# Patient Record
Sex: FEMALE | Race: WHITE | NOT HISPANIC OR LATINO | URBAN - METROPOLITAN AREA
[De-identification: names, ages, dates, MRNs, and addresses within clinical notes are randomized per-mention and may not be internally consistent; named-entity substitution may affect disease eponyms.]

---

## 2017-06-17 ENCOUNTER — INPATIENT (INPATIENT)
Facility: HOSPITAL | Age: 47
LOS: 0 days | Discharge: HOME | End: 2017-06-18
Attending: INTERNAL MEDICINE | Admitting: FAMILY MEDICINE

## 2017-06-17 DIAGNOSIS — E11.9 TYPE 2 DIABETES MELLITUS WITHOUT COMPLICATIONS: ICD-10-CM

## 2017-06-17 DIAGNOSIS — K85.90 ACUTE PANCREATITIS WITHOUT NECROSIS OR INFECTION, UNSPECIFIED: ICD-10-CM

## 2017-06-17 DIAGNOSIS — I25.10 ATHEROSCLEROTIC HEART DISEASE OF NATIVE CORONARY ARTERY WITHOUT ANGINA PECTORIS: ICD-10-CM

## 2017-06-28 DIAGNOSIS — I25.10 ATHEROSCLEROTIC HEART DISEASE OF NATIVE CORONARY ARTERY WITHOUT ANGINA PECTORIS: ICD-10-CM

## 2017-06-28 DIAGNOSIS — Z87.891 PERSONAL HISTORY OF NICOTINE DEPENDENCE: ICD-10-CM

## 2017-06-28 DIAGNOSIS — E66.01 MORBID (SEVERE) OBESITY DUE TO EXCESS CALORIES: ICD-10-CM

## 2017-06-28 DIAGNOSIS — Z95.5 PRESENCE OF CORONARY ANGIOPLASTY IMPLANT AND GRAFT: ICD-10-CM

## 2017-06-28 DIAGNOSIS — F32.9 MAJOR DEPRESSIVE DISORDER, SINGLE EPISODE, UNSPECIFIED: ICD-10-CM

## 2017-06-28 DIAGNOSIS — I10 ESSENTIAL (PRIMARY) HYPERTENSION: ICD-10-CM

## 2017-06-28 DIAGNOSIS — E78.5 HYPERLIPIDEMIA, UNSPECIFIED: ICD-10-CM

## 2017-06-28 DIAGNOSIS — Z79.4 LONG TERM (CURRENT) USE OF INSULIN: ICD-10-CM

## 2017-06-28 DIAGNOSIS — R07.9 CHEST PAIN, UNSPECIFIED: ICD-10-CM

## 2017-06-28 DIAGNOSIS — F41.9 ANXIETY DISORDER, UNSPECIFIED: ICD-10-CM

## 2017-06-28 DIAGNOSIS — E11.9 TYPE 2 DIABETES MELLITUS WITHOUT COMPLICATIONS: ICD-10-CM

## 2017-06-28 DIAGNOSIS — I25.2 OLD MYOCARDIAL INFARCTION: ICD-10-CM

## 2017-07-09 ENCOUNTER — INPATIENT (INPATIENT)
Facility: HOSPITAL | Age: 47
LOS: 1 days | Discharge: HOME | End: 2017-07-11
Attending: HOSPITALIST | Admitting: FAMILY MEDICINE

## 2017-07-09 DIAGNOSIS — I25.10 ATHEROSCLEROTIC HEART DISEASE OF NATIVE CORONARY ARTERY WITHOUT ANGINA PECTORIS: ICD-10-CM

## 2017-07-09 DIAGNOSIS — E11.9 TYPE 2 DIABETES MELLITUS WITHOUT COMPLICATIONS: ICD-10-CM

## 2017-07-09 DIAGNOSIS — K85.90 ACUTE PANCREATITIS WITHOUT NECROSIS OR INFECTION, UNSPECIFIED: ICD-10-CM

## 2017-07-14 DIAGNOSIS — Z79.84 LONG TERM (CURRENT) USE OF ORAL HYPOGLYCEMIC DRUGS: ICD-10-CM

## 2017-07-14 DIAGNOSIS — Z87.891 PERSONAL HISTORY OF NICOTINE DEPENDENCE: ICD-10-CM

## 2017-07-14 DIAGNOSIS — I25.2 OLD MYOCARDIAL INFARCTION: ICD-10-CM

## 2017-07-14 DIAGNOSIS — Z95.5 PRESENCE OF CORONARY ANGIOPLASTY IMPLANT AND GRAFT: ICD-10-CM

## 2017-07-14 DIAGNOSIS — I25.10 ATHEROSCLEROTIC HEART DISEASE OF NATIVE CORONARY ARTERY WITHOUT ANGINA PECTORIS: ICD-10-CM

## 2017-07-14 DIAGNOSIS — I10 ESSENTIAL (PRIMARY) HYPERTENSION: ICD-10-CM

## 2017-07-14 DIAGNOSIS — R19.7 DIARRHEA, UNSPECIFIED: ICD-10-CM

## 2017-07-14 DIAGNOSIS — K85.90 ACUTE PANCREATITIS WITHOUT NECROSIS OR INFECTION, UNSPECIFIED: ICD-10-CM

## 2017-07-14 DIAGNOSIS — E78.5 HYPERLIPIDEMIA, UNSPECIFIED: ICD-10-CM

## 2017-07-14 DIAGNOSIS — E83.42 HYPOMAGNESEMIA: ICD-10-CM

## 2017-07-14 DIAGNOSIS — E11.9 TYPE 2 DIABETES MELLITUS WITHOUT COMPLICATIONS: ICD-10-CM

## 2017-07-21 ENCOUNTER — TRANSCRIPTION ENCOUNTER (OUTPATIENT)
Age: 47
End: 2017-07-21

## 2017-10-12 ENCOUNTER — EMERGENCY (EMERGENCY)
Facility: HOSPITAL | Age: 47
LOS: 0 days | Discharge: HOME | End: 2017-10-12
Admitting: FAMILY MEDICINE

## 2017-10-12 DIAGNOSIS — Z79.899 OTHER LONG TERM (CURRENT) DRUG THERAPY: ICD-10-CM

## 2017-10-12 DIAGNOSIS — E11.9 TYPE 2 DIABETES MELLITUS WITHOUT COMPLICATIONS: ICD-10-CM

## 2017-10-12 DIAGNOSIS — I25.10 ATHEROSCLEROTIC HEART DISEASE OF NATIVE CORONARY ARTERY WITHOUT ANGINA PECTORIS: ICD-10-CM

## 2017-10-12 DIAGNOSIS — Z95.5 PRESENCE OF CORONARY ANGIOPLASTY IMPLANT AND GRAFT: ICD-10-CM

## 2017-10-12 DIAGNOSIS — I10 ESSENTIAL (PRIMARY) HYPERTENSION: ICD-10-CM

## 2017-10-12 DIAGNOSIS — Z79.84 LONG TERM (CURRENT) USE OF ORAL HYPOGLYCEMIC DRUGS: ICD-10-CM

## 2017-10-12 DIAGNOSIS — R07.89 OTHER CHEST PAIN: ICD-10-CM

## 2017-10-12 DIAGNOSIS — K85.90 ACUTE PANCREATITIS WITHOUT NECROSIS OR INFECTION, UNSPECIFIED: ICD-10-CM

## 2017-10-12 DIAGNOSIS — Z79.82 LONG TERM (CURRENT) USE OF ASPIRIN: ICD-10-CM

## 2018-04-06 ENCOUNTER — TRANSCRIPTION ENCOUNTER (OUTPATIENT)
Age: 48
End: 2018-04-06

## 2018-07-05 ENCOUNTER — EMERGENCY (EMERGENCY)
Facility: HOSPITAL | Age: 48
LOS: 0 days | Discharge: HOME | End: 2018-07-05
Attending: EMERGENCY MEDICINE | Admitting: EMERGENCY MEDICINE

## 2018-07-05 VITALS
DIASTOLIC BLOOD PRESSURE: 72 MMHG | SYSTOLIC BLOOD PRESSURE: 137 MMHG | TEMPERATURE: 98 F | OXYGEN SATURATION: 99 % | RESPIRATION RATE: 18 BRPM | HEART RATE: 72 BPM

## 2018-07-05 VITALS
OXYGEN SATURATION: 98 % | HEART RATE: 69 BPM | DIASTOLIC BLOOD PRESSURE: 90 MMHG | RESPIRATION RATE: 19 BRPM | TEMPERATURE: 98 F | SYSTOLIC BLOOD PRESSURE: 174 MMHG

## 2018-07-05 DIAGNOSIS — I48.91 UNSPECIFIED ATRIAL FIBRILLATION: ICD-10-CM

## 2018-07-05 DIAGNOSIS — I25.10 ATHEROSCLEROTIC HEART DISEASE OF NATIVE CORONARY ARTERY WITHOUT ANGINA PECTORIS: ICD-10-CM

## 2018-07-05 DIAGNOSIS — R42 DIZZINESS AND GIDDINESS: ICD-10-CM

## 2018-07-05 DIAGNOSIS — Z95.5 PRESENCE OF CORONARY ANGIOPLASTY IMPLANT AND GRAFT: ICD-10-CM

## 2018-07-05 DIAGNOSIS — E11.9 TYPE 2 DIABETES MELLITUS WITHOUT COMPLICATIONS: ICD-10-CM

## 2018-07-05 DIAGNOSIS — Z79.84 LONG TERM (CURRENT) USE OF ORAL HYPOGLYCEMIC DRUGS: ICD-10-CM

## 2018-07-05 DIAGNOSIS — Z79.899 OTHER LONG TERM (CURRENT) DRUG THERAPY: ICD-10-CM

## 2018-07-05 DIAGNOSIS — R11.0 NAUSEA: ICD-10-CM

## 2018-07-05 DIAGNOSIS — I25.2 OLD MYOCARDIAL INFARCTION: ICD-10-CM

## 2018-07-05 LAB
ANION GAP SERPL CALC-SCNC: 14 MMOL/L — SIGNIFICANT CHANGE UP (ref 7–14)
BASOPHILS # BLD AUTO: 0.03 K/UL — SIGNIFICANT CHANGE UP (ref 0–0.2)
BASOPHILS NFR BLD AUTO: 0.4 % — SIGNIFICANT CHANGE UP (ref 0–1)
BUN SERPL-MCNC: 10 MG/DL — SIGNIFICANT CHANGE UP (ref 10–20)
CALCIUM SERPL-MCNC: 9.7 MG/DL — SIGNIFICANT CHANGE UP (ref 8.5–10.1)
CHLORIDE SERPL-SCNC: 97 MMOL/L — LOW (ref 98–110)
CK MB CFR SERPL CALC: 1.6 NG/ML — SIGNIFICANT CHANGE UP (ref 0.6–6.3)
CK SERPL-CCNC: 91 U/L — SIGNIFICANT CHANGE UP (ref 0–225)
CO2 SERPL-SCNC: 28 MMOL/L — SIGNIFICANT CHANGE UP (ref 17–32)
CREAT SERPL-MCNC: 0.6 MG/DL — LOW (ref 0.7–1.5)
EOSINOPHIL # BLD AUTO: 0.12 K/UL — SIGNIFICANT CHANGE UP (ref 0–0.7)
EOSINOPHIL NFR BLD AUTO: 1.7 % — SIGNIFICANT CHANGE UP (ref 0–8)
GLUCOSE SERPL-MCNC: 195 MG/DL — HIGH (ref 70–99)
HCT VFR BLD CALC: 38.8 % — SIGNIFICANT CHANGE UP (ref 37–47)
HGB BLD-MCNC: 13.2 G/DL — SIGNIFICANT CHANGE UP (ref 12–16)
IMM GRANULOCYTES NFR BLD AUTO: 0.3 % — SIGNIFICANT CHANGE UP (ref 0.1–0.3)
LYMPHOCYTES # BLD AUTO: 1.62 K/UL — SIGNIFICANT CHANGE UP (ref 1.2–3.4)
LYMPHOCYTES # BLD AUTO: 22.8 % — SIGNIFICANT CHANGE UP (ref 20.5–51.1)
MAGNESIUM SERPL-MCNC: 1.6 MG/DL — LOW (ref 1.8–2.4)
MCHC RBC-ENTMCNC: 30.9 PG — SIGNIFICANT CHANGE UP (ref 27–31)
MCHC RBC-ENTMCNC: 34 G/DL — SIGNIFICANT CHANGE UP (ref 32–37)
MCV RBC AUTO: 90.9 FL — SIGNIFICANT CHANGE UP (ref 81–99)
MONOCYTES # BLD AUTO: 0.33 K/UL — SIGNIFICANT CHANGE UP (ref 0.1–0.6)
MONOCYTES NFR BLD AUTO: 4.6 % — SIGNIFICANT CHANGE UP (ref 1.7–9.3)
NEUTROPHILS # BLD AUTO: 4.99 K/UL — SIGNIFICANT CHANGE UP (ref 1.4–6.5)
NEUTROPHILS NFR BLD AUTO: 70.2 % — SIGNIFICANT CHANGE UP (ref 42.2–75.2)
PLATELET # BLD AUTO: 259 K/UL — SIGNIFICANT CHANGE UP (ref 130–400)
POTASSIUM SERPL-MCNC: 4.4 MMOL/L — SIGNIFICANT CHANGE UP (ref 3.5–5)
POTASSIUM SERPL-SCNC: 4.4 MMOL/L — SIGNIFICANT CHANGE UP (ref 3.5–5)
RBC # BLD: 4.27 M/UL — SIGNIFICANT CHANGE UP (ref 4.2–5.4)
RBC # FLD: 13.4 % — SIGNIFICANT CHANGE UP (ref 11.5–14.5)
SODIUM SERPL-SCNC: 139 MMOL/L — SIGNIFICANT CHANGE UP (ref 135–146)
TROPONIN T SERPL-MCNC: <0.01 NG/ML — SIGNIFICANT CHANGE UP
WBC # BLD: 7.11 K/UL — SIGNIFICANT CHANGE UP (ref 4.8–10.8)
WBC # FLD AUTO: 7.11 K/UL — SIGNIFICANT CHANGE UP (ref 4.8–10.8)

## 2018-07-05 RX ORDER — SODIUM CHLORIDE 9 MG/ML
1000 INJECTION INTRAMUSCULAR; INTRAVENOUS; SUBCUTANEOUS ONCE
Qty: 0 | Refills: 0 | Status: COMPLETED | OUTPATIENT
Start: 2018-07-05 | End: 2018-07-05

## 2018-07-05 RX ADMIN — SODIUM CHLORIDE 1000 MILLILITER(S): 9 INJECTION INTRAMUSCULAR; INTRAVENOUS; SUBCUTANEOUS at 18:33

## 2018-07-05 NOTE — ED PROVIDER NOTE - ATTENDING CONTRIBUTION TO CARE
46 yo female h/o CAD/ 4 stents (cardiologist Dr Callejas), DM c/o an episode of dizziness after having a BM earlier today.  Denies black or bloody stools, no vomiting or abdominal pain, no CP, SOB, fever, chills, change in exercise tolerance,  no paresthesias or focal weakness.  Reports compliance with medications.  No recent illness or travel, no change in meds.  States she is feeling much better. Well-appearing, female, resting on a stretcher, NAD, SHIN, mmm, nml exam including a detained neuro exam, nml gait.  Will check l;abd, ECG, reassess.

## 2018-07-05 NOTE — ED PROVIDER NOTE - OBJECTIVE STATEMENT
46yo F with PMHx CAD/PCI, DM, p/w dizziness after having a bowel movement earlier today. BM was diarrhea (chronic for patient), no melena or bloody stools. Assoc with nausea. Denies fever, headache, CP, SOB, cough, palpitations, vomiting, abd pain, dysuria, hematuria, leg swelling.   Card: Duvvuri

## 2018-07-05 NOTE — ED PROVIDER NOTE - NS ED ROS FT
Constitutional: No fever  ENMT:  No neck pain  Cardiac:  No chest pain  Respiratory:  No cough, SOB  GI:  No nausea, vomiting, abdominal pain. +diarrhea  :  No dysuria  MS:  No back pain  Neuro:  No headache. +lightheadedness  Skin:  No skin rash  Endocrine: +DM

## 2018-07-05 NOTE — ED PROVIDER NOTE - PHYSICAL EXAMINATION
Vital signs reviewed  GENERAL: Patient well appearing, NAD  HEAD: NCAT  EYES: PERRL, EOMI. No nystagmus  ENT: MMM  NECK: Supple, non tender,   RESPIRATORY: Normal respiratory effort. CTA B/L. No wheezing, rales, rhonchi  CARDIOVASCULAR: Regular rate and rhythm. Normal S1/S2. No murmurs, rubs or gallops.  ABDOMEN: Soft. Nondistended. Nontender. No guarding or rebound. No CVA tenderness.  MUSCULOSKELETAL/EXTREMITIES: Brisk cap refill. 2+ radial pulses. No leg edema.  SKIN:  Warm and dry  NEURO: AAOx3. No gross FND  PSYCHIATRIC: Cooperative. Affect appropriate

## 2018-07-05 NOTE — ED PROVIDER NOTE - PROGRESS NOTE DETAILS
Pt states will f/u with Dr. Sotelo outpatient. Strict return precautions given. I had a long discussion with the patient and her  prior to disposition,  I discussed results of all tests and recommended admission ( patient with h/o CAD/stent); she does not want to stay despite no clear cause for her symptoms.  She is A&Ox3, has capacity, her  participated in the decision making process and is comfortable with her going home.  Strict return precautions given, patient verbalized understanding and is amenable with the plan.

## 2018-10-18 ENCOUNTER — TRANSCRIPTION ENCOUNTER (OUTPATIENT)
Age: 48
End: 2018-10-18

## 2019-08-08 ENCOUNTER — OUTPATIENT (OUTPATIENT)
Dept: OUTPATIENT SERVICES | Facility: HOSPITAL | Age: 49
LOS: 1 days | Discharge: HOME | End: 2019-08-08

## 2019-08-08 PROBLEM — E11.9 TYPE 2 DIABETES MELLITUS WITHOUT COMPLICATIONS: Chronic | Status: ACTIVE | Noted: 2018-07-05

## 2019-08-08 PROBLEM — I48.91 UNSPECIFIED ATRIAL FIBRILLATION: Chronic | Status: ACTIVE | Noted: 2018-07-05

## 2019-08-22 PROBLEM — Z00.00 ENCOUNTER FOR PREVENTIVE HEALTH EXAMINATION: Status: ACTIVE | Noted: 2019-08-22

## 2019-09-10 ENCOUNTER — OUTPATIENT (OUTPATIENT)
Dept: OUTPATIENT SERVICES | Facility: HOSPITAL | Age: 49
LOS: 1 days | Discharge: HOME | End: 2019-09-10

## 2019-09-10 ENCOUNTER — LABORATORY RESULT (OUTPATIENT)
Age: 49
End: 2019-09-10

## 2019-09-10 ENCOUNTER — APPOINTMENT (OUTPATIENT)
Dept: OBGYN | Facility: CLINIC | Age: 49
End: 2019-09-10
Payer: COMMERCIAL

## 2019-09-10 VITALS
SYSTOLIC BLOOD PRESSURE: 144 MMHG | DIASTOLIC BLOOD PRESSURE: 80 MMHG | BODY MASS INDEX: 44.68 KG/M2 | WEIGHT: 278 LBS | HEART RATE: 89 BPM | HEIGHT: 66 IN | RESPIRATION RATE: 20 BRPM

## 2019-09-10 DIAGNOSIS — L73.8 OTHER SPECIFIED FOLLICULAR DISORDERS: ICD-10-CM

## 2019-09-10 DIAGNOSIS — B37.3 CANDIDIASIS OF VULVA AND VAGINA: ICD-10-CM

## 2019-09-10 PROCEDURE — 99386 PREV VISIT NEW AGE 40-64: CPT

## 2019-09-10 NOTE — CHIEF COMPLAINT
[Annual Visit] : annual visit [FreeTextEntry1] : MARCIAL MCCORMICK is a 49 year female for annual\par

## 2019-09-11 DIAGNOSIS — Z01.419 ENCOUNTER FOR GYNECOLOGICAL EXAMINATION (GENERAL) (ROUTINE) WITHOUT ABNORMAL FINDINGS: ICD-10-CM

## 2019-09-17 LAB
A VAGINAE DNA VAG QL NAA+PROBE: NORMAL
BVAB2 DNA VAG QL NAA+PROBE: NORMAL
C KRUSEI DNA VAG QL NAA+PROBE: NEGATIVE
C TRACH RRNA SPEC QL NAA+PROBE: NEGATIVE
MEGA1 DNA VAG QL NAA+PROBE: NORMAL
N GONORRHOEA RRNA SPEC QL NAA+PROBE: NEGATIVE
T VAGINALIS RRNA SPEC QL NAA+PROBE: NEGATIVE

## 2019-10-06 ENCOUNTER — INPATIENT (INPATIENT)
Facility: HOSPITAL | Age: 49
LOS: 0 days | Discharge: HOME | End: 2019-10-07
Attending: INTERNAL MEDICINE | Admitting: INTERNAL MEDICINE
Payer: COMMERCIAL

## 2019-10-06 VITALS
TEMPERATURE: 97 F | SYSTOLIC BLOOD PRESSURE: 206 MMHG | DIASTOLIC BLOOD PRESSURE: 102 MMHG | HEART RATE: 147 BPM | RESPIRATION RATE: 20 BRPM

## 2019-10-06 DIAGNOSIS — Z98.891 HISTORY OF UTERINE SCAR FROM PREVIOUS SURGERY: Chronic | ICD-10-CM

## 2019-10-06 LAB
ALBUMIN SERPL ELPH-MCNC: 4.6 G/DL — SIGNIFICANT CHANGE UP (ref 3.5–5.2)
ALP SERPL-CCNC: 91 U/L — SIGNIFICANT CHANGE UP (ref 30–115)
ALT FLD-CCNC: 43 U/L — HIGH (ref 0–41)
ANION GAP SERPL CALC-SCNC: 16 MMOL/L — HIGH (ref 7–14)
AST SERPL-CCNC: 24 U/L — SIGNIFICANT CHANGE UP (ref 0–41)
BILIRUB SERPL-MCNC: 0.4 MG/DL — SIGNIFICANT CHANGE UP (ref 0.2–1.2)
BUN SERPL-MCNC: 16 MG/DL — SIGNIFICANT CHANGE UP (ref 10–20)
CALCIUM SERPL-MCNC: 10.1 MG/DL — SIGNIFICANT CHANGE UP (ref 8.5–10.1)
CHLORIDE SERPL-SCNC: 101 MMOL/L — SIGNIFICANT CHANGE UP (ref 98–110)
CO2 SERPL-SCNC: 24 MMOL/L — SIGNIFICANT CHANGE UP (ref 17–32)
CREAT SERPL-MCNC: 0.8 MG/DL — SIGNIFICANT CHANGE UP (ref 0.7–1.5)
GLUCOSE BLDC GLUCOMTR-MCNC: 203 MG/DL — HIGH (ref 70–99)
GLUCOSE SERPL-MCNC: 192 MG/DL — HIGH (ref 70–99)
HCT VFR BLD CALC: 46.2 % — SIGNIFICANT CHANGE UP (ref 37–47)
HGB BLD-MCNC: 15.6 G/DL — SIGNIFICANT CHANGE UP (ref 12–16)
MCHC RBC-ENTMCNC: 31.6 PG — HIGH (ref 27–31)
MCHC RBC-ENTMCNC: 33.8 G/DL — SIGNIFICANT CHANGE UP (ref 32–37)
MCV RBC AUTO: 93.5 FL — SIGNIFICANT CHANGE UP (ref 81–99)
NRBC # BLD: 0 /100 WBCS — SIGNIFICANT CHANGE UP (ref 0–0)
PLATELET # BLD AUTO: 248 K/UL — SIGNIFICANT CHANGE UP (ref 130–400)
POTASSIUM SERPL-MCNC: 4.2 MMOL/L — SIGNIFICANT CHANGE UP (ref 3.5–5)
POTASSIUM SERPL-SCNC: 4.2 MMOL/L — SIGNIFICANT CHANGE UP (ref 3.5–5)
PROT SERPL-MCNC: 7.6 G/DL — SIGNIFICANT CHANGE UP (ref 6–8)
RBC # BLD: 4.94 M/UL — SIGNIFICANT CHANGE UP (ref 4.2–5.4)
RBC # FLD: 13.5 % — SIGNIFICANT CHANGE UP (ref 11.5–14.5)
SODIUM SERPL-SCNC: 141 MMOL/L — SIGNIFICANT CHANGE UP (ref 135–146)
TROPONIN T SERPL-MCNC: <0.01 NG/ML — SIGNIFICANT CHANGE UP
WBC # BLD: 6.44 K/UL — SIGNIFICANT CHANGE UP (ref 4.8–10.8)
WBC # FLD AUTO: 6.44 K/UL — SIGNIFICANT CHANGE UP (ref 4.8–10.8)

## 2019-10-06 PROCEDURE — 93010 ELECTROCARDIOGRAM REPORT: CPT

## 2019-10-06 PROCEDURE — 99291 CRITICAL CARE FIRST HOUR: CPT

## 2019-10-06 PROCEDURE — 71045 X-RAY EXAM CHEST 1 VIEW: CPT | Mod: 26

## 2019-10-06 PROCEDURE — 71275 CT ANGIOGRAPHY CHEST: CPT | Mod: 26

## 2019-10-06 RX ORDER — ATORVASTATIN CALCIUM 80 MG/1
80 TABLET, FILM COATED ORAL AT BEDTIME
Refills: 0 | Status: DISCONTINUED | OUTPATIENT
Start: 2019-10-06 | End: 2019-10-07

## 2019-10-06 RX ORDER — ENOXAPARIN SODIUM 100 MG/ML
125 INJECTION SUBCUTANEOUS ONCE
Refills: 0 | Status: DISCONTINUED | OUTPATIENT
Start: 2019-10-06 | End: 2019-10-06

## 2019-10-06 RX ORDER — CHLORHEXIDINE GLUCONATE 213 G/1000ML
1 SOLUTION TOPICAL
Refills: 0 | Status: DISCONTINUED | OUTPATIENT
Start: 2019-10-06 | End: 2019-10-07

## 2019-10-06 RX ORDER — INSULIN LISPRO 100/ML
16 VIAL (ML) SUBCUTANEOUS
Refills: 0 | Status: DISCONTINUED | OUTPATIENT
Start: 2019-10-06 | End: 2019-10-07

## 2019-10-06 RX ORDER — DEXTROSE 50 % IN WATER 50 %
25 SYRINGE (ML) INTRAVENOUS ONCE
Refills: 0 | Status: DISCONTINUED | OUTPATIENT
Start: 2019-10-06 | End: 2019-10-07

## 2019-10-06 RX ORDER — CLOPIDOGREL BISULFATE 75 MG/1
75 TABLET, FILM COATED ORAL DAILY
Refills: 0 | Status: DISCONTINUED | OUTPATIENT
Start: 2019-10-06 | End: 2019-10-07

## 2019-10-06 RX ORDER — GLUCAGON INJECTION, SOLUTION 0.5 MG/.1ML
1 INJECTION, SOLUTION SUBCUTANEOUS ONCE
Refills: 0 | Status: DISCONTINUED | OUTPATIENT
Start: 2019-10-06 | End: 2019-10-07

## 2019-10-06 RX ORDER — METOPROLOL TARTRATE 50 MG
100 TABLET ORAL DAILY
Refills: 0 | Status: DISCONTINUED | OUTPATIENT
Start: 2019-10-06 | End: 2019-10-07

## 2019-10-06 RX ORDER — SODIUM CHLORIDE 9 MG/ML
1000 INJECTION, SOLUTION INTRAVENOUS
Refills: 0 | Status: DISCONTINUED | OUTPATIENT
Start: 2019-10-06 | End: 2019-10-07

## 2019-10-06 RX ORDER — ENOXAPARIN SODIUM 100 MG/ML
120 INJECTION SUBCUTANEOUS
Refills: 0 | Status: DISCONTINUED | OUTPATIENT
Start: 2019-10-06 | End: 2019-10-07

## 2019-10-06 RX ORDER — DEXTROSE 50 % IN WATER 50 %
12.5 SYRINGE (ML) INTRAVENOUS ONCE
Refills: 0 | Status: DISCONTINUED | OUTPATIENT
Start: 2019-10-06 | End: 2019-10-07

## 2019-10-06 RX ORDER — ENOXAPARIN SODIUM 100 MG/ML
120 INJECTION SUBCUTANEOUS ONCE
Refills: 0 | Status: COMPLETED | OUTPATIENT
Start: 2019-10-06 | End: 2019-10-06

## 2019-10-06 RX ORDER — INSULIN LISPRO 100/ML
16 VIAL (ML) SUBCUTANEOUS
Refills: 0 | Status: DISCONTINUED | OUTPATIENT
Start: 2019-10-06 | End: 2019-10-06

## 2019-10-06 RX ORDER — INSULIN LISPRO 100/ML
VIAL (ML) SUBCUTANEOUS
Refills: 0 | Status: DISCONTINUED | OUTPATIENT
Start: 2019-10-06 | End: 2019-10-07

## 2019-10-06 RX ORDER — ASPIRIN/CALCIUM CARB/MAGNESIUM 324 MG
81 TABLET ORAL DAILY
Refills: 0 | Status: DISCONTINUED | OUTPATIENT
Start: 2019-10-06 | End: 2019-10-07

## 2019-10-06 RX ORDER — INSULIN GLARGINE 100 [IU]/ML
70 INJECTION, SOLUTION SUBCUTANEOUS AT BEDTIME
Refills: 0 | Status: DISCONTINUED | OUTPATIENT
Start: 2019-10-06 | End: 2019-10-07

## 2019-10-06 RX ORDER — INSULIN GLARGINE 100 [IU]/ML
70 INJECTION, SOLUTION SUBCUTANEOUS AT BEDTIME
Refills: 0 | Status: DISCONTINUED | OUTPATIENT
Start: 2019-10-06 | End: 2019-10-06

## 2019-10-06 RX ORDER — DILTIAZEM HCL 120 MG
20 CAPSULE, EXT RELEASE 24 HR ORAL ONCE
Refills: 0 | Status: COMPLETED | OUTPATIENT
Start: 2019-10-06 | End: 2019-10-06

## 2019-10-06 RX ORDER — DEXTROSE 50 % IN WATER 50 %
15 SYRINGE (ML) INTRAVENOUS ONCE
Refills: 0 | Status: DISCONTINUED | OUTPATIENT
Start: 2019-10-06 | End: 2019-10-07

## 2019-10-06 RX ADMIN — ENOXAPARIN SODIUM 120 MILLIGRAM(S): 100 INJECTION SUBCUTANEOUS at 20:31

## 2019-10-06 RX ADMIN — Medication 20 MILLIGRAM(S): at 16:48

## 2019-10-06 NOTE — ED PROVIDER NOTE - NS ED ROS FT
Constitutional:  No fevers or chills.  Eyes:  No visual changes, eye pain, or discharge.  ENT:  No hearing changes. No sore throat.  Neck:  No neck pain or stiffness.  Cardiac:  +CP.  Resp:  No cough, +SOB. No hemoptysis.   GI:  No nausea, vomiting, diarrhea, or abdominal pain.  :  No dysuria, frequency, or hematuria.  MSK:  No myalgias or joint pain/swelling.  Neuro:  No headache, +dizziness/weakness.  Skin:  No skin rash.

## 2019-10-06 NOTE — ED PROVIDER NOTE - CLINICAL SUMMARY MEDICAL DECISION MAKING FREE TEXT BOX
pt s/o to me by Dr. Ansari - 49y f h/o cad/mi/stents, dm, htn, marilin non-compliance w/cpap p/w new-onset rapid AF to 120s - rate controlled with cardizem, AC lovenox - ED w/u incl CTPA w/acute findings, no PE - Cardio/EP consulted, admitted to tele for further mgmt

## 2019-10-06 NOTE — ED PROVIDER NOTE - PHYSICAL EXAMINATION
PHYSICAL EXAM: I have reviewed current vital signs.  GENERAL: NAD.  HEAD:  Normocephalic, atraumatic.  EYES:  Conjunctiva and sclera clear.  ENT: MMM, no erythema/exudates.  NECK: Supple, full ROM.  CHEST/LUNG: Clear to auscultation bilaterally; no wheezes, rales, or rhonchi.  HEART: Irregularly irregular, normal S1 and S2; no murmurs, rubs, or gallops.  ABDOMEN: Soft, nontender, nondistended.  EXTREMITIES:  2+ peripheral pulses; FROM.  PSYCH: Cooperative, appropriate, normal mood and affect.  NEUROLOGY: A&O x 3. Motor 5/5. No focal neurological deficits.   SKIN: Warm and dry.

## 2019-10-06 NOTE — H&P ADULT - HISTORY OF PRESENT ILLNESS
49 year old female with history of HTN, DM, DLD, CAD, s/p cardiac stent x 4 on DAPT presents to ED with palpitations.     Has associated substernal chest pressure/SOB and dizziness/weakness. Mild chest pressure, non-radiating, no exacerbating/relieving factors. Denies any f/c, back pain, abd pain, n/v/d, injuries/traumas/falls, or syncope. No know hx of afib, cards is Dr. Sotelo. Took 2 baby ASA PTA. Patient's brother  suddenly from PE after going into afib.    In ED   206/102  RR 20 /min Temp (F)97.4 found to have new afib 49 year old female with history of HTN, DM, DLD, CAD, s/p cardiac stent x 4 on DAPT presents to ED with palpitations.  patient was doing well until this morning when she started to fel palpitations and chest tightness, reports similar 2 episodes in the past. has been experiencing sob with exertion, no chest pain, orthopnea, PND. no nausea, vomiting, abdominal pain, Diarrhea or urinary symptoms.     In ED   206/102  RR 20 /min Temp (F)97.4 found to have new afib, rate controlled with Cardizem IV 20 mg.

## 2019-10-06 NOTE — ED ADULT NURSE NOTE - CHPI ED NUR SYMPTOMS NEG
no congestion/no back pain/no shortness of breath/no diaphoresis/no syncope/no vomiting/no dizziness/no fever/no nausea/no chest pain/no chills

## 2019-10-06 NOTE — H&P ADULT - NSHPLABSRESULTS_GEN_ALL_CORE
15.6   6.44  )-----------( 248      ( 06 Oct 2019 16:13 )             46.2       10-06    141  |  101  |  16  ----------------------------<  192<H>  4.2   |  24  |  0.8    Ca    10.1      06 Oct 2019 16:13    TPro  7.6  /  Alb  4.6  /  TBili  0.4  /  DBili  x   /  AST  24  /  ALT  43<H>  /  AlkPhos  91  10-06                      Lactate Trend      CARDIAC MARKERS ( 06 Oct 2019 16:13 )  x     / <0.01 ng/mL / x     / x     / x            CAPILLARY BLOOD GLUCOSE      POCT Blood Glucose.: 172 mg/dL (06 Oct 2019 15:58)

## 2019-10-06 NOTE — ED ADULT NURSE NOTE - NSIMPLEMENTINTERV_GEN_ALL_ED
Implemented All Universal Safety Interventions:  [place] to call system. Call bell, personal items and telephone within reach. Instruct patient to call for assistance. Room bathroom lighting operational. Non-slip footwear when patient is off stretcher. Physically safe environment: no spills, clutter or unnecessary equipment. Stretcher in lowest position, wheels locked, appropriate side rails in place.

## 2019-10-06 NOTE — ED ADULT NURSE NOTE - OBJECTIVE STATEMENT
Patient c/o palpitations this afternoon while cooking. Denies n/v/f/chills/SOB at this time. Patient took 2 ASA tablets and nitro at home prior to arrival On assessment patients HR- 155, shows no signs of distress at this time.

## 2019-10-06 NOTE — CONSULT NOTE ADULT - SUBJECTIVE AND OBJECTIVE BOX
Patient was seen and examined earlier this evening by me in Main ER.   at bedside.      Patient is a 49y old  Female who presents with a chief complaint of palpitation (06 Oct 2019 21:35)      REASON FOR CONSULT: Atrial Fibrillation    HPI:  Ms. Emelina Gutierrez is an 49-year-old  woman with a past medical history of CAD, S/P MI, S/P PTCA/Stent (Infarct-related Artery, RCA, JUAN), S/P PTCA/Stent of LCx, Hypertension, Hyperlipidemia, Type II DM, Obesity and Obstructive Sleep Apnea.     She presented at [facility] ER because of palpitations associated with chest tightness.  She was in her usual state of health when she suddenly started feeling palpitations while she in her kitchen at home.   She admits to having had episodes of exertional shortness of breath.   She was found to be in rapid AFIB in the ER.    She currently appears comfortable.  Her rhythm remains in Atrial Fibrillation.  She denies any chest pain.  _______________________________    House Staff Admission Notes  49 year old female with history of HTN, DM, DLD, CAD, s/p cardiac stent x 4 on DAPT presents to ED with palpitations.  patient was doing well until this morning when she started to fel palpitations and chest tightness, reports similar 2 episodes in the past. has been experiencing sob with exertion, no chest pain, orthopnea, PND. no nausea, vomiting, abdominal pain, Diarrhea or urinary symptoms.     In ED   206/102  RR 20 /min Temp (F)97.4 found to have new afib, rate controlled with Cardizem IV 20 mg. (06 Oct 2019 21:35)      PAST MEDICAL & SURGICAL HISTORY:  DM (diabetes mellitus)  Afib  MI (myocardial infarction)  H/O  section          SOCIAL HISTORY: Quit smoking in 2016 when she had MI    FAMILY HISTORY:  Family history of early CAD  FH: stroke  FH: pulmonary embolism    No Known Allergies      MEDICATIONS  (STANDING):  aspirin  chewable 81 milliGRAM(s) Oral daily  atorvastatin 80 milliGRAM(s) Oral at bedtime  chlorhexidine 4% Liquid 1 Application(s) Topical <User Schedule>  clopidogrel Tablet 75 milliGRAM(s) Oral daily  dextrose 5%. 1000 milliLiter(s) (50 mL/Hr) IV Continuous <Continuous>  dextrose 50% Injectable 12.5 Gram(s) IV Push once  dextrose 50% Injectable 25 Gram(s) IV Push once  dextrose 50% Injectable 25 Gram(s) IV Push once  enoxaparin Injectable 120 milliGRAM(s) SubCutaneous two times a day  insulin glargine Injectable (LANTUS) 70 Unit(s) SubCutaneous at bedtime  insulin lispro (HumaLOG) corrective regimen sliding scale   SubCutaneous three times a day before meals  insulin lispro Injectable (HumaLOG) 16 Unit(s) SubCutaneous three times a day before meals  metoprolol succinate  milliGRAM(s) Oral daily  PARoxetine 30 milliGRAM(s) Oral daily    MEDICATIONS  (PRN):  dextrose 40% Gel 15 Gram(s) Oral once PRN Blood Glucose LESS THAN 70 milliGRAM(s)/deciliter  glucagon  Injectable 1 milliGRAM(s) IntraMuscular once PRN Glucose LESS THAN 70 milligrams/deciliter      Vital Signs Last 24 Hrs  T(C): 36.1 (06 Oct 2019 22:16), Max: 37.7 (06 Oct 2019 16:22)  T(F): 97 (06 Oct 2019 22:16), Max: 99.9 (06 Oct 2019 16:22)  HR: 80 (06 Oct 2019 22:16) (80 - 147)  BP: 131/61 (06 Oct 2019 22:16) (119/57 - 206/102)  BP(mean): --  RR: 18 (06 Oct 2019 22:16) (17 - 20)  SpO2: 98% (06 Oct 2019 22:16) (96% - 99%) I&O's Detail    PHYSICAL EXAM:  Not in distress  Normocephalic; atraumatic; obese  EOM's intact  No JVD; irregular rhythm; nl S1S2  Bilateral Breath sounds  Abdomen soft, no guarding  No edema  No focal neurologic deficits    REVIEW OF SYSTEMS  CONSTITUTIONAL:  No night sweats.  No fatigue, malaise, lethargy.  No fever or chills.  HEENT:  Eyes:  No visual changes.  No eye pain.  No eye discharge.  ENT:  No runny nose.  No epistaxis.  No sinus pain.  No sore throat.  No ear pain.  No congestion.  Stopped using CPAP in .  RESPIRATORY:  No cough.  No wheeze.  No hemoptysis.    CARDIOVASCULAR:  Palpitations with chest tightness and shortness of breath as described in HPI.  GASTROINTESTINAL:  No abdominal pain.  No nausea or vomiting.  No diarrhea or constipation.  No hematemesis.  No hematochezia.  No melena.  GENITOURINARY:  No urgency.  No frequency.  No dysuria.  No hematuria.  No obstructive symptoms.    MUSCULOSKELETAL:  No musculoskeletal pain.  No joint swelling.  No arthritis.  NEUROLOGICAL:    No headache or neck pain.  No syncope or seizure.   SKIN:  No rashes.  No lesions.  No wounds.  ENDOCRINE:  No unexplained weight loss.  No polydipsia.  No polyuria.  No polyphagia.  HEMATOLOGIC:  No anemia.  No purpura.  No petechiae.    ALLERGIC AND IMMUNOLOGIC:  No pruritus.      ECG: Atrial Fibrillation    ECHOCARDIOGRAM: pending    RADIOLOGY & ADDITIONAL STUDIES:  CXR  < from: Xray Chest 1 View AP/PA (10.06.19 @ 16:38) >  FINDINGS:    Support Devices: None.    Cardiac/Mediastinum/Hilum: Unremarkable.    Lung Parenchyma/Pleura: No focal parenchymal opacities, pleural effusion   or pneumothorax are present.    Skeleton/Soft Tissues: Unremarkable.    IMPRESSION:  No evidence of acute cardiopulmonary disease.      < end of copied text >    CT  < from: CT Angio Chest w/ IV Cont (10.06.19 @ 20:35) >  FINDINGS:    PULMONARY EMBOLUS: No pulmonary embolus.    LUNGS: Bibasilar subsegmental atelectasis. No focal consolidations,   pneumothorax, or pleural effusions. Patent central airways.    HEART/GREAT VESSELS: Cardiomegaly. No pericardial effusion. The thoracic   aorta and main pulmonary artery are within normal limits. Coronary artery   calcifications.    MEDIASTINUM/THORACIC NODES: Unremarkable.    VISUALIZED UPPER ABDOMEN: Postcholecystectomy.    BONES/SOFT TISSUES: Degenerative changes of the thoracolumbar spine.    TUBES/LINES: None.    IMPRESSION:    No pulmonary emboli or acute intrathoracic pathology.    < end of copied text >        LABS:                        15.6   6.44  )-----------( 248      ( 06 Oct 2019 16:13 )             46.2     10-    141  |  101  |  16  ----------------------------<  192<H>  4.2   |  24  |  0.8    Ca    10.1      06 Oct 2019 16:13    TPro  7.6  /  Alb  4.6  /  TBili  0.4  /  DBili  x   /  AST  24  /  ALT  43<H>  /  AlkPhos  91  10-06    CARDIAC MARKERS ( 06 Oct 2019 16:13 )  x     / <0.01 ng/mL / x     / x     / x

## 2019-10-06 NOTE — ED PROVIDER NOTE - PROGRESS NOTE DETAILS
I personally evaluated the patient. I reviewed the Resident’s or Physician Assistant’s note (as assigned above), and agree with the findings and plan except as documented in my note.   50 Y/O F HTN, DM, DLD, CAD, S/P CARDIAC STENT X 4 ON DAPT, C/O PALPITATIONS SINCE 3 PM THIS AFTERNOON WHILE PREPARING DINNER. + ASSOCIATED CHEST PRESSURE AND SOB. + DIZZINESS. PT DENIES ANY H/O AFIB. PT REPORTS BROTHER DIES OF PE. NO RECENT CHANGE IN MEDICATIONS. VITALS NOTED. ALERT OX3 NAD. OP NORMAL. NECK SUPPLE. THYROID NORMAL. LUNGS CLEAR B/L. TACHYCARDIA IRREG IRREG RHYTHM. ABD- SOFT NONTENDER. NO LEG EDEMA. CN 2-12 INTACT. NEURO EXAM NONFOCAL. CASE D/W DR. TAPIA, DR. HILLMAN IN ED AND WILL EVALUATE PT DR. HILLMAN AT PTS BEDSIDE. DR. HILLMAN AT PTS BEDSIDE. EP CONSULTED. PT SIGNED OUT TO DR. RAMACHANDRAN, FOLLOW UP CT SCAN, REASSESS AND DISPO.

## 2019-10-06 NOTE — H&P ADULT - ATTENDING COMMENTS
48 YO F with a PMH of HTN, DM, DLD, CAD, s/p cardiac stent x 4, and JOCELYNE (non-compliant with mask) who presents to the hospital with a c/o palpitations that started suddenly today at 1500. Associated with chest discomfort and head throbbing. Pt denies any excessive caffeine, cocaine/crack use, or cigarette smoking. In the ED, the pt was noted to be in AF with RVR. Given Cardizem 20 mg IV with good relief of symptoms. Cardio consulted. Physical exam with obesity. Otherwise WNLs. Labs and radiology as above. New onset AF with RVR, resolved. FU Cardio consult. Elevated CHADSVASC (4), will need to be started on AC. C/w beta-blocker therapy. Obtain echo. Send TSH. Admit to telem. JOCELYNE, poor compliance with CPAP. Will need to be reevaluated as out-pt. Hx of CAD. C/w home meds. Hx of HTN and DM. C/w home meds. GI and DVT PPX. Rest as per above note. 50 YO F with a PMH of HTN, DM, DLD, CAD, s/p cardiac stent x 4, and JOCELYNE (non-compliant with mask) who presents to the hospital with a c/o palpitations that started suddenly today at 1500. Associated with chest discomfort and head throbbing. Pt denies any excessive caffeine, cocaine/crack use, or cigarette smoking. In the ED, the pt was noted to be in AF with RVR. Given Cardizem 20 mg IV with good relief of symptoms. Cardio consulted. Physical exam with obesity. Otherwise WNLs. Labs and radiology as above. New onset AF with RVR, resolved. FU Cardio consult. Elevated CHADSVASC (4), will need to be started on AC. C/w beta-blocker therapy. Obtain echo. Send TSH. Admit to telem. [facility]. Short trial of IVFs. Repeat BMP in the AM. JOCELYNE, poor compliance with CPAP. Will need to be reevaluated as out-pt. Hx of CAD. C/w home meds. Hx of HTN and DM. C/w home meds. GI and DVT PPX. Rest as per above note.

## 2019-10-06 NOTE — CONSULT NOTE ADULT - ASSESSMENT
1] Atrial Fibrillation, new onset? vs PAF      [id number] score 4  - Indications for OAC discussed with the patient.  Risks, benefits and alternatives of OAC with Warfarin vs NOAC (Xarelto or Eliquis) discussed with the patient.  - Will recommend OAC with NOAC if patient agrees.  - Continue anticoagulation with Heparin for now  - Treatment options for Atrial Fib discussed with the patient.  Will hold off on IBIS guided DCCV since patient has not been on CPAP therapy for her JOCELYNE for past 3 years.  Patient states she has had episodes of palpitations.    2] CAD S/P PTCA/Stent (RCA/LCx)  - Will discontinue Aspirin when OAC is started  - For now continue DAPT    3] Obstructive Sleep Apnea  - Patient used her CPAP therapy briefly in 2016 after her MI.  She has not been on CPAP for >3 years now.  - Will need to be re-evaluated as outpatient.    4] Hypertension  - Continue beta-blocker therapy.  Adjust dose in am if rate of AFIB still >100 beats per minute    5] Type II DM  - Glycemic control    6] Hyperlipidemia  - Continue statin therapy    Omero Giraldo MD (covering for Dr. Sean Callejas)  [phone] Office

## 2019-10-06 NOTE — ED PROVIDER NOTE - OBJECTIVE STATEMENT
50yo F with PMH of HTN, DM, DLD, CAD, s/p cardiac stent x 4 on DAPT presenting to ED with palpitations that started Y/O F HTN, DM, DLD, CAD, S/P CARDIAC STENT X 4 ON DAPT, C/O PALPITATIONS SINCE 3 PM THIS AFTERNOON WHILE PREPARING DINNER. + ASSOCIATED CHEST PRESSURE AND SOB. + DIZZINESS. PT DENIES ANY H/O AFIB. PT REPORTS BROTHER DIES OF PE. NO RECENT CHANGE IN MEDICATIONS. VITALS NOTED. ALERT OX3 NAD. OP NORMAL. NECK SUPPLE. THYROID NORMAL. LUNGS CLEAR B/L. TACHYCARDIA IRREG IRREG RHYTHM. ABD- SOFT NONTENDER. NO LEG EDEMA. CN 2-12 INTACT. NEURO EXAM NONFOCA 50yo F with PMH of HTN, DM, DLD, CAD, s/p cardiac stent x 4 on DAPT presenting to ED with palpitations that started this afternoon around 3PM when preparing dinner. Has associated substernal chest pressure/SOB and dizziness/weakness. Mild chest pressure, non-radiating, no exacerbating/relieving factors. Denies any f/c, back pain, abd pain, n/v/d, injuries/traumas/falls, or syncope. No know hx of afib, cards is Dr. Sotelo. Took 2 baby ASA PTA. Patient's brother  suddenly from PE after going into afib.

## 2019-10-06 NOTE — H&P ADULT - ASSESSMENT
# NEW ATRIAL FIBRILLATION      # HTN   # CAD s/p PCI      # DM 49 year old female with history of HTN, DM, DLD, CAD, s/p cardiac stent x 4 on DAPT presents to ED with palpitations.      # NEW ATRIAL FIBRILLATION  - rate controlled s/p 2 mg Cardizem IV, start 30 mg Q6 PO  - Lovenox for now, switch to NOAC before d/c   - trop negative *1   - 2decho   - admit to tele   - TFT  - cardiology eval    # HTN   # CAD s/p PCI  - c/w aspirin, and Plavix   - c/w metoprolol   - c/w atorvaststin    # DM   - c/w insulin 70/16/16/16    # DVT PPX on AC   # DASH diet   # full code   # dispo d/c in 24 hours 49 year old female with history of HTN, DM, DLD, CAD, s/p cardiac stent x 4 on DAPT presents to ED with palpitations.      # NEW ATRIAL FIBRILLATION  - rate controlled s/p 2 mg Cardizem IV, start 30 mg Q6 PO  - Lovenox for now, switch to NOAC before d/c   - trop negative *1   - 2decho   - admit to tele   - TFT  - cardiology eval    # HTN   # CAD s/p PCI  - c/w aspirin, and Plavix  - c/w metoprolol   - c/w atorvaststin    # DM   - c/w insulin 70/16/16/16    # DVT PPX on AC   # DASH diet   # full code   # dispo d/c in 24 hours

## 2019-10-06 NOTE — H&P ADULT - NSHPPHYSICALEXAM_GEN_ALL_CORE
PHYSICAL EXAM:  GENERAL: NAD, lying in bed comfortably  HEAD:  Atraumatic, Normocephalic  EYES: EOMI, PERRLA, conjunctiva and sclera clear  ENT: Moist mucous membranes  NECK: Supple, No JVD  CHEST/LUNG: Clear to auscultation bilaterally; No rales, rhonchi, wheezing, or rubs. Unlabored respirations  HEART: Regular rate and rhythm; No murmurs, rubs, or gallops  ABDOMEN: Bowel sounds present; Soft, Nontender, Nondistended. No hepatomegally  EXTREMITIES:  2+ Peripheral Pulses, brisk capillary refill. No clubbing, cyanosis, or edema  NERVOUS SYSTEM:  Alert & Oriented X3, speech clear. No deficits   MSK: FROM all 4 extremities, full and equal strength  SKIN: No rashes or lesions GENERAL: NAD, lying in bed comfortably  HEAD:  Atraumatic, Normocephalic  EYES: EOMI, PERRLA, conjunctiva and sclera clear  ENT: Moist mucous membranes  CHEST/LUNG: Clear to auscultation bilaterally  HEART: Regular rate and rhythm; No murmurs, rubs, or gallops  ABDOMEN: Soft, Nontender, Nondistended. No hepatomegally  EXTREMITIES:  2+ Peripheral Pulses, brisk capillary refill. No clubbing, cyanosis, or edema  NERVOUS SYSTEM:  Alert & Oriented X3, speech clear. No deficits   MSK: FROM all 4 extremities, full and equal strength  SKIN: No rashes or lesions

## 2019-10-06 NOTE — H&P ADULT - NSICDXFAMILYHX_GEN_ALL_CORE_FT
FAMILY HISTORY:  FH: pulmonary embolism FAMILY HISTORY:  Family history of early CAD  FH: pulmonary embolism  FH: stroke

## 2019-10-07 ENCOUNTER — TRANSCRIPTION ENCOUNTER (OUTPATIENT)
Age: 49
End: 2019-10-07

## 2019-10-07 VITALS
SYSTOLIC BLOOD PRESSURE: 141 MMHG | DIASTOLIC BLOOD PRESSURE: 76 MMHG | HEART RATE: 100 BPM | RESPIRATION RATE: 18 BRPM | TEMPERATURE: 97 F | OXYGEN SATURATION: 98 %

## 2019-10-07 LAB
ALBUMIN SERPL ELPH-MCNC: 4.2 G/DL — SIGNIFICANT CHANGE UP (ref 3.5–5.2)
ALP SERPL-CCNC: 71 U/L — SIGNIFICANT CHANGE UP (ref 30–115)
ALT FLD-CCNC: 37 U/L — SIGNIFICANT CHANGE UP (ref 0–41)
ANION GAP SERPL CALC-SCNC: 18 MMOL/L — HIGH (ref 7–14)
AST SERPL-CCNC: 19 U/L — SIGNIFICANT CHANGE UP (ref 0–41)
BASOPHILS # BLD AUTO: 0.03 K/UL — SIGNIFICANT CHANGE UP (ref 0–0.2)
BASOPHILS NFR BLD AUTO: 0.6 % — SIGNIFICANT CHANGE UP (ref 0–1)
BILIRUB SERPL-MCNC: 0.4 MG/DL — SIGNIFICANT CHANGE UP (ref 0.2–1.2)
BUN SERPL-MCNC: 15 MG/DL — SIGNIFICANT CHANGE UP (ref 10–20)
CALCIUM SERPL-MCNC: 9.1 MG/DL — SIGNIFICANT CHANGE UP (ref 8.5–10.1)
CHLORIDE SERPL-SCNC: 100 MMOL/L — SIGNIFICANT CHANGE UP (ref 98–110)
CO2 SERPL-SCNC: 22 MMOL/L — SIGNIFICANT CHANGE UP (ref 17–32)
CREAT SERPL-MCNC: 0.6 MG/DL — LOW (ref 0.7–1.5)
EOSINOPHIL # BLD AUTO: 0.12 K/UL — SIGNIFICANT CHANGE UP (ref 0–0.7)
EOSINOPHIL NFR BLD AUTO: 2.6 % — SIGNIFICANT CHANGE UP (ref 0–8)
ESTIMATED AVERAGE GLUCOSE: 237 MG/DL — HIGH (ref 68–114)
GLUCOSE BLDC GLUCOMTR-MCNC: 156 MG/DL — HIGH (ref 70–99)
GLUCOSE BLDC GLUCOMTR-MCNC: 158 MG/DL — HIGH (ref 70–99)
GLUCOSE BLDC GLUCOMTR-MCNC: 163 MG/DL — HIGH (ref 70–99)
GLUCOSE SERPL-MCNC: 166 MG/DL — HIGH (ref 70–99)
HBA1C BLD-MCNC: 9.9 % — HIGH (ref 4–5.6)
HCT VFR BLD CALC: 40.6 % — SIGNIFICANT CHANGE UP (ref 37–47)
HGB BLD-MCNC: 13.3 G/DL — SIGNIFICANT CHANGE UP (ref 12–16)
IMM GRANULOCYTES NFR BLD AUTO: 0.6 % — HIGH (ref 0.1–0.3)
LYMPHOCYTES # BLD AUTO: 2.04 K/UL — SIGNIFICANT CHANGE UP (ref 1.2–3.4)
LYMPHOCYTES # BLD AUTO: 43.7 % — SIGNIFICANT CHANGE UP (ref 20.5–51.1)
MAGNESIUM SERPL-MCNC: 1.8 MG/DL — SIGNIFICANT CHANGE UP (ref 1.8–2.4)
MCHC RBC-ENTMCNC: 31.1 PG — HIGH (ref 27–31)
MCHC RBC-ENTMCNC: 32.8 G/DL — SIGNIFICANT CHANGE UP (ref 32–37)
MCV RBC AUTO: 95.1 FL — SIGNIFICANT CHANGE UP (ref 81–99)
MONOCYTES # BLD AUTO: 0.34 K/UL — SIGNIFICANT CHANGE UP (ref 0.1–0.6)
MONOCYTES NFR BLD AUTO: 7.3 % — SIGNIFICANT CHANGE UP (ref 1.7–9.3)
NEUTROPHILS # BLD AUTO: 2.11 K/UL — SIGNIFICANT CHANGE UP (ref 1.4–6.5)
NEUTROPHILS NFR BLD AUTO: 45.2 % — SIGNIFICANT CHANGE UP (ref 42.2–75.2)
NRBC # BLD: 0 /100 WBCS — SIGNIFICANT CHANGE UP (ref 0–0)
PLATELET # BLD AUTO: 212 K/UL — SIGNIFICANT CHANGE UP (ref 130–400)
POTASSIUM SERPL-MCNC: 4.1 MMOL/L — SIGNIFICANT CHANGE UP (ref 3.5–5)
POTASSIUM SERPL-SCNC: 4.1 MMOL/L — SIGNIFICANT CHANGE UP (ref 3.5–5)
PROT SERPL-MCNC: 6.8 G/DL — SIGNIFICANT CHANGE UP (ref 6–8)
RBC # BLD: 4.27 M/UL — SIGNIFICANT CHANGE UP (ref 4.2–5.4)
RBC # FLD: 13.6 % — SIGNIFICANT CHANGE UP (ref 11.5–14.5)
SODIUM SERPL-SCNC: 140 MMOL/L — SIGNIFICANT CHANGE UP (ref 135–146)
TSH SERPL-MCNC: 1.48 UIU/ML — SIGNIFICANT CHANGE UP (ref 0.27–4.2)
WBC # BLD: 4.67 K/UL — LOW (ref 4.8–10.8)
WBC # FLD AUTO: 4.67 K/UL — LOW (ref 4.8–10.8)

## 2019-10-07 PROCEDURE — 99223 1ST HOSP IP/OBS HIGH 75: CPT | Mod: AI

## 2019-10-07 PROCEDURE — 93010 ELECTROCARDIOGRAM REPORT: CPT

## 2019-10-07 PROCEDURE — 93306 TTE W/DOPPLER COMPLETE: CPT | Mod: 26

## 2019-10-07 RX ORDER — INFLUENZA VIRUS VACCINE 15; 15; 15; 15 UG/.5ML; UG/.5ML; UG/.5ML; UG/.5ML
0.5 SUSPENSION INTRAMUSCULAR ONCE
Refills: 0 | Status: DISCONTINUED | OUTPATIENT
Start: 2019-10-07 | End: 2019-10-07

## 2019-10-07 RX ORDER — RIVAROXABAN 15 MG-20MG
1 KIT ORAL
Qty: 30 | Refills: 0
Start: 2019-10-07 | End: 2019-11-05

## 2019-10-07 RX ORDER — METOPROLOL TARTRATE 50 MG
1 TABLET ORAL
Qty: 0 | Refills: 0 | DISCHARGE

## 2019-10-07 RX ORDER — CLOPIDOGREL BISULFATE 75 MG/1
1 TABLET, FILM COATED ORAL
Qty: 30 | Refills: 0
Start: 2019-10-07 | End: 2019-11-05

## 2019-10-07 RX ORDER — METOPROLOL TARTRATE 50 MG
1 TABLET ORAL
Qty: 30 | Refills: 0
Start: 2019-10-07 | End: 2019-11-05

## 2019-10-07 RX ORDER — RIVAROXABAN 15 MG-20MG
20 KIT ORAL ONCE
Refills: 0 | Status: COMPLETED | OUTPATIENT
Start: 2019-10-07 | End: 2019-10-07

## 2019-10-07 RX ORDER — METOPROLOL TARTRATE 50 MG
100 TABLET ORAL
Refills: 0 | Status: DISCONTINUED | OUTPATIENT
Start: 2019-10-07 | End: 2019-10-07

## 2019-10-07 RX ORDER — IBUPROFEN 200 MG
400 TABLET ORAL EVERY 6 HOURS
Refills: 0 | Status: DISCONTINUED | OUTPATIENT
Start: 2019-10-07 | End: 2019-10-07

## 2019-10-07 RX ORDER — RIVAROXABAN 15 MG-20MG
20 KIT ORAL DAILY
Refills: 0 | Status: DISCONTINUED | OUTPATIENT
Start: 2019-10-07 | End: 2019-10-07

## 2019-10-07 RX ORDER — METOPROLOL TARTRATE 50 MG
50 TABLET ORAL
Refills: 0 | Status: DISCONTINUED | OUTPATIENT
Start: 2019-10-07 | End: 2019-10-07

## 2019-10-07 RX ADMIN — Medication 100 MILLIGRAM(S): at 05:30

## 2019-10-07 RX ADMIN — Medication 16 UNIT(S): at 12:56

## 2019-10-07 RX ADMIN — Medication 1: at 17:00

## 2019-10-07 RX ADMIN — CLOPIDOGREL BISULFATE 75 MILLIGRAM(S): 75 TABLET, FILM COATED ORAL at 12:55

## 2019-10-07 RX ADMIN — Medication 1: at 08:43

## 2019-10-07 RX ADMIN — Medication 1: at 12:56

## 2019-10-07 RX ADMIN — Medication 16 UNIT(S): at 17:00

## 2019-10-07 RX ADMIN — Medication 81 MILLIGRAM(S): at 12:55

## 2019-10-07 RX ADMIN — RIVAROXABAN 20 MILLIGRAM(S): KIT at 18:29

## 2019-10-07 RX ADMIN — Medication 16 UNIT(S): at 08:43

## 2019-10-07 RX ADMIN — Medication 30 MILLIGRAM(S): at 12:55

## 2019-10-07 RX ADMIN — RIVAROXABAN 20 MILLIGRAM(S): KIT at 20:43

## 2019-10-07 RX ADMIN — ENOXAPARIN SODIUM 120 MILLIGRAM(S): 100 INJECTION SUBCUTANEOUS at 05:30

## 2019-10-07 NOTE — DISCHARGE NOTE PROVIDER - NSDCFUSCHEDAPPT_GEN_ALL_CORE_FT
MARCIAL MCCORMICK ; 10/09/2019 ; [facility]  MARCIAL MCCORMICK ; 12/06/2019 ; [facility]PP Urogynecology [address]  MARCIAL MCCORMICK ; 12/12/2019 ; NPP Urogyn [address] [facility] MARCIAL MCCORMICK ; 10/09/2019 ; [facility] PreAdmits  MARCIAL MCCORMICK ; 12/06/2019 ; NPP Urogynecology [address]  MARCIAL MCCORMICK ; 12/12/2019 ; NPP Urogyn [address] [facility]

## 2019-10-07 NOTE — DISCHARGE NOTE NURSING/CASE MANAGEMENT/SOCIAL WORK - PATIENT PORTAL LINK FT
You can access the FollowMyHealth Patient Portal offered by [facility] by registering at the following website: [email or web address]. By joining [facility]’s FollowMyHealth portal, you will also be able to view your health information using other applications (apps) compatible with our system.

## 2019-10-07 NOTE — PROGRESS NOTE ADULT - ASSESSMENT
Atrial Fibrillation, new onset      [id number] score 4  -Begin Xarelto 20 mg daily.  -- Treatment options for Atrial Fib discussed with the patient.    Will hold off on IBIS guided DCCV since patient has not been on CPAP therapy for her JOCELYNE for past 3 years.  Patient states she has had episodes of palpitations.     CAD S/P PTCA/Stent (RCA/LCx) stable.  - Will discontinue Aspirin and continue Clopidogrel.      Obstructive Sleep Apnea  - Patient used her CPAP therapy briefly in 2016 after her MI.  She has not been on CPAP for >3 years now.  - Will need to be re-evaluated as outpatient.     Hypertension  - Continue beta-blocker therapy.       Type II DM  - Glycemic control     Hyperlipidemia  - Continue statin therapy Atrial Fibrillation - paroxysmal.      [id number] score 4  -Begin Xarelto 20 mg daily.  -- Treatment options for Atrial Fib discussed with the patient.    patient has not been on CPAP therapy for her JOCELYNE for past 3 years and importance of JOCELYNE control with CPAP to minimize recurrence of AF was discussed.      CAD S/P PTCA/Stent (RCA/LCx) stable.  - Will discontinue Aspirin and continue Clopidogrel.      Obstructive Sleep Apnea  - Patient used her CPAP therapy briefly in 2016 after her MI.   She has not been on CPAP for >3 years now.  - Will need to be re-evaluated as outpatient.     Hypertension  - Continue beta-blocker therapy.       Type II DM  - Glycemic control     Hyperlipidemia  - Continue statin therapy    Discussed with Intern.  Repeat EKG today and pt may be discharged on meds as noted above.  Follow up with me in 2 weeks.

## 2019-10-07 NOTE — CHART NOTE - NSCHARTNOTEFT_GEN_A_CORE
<<<RESIDENT DISCHARGE NOTE>>>     MACRIAL MCCORMICK  MRN-[id number]    VITAL SIGNS:  T(F): 96.7 (10-07-19 @ 21:14), Max: 98.6 (10-07-19 @ 16:11)  HR: 100 (10-07-19 @ 21:14)  BP: 141/76 (10-07-19 @ 21:14)  SpO2: 98% (10-07-19 @ 21:14)      TEST RESULTS:                        13.3   4.67  )-----------( 212      ( 07 Oct 2019 05:15 )             40.6       10-07    140  |  100  |  15  ----------------------------<  166<H>  4.1   |  22  |  0.6<L>    Ca    9.1      07 Oct 2019 05:15  Mg     1.8     10-07    TPro  6.8  /  Alb  4.2  /  TBili  0.4  /  DBili  x   /  AST  19  /  ALT  37  /  AlkPhos  71  10-07      FINAL DISCHARGE INTERVIEW:  Resident(s) Present: (Name:______Duran_______), RN Present: (Name:  ___________)    DISCHARGE MEDICATION RECONCILIATION  reviewed with Attending (Name:_____Dr. Callejas______)    DISPOSITION:   [ X ] Home,    [  ] Home with Visiting Nursing Services,   [    ]  SNF/ NH,    [   ] Acute Rehab (4A),   [   ] Other (Specify:_________)                   Discussed patient with Dr. Callejas who wishes to have patient discharged home. Called in Xarelto to pharmacy and copay is 30 dollars - patient is OK with that. During afternoon rounds earlier that day with hospitalist team - if anticoagulation plan is established then patient can go home. Discussed with patient her uncontrolled diabetes - 9.9 HbA1c - she will be establishing care with new endocrinologist.

## 2019-10-07 NOTE — PROGRESS NOTE ADULT - SUBJECTIVE AND OBJECTIVE BOX
Patient's chart reviewed and patient examined by Sean Callejas MD (contact:[phone])    SUBJ: p[t with new onset AF, hx of CAD, DM, Htn    Events in last 24 hours:    MEDICATIONS  (STANDING):  aspirin  chewable 81 milliGRAM(s) Oral daily  atorvastatin 80 milliGRAM(s) Oral at bedtime  chlorhexidine 4% Liquid 1 Application(s) Topical <User Schedule>  clopidogrel Tablet 75 milliGRAM(s) Oral daily  dextrose 5%. 1000 milliLiter(s) (50 mL/Hr) IV Continuous <Continuous>  dextrose 50% Injectable 12.5 Gram(s) IV Push once  dextrose 50% Injectable 25 Gram(s) IV Push once  dextrose 50% Injectable 25 Gram(s) IV Push once  enoxaparin Injectable 120 milliGRAM(s) SubCutaneous two times a day  insulin glargine Injectable (LANTUS) 70 Unit(s) SubCutaneous at bedtime  insulin lispro (HumaLOG) corrective regimen sliding scale   SubCutaneous three times a day before meals  insulin lispro Injectable (HumaLOG) 16 Unit(s) SubCutaneous three times a day before meals  metoprolol succinate  milliGRAM(s) Oral daily  PARoxetine 30 milliGRAM(s) Oral daily    MEDICATIONS  (PRN):  dextrose 40% Gel 15 Gram(s) Oral once PRN Blood Glucose LESS THAN 70 milliGRAM(s)/deciliter  glucagon  Injectable 1 milliGRAM(s) IntraMuscular once PRN Glucose LESS THAN 70 milligrams/deciliter  ibuprofen  Tablet. 400 milliGRAM(s) Oral every 6 hours PRN Moderate Pain (4 - 6)          Vital Signs Last 24 Hrs  T(C): 37 (07 Oct 2019 16:11), Max: 37 (07 Oct 2019 16:11)  T(F): 98.6 (07 Oct 2019 16:11), Max: 98.6 (07 Oct 2019 16:11)  HR: 84 (07 Oct 2019 16:11) (77 - 97)  BP: 169/75 (07 Oct 2019 16:11) (119/57 - 169/75)  BP(mean): --  RR: 18 (07 Oct 2019 16:11) (18 - 18)  SpO2: 95% (07 Oct 2019 16:11) (95% - 99%)     REVIEW OF SYSTEMS:  CONSTITUTIONAL: No fever, chills.  CARDIOLOGY: Denies chest pain, shortness of breath or palpitations.   RESPIRATORY: denies cough, shortness of breath, wheezing.   NEUROLOGICAL: Generalized weakness, no focal deficits to report.  GI: no melena/hematochezia, denies nausea, Vomiting or diarrhea.    PSYCHIATRY: normal mood and affect    PHYSICAL EXAM:  · CONSTITUTIONAL:	Well-developed, well nourished M/F in no distress    ·RESPIRATORY:   breath sounds equal with good entry;  clear to auscultation bilaterally; no rales, rhonchi or wheeze  · CARDIOVASCULAR	S1 and S2 normal intensity, no rub, gallop or murmur,    ABDOMEN: soft, non-tender, NABS  · EXTREMITIES: No cyanosis, clubbing or edema  NEURO: alert and oriented x 3, no focal deficits.  · VASCULAR: 	Equal and normal pulses   	  TELEMETRY:      ECG:    LABS:                        13.3   4.67  )-----------( 212      ( 07 Oct 2019 05:15 )             40.6     10-07    140  |  100  |  15  ----------------------------<  166<H>  4.1   |  22  |  0.6<L>    Ca    9.1      07 Oct 2019 05:15  Mg     1.8     10-07    TPro  6.8  /  Alb  4.2  /  TBili  0.4  /  DBili  x   /  AST  19  /  ALT  37  /  AlkPhos  71  10-07    CARDIAC MARKERS ( 06 Oct 2019 16:13 )  x     / <0.01 ng/mL / x     / x     / x              I&O's Summary    BNP  RADIOLOGY & ADDITIONAL STUDIES:    IMPRESSION AND PLAN: Patient's chart reviewed and patient examined by Sean Callejas MD (contact:[phone])    SUBJ: p[t with new onset AF, hx of CAD, DM, Htn    Events in last 24 hours: converted to sinus.    MEDICATIONS  (STANDING):  aspirin  chewable 81 milliGRAM(s) Oral daily  atorvastatin 80 milliGRAM(s) Oral at bedtime  chlorhexidine 4% Liquid 1 Application(s) Topical <User Schedule>  clopidogrel Tablet 75 milliGRAM(s) Oral daily  dextrose 5%. 1000 milliLiter(s) (50 mL/Hr) IV Continuous <Continuous>  dextrose 50% Injectable 12.5 Gram(s) IV Push once  dextrose 50% Injectable 25 Gram(s) IV Push once  dextrose 50% Injectable 25 Gram(s) IV Push once  enoxaparin Injectable 120 milliGRAM(s) SubCutaneous two times a day  insulin glargine Injectable (LANTUS) 70 Unit(s) SubCutaneous at bedtime  insulin lispro (HumaLOG) corrective regimen sliding scale   SubCutaneous three times a day before meals  insulin lispro Injectable (HumaLOG) 16 Unit(s) SubCutaneous three times a day before meals  metoprolol succinate  milliGRAM(s) Oral daily  PARoxetine 30 milliGRAM(s) Oral daily    MEDICATIONS  (PRN):  dextrose 40% Gel 15 Gram(s) Oral once PRN Blood Glucose LESS THAN 70 milliGRAM(s)/deciliter  glucagon  Injectable 1 milliGRAM(s) IntraMuscular once PRN Glucose LESS THAN 70 milligrams/deciliter  ibuprofen  Tablet. 400 milliGRAM(s) Oral every 6 hours PRN Moderate Pain (4 - 6)      Vital Signs Last 24 Hrs  T(C): 37 (07 Oct 2019 16:11), Max: 37 (07 Oct 2019 16:11)  T(F): 98.6 (07 Oct 2019 16:11), Max: 98.6 (07 Oct 2019 16:11)  HR: 84 (07 Oct 2019 16:11) (77 - 97)  BP: 169/75 (07 Oct 2019 16:11) (119/57 - 169/75)  BP(mean): --  RR: 18 (07 Oct 2019 16:11) (18 - 18)  SpO2: 95% (07 Oct 2019 16:11) (95% - 99%)     REVIEW OF SYSTEMS:  CONSTITUTIONAL: No fever, chills.  CARDIOLOGY: Denies chest pain, shortness of breath or palpitations.   RESPIRATORY: denies cough, shortness of breath, wheezing.   NEUROLOGICAL: Generalized weakness, no focal deficits to report.  GI: no melena/hematochezia, denies nausea, Vomiting or diarrhea.    PSYCHIATRY: normal mood and affect    PHYSICAL EXAM:  · CONSTITUTIONAL:	Well-developed obese F in no distress    ·RESPIRATORY:   breath sounds equal with good entry;  clear to auscultation bilaterally; no rales, rhonchi or wheeze  · CARDIOVASCULAR	S1 and S2 normal intensity, no rub, gallop or murmur,    ABDOMEN: soft, non-tender, NABS  · EXTREMITIES: No cyanosis, clubbing or edema  NEURO: alert and oriented x 3, no focal deficits.  · VASCULAR: 	Equal and normal pulses   	  TELEMETRY:  NSR 90/min    LABS:                        13.3   4.67  )-----------( 212      ( 07 Oct 2019 05:15 )             40.6     10-07    140  |  100  |  15  ----------------------------<  166<H>  4.1   |  22  |  0.6<L>    Ca    9.1      07 Oct 2019 05:15  Mg     1.8     10-07    TPro  6.8  /  Alb  4.2  /  TBili  0.4  /  DBili  x   /  AST  19  /  ALT  37  /  AlkPhos  71  10-07    CARDIAC MARKERS ( 06 Oct 2019 16:13 )  x     / <0.01 ng/mL / x     / x     / x        I&O's Summary    BNP  RADIOLOGY & ADDITIONAL STUDIES:    IMPRESSION AND PLAN:

## 2019-10-07 NOTE — DISCHARGE NOTE PROVIDER - NSDCCPCAREPLAN_GEN_ALL_CORE_FT
PRINCIPAL DISCHARGE DIAGNOSIS  Diagnosis: Palpitations  Assessment and Plan of Treatment: You have atrial fibrillation. Please continue taking your Plavix. Stop your Aspirin. Metoprolol will now be 50mg in the morning and 100mg at night. Please continue taking Xarelto 20mg daily. Follow up with your Cardiologist within 1 week of discharge from the hospital.      SECONDARY DISCHARGE DIAGNOSES  Diagnosis: Diabetes mellitus  Assessment and Plan of Treatment: Your A1c was 9.9. Please follow up with your endocrinologist within 1 week of discharge from the hospital.

## 2019-10-07 NOTE — DISCHARGE NOTE PROVIDER - HOSPITAL COURSE
49 year old female with history of HTN, DM, DLD, CAD, s/p cardiac stent x 4 on DAPT presents to ED with palpitations.            Atrial Fibrillation - paroxysmal.        [id number] score 4    -Begin Xarelto 20 mg daily.    -- Treatment options for Atrial Fib discussed with the patient.      patient has not been on CPAP therapy for her JOCELYNE for past 3 years and importance of JOCELYNE control with CPAP to minimize recurrence of AF was discussed.          CAD S/P PTCA/Stent (RCA/LCx) stable.    - Will discontinue Aspirin and continue Clopidogrel.         Diabetes:    Cont home meds        Patient cleared by cardiology and adamant that she wants to go home.

## 2019-10-11 DIAGNOSIS — G47.33 OBSTRUCTIVE SLEEP APNEA (ADULT) (PEDIATRIC): ICD-10-CM

## 2019-10-11 DIAGNOSIS — I48.0 PAROXYSMAL ATRIAL FIBRILLATION: ICD-10-CM

## 2019-10-11 DIAGNOSIS — E11.9 TYPE 2 DIABETES MELLITUS WITHOUT COMPLICATIONS: ICD-10-CM

## 2019-10-11 DIAGNOSIS — I25.2 OLD MYOCARDIAL INFARCTION: ICD-10-CM

## 2019-10-11 DIAGNOSIS — E78.5 HYPERLIPIDEMIA, UNSPECIFIED: ICD-10-CM

## 2019-10-11 DIAGNOSIS — I10 ESSENTIAL (PRIMARY) HYPERTENSION: ICD-10-CM

## 2019-10-11 DIAGNOSIS — Z87.891 PERSONAL HISTORY OF NICOTINE DEPENDENCE: ICD-10-CM

## 2019-10-11 DIAGNOSIS — I25.10 ATHEROSCLEROTIC HEART DISEASE OF NATIVE CORONARY ARTERY WITHOUT ANGINA PECTORIS: ICD-10-CM

## 2019-10-30 ENCOUNTER — OUTPATIENT (OUTPATIENT)
Dept: OUTPATIENT SERVICES | Facility: HOSPITAL | Age: 49
LOS: 1 days | Discharge: HOME | End: 2019-10-30

## 2019-10-30 DIAGNOSIS — K02.63 DENTAL CARIES ON SMOOTH SURFACE PENETRATING INTO PULP: ICD-10-CM

## 2019-10-30 DIAGNOSIS — Z98.891 HISTORY OF UTERINE SCAR FROM PREVIOUS SURGERY: Chronic | ICD-10-CM

## 2019-11-21 ENCOUNTER — OTHER (OUTPATIENT)
Age: 49
End: 2019-11-21

## 2019-12-06 ENCOUNTER — APPOINTMENT (OUTPATIENT)
Dept: UROGYNECOLOGY | Facility: CLINIC | Age: 49
End: 2019-12-06

## 2019-12-12 ENCOUNTER — APPOINTMENT (OUTPATIENT)
Dept: UROGYNECOLOGY | Facility: CLINIC | Age: 49
End: 2019-12-12

## 2020-02-08 ENCOUNTER — EMERGENCY (EMERGENCY)
Facility: HOSPITAL | Age: 50
LOS: 0 days | Discharge: HOME | End: 2020-02-08
Admitting: EMERGENCY MEDICINE
Payer: COMMERCIAL

## 2020-02-08 VITALS
DIASTOLIC BLOOD PRESSURE: 77 MMHG | WEIGHT: 270.07 LBS | SYSTOLIC BLOOD PRESSURE: 162 MMHG | TEMPERATURE: 98 F | HEART RATE: 87 BPM | RESPIRATION RATE: 20 BRPM | OXYGEN SATURATION: 97 %

## 2020-02-08 VITALS
DIASTOLIC BLOOD PRESSURE: 70 MMHG | RESPIRATION RATE: 18 BRPM | SYSTOLIC BLOOD PRESSURE: 135 MMHG | TEMPERATURE: 98 F | HEART RATE: 80 BPM | OXYGEN SATURATION: 99 %

## 2020-02-08 DIAGNOSIS — R06.02 SHORTNESS OF BREATH: ICD-10-CM

## 2020-02-08 DIAGNOSIS — Z98.891 HISTORY OF UTERINE SCAR FROM PREVIOUS SURGERY: Chronic | ICD-10-CM

## 2020-02-08 DIAGNOSIS — R50.9 FEVER, UNSPECIFIED: ICD-10-CM

## 2020-02-08 DIAGNOSIS — E11.9 TYPE 2 DIABETES MELLITUS WITHOUT COMPLICATIONS: ICD-10-CM

## 2020-02-08 DIAGNOSIS — R05 COUGH: ICD-10-CM

## 2020-02-08 DIAGNOSIS — Z79.4 LONG TERM (CURRENT) USE OF INSULIN: ICD-10-CM

## 2020-02-08 DIAGNOSIS — Z79.899 OTHER LONG TERM (CURRENT) DRUG THERAPY: ICD-10-CM

## 2020-02-08 DIAGNOSIS — Z87.891 PERSONAL HISTORY OF NICOTINE DEPENDENCE: ICD-10-CM

## 2020-02-08 PROCEDURE — 71046 X-RAY EXAM CHEST 2 VIEWS: CPT | Mod: 26

## 2020-02-08 PROCEDURE — 99285 EMERGENCY DEPT VISIT HI MDM: CPT

## 2020-02-08 RX ORDER — IPRATROPIUM/ALBUTEROL SULFATE 18-103MCG
3 AEROSOL WITH ADAPTER (GRAM) INHALATION ONCE
Refills: 0 | Status: COMPLETED | OUTPATIENT
Start: 2020-02-08 | End: 2020-02-08

## 2020-02-08 RX ORDER — AZITHROMYCIN 500 MG/1
1 TABLET, FILM COATED ORAL
Qty: 3 | Refills: 0
Start: 2020-02-08 | End: 2020-02-10

## 2020-02-08 RX ORDER — DEXAMETHASONE 0.5 MG/5ML
10 ELIXIR ORAL ONCE
Refills: 0 | Status: COMPLETED | OUTPATIENT
Start: 2020-02-08 | End: 2020-02-08

## 2020-02-08 RX ADMIN — Medication 3 MILLILITER(S): at 21:26

## 2020-02-08 RX ADMIN — Medication 3 MILLILITER(S): at 21:10

## 2020-02-08 RX ADMIN — Medication 10 MILLIGRAM(S): at 21:25

## 2020-02-08 RX ADMIN — Medication 3 MILLILITER(S): at 22:10

## 2020-02-08 NOTE — ED PROVIDER NOTE - PATIENT PORTAL LINK FT
You can access the FollowMyHealth Patient Portal offered by [facility] by registering at the following website: [email or web address]. By joining [email or web address]’s FollowMyHealth portal, you will also be able to view your health information using other applications (apps) compatible with our system.

## 2020-02-08 NOTE — ED PROVIDER NOTE - NS ED ROS FT
Constitutional: (+) fever  Eyes/ENT: (-) visual changes   Cardiovascular: (-) chest pain, (-) syncope  Respiratory: (+) cough, (-) shortness of breath  Gastrointestinal: (-) vomiting, (-) diarrhea  Genitourinary: (-) dysuria, (-) hesitancy, (-) frequency   Musculoskeletal: (-) neck pain, (-) back pain, (-) joint pain  Integumentary: (-) rash, (-) edema  Neurological: (-) headache, (-) altered mental status  Allergic/Immunologic: (-) pruritus

## 2020-02-08 NOTE — ED PROVIDER NOTE - PHYSICAL EXAMINATION
Gen: NAD, AOx3  Head: NCAT  HEENT: PERRL, oral mucosa moist, normal conjunctiva, oropharynx clear without exudate or erythema  Lung: CTAB, no respiratory distress, expiratory wheezing  CV: normal s1/s2, rrr, Normal perfusion, pulses 2+ throughout  Abd: soft, NTND, no CVA tenderness  Genitourinary: no pelvic tenderness  MSK: No edema, no visible deformities, full range of motion in all 4 extremities  Neuro: CN II-XII grossly intact, No focal neurologic deficits  Skin: No rash   Psych: normal affect

## 2020-02-08 NOTE — ED PROVIDER NOTE - OBJECTIVE STATEMENT
50 yo female with a pmh of DM, MI, and afib presents with fever/body aches/nonproductive cough for 6 day. pt states today was her first day afebrile but cough is persistent now causing some difficulty breathing. denies any other symptoms including headache, recent illness/travel, n/v/d, abdominal pain, or chest pain.

## 2020-02-08 NOTE — ED ADULT TRIAGE NOTE - CHIEF COMPLAINT QUOTE
came to ED for shortness of breath after coughing   states she had the flu last week  no relief from inhalers  speaking in full sentences in triage

## 2020-02-08 NOTE — ED PROVIDER NOTE - NSFOLLOWUPINSTRUCTIONS_ED_ALL_ED_FT
Please follow up with your primary care physician within 24-72 hours and return immediately if symptoms worsen.    Viral Illness, Adult  Viruses are tiny germs that can get into a person's body and cause illness. There are many different types of viruses, and they cause many types of illness. Viral illnesses can range from mild to severe. They can affect various parts of the body.    Common illnesses that are caused by a virus include colds and the flu. Viral illnesses also include serious conditions such as HIV/AIDS (human immunodeficiency virus/acquired immunodeficiency syndrome). A few viruses have been linked to certain cancers.    What are the causes?  Many types of viruses can cause illness. Viruses invade cells in your body, multiply, and cause the infected cells to malfunction or die. When the cell dies, it releases more of the virus. When this happens, you develop symptoms of the illness, and the virus continues to spread to other cells. If the virus takes over the function of the cell, it can cause the cell to divide and grow out of control, as is the case when a virus causes cancer.    Different viruses get into the body in different ways. You can get a virus by:  Swallowing food or water that is contaminated with the virus.  Breathing in droplets that have been coughed or sneezed into the air by an infected person.  Touching a surface that has been contaminated with the virus and then touching your eyes, nose, or mouth.  Being bitten by an insect or animal that carries the virus.  Having sexual contact with a person who is infected with the virus.  Being exposed to blood or fluids that contain the virus, either through an open cut or during a transfusion.  If a virus enters your body, your body's defense system (immune system) will try to fight the virus. You may be at higher risk for a viral illness if your immune system is weak.    What are the signs or symptoms?  Symptoms vary depending on the type of virus and the location of the cells that it invades. Common symptoms of the main types of viral illnesses include:    Cold and flu viruses     Fever.  Headache.  Sore throat.  Muscle aches.  Nasal congestion.  Cough.  Digestive system (gastrointestinal) viruses     Fever.  Abdominal pain.  Nausea.  Diarrhea.  Liver viruses (hepatitis)     Loss of appetite.  Tiredness.  Yellowing of the skin (jaundice).  Brain and spinal cord viruses     Fever.  Headache.  Stiff neck.  Nausea and vomiting.  Confusion or sleepiness.  Skin viruses     Warts.  Itching.  Rash.  Sexually transmitted viruses     Discharge.  Swelling.  Redness.  Rash.  How is this treated?  Viruses can be difficult to treat because they live within cells. Antibiotic medicines do not treat viruses because these drugs do not get inside cells. Treatment for a viral illness may include:  Resting and drinking plenty of fluids.  Medicines to relieve symptoms. These can include over-the-counter medicine for pain and fever, medicines for cough or congestion, and medicines to relieve diarrhea.  Antiviral medicines. These drugs are available only for certain types of viruses. They may help reduce flu symptoms if taken early. There are also many antiviral medicines for hepatitis and HIV/AIDS.  Some viral illnesses can be prevented with vaccinations. A common example is the flu shot.    Follow these instructions at home:  Medicines     Image   Take over-the-counter and prescription medicines only as told by your health care provider.  If you were prescribed an antiviral medicine, take it as told by your health care provider. Do not stop taking the medicine even if you start to feel better.  Be aware of when antibiotics are needed and when they are not needed. Antibiotics do not treat viruses. If your health care provider thinks that you may have a bacterial infection as well as a viral infection, you may get an antibiotic.  Do not ask for an antibiotic prescription if you have been diagnosed with a viral illness. That will not make your illness go away faster.  Frequently taking antibiotics when they are not needed can lead to antibiotic resistance. When this develops, the medicine no longer works against the bacteria that it normally fights.  General instructions     Drink enough fluids to keep your urine clear or pale yellow.  Rest as much as possible.  Return to your normal activities as told by your health care provider. Ask your health care provider what activities are safe for you.  Keep all follow-up visits as told by your health care provider. This is important.  How is this prevented?  Take these actions to reduce your risk of viral infection:Image  Eat a healthy diet and get enough rest.  Wash your hands often with soap and water. This is especially important when you are in public places. If soap and water are not available, use hand .  Avoid close contact with friends and family who have a viral illness.  If you travel to areas where viral gastrointestinal infection is common, avoid drinking water or eating raw food.  Keep your immunizations up to date. Get a flu shot every year as told by your health care provider.  Do not share toothbrushes, nail clippers, razors, or needles with other people.  Always practice safe sex.  Contact a health care provider if:  You have symptoms of a viral illness that do not go away.  Your symptoms come back after going away.  Your symptoms get worse.  Get help right away if:  You have trouble breathing.  You have a severe headache or a stiff neck.  You have severe vomiting or abdominal pain.  This information is not intended to replace advice given to you by your health care provider. Make sure you discuss any questions you have with your health care provider.

## 2020-02-08 NOTE — ED PROVIDER NOTE - CLINICAL SUMMARY MEDICAL DECISION MAKING FREE TEXT BOX
48 yo female presents with 6 days of flu-like symptoms. will start on short course on steroids, z-noe, and have f/u with pcp. given return precautions.

## 2020-05-13 ENCOUNTER — OUTPATIENT (OUTPATIENT)
Dept: OUTPATIENT SERVICES | Facility: HOSPITAL | Age: 50
LOS: 1 days | Discharge: HOME | End: 2020-05-13

## 2020-05-13 DIAGNOSIS — Z98.891 HISTORY OF UTERINE SCAR FROM PREVIOUS SURGERY: Chronic | ICD-10-CM

## 2020-05-13 DIAGNOSIS — K02.9 DENTAL CARIES, UNSPECIFIED: ICD-10-CM

## 2020-08-17 ENCOUNTER — TRANSCRIPTION ENCOUNTER (OUTPATIENT)
Age: 50
End: 2020-08-17

## 2020-08-18 ENCOUNTER — INPATIENT (INPATIENT)
Facility: HOSPITAL | Age: 50
LOS: 7 days | Discharge: HOME | End: 2020-08-26
Attending: SURGERY | Admitting: SURGERY
Payer: COMMERCIAL

## 2020-08-18 VITALS
HEART RATE: 72 BPM | OXYGEN SATURATION: 98 % | DIASTOLIC BLOOD PRESSURE: 69 MMHG | SYSTOLIC BLOOD PRESSURE: 147 MMHG | TEMPERATURE: 98 F | RESPIRATION RATE: 18 BRPM

## 2020-08-18 DIAGNOSIS — Z98.891 HISTORY OF UTERINE SCAR FROM PREVIOUS SURGERY: Chronic | ICD-10-CM

## 2020-08-18 LAB
ALBUMIN SERPL ELPH-MCNC: 4.2 G/DL — SIGNIFICANT CHANGE UP (ref 3.5–5.2)
ALP SERPL-CCNC: 107 U/L — SIGNIFICANT CHANGE UP (ref 30–115)
ALT FLD-CCNC: 23 U/L — SIGNIFICANT CHANGE UP (ref 0–41)
ANION GAP SERPL CALC-SCNC: 11 MMOL/L — SIGNIFICANT CHANGE UP (ref 7–14)
AST SERPL-CCNC: 16 U/L — SIGNIFICANT CHANGE UP (ref 0–41)
BASOPHILS # BLD AUTO: 0.03 K/UL — SIGNIFICANT CHANGE UP (ref 0–0.2)
BASOPHILS NFR BLD AUTO: 0.4 % — SIGNIFICANT CHANGE UP (ref 0–1)
BILIRUB SERPL-MCNC: 0.8 MG/DL — SIGNIFICANT CHANGE UP (ref 0.2–1.2)
BUN SERPL-MCNC: 15 MG/DL — SIGNIFICANT CHANGE UP (ref 10–20)
CALCIUM SERPL-MCNC: 9.4 MG/DL — SIGNIFICANT CHANGE UP (ref 8.5–10.1)
CHLORIDE SERPL-SCNC: 100 MMOL/L — SIGNIFICANT CHANGE UP (ref 98–110)
CO2 SERPL-SCNC: 26 MMOL/L — SIGNIFICANT CHANGE UP (ref 17–32)
CREAT SERPL-MCNC: 0.7 MG/DL — SIGNIFICANT CHANGE UP (ref 0.7–1.5)
EOSINOPHIL # BLD AUTO: 0.13 K/UL — SIGNIFICANT CHANGE UP (ref 0–0.7)
EOSINOPHIL NFR BLD AUTO: 1.9 % — SIGNIFICANT CHANGE UP (ref 0–8)
ERYTHROCYTE [SEDIMENTATION RATE] IN BLOOD: 22 MM/HR — HIGH (ref 0–20)
GLUCOSE BLDC GLUCOMTR-MCNC: 147 MG/DL — HIGH (ref 70–99)
GLUCOSE SERPL-MCNC: 123 MG/DL — HIGH (ref 70–99)
HCG UR QL: NEGATIVE — SIGNIFICANT CHANGE UP
HCT VFR BLD CALC: 42.8 % — SIGNIFICANT CHANGE UP (ref 37–47)
HGB BLD-MCNC: 13.4 G/DL — SIGNIFICANT CHANGE UP (ref 12–16)
IMM GRANULOCYTES NFR BLD AUTO: 0.4 % — HIGH (ref 0.1–0.3)
LYMPHOCYTES # BLD AUTO: 1.41 K/UL — SIGNIFICANT CHANGE UP (ref 1.2–3.4)
LYMPHOCYTES # BLD AUTO: 21.1 % — SIGNIFICANT CHANGE UP (ref 20.5–51.1)
MCHC RBC-ENTMCNC: 30.1 PG — SIGNIFICANT CHANGE UP (ref 27–31)
MCHC RBC-ENTMCNC: 31.3 G/DL — LOW (ref 32–37)
MCV RBC AUTO: 96.2 FL — SIGNIFICANT CHANGE UP (ref 81–99)
MONOCYTES # BLD AUTO: 0.42 K/UL — SIGNIFICANT CHANGE UP (ref 0.1–0.6)
MONOCYTES NFR BLD AUTO: 6.3 % — SIGNIFICANT CHANGE UP (ref 1.7–9.3)
NEUTROPHILS # BLD AUTO: 4.67 K/UL — SIGNIFICANT CHANGE UP (ref 1.4–6.5)
NEUTROPHILS NFR BLD AUTO: 69.9 % — SIGNIFICANT CHANGE UP (ref 42.2–75.2)
NRBC # BLD: 0 /100 WBCS — SIGNIFICANT CHANGE UP (ref 0–0)
PLATELET # BLD AUTO: 249 K/UL — SIGNIFICANT CHANGE UP (ref 130–400)
POTASSIUM SERPL-MCNC: 4.6 MMOL/L — SIGNIFICANT CHANGE UP (ref 3.5–5)
POTASSIUM SERPL-SCNC: 4.6 MMOL/L — SIGNIFICANT CHANGE UP (ref 3.5–5)
PROT SERPL-MCNC: 6.9 G/DL — SIGNIFICANT CHANGE UP (ref 6–8)
RBC # BLD: 4.45 M/UL — SIGNIFICANT CHANGE UP (ref 4.2–5.4)
RBC # FLD: 14.2 % — SIGNIFICANT CHANGE UP (ref 11.5–14.5)
SODIUM SERPL-SCNC: 137 MMOL/L — SIGNIFICANT CHANGE UP (ref 135–146)
WBC # BLD: 6.69 K/UL — SIGNIFICANT CHANGE UP (ref 4.8–10.8)
WBC # FLD AUTO: 6.69 K/UL — SIGNIFICANT CHANGE UP (ref 4.8–10.8)

## 2020-08-18 PROCEDURE — 99223 1ST HOSP IP/OBS HIGH 75: CPT

## 2020-08-18 PROCEDURE — 70450 CT HEAD/BRAIN W/O DYE: CPT | Mod: 26

## 2020-08-18 PROCEDURE — 99285 EMERGENCY DEPT VISIT HI MDM: CPT

## 2020-08-18 PROCEDURE — 93010 ELECTROCARDIOGRAM REPORT: CPT

## 2020-08-18 RX ORDER — INSULIN DEGLUDEC 100 U/ML
70 INJECTION, SOLUTION SUBCUTANEOUS
Qty: 0 | Refills: 0 | DISCHARGE

## 2020-08-18 RX ORDER — TRAMADOL HYDROCHLORIDE 50 MG/1
50 TABLET ORAL ONCE
Refills: 0 | Status: DISCONTINUED | OUTPATIENT
Start: 2020-08-18 | End: 2020-08-18

## 2020-08-18 RX ORDER — RIVAROXABAN 15 MG-20MG
20 KIT ORAL
Refills: 0 | Status: DISCONTINUED | OUTPATIENT
Start: 2020-08-18 | End: 2020-08-22

## 2020-08-18 RX ORDER — CLOPIDOGREL BISULFATE 75 MG/1
75 TABLET, FILM COATED ORAL DAILY
Refills: 0 | Status: DISCONTINUED | OUTPATIENT
Start: 2020-08-19 | End: 2020-08-24

## 2020-08-18 RX ORDER — CHLORHEXIDINE GLUCONATE 213 G/1000ML
1 SOLUTION TOPICAL
Refills: 0 | Status: DISCONTINUED | OUTPATIENT
Start: 2020-08-18 | End: 2020-08-25

## 2020-08-18 RX ORDER — METOPROLOL TARTRATE 50 MG
50 TABLET ORAL DAILY
Refills: 0 | Status: DISCONTINUED | OUTPATIENT
Start: 2020-08-18 | End: 2020-08-25

## 2020-08-18 RX ORDER — ATORVASTATIN CALCIUM 80 MG/1
80 TABLET, FILM COATED ORAL AT BEDTIME
Refills: 0 | Status: DISCONTINUED | OUTPATIENT
Start: 2020-08-18 | End: 2020-08-25

## 2020-08-18 RX ORDER — METOPROLOL TARTRATE 50 MG
100 TABLET ORAL AT BEDTIME
Refills: 0 | Status: DISCONTINUED | OUTPATIENT
Start: 2020-08-18 | End: 2020-08-25

## 2020-08-18 RX ORDER — INSULIN LISPRO 100/ML
18 VIAL (ML) SUBCUTANEOUS
Refills: 0 | Status: DISCONTINUED | OUTPATIENT
Start: 2020-08-18 | End: 2020-08-21

## 2020-08-18 RX ORDER — INSULIN LISPRO 100/ML
16 VIAL (ML) SUBCUTANEOUS
Qty: 0 | Refills: 0 | DISCHARGE

## 2020-08-18 RX ADMIN — RIVAROXABAN 20 MILLIGRAM(S): KIT at 21:16

## 2020-08-18 RX ADMIN — ATORVASTATIN CALCIUM 80 MILLIGRAM(S): 80 TABLET, FILM COATED ORAL at 21:15

## 2020-08-18 RX ADMIN — Medication 100 MILLIGRAM(S): at 21:15

## 2020-08-18 RX ADMIN — TRAMADOL HYDROCHLORIDE 50 MILLIGRAM(S): 50 TABLET ORAL at 19:49

## 2020-08-18 NOTE — H&P ADULT - NSHPPHYSICALEXAM_GEN_ALL_CORE
Vital Signs Last 24 Hrs  T(C): 36.8 (18 Aug 2020 13:44), Max: 36.8 (18 Aug 2020 13:44)  T(F): 98.3 (18 Aug 2020 13:44), Max: 98.3 (18 Aug 2020 13:44)  HR: 72 (18 Aug 2020 13:44) (72 - 72)  BP: 147/69 (18 Aug 2020 13:44) (147/69 - 147/69)  BP(mean): --  RR: 18 (18 Aug 2020 13:44) (18 - 18)  SpO2: 98% (18 Aug 2020 13:44) (98% - 98%)    Physical examination:  General: NAD. AAOx4, obese body habitus  HEENT: PERRLA, visual fields intact in all quadrants b/l; no conjunctival injection or discharge, atraumatic, normocephalic  CV: NS1, S2, now normal rate and rhythm, no murmurs, rubs, gallops  Pulmonary: clear to auscultation b/l; non-tachypneic; non-cyanotic; on room air  Gastrointestinal: soft, nontender, nondistended  Extremities: non-erythematous, non-edematous  Neurological: non-dysarthric; no facial asymmetry; motor and sensory function intact in all extremities  Skin: no rashes or lesions

## 2020-08-18 NOTE — H&P ADULT - ATTENDING COMMENTS
Patient's significant other present by bedside.      PHYSICAL EXAM:    CONSTITUTIONAL: NAD, sitting on side of stretcher, comfortable   ENMT: EOMI, PERRLA, No tonsillar erythema, exudates, or enlargement, neck supple, No JVD  PSYCH: Alert & Oriented X3  RESPIRATORY: Clear to percussion bilaterally; No rales, rhonchi, wheezing, or rubs  CARDIOVASCULAR: Regular rate and rhythm; No murmurs, rubs, or gallops, negative edema  GASTROINTESTINAL: Soft, Nontender, Nondistended; Bowel sounds present  EXTREMITIES:  2+ Peripheral Pulses, No clubbing, cyanosis  SKIN: No rashes or lesions    51 yo F with PMHx of CAD s/p PCI, AFIB on Xarelto, DM II, HTN was advised by Ophthalmologist to report to ED for further evualuation of left eye vision changes and concern for optic neuritis (as per ED staff's discussion with Ophthalmologist). Patient states symptoms occurred on Sunday evening when she noticed while looking downward her bottom left visual field was black, symptoms persisted until Monday morning, patient currently endorses "fuzzy" left sided vision with scattered "spots" throughout left field. Patient also describes history of frontal headaches, recently since Sunday, headache have been left sided, pressure like and felt behind the eye, with bilateral watery discharge, patient endorses photo and phonophobia, takes occasionally Tylenol without noted relief. Remaining ROS unrevealing.      #Left Eye Vision Changes, Left Sided Headaches: differential includes optic neuritis secondary to DM II vs. Giant Cell Arteritis vs. MS vs. Complicated Migraine: Awaiting Neurology and Opthalmology recommendations, f/u     Will order MRI Brain of bilateral orbits w/ and w/o IV contrast, cervical, thoracic, lumbar spine w/ and w/o  Will hold off on IV steroids for now pending imaging and as patient's vision improving  Tramadol 50 mg PO x 1; if headache improves can add to standing regimen  Vitals per routine  Admit to Medicine    # CAD s/p 4 stents  C/w metoprolol succinate 50 mg qAM, 100 mg qHS  C/w Lipitor 80 mg once daily, Plavix 75 mg once daily    # HTN  C/w metoprolol succinate 50 mg qAM, 100 mg qHS  Vitals per routine    # DM  On Jardiance, metformin, Tresiba at home; hold for now  C/w Lispro 18 U qAC  FS qAC, qHS; IF > 180, add sliding scale  Carb-consistent diet    # CHG  # DVT ppx- Xarelto 20 mg qHS  # GI ppx- not indicated  # Diet- Carb-consistent  # Activity- ambulate w/ assistance  # Code status- full  # Dispo- acute pending MRI b/l orbits and of spine; Neuro eval Patient's significant other present by bedside.    PHYSICAL EXAM:    CONSTITUTIONAL: NAD, sitting on side of stretcher, comfortable   ENMT: EOMI, PERRLA, No tonsillar erythema, exudates, or enlargement, neck supple, No JVD  PSYCH: Alert & Oriented X3  RESPIRATORY: Clear to percussion bilaterally; No rales, rhonchi, wheezing, or rubs  CARDIOVASCULAR: Regular rate and rhythm; No murmurs, rubs, or gallops, negative edema  GASTROINTESTINAL: Soft, Nontender, Nondistended; Bowel sounds present  EXTREMITIES:  2+ Peripheral Pulses, No clubbing, cyanosis  SKIN: No rashes or lesions    51 yo F with PMHx of CAD s/p PCI, AFIB on Xarelto, DM II, HTN was advised by Ophthalmologist to report to ED for further evualuation of left eye vision changes and concern for optic neuritis (as per ED staff's discussion with Ophthalmologist). Patient states symptoms occurred on Sunday evening when she noticed while looking downward her bottom left visual field was black, symptoms persisted until Monday morning, patient currently endorses "fuzzy" left sided vision with scattered "spots" throughout left field. Patient also describes history of frontal headaches, recently since Sunday, headache have been left sided, pressure like and felt behind the eye, with bilateral watery discharge, patient endorses photo and phonophobia, takes occasionally Tylenol without noted relief. Remaining ROS unrevealing.      #Left Eye Vision Changes, Left Sided Headaches: differential includes optic neuritis secondary to DM II vs. Giant Cell Arteritis vs. MS vs. Complicated Migraine: Awaiting Neurology and Opthalmology recommendations, f/u MRI Orbits (discussed with admitting resident physician who states opthalmology resident recommended MRI cervical, thoracic and lumbar imaging as well), fall precautions, neuro checks per unit, f/u ESR, CRP    #DM II (monitor fingersticks, basal bolus insulin, patient states last HgbA1C% was 7)/CAD/HTN: Continue home medications  CAD s/p 4 stents    Disposition: Acute

## 2020-08-18 NOTE — H&P ADULT - HISTORY OF PRESENT ILLNESS
The patient is a 50 year-old female with a PMH of CAD w/ 4 stents (on Plavix), atrial fibrillation (on Xarelto), DM, and HTN, presenting for left eye vision loss. The patient reports that Luis evening while looking at her phone, she began to experience a darkening of the inferior thomas-visual field; it gradually progressed to blackness. She reports about 90 minutes later, while ambulating, she began feeling like the room was spinning and was nauseous while ambulating, though she did not vomit. She went to sleep; by the following morning, the darkness had resolved, replaced by slight blurriness, and she started seeing floaters in her left visual field. The patient denied any associated subjective fever or chills, cough, shortness of breath, recent trauma, sick contacts, focal weakness of any limb, slurred speech or facial asymmetry. Today, she saw her ophthalmologist who did a dilated eye exam and noted optic neuritis of the left eye; she referred the patient to the ED for an MRI and neurological evaluation. Notably, she reports over the last 2-3 months she has been occasionally forgetting names; prior to this she has had no memory deficits.     In the ED, T 98.3 F, HR 72, /69, RR 18; O2 sat 98%. CT Head negative for acute mass effect, midline shift or hemorrhage. Ophtho was consulted; recommended an MRI of b/l orbits and of the spine w/ and w/o contrast, and will see the patient in the morning. The patient is a 50 year-old female with a PMH of CAD w/ 4 stents (on Plavix), atrial fibrillation (on Xarelto), DM, and HTN, presenting for left eye vision loss. The patient reports that Luis evening while looking at her phone, she began to experience a darkening of the inferior thomas-visual field; it gradually progressed to blackness. She reports about 90 minutes later, while ambulating, she began feeling like the room was spinning and was nauseous while ambulating, though she did not vomit. She went to sleep; by the following morning, the darkness had resolved, replaced by slight blurriness, and she started seeing floaters in her left visual field. The patient denied any associated subjective fever or chills, cough, shortness of breath, recent trauma, sick contacts, focal weakness of any limb, slurred speech or facial asymmetry. Today, she saw her ophthalmologist who did a dilated eye exam and noted optic neuritis of the left eye; she referred the patient to the ED for an MRI and neurological evaluation. She also reports a chronic sinus headache, 6/10 in severity, for which she typically takes a nasal steroid spray, but which does not alleviate the discomfort. Notably, she reports over the last 2-3 months she has been occasionally forgetting names; prior to this she has had no memory deficits.     In the ED, T 98.3 F, HR 72, /69, RR 18; O2 sat 98%. CT Head negative for acute mass effect, midline shift or hemorrhage. Ophtho was consulted; recommended an MRI of b/l orbits and of the spine w/ and w/o contrast, and will see the patient in the morning.

## 2020-08-18 NOTE — ED PROVIDER NOTE - PHYSICAL EXAMINATION
VITALS:  I have reviewed the initial vital signs.  GENERAL: Well-developed, obese female, in no acute distress. Nontoxic.  HEENT: Visual acuity 20/20 OD, 20/40 OS, 20/20 OU. Visual fields intact. Sclera clear. No conjunctival injection. EOMI, PERRLA. Mucous membranes moist.  NECK: supple w FROM.   CARDIO: RRR, nl S1 and S2. No murmurs, rubs, or gallops.   PULM: Normal effort. CTA b/l without wheezes, rales, or rhonchi.  MSK: Normal, steady gait.   GI: Abdomen soft and non-distended. Nontender.  SKIN: Warm, dry. Capillary refill <2 seconds. No pallor. No rash.   NEURO: A&Ox3. Speech clear. CN II-XII intact. 5/5 strength to upper and lower extremities b/l. Sensation intact and equal throughout. No pronator drift. Finger to nose intact. Normal heel to shin. Negative romberg.  PSYCH: Calm and cooperative.

## 2020-08-18 NOTE — H&P ADULT - NSHPOUTPATIENTPROVIDERS_GEN_ALL_CORE
Dr. Cristina Green (Ophthalmology)  Dr. Hunter (Endocrinology)  Dr. Callejas (Cardiology)  Dr. Tatiana Wallace (PMD)

## 2020-08-18 NOTE — ED PROVIDER NOTE - NS ED ROS FT
CONSTITUTIONAL: (-) fevers, (-) chills  EYES: see HPI, (-) blurry vision, (-) double vision, (-) photophobia, (-) eye pain, (-) eye redness, (-) eye discharge, (-) tearing  ENT: (-) tinnitus, (-) congestion, (-) rhinorrhea, (-) sore throat  NECK: (-) neck pain, (-) neck stiffness  CARDIO: (-) chest pain, (-) palpitations  PULM: (-) cough, (-) sputum, (-) chest tightness, (-) shortness of breath  GI: (-) nausea, (-) vomiting, (-) diarrhea, (-) abdominal pain  : (-) dysuria, (-) hematuria, (-) frequency  MSK: (-) back pain, (-) myalgias, (-) gait difficulty  SKIN: (-) rashes, (-) pallor  NEURO: see HPI, (-) headache, (-) head injury, (-) LOC, (-) lightheadedness, (-) weakness, (-) paresthesias, (-) numbness, (-) syncope, (-) speech changes, (-) facial droop, (-) confusion    *all other systems negative except as documented above and in the HPI*

## 2020-08-18 NOTE — H&P ADULT - ASSESSMENT
The patient is a 50 year-old female with a PMH of CAD w/ 4 stents (on Plavix), atrial fibrillation (on Xarelto), DM, and HTN, presenting for left eye vision loss, admitted for further workup. The patient is a 50 year-old female with a PMH of CAD w/ 4 stents (on Plavix), atrial fibrillation (on Xarelto), DM, and HTN, presenting for left eye vision loss, admitted for further workup.    # Left lower hemifield vision loss x 2 days, improving, possibly secondary to optic neuritis vs vasculitis vs amaurosis fugax, less likely, w/ chronic sinus headache  Visual field testing normal on my exam; CT Head also negative for acute bleed or mass effect  Spoke w/ Ophthalmology resident on-call; said he will see patient in the AM after imaging   Will order MRI Brain of bilateral orbits w/ and w/o IV contrast, cervical, thoracic, lumbar spine w/ and w/o  Will hold off on IV steroids for now pending imaging and as patient's vision improving  Tramadol 50 mg PO x 1; if headache improves can add to standing regimen  Vitals per routine  Admit to Medicine    # CAD s/p 4 stents  C/w metoprolol succinate 50 mg qAM, 100 mg qHS  C/w Lipitor 80 mg once daily, Plavix 75 mg once daily    # HTN  C/w metoprolol succinate 50 mg qAM, 100 mg qHS  Vitals per routine    # DM  On Jardiance, metformin, Tresiba at home; hold for now  C/w Lispro 18 U qAC  FS qAC, qHS; IF > 180, add sliding scale  Carb-consistent diet    # CHG  # DVT ppx- Xarelto 20 mg qHS  # GI ppx- not indicated  # Diet- Carb-consistent  # Activity- ambulate w/ assistance  # Code status- full  # Dispo- acute pending MRI b/l orbits and of spine; Neuro eval

## 2020-08-18 NOTE — ED ADULT NURSE NOTE - OBJECTIVE STATEMENT
Pt is c/o left eye blurriness starting Sunday when her visual field went blackm she became nauseous and dizzy. PMH a fib on xarelto, stents on plavix, htn, dm.

## 2020-08-18 NOTE — ED ADULT TRIAGE NOTE - CHIEF COMPLAINT QUOTE
Pt sent in by ophthalmologist for r/o anterior ischemic optic neuropathy, pt endorses partial vision loss in left eye since Sunday

## 2020-08-18 NOTE — ED PROVIDER NOTE - PROGRESS NOTE DETAILS
Attending Note: 51 y/o F with PMH of DM, MI and Afib, sent in to ED by ophthalmologist for evaluation. Pt reports partial vision loss in the left eye and feeling dizzy for the past few days. Pt was seen by an ophthalmologist today and had a dilated eye exam showing inflammation of the optic nerve so was referred to the ED. When asked why she was referred to ED as opposed to outpatient was told that ophthalmologist spoke to pt’s PMD who preferred she goes to ED for evaluation. Pt reports she is sent in for an MRI and neuro consult.  Plan: CT of head, labs. RICARDO: spoke with Dr. Green, patient's ophthalmologist, states patient's exam in office was concerning for optic neuritis, requesting neuro eval and brain MRI. RICARDO: Patient currently eating meal, no complaints at this time. Discussed labs and ct results. Recommended admission for MRI and neuro evaluation. Patient agreeable. MAR aware.

## 2020-08-18 NOTE — H&P ADULT - NSHPLABSRESULTS_GEN_ALL_CORE
13.4   6.69  )-----------( 249      ( 18 Aug 2020 14:19 )             42.8     08-18    137  |  100  |  15  ----------------------------<  123<H>  4.6   |  26  |  0.7    Ca    9.4      18 Aug 2020 14:19    TPro  6.9  /  Alb  4.2  /  TBili  0.8  /  DBili  x   /  AST  16  /  ALT  23  /  AlkPhos  107  08-18    < from: CT Head No Cont (08.18.20 @ 15:29) >    INTERPRETATION:  Clinical History / Reason for exam: Dizziness, left eye lateral vision loss for 3 days. A. fib on Xarelto.    TECHNIQUE: Contiguous axial CT images were obtained from the base of the skull to the vertex without administration of intravenous contrast. Coronal and sagittal reformatted images were constructed.    COMPARISON:None    FINDINGS:    The ventricles and cortical sulci are unremarkable.    There is no evidence for intracranial hemorrhage, large territorial infarct, midline shift, or mass effect.    The calvarium is intact. Visualized paranasal sinuses and mastoids are well-aerated. The orbits are unremarkable though incompletely visualized.    IMPRESSION:    No acute intracranial pathology. No evidence of midline shift, mass effect or intracranial hemorrhage.    < end of copied text >

## 2020-08-18 NOTE — H&P ADULT - NSICDXPASTMEDICALHX_GEN_ALL_CORE_FT
PAST MEDICAL HISTORY:  Afib     DM (diabetes mellitus)     Hypertension     MI (myocardial infarction) s/p 4 stents (most recent 2018)

## 2020-08-18 NOTE — H&P ADULT - NSHPSOCIALHISTORY_GEN_ALL_CORE
Patient smoked until 4 years ago when she had the MI; denies alcohol or other drug use. Patient smoked until 4 years ago when she had the MI; denies alcohol or other illicit drug use.

## 2020-08-18 NOTE — ED PROVIDER NOTE - CLINICAL SUMMARY MEDICAL DECISION MAKING FREE TEXT BOX
pt sent for admission and workup of left eye seen by optho.  ct head wnl. pt admitted for further eval.

## 2020-08-18 NOTE — ED ADULT NURSE NOTE - NSIMPLEMENTINTERV_GEN_ALL_ED
Implemented All Universal Safety Interventions:  [place] to call system. Call bell, personal items and telephone within reach. Instruct patient to call for assistance. Room bathroom lighting operational. Non-slip footwear when patient is off stretcher. Physically safe environment: no spills, clutter or unnecessary equipment. Stretcher in lowest position, wheels locked, appropriate side rails in place.

## 2020-08-18 NOTE — CONSULT NOTE ADULT - ASSESSMENT
49 yO RHF with a PMH of CAD w/ 4 stents (on Plavix), atrial fibrillation (on Xarelto), DM, and HTN, p.w 3 days of partial left eye vision loss. The patient reports that this past Sunday evening after she woke up she experienced sudden loss of vision in the left inferior temporal visual field which was a/w nausea, room spinning dizziness  and left sided headache. CTH negative for acute findings.       DDX  -Optic neuritis   -Stroke   -Giant cell arteritis        Plan  -MRI brain and orbits ww/o contrast  -CTA H/N  -Check ESR crp  -Opthalmology follow up 51 yO RHF with a PMH of CAD w/ 4 stents (on Plavix), atrial fibrillation (on Xarelto), DM, and HTN, p.w 3 days of partial left eye vision loss. The patient reports that this past Sunday evening after she woke up she experienced sudden loss of vision in the left inferior temporal visual field which was a/w nausea, room spinning dizziness  and left sided headache. CTH negative for acute findings.       DDX  -Optic neuritis   -Stroke   -Giant cell arteritis        Plan  -MRI brain and orbits ww/o contrast  -CTA H/N  -Check ESR crp, lipid profile, hbaic  -Opthalmology follow up 51 yO RHF with a PMH of CAD w/ 4 stents (on Plavix), atrial fibrillation (on Xarelto), DM, and HTN, p.w 3 days of partial left eye vision loss. The patient reports that this past Sunday evening after she woke up she experienced sudden loss of vision in the left inferior temporal visual field which was a/w nausea, room spinning dizziness  and left sided headache. CTH negative for acute findings.       DDX  -Optic neuritis   -Stroke   -Giant cell arteritis        Plan  -CTA H/N  -Check ESR crp, lipid profile, hbaic  -Opthalmology follow up

## 2020-08-18 NOTE — ED ADULT NURSE NOTE - PMH
Afib    DM (diabetes mellitus)    Hypertension    MI (myocardial infarction)  s/p 4 stents (most recent 2018)

## 2020-08-18 NOTE — CONSULT NOTE ADULT - SUBJECTIVE AND OBJECTIVE BOX
CC:HPI: HPI:  The patient is a 50 year-old female with a PMH of CAD w/ 4 stents (on Plavix), atrial fibrillation (on Xarelto), DM, and HTN, presenting for left eye vision loss. The patient reports that Luis evening while looking at her phone, she began to experience a darkening of the inferior thomas-visual field; it gradually progressed to blackness. She reports about 90 minutes later, while ambulating, she began feeling like the room was spinning and was nauseous while ambulating, though she did not vomit. She went to sleep; by the following morning, the darkness had resolved, replaced by slight blurriness, and she started seeing floaters in her left visual field. The patient denied any associated subjective fever or chills, cough, shortness of breath, recent trauma, sick contacts, focal weakness of any limb, slurred speech or facial asymmetry. Today, she saw her ophthalmologist who did a dilated eye exam and noted optic neuritis of the left eye; she referred the patient to the ED for an MRI and neurological evaluation. She also reports a chronic sinus headache, 6/10 in severity, for which she typically takes a nasal steroid spray, but which does not alleviate the discomfort. Notably, she reports over the last 2-3 months she has been occasionally forgetting names; prior to this she has had no memory deficits.     In the ED, T 98.3 F, HR 72, /69, RR 18; O2 sat 98%. CT Head negative for acute mass effect, midline shift or hemorrhage. Ophtho was consulted; recommended an MRI of b/l orbits and of the spine w/ and w/o contrast, and will see the patient in the morning. (18 Aug 2020 17:58)      ROS:  Constitutional, Neurological, Psychiatric, Eyes, ENT, Cardiovascular, Respiratory, Gastrointestinal, Genitourinary, Musculoskeletal, Integumentary, Endocrine and Heme/Lymph are otherwise negative.     Physical Exam:  Constitutional: alert and in no acute distress.  Eyes: the sclera and conjunctiva were normal, pupils were equal in size, round, reactive to light, with normal accommodation and extraocular movements were intact.   Back: no costovertebral angle tenderness and no spinal tenderness.      Neuro Exam:  Orientation: oriented to person, place time and situation  Attention: normal concentrating ability and visual attention was not decreased.   Language: no difficulty naming common objects, no difficulty repeating a phrase, no difficulty writing a sentence, fluency intact, comprehension intact and reading intact.   Fund of knowledge: displays adequate knowledge of personal past history.   Cranial Nerves: visual acuity intact bilaterally, visual fields full to confrontation, pupils equal round and reactive to light, extraocular motion intact, facial sensation intact symmetrically, face symmetrical, hearing was intact bilaterally, tongue and palate midline, head turning and shoulder shrug symmetric and there was no tongue deviation with protrusion.   Motor: muscle tone was normal in all four extremities, muscle strength was normal in all four extremities and normal bulk in all four extremities.   Sensory exam: light touch was intact.   Coordination:. normal gait. balance was intact. there was no past-pointing. no tremor present.   Deep tendon reflexes:   Biceps right 2+. Biceps left 2+.    Triceps right 2+. Triceps left 2+.  LOC  Brachioradialis right 2+. Brachioradialis left 2+.    Patella right 2+. Patella left 2+.    Ankle jerk right 2+. Ankle jerk left 2+.   Plantar responses normal on the right, normal on the left.      NIHSS:     Allergies    No Known Allergies    Intolerances      MEDICATIONS  (STANDING):  atorvastatin 80 milliGRAM(s) Oral at bedtime  chlorhexidine 4% Liquid 1 Application(s) Topical <User Schedule>  insulin lispro Injectable (HumaLOG) 18 Unit(s) SubCutaneous three times a day before meals  metoprolol succinate  milliGRAM(s) Oral at bedtime  metoprolol succinate ER 50 milliGRAM(s) Oral daily  PARoxetine 30 milliGRAM(s) Oral daily  rivaroxaban 20 milliGRAM(s) Oral with dinner        MEDICATIONS  (PRN):      LABS:                        13.4   6.69  )-----------( 249      ( 18 Aug 2020 14:19 )             42.8     08-18    137  |  100  |  15  ----------------------------<  123<H>  4.6   |  26  |  0.7    Ca    9.4      18 Aug 2020 14:19    TPro  6.9  /  Alb  4.2  /  TBili  0.8  /  DBili  x   /  AST  16  /  ALT  23  /  AlkPhos  107  08-18            Neuro Imaging:  < from: CT Head No Cont (08.18.20 @ 15:29) >  FINDINGS:    The ventricles and cortical sulci are unremarkable.    There is no evidence for intracranial hemorrhage, large territorial infarct, midline shift, or mass effect.    The calvarium is intact. Visualized paranasal sinuses and mastoids are well-aerated. The orbits are unremarkable though incompletely visualized.      IMPRESSION:    No acute intracranial pathology. No evidence of midline shift, mass effect or intracranial hemorrhage.            KODY RANGEL M.D., RESIDENT RADIOLOGIST  This document has been electronically signed.  NESTOR SUTHERLAND M.D., ATTENDING RADIOLOGIST  This document has been electronically signed. Aug 18 2020  4:02PM    < end of copied text >    EEG:     Echo:   Carotid Doppler: N/A  Telemetry: CC: Left eye peripheral vision loss      HPI:  49 yO RHF with a PMH of CAD w/ 4 stents (on Plavix), atrial fibrillation (on Xarelto), DM, and HTN, p.w 3 days of  left eye vision loss. The patient reports that this past Sunday evening after she woke up she experienced sudden loss of vision in the left inferior temporal visual field.  She reports about 90 minutes later, while ambulating, she began feeling like the room was spinning and was nauseous while ambulating, though she did not vomit. The following morning, the vision had improved and only had some bluriness. Today, she saw her ophthalmologist who did a dilated eye exam and suspected optic neuritis of the left eye; she referred the patient to the ED for an MRI and further neurourological evaluation. She also reports worsening of left ear pain , left sided  headache, 6/10  with above symptoms and discomfort with left eye movements. Notably, she reports over the last 2-3 months she has been occasionally forgetting names; prior to this she has had no memory deficits.     In the ED, T 98.3 F, HR 72, /69, RR 18; O2 sat 98%.   CT Head negative for acute mass effect, midline shift or hemorrhage.   Ophtho was consulted; recommended an MRI of b/l orbits and of the spine w/ and w/o contrast        Home Medications:  Atorvastatin 80 mg oral tablet: 1 tab(s) orally once a day  HumaLOG KwikPen 100 units/mL injectable solution: 18 unit(s) injectable 3 times a day  Jardiance 25 mg oral tablet: 1 tab(s) orally once a day (in the morning)  MetFORMIN 1000 mg oral tablet: 1 tab(s) orally 2 times a day  PARoxetine 30 mg oral tablet: 1 tab(s) orally once a day   pioglitazone 30 mg oral tablet: 1 tab(s) orally once a day  Tresiba FlexTouch 100 units/mL subcutaneous solution: 80 unit(s) subcutaneous once a day    Social History  EX heavy smoker 2ppd x 32 years  Denies alcohol and drug use      Family history  Father with DM, CAD, CVA at age 54  Mother with HTN        Neuro Exam:  Orientation: oriented to person, place time and situation. Able to give history, speech is fluent with good repeats  Cranial Nerves: intact except for subjective blurriness in the left peripheral visual field. VFF on exam   Motor: 5/5 throughout/ no drift  Sensory exam:  intact.   Coordination:. No dysmetria or limb ataxia  Gait : deferred    NIHSS:     Allergies    No Known Allergies    Intolerances      MEDICATIONS  (STANDING):  atorvastatin 80 milliGRAM(s) Oral at bedtime  chlorhexidine 4% Liquid 1 Application(s) Topical <User Schedule>  insulin lispro Injectable (HumaLOG) 18 Unit(s) SubCutaneous three times a day before meals  metoprolol succinate  milliGRAM(s) Oral at bedtime  metoprolol succinate ER 50 milliGRAM(s) Oral daily  PARoxetine 30 milliGRAM(s) Oral daily  rivaroxaban 20 milliGRAM(s) Oral with dinner        MEDICATIONS  (PRN):      LABS:                        13.4   6.69  )-----------( 249      ( 18 Aug 2020 14:19 )             42.8     08-18    137  |  100  |  15  ----------------------------<  123<H>  4.6   |  26  |  0.7    Ca    9.4      18 Aug 2020 14:19    TPro  6.9  /  Alb  4.2  /  TBili  0.8  /  DBili  x   /  AST  16  /  ALT  23  /  AlkPhos  107  08-18            Neuro Imaging:  < from: CT Head No Cont (08.18.20 @ 15:29) >  FINDINGS:    The ventricles and cortical sulci are unremarkable.    There is no evidence for intracranial hemorrhage, large territorial infarct, midline shift, or mass effect.    The calvarium is intact. Visualized paranasal sinuses and mastoids are well-aerated. The orbits are unremarkable though incompletely visualized.      IMPRESSION:    No acute intracranial pathology. No evidence of midline shift, mass effect or intracranial hemorrhage.            KODY RANGEL M.D., RESIDENT RADIOLOGIST  This document has been electronically signed.  NESTOR SUTHERLAND M.D., ATTENDING RADIOLOGIST  This document has been electronically signed. Aug 18 2020  4:02PM    < end of copied text >    EEG:     Echo:   Carotid Doppler: N/A  Telemetry:

## 2020-08-19 LAB
ANION GAP SERPL CALC-SCNC: 9 MMOL/L — SIGNIFICANT CHANGE UP (ref 7–14)
BASOPHILS # BLD AUTO: 0.04 K/UL — SIGNIFICANT CHANGE UP (ref 0–0.2)
BASOPHILS NFR BLD AUTO: 0.6 % — SIGNIFICANT CHANGE UP (ref 0–1)
BUN SERPL-MCNC: 17 MG/DL — SIGNIFICANT CHANGE UP (ref 10–20)
CALCIUM SERPL-MCNC: 9.6 MG/DL — SIGNIFICANT CHANGE UP (ref 8.5–10.1)
CHLORIDE SERPL-SCNC: 97 MMOL/L — LOW (ref 98–110)
CO2 SERPL-SCNC: 29 MMOL/L — SIGNIFICANT CHANGE UP (ref 17–32)
CREAT SERPL-MCNC: 0.7 MG/DL — SIGNIFICANT CHANGE UP (ref 0.7–1.5)
CRP SERPL-MCNC: 0.4 MG/DL — SIGNIFICANT CHANGE UP (ref 0–0.4)
EOSINOPHIL # BLD AUTO: 0.17 K/UL — SIGNIFICANT CHANGE UP (ref 0–0.7)
EOSINOPHIL NFR BLD AUTO: 2.4 % — SIGNIFICANT CHANGE UP (ref 0–8)
ERYTHROCYTE [SEDIMENTATION RATE] IN BLOOD: 20 MM/HR — SIGNIFICANT CHANGE UP (ref 0–20)
GLUCOSE BLDC GLUCOMTR-MCNC: 109 MG/DL — HIGH (ref 70–99)
GLUCOSE BLDC GLUCOMTR-MCNC: 190 MG/DL — HIGH (ref 70–99)
GLUCOSE BLDC GLUCOMTR-MCNC: 261 MG/DL — HIGH (ref 70–99)
GLUCOSE SERPL-MCNC: 130 MG/DL — HIGH (ref 70–99)
HCT VFR BLD CALC: 41.5 % — SIGNIFICANT CHANGE UP (ref 37–47)
HGB BLD-MCNC: 13.1 G/DL — SIGNIFICANT CHANGE UP (ref 12–16)
IMM GRANULOCYTES NFR BLD AUTO: 0.4 % — HIGH (ref 0.1–0.3)
LYMPHOCYTES # BLD AUTO: 2.46 K/UL — SIGNIFICANT CHANGE UP (ref 1.2–3.4)
LYMPHOCYTES # BLD AUTO: 35.1 % — SIGNIFICANT CHANGE UP (ref 20.5–51.1)
MAGNESIUM SERPL-MCNC: 1.9 MG/DL — SIGNIFICANT CHANGE UP (ref 1.8–2.4)
MCHC RBC-ENTMCNC: 30.4 PG — SIGNIFICANT CHANGE UP (ref 27–31)
MCHC RBC-ENTMCNC: 31.6 G/DL — LOW (ref 32–37)
MCV RBC AUTO: 96.3 FL — SIGNIFICANT CHANGE UP (ref 81–99)
MONOCYTES # BLD AUTO: 0.49 K/UL — SIGNIFICANT CHANGE UP (ref 0.1–0.6)
MONOCYTES NFR BLD AUTO: 7 % — SIGNIFICANT CHANGE UP (ref 1.7–9.3)
NEUTROPHILS # BLD AUTO: 3.82 K/UL — SIGNIFICANT CHANGE UP (ref 1.4–6.5)
NEUTROPHILS NFR BLD AUTO: 54.5 % — SIGNIFICANT CHANGE UP (ref 42.2–75.2)
NRBC # BLD: 0 /100 WBCS — SIGNIFICANT CHANGE UP (ref 0–0)
PLATELET # BLD AUTO: 235 K/UL — SIGNIFICANT CHANGE UP (ref 130–400)
POTASSIUM SERPL-MCNC: 4.3 MMOL/L — SIGNIFICANT CHANGE UP (ref 3.5–5)
POTASSIUM SERPL-SCNC: 4.3 MMOL/L — SIGNIFICANT CHANGE UP (ref 3.5–5)
RBC # BLD: 4.31 M/UL — SIGNIFICANT CHANGE UP (ref 4.2–5.4)
RBC # FLD: 14.1 % — SIGNIFICANT CHANGE UP (ref 11.5–14.5)
SARS-COV-2 IGG SERPL QL IA: NEGATIVE — SIGNIFICANT CHANGE UP
SARS-COV-2 IGM SERPL IA-ACNC: 0.01 INDEX — SIGNIFICANT CHANGE UP
SARS-COV-2 RNA SPEC QL NAA+PROBE: SIGNIFICANT CHANGE UP
SODIUM SERPL-SCNC: 135 MMOL/L — SIGNIFICANT CHANGE UP (ref 135–146)
WBC # BLD: 7.01 K/UL — SIGNIFICANT CHANGE UP (ref 4.8–10.8)
WBC # FLD AUTO: 7.01 K/UL — SIGNIFICANT CHANGE UP (ref 4.8–10.8)

## 2020-08-19 PROCEDURE — 99233 SBSQ HOSP IP/OBS HIGH 50: CPT

## 2020-08-19 RX ORDER — METOCLOPRAMIDE HCL 10 MG
5 TABLET ORAL DAILY
Refills: 0 | Status: DISCONTINUED | OUTPATIENT
Start: 2020-08-19 | End: 2020-08-19

## 2020-08-19 RX ORDER — METOCLOPRAMIDE HCL 10 MG
10 TABLET ORAL ONCE
Refills: 0 | Status: COMPLETED | OUTPATIENT
Start: 2020-08-19 | End: 2020-08-19

## 2020-08-19 RX ORDER — ACETAMINOPHEN 500 MG
650 TABLET ORAL EVERY 6 HOURS
Refills: 0 | Status: DISCONTINUED | OUTPATIENT
Start: 2020-08-19 | End: 2020-08-25

## 2020-08-19 RX ADMIN — Medication 1 MILLIGRAM(S): at 17:02

## 2020-08-19 RX ADMIN — Medication 650 MILLIGRAM(S): at 00:57

## 2020-08-19 RX ADMIN — Medication 100 MILLIGRAM(S): at 22:12

## 2020-08-19 RX ADMIN — CLOPIDOGREL BISULFATE 75 MILLIGRAM(S): 75 TABLET, FILM COATED ORAL at 17:03

## 2020-08-19 RX ADMIN — Medication 30 MILLIGRAM(S): at 17:03

## 2020-08-19 RX ADMIN — Medication 50 MILLIGRAM(S): at 05:45

## 2020-08-19 RX ADMIN — Medication 10 MILLIGRAM(S): at 01:49

## 2020-08-19 RX ADMIN — Medication 650 MILLIGRAM(S): at 02:00

## 2020-08-19 RX ADMIN — ATORVASTATIN CALCIUM 80 MILLIGRAM(S): 80 TABLET, FILM COATED ORAL at 22:12

## 2020-08-19 RX ADMIN — RIVAROXABAN 20 MILLIGRAM(S): KIT at 18:34

## 2020-08-19 RX ADMIN — Medication 18 UNIT(S): at 08:44

## 2020-08-19 NOTE — CONSULT NOTE ADULT - ASSESSMENT
IMP/PLAN: 1. Ischemic Optic Neuropathy OS.   2. Cotton Wool Spots OS.   3. Branch Retinal Artery Occlusion OS.   4. Diabetes, Type II with Ocular Complications, Insulin Dependent.   5. Hypertensive Retinopathy OU. 6. Mild Nonproliferative Diabetic Retinopathy OU.   7. Nuclear Sclerosis OU.   8. Hypertension, Systemic.   9. Coronary Artery Disease (CAD) (Plavix).   10. Atrial Fibrillation (Xarelto).     8/19/20, There is no evidence of optic nerve edema or inflammation OU, there is a cotton wool spot, from an occlusion of  a precapillary arteriole off the nerve at 2-3:00 OS which accounts for her symptoms of a defect in the inferotemporal VF, this is not a thomas-field defect. There is ischemia in the sector of the optic nerve corresponding to the CWS and it can be associated with an afferent defect which Ms Gutierrez does have. This is more likely a retinal or cilioretinal arteriole occlusion, small, OS. With minimal digital compression, I can see pulsation of what is likely a cilioretinal artery off the disc OS.  I agree with the evaluation of GCA but she has no symptoms of GCA but has had stents for coronary artery disease. I will follow for resolution of the CWS and improvement of ocular symptoms. Stroke evaluation should also be performed.    FOLLOW UP: Dr.Vincent Sneed 2-3 Weeks

## 2020-08-19 NOTE — PROGRESS NOTE ADULT - ASSESSMENT
The patient is a 50 year-old female with a PMH of CAD w/ 4 stents (on Plavix), atrial fibrillation (on Xarelto), DM, and HTN, presenting for left eye vision loss, admitted for further workup.    # Left lower hemifield vision loss x 2 days, improving, possibly secondary to optic neuritis vs vasculitis vs amaurosis fugax, less likely, w/ chronic sinus headache  Visual field testing normal on my exam;   CT Head also negative for acute bleed or mass effect   MRI Brain of bilateral orbits w/ and w/o IV contrast, cervical, thoracic, lumbar spine w/ and w/o: was declined by patient due to anxiety  hold off on IV steroids (patient's vision improving)  Tramadol 50 mg PO x 1; if headache improves can add to standing regimen  Opthalmology exam: 8/19/20, no evidence of optic nerve edema or inflammation OU, there is a cotton wool spot, from an occlusion of  a precapillary arteriole off the nerve at 2-3:00 OS which accounts for her symptoms of a defect in the inferotemporal VF, this is not a thomas-field defect. ischemia in the sector of the optic nerve. retinal or cilioretinal arteriole occlusion, small, OS.    # CAD s/p 4 stents  C/w metoprolol succinate 50 mg qAM, 100 mg qHS  C/w Lipitor 80 mg once daily, Plavix 75 mg once daily    # HTN  C/w metoprolol succinate 50 mg qAM, 100 mg qHS  Vitals per routine    # DM  On Jardiance, metformin, Tresiba at home; hold for now  C/w Lispro 18 U qAC  FS qAC, qHS; IF > 180, add sliding scale  Carb-consistent diet    # CHG  # DVT ppx- Xarelto 20 mg qHS  # GI ppx- not indicated  # Diet- Carb-consistent  # Activity- ambulate w/ assistance  # Code status- full  # Dispo- Neuro evaluation

## 2020-08-19 NOTE — PROGRESS NOTE ADULT - SUBJECTIVE AND OBJECTIVE BOX
SUBJECTIVE:    Patient is a 50y old Female who presents with a chief complaint of left eye vision loss (19 Aug 2020 18:12)    Currently admitted to medicine with the primary diagnosis of Vision loss of left eye     Today is hospital day 1d. This morning she is resting comfortably in bed and reports blurry vision, floating, left sided head sensitivity to touch and headaches. Denies n/v, dizziness.    PAST MEDICAL & SURGICAL HISTORY  Hypertension  DM (diabetes mellitus)  Afib  MI (myocardial infarction): s/p 4 stents (most recent )  H/O  section    SOCIAL HISTORY:  Negative for smoking/alcohol/drug use.     ALLERGIES:  No Known Allergies    MEDICATIONS:  STANDING MEDICATIONS  atorvastatin 80 milliGRAM(s) Oral at bedtime  chlorhexidine 4% Liquid 1 Application(s) Topical <User Schedule>  clopidogrel Tablet 75 milliGRAM(s) Oral daily  insulin lispro Injectable (HumaLOG) 18 Unit(s) SubCutaneous three times a day before meals  metoprolol succinate  milliGRAM(s) Oral at bedtime  metoprolol succinate ER 50 milliGRAM(s) Oral daily  PARoxetine 30 milliGRAM(s) Oral daily  rivaroxaban 20 milliGRAM(s) Oral with dinner    PRN MEDICATIONS  acetaminophen   Tablet .. 650 milliGRAM(s) Oral every 6 hours PRN    VITALS:   T(F): 98.7  HR: 80  BP: 137/63  RR: 18  SpO2: 97%    LABS:                        13.1   7.01  )-----------( 235      ( 19 Aug 2020 04:30 )             41.5     -    135  |  97<L>  |  17  ----------------------------<  130<H>  4.3   |  29  |  0.7    Ca    9.6      19 Aug 2020 04:30  Mg     1.9         TPro  6.9  /  Alb  4.2  /  TBili  0.8  /  DBili  x   /  AST  16  /  ALT  23  /  AlkPhos  107            Sedimentation Rate, Erythrocyte: 20 mm/Hr (20 @ 04:30)  Sedimentation Rate, Erythrocyte: 22 mm/Hr <H> (20 @ 23:00)          RADIOLOGY:  MRI cervical/ thoracic and lumbar spine: declined by patient    PHYSICAL EXAM:  GEN: No acute distress, appears worried  Eyes: no conjunctival injection, no sclarea clear. Fundoscopic exam: not performed, limited due to lack of supplies,  LUNGS: Clear to auscultation bilaterally, no wheezes, rales, rhonchi   HEART: Regular rate + s1 s2, no murmurs  ABD: Soft, non-tender, non-distended.  EXT: No edema, peripheral pulses 2+ b/l   NEURO: AAOX3

## 2020-08-19 NOTE — CONSULT NOTE ADULT - SUBJECTIVE AND OBJECTIVE BOX
MARCIAL MCCORMICK  MRN-[id number]  50y Female        Patient is a 50y old  Female who presents with a chief complaint of left eye vision loss (19 Aug 2020 15:14)    HEALTH ISSUES - R/O PROBLEM Dx:    HPI:  The patient is a 50 year-old female with a PMH of CAD w/ 4 stents (on Plavix), atrial fibrillation (on Xarelto), DM, and HTN, presenting for left eye vision loss. The patient reports that Luis evening while looking at her phone, she began to experience a darkening of the inferior thomas-visual field; it gradually progressed to blackness. She reports about 90 minutes later, while ambulating, she began feeling like the room was spinning and was nauseous while ambulating, though she did not vomit. She went to sleep; by the following morning, the darkness had resolved, replaced by slight blurriness, and she started seeing floaters in her left visual field. The patient denied any associated subjective fever or chills, cough, shortness of breath, recent trauma, sick contacts, focal weakness of any limb, slurred speech or facial asymmetry. Today, she saw her ophthalmologist who did a dilated eye exam and noted optic neuritis of the left eye; she referred the patient to the ED for an MRI and neurological evaluation. She also reports a chronic sinus headache, 6/10 in severity, for which she typically takes a nasal steroid spray, but which does not alleviate the discomfort. Notably, she reports over the last 2-3 months she has been occasionally forgetting names; prior to this she has had no memory deficits.     In the ED, T 98.3 F, HR 72, /69, RR 18; O2 sat 98%. CT Head negative for acute mass effect, midline shift or hemorrhage. Ophtho was consulted; recommended an MRI of b/l orbits and of the spine w/ and w/o contrast, and will see the patient in the morning. (18 Aug 2020 17:58)    PAST MEDICAL & SURGICAL HISTORY:  Hypertension  DM (diabetes mellitus)  Afib  MI (myocardial infarction): s/p 4 stents (most recent )  H/O  section    MEDICATIONS  (STANDING):  atorvastatin 80 milliGRAM(s) Oral at bedtime  chlorhexidine 4% Liquid 1 Application(s) Topical <User Schedule>  clopidogrel Tablet 75 milliGRAM(s) Oral daily  insulin lispro Injectable (HumaLOG) 18 Unit(s) SubCutaneous three times a day before meals  metoprolol succinate  milliGRAM(s) Oral at bedtime  metoprolol succinate ER 50 milliGRAM(s) Oral daily  PARoxetine 30 milliGRAM(s) Oral daily  rivaroxaban 20 milliGRAM(s) Oral with dinner    MEDICATIONS  (PRN):  acetaminophen   Tablet .. 650 milliGRAM(s) Oral every 6 hours PRN Severe Pain (7 - 10)    Allergies    No Known Allergies    Intolerances      HEALTH ISSUES - PROBLEM Dx:          QIANA:  POHx:  FOHx: Non-contributory    Extended HPI: CC: Blurred Vision OS. Severity: moderate OS. Location: inferior temporal visual field. Onset: sudden. Duration of Problem: 1 day. Modifying Factors: one hour later felt dizzy (as if she was spinning) while sitting/standing but not lying down. Also c/o left sided HA. No temple pain. No AF, No dipoplia, rash, Now feels as if has to more head around more to see out of the left eye. No pain with eye movements    Ocular Medications: none        EXTERNAL:    VISUAL ACUITY:       RIGHT EYE: sc: 20/25                  LEFT EYE: sc: 20/25        NEURO TESTING  EXTRAOCULAR MOVEMENTS: full Ou  PUPILS: PERRL; trace APD OS  VFc: FTFC OU    [place]ler OD - wml OS - [place] left side    ANTERIOR SEGMENT EVALUATION: :    LIDS/ADENEXA: clear  CONJUNCTIVA/SCLERA: clear  CORNEA: clear  ANTERIOR CHAMBER: deep/quiet  IRIS/PUPIL: flat OU  LENS/VITREOUS: NS OU    INTRAOCULAR PRESSURE:   OD: 17mmHg  OS: 17mmHg  @ 2:00pm    POSTERIOR SEGMENT EVALUATION  DFE: 1% Tropicamide, 2.5 % Phenylephrine OU    OPTIC NERVE: 0.2/0.2 sector pallor, 2-3 oclock  MACULA: cotton wool spot ST macular OS  A/V: av nicking, tortuosity OD > OS  FUNDUS/PERIPHERY:    SUPPLEMENTAL TESTING:

## 2020-08-19 NOTE — PROGRESS NOTE ADULT - SUBJECTIVE AND OBJECTIVE BOX
PROGRESS NOTE  Chief Complaint:  Patient is a 50y old  Female who presents with a chief complaint of left eye vision loss (18 Aug 2020 20:35)      HPI:  The patient is a 50 year-old female with a PMH of CAD w/ 4 stents (on Plavix), atrial fibrillation (on Xarelto), DM, and HTN, presenting for left eye vision loss. The patient reports that Luis evening while looking at her phone, she began to experience a darkening of the inferior thomas-visual field; it gradually progressed to blackness. She reports about 90 minutes later, while ambulating, she began feeling like the room was spinning and was nauseous while ambulating, though she did not vomit. She went to sleep; by the following morning, the darkness had resolved, replaced by slight blurriness, and she started seeing floaters in her left visual field. The patient denied any associated subjective fever or chills, cough, shortness of breath, recent trauma, sick contacts, focal weakness of any limb, slurred speech or facial asymmetry. Today, she saw her ophthalmologist who did a dilated eye exam and noted optic neuritis of the left eye; she referred the patient to the ED for an MRI and neurological evaluation. She also reports a chronic sinus headache, 6/10 in severity, for which she typically takes a nasal steroid spray, but which does not alleviate the discomfort. Notably, she reports over the last 2-3 months she has been occasionally forgetting names; prior to this she has had no memory deficits.     In the ED, T 98.3 F, HR 72, /69, RR 18; O2 sat 98%. CT Head negative for acute mass effect, midline shift or hemorrhage. Ophtho was consulted; recommended an MRI of b/l orbits and of the spine w/ and w/o contrast, and will see the patient in the morning. (18 Aug 2020 17:58)      ALLERGIES:  No Known Allergies      HOSPITAL MEDICATIONS:  MEDICATIONS  (STANDING):  atorvastatin 80 milliGRAM(s) Oral at bedtime  chlorhexidine 4% Liquid 1 Application(s) Topical <User Schedule>  clopidogrel Tablet 75 milliGRAM(s) Oral daily  insulin lispro Injectable (HumaLOG) 18 Unit(s) SubCutaneous three times a day before meals  metoprolol succinate  milliGRAM(s) Oral at bedtime  metoprolol succinate ER 50 milliGRAM(s) Oral daily  PARoxetine 30 milliGRAM(s) Oral daily  rivaroxaban 20 milliGRAM(s) Oral with dinner    MEDICATIONS  (PRN):  acetaminophen   Tablet .. 650 milliGRAM(s) Oral every 6 hours PRN Severe Pain (7 - 10)  LORazepam   Injectable 1 milliGRAM(s) IV Push once PRN Agitation      PMHX/PSHX:  Hypertension  DM (diabetes mellitus)  Afib  MI (myocardial infarction)  H/O  section      FAMILY HISTORY:  Family history of early CAD  FH: stroke  FH: pulmonary embolism      SOCIAL HISTORY:    REVIEW OF SYSTEMS:     General:  No wt loss, fevers, chills, night sweats, fatigue,   Eyes:  Good vision, no reported pain  ENT:  No sore throat, pain, runny nose, dysphagia  CV:  No pain, palpitations, hypo/hypertension  Resp:  No dyspnea, cough, tachypnea, wheezing  GI:  No pain, No nausea, No vomiting, No diarrhea, No constipation, No weight loss, No fever, No pruritis, No rectal bleeding, No tarry stools, No dysphagia,  :  No pain, bleeding, incontinence, nocturia  Muscle:  No pain, weakness  Neuro:  No weakness, tingling, memory problems  Psych:  No fatigue, insomnia, mood problems, depression  Endocrine:  No polyuria, polydipsia, cold/heat intolerance  Heme:  No petechiae, ecchymosis, easy bruisability  Skin:  No rash, tattoos, scars, edema      PHYSICAL EXAM:   Vital Signs:  Vital Signs Last 24 Hrs  T(C): 36 (19 Aug 2020 05:22), Max: 37.4 (18 Aug 2020 23:58)  T(F): 96.8 (19 Aug 2020 05:22), Max: 99.3 (18 Aug 2020 23:58)  HR: 68 (19 Aug 2020 05:22) (61 - 77)  BP: 139/76 (19 Aug 2020 05:22) (123/75 - 139/76)  RR: 18 (19 Aug 2020 05:22) (18 - 20)  SpO2: 97% (19 Aug 2020 03:17) (94% - 97%)  Daily Height in cm: 167.64 (19 Aug 2020 05:22)        GENERAL:  Appears stated age, well-groomed, well-nourished, no distress  HEENT:  NC/AT,  conjunctivae clear and pink, no thyromegaly, nodules, adenopathy, no JVD, sclera -anicteric  CHEST:  Full & symmetric excursion, no increased effort, breath sounds clear  HEART:  Regular rhythm, S1, S2, no murmur/rub/S3/S4, no abdominal bruit, no edema  ABDOMEN:  Soft, non-tender, non-distended, normoactive bowel sounds,  no masses ,no hepatosplenomegaly no signs of chronic liver disease  EXTREMITIES  no cyanosis, clubbing or edema  SKIN:  No rash/erythema/ecchymoses/petechiae/wounds/abscess/warm/dry  NEURO:  Alert, oriented, no asterixis, no tremor, no encephalopathy    LABS:                        13.1   7.01  )-----------( 235      ( 19 Aug 2020 04:30 )             41.5     -    135  |  97<L>  |  17  ----------------------------<  130<H>  4.3   |  29  |  0.7    Ca    9.6      19 Aug 2020 04:30  Mg     1.9         TPro  6.9  /  Alb  4.2  /  TBili  0.8  /  DBili  x   /  AST  16  /  ALT  23  /  AlkPhos  107  18    LIVER FUNCTIONS - ( 18 Aug 2020 14:19 )  Alb: 4.2 g/dL / Pro: 6.9 g/dL / ALK PHOS: 107 U/L / ALT: 23 U/L / AST: 16 U/L / GGT: x                   ASSESSMENT & PLAN:

## 2020-08-19 NOTE — PROGRESS NOTE ADULT - ASSESSMENT
The patient is a 50 year-old female with a PMH of CAD w/ 4 stents (on Plavix), atrial fibrillation (on Xarelto), DM, and HTN, presenting for left eye vision loss, admitted for further workup.    # Left lower hemifield vision loss x 2 days, improving, possibly secondary to optic neuritis vs vasculitis vs amaurosis fugax, less likely, w/ chronic sinus headache  Visual field testing normal on my exam; CT Head also negative for acute bleed or mass effect  Will order MRI Brain of bilateral orbits w/ and w/o IV contrast, cervical, thoracic, lumbar spine w/ and w/o  Will hold off on IV steroids for now pending imaging and as patient's vision improving  Tramadol 50 mg PO x 1; if headache improves can add to standing regimen      # CAD s/p 4 stents  C/w metoprolol succinate 50 mg qAM, 100 mg qHS  C/w Lipitor 80 mg once daily, Plavix 75 mg once daily    # HTN  C/w metoprolol succinate 50 mg qAM, 100 mg qHS  Vitals per routine    # DM  On Jardiance, metformin, Tresiba at home; hold for now  C/w Lispro 18 U qAC  FS qAC, qHS; IF > 180, add sliding scale  Carb-consistent diet    # CHG  # DVT ppx- Xarelto 20 mg qHS  # GI ppx- not indicated  # Diet- Carb-consistent  # Activity- ambulate w/ assistance  # Code status- full  # Dispo- acute pending MRI b/l orbits and of spine; Neuro eval    Follow up MRI followed by Neurology and ophthalmology follow ups

## 2020-08-20 LAB
ALBUMIN SERPL ELPH-MCNC: 4.2 G/DL — SIGNIFICANT CHANGE UP (ref 3.5–5.2)
ALP SERPL-CCNC: 98 U/L — SIGNIFICANT CHANGE UP (ref 30–115)
ALT FLD-CCNC: 24 U/L — SIGNIFICANT CHANGE UP (ref 0–41)
ANION GAP SERPL CALC-SCNC: 10 MMOL/L — SIGNIFICANT CHANGE UP (ref 7–14)
AST SERPL-CCNC: 17 U/L — SIGNIFICANT CHANGE UP (ref 0–41)
BILIRUB SERPL-MCNC: 1 MG/DL — SIGNIFICANT CHANGE UP (ref 0.2–1.2)
BUN SERPL-MCNC: 13 MG/DL — SIGNIFICANT CHANGE UP (ref 10–20)
CALCIUM SERPL-MCNC: 9.2 MG/DL — SIGNIFICANT CHANGE UP (ref 8.5–10.1)
CHLORIDE SERPL-SCNC: 102 MMOL/L — SIGNIFICANT CHANGE UP (ref 98–110)
CO2 SERPL-SCNC: 29 MMOL/L — SIGNIFICANT CHANGE UP (ref 17–32)
CREAT SERPL-MCNC: 0.7 MG/DL — SIGNIFICANT CHANGE UP (ref 0.7–1.5)
GLUCOSE BLDC GLUCOMTR-MCNC: 127 MG/DL — HIGH (ref 70–99)
GLUCOSE BLDC GLUCOMTR-MCNC: 159 MG/DL — HIGH (ref 70–99)
GLUCOSE BLDC GLUCOMTR-MCNC: 173 MG/DL — HIGH (ref 70–99)
GLUCOSE BLDC GLUCOMTR-MCNC: 290 MG/DL — HIGH (ref 70–99)
GLUCOSE SERPL-MCNC: 122 MG/DL — HIGH (ref 70–99)
HCT VFR BLD CALC: 41.2 % — SIGNIFICANT CHANGE UP (ref 37–47)
HGB BLD-MCNC: 13 G/DL — SIGNIFICANT CHANGE UP (ref 12–16)
MCHC RBC-ENTMCNC: 30.6 PG — SIGNIFICANT CHANGE UP (ref 27–31)
MCHC RBC-ENTMCNC: 31.6 G/DL — LOW (ref 32–37)
MCV RBC AUTO: 96.9 FL — SIGNIFICANT CHANGE UP (ref 81–99)
NRBC # BLD: 0 /100 WBCS — SIGNIFICANT CHANGE UP (ref 0–0)
PLATELET # BLD AUTO: 248 K/UL — SIGNIFICANT CHANGE UP (ref 130–400)
POTASSIUM SERPL-MCNC: 4.2 MMOL/L — SIGNIFICANT CHANGE UP (ref 3.5–5)
POTASSIUM SERPL-SCNC: 4.2 MMOL/L — SIGNIFICANT CHANGE UP (ref 3.5–5)
PROT SERPL-MCNC: 6.6 G/DL — SIGNIFICANT CHANGE UP (ref 6–8)
RBC # BLD: 4.25 M/UL — SIGNIFICANT CHANGE UP (ref 4.2–5.4)
RBC # FLD: 14.4 % — SIGNIFICANT CHANGE UP (ref 11.5–14.5)
SODIUM SERPL-SCNC: 141 MMOL/L — SIGNIFICANT CHANGE UP (ref 135–146)
WBC # BLD: 6.38 K/UL — SIGNIFICANT CHANGE UP (ref 4.8–10.8)
WBC # FLD AUTO: 6.38 K/UL — SIGNIFICANT CHANGE UP (ref 4.8–10.8)

## 2020-08-20 PROCEDURE — 99222 1ST HOSP IP/OBS MODERATE 55: CPT

## 2020-08-20 PROCEDURE — 70496 CT ANGIOGRAPHY HEAD: CPT | Mod: 26

## 2020-08-20 PROCEDURE — 70498 CT ANGIOGRAPHY NECK: CPT | Mod: 26

## 2020-08-20 PROCEDURE — 99233 SBSQ HOSP IP/OBS HIGH 50: CPT

## 2020-08-20 RX ORDER — ALPRAZOLAM 0.25 MG
0.5 TABLET ORAL ONCE
Refills: 0 | Status: DISCONTINUED | OUTPATIENT
Start: 2020-08-20 | End: 2020-08-20

## 2020-08-20 RX ORDER — ALPRAZOLAM 0.25 MG
0.5 TABLET ORAL ONCE
Refills: 0 | Status: DISCONTINUED | OUTPATIENT
Start: 2020-08-20 | End: 2020-08-21

## 2020-08-20 RX ORDER — INSULIN LISPRO 100/ML
7 VIAL (ML) SUBCUTANEOUS ONCE
Refills: 0 | Status: COMPLETED | OUTPATIENT
Start: 2020-08-20 | End: 2020-08-21

## 2020-08-20 RX ADMIN — Medication 30 MILLIGRAM(S): at 12:10

## 2020-08-20 RX ADMIN — Medication 100 MILLIGRAM(S): at 22:53

## 2020-08-20 RX ADMIN — RIVAROXABAN 20 MILLIGRAM(S): KIT at 17:06

## 2020-08-20 RX ADMIN — CLOPIDOGREL BISULFATE 75 MILLIGRAM(S): 75 TABLET, FILM COATED ORAL at 12:09

## 2020-08-20 RX ADMIN — ATORVASTATIN CALCIUM 80 MILLIGRAM(S): 80 TABLET, FILM COATED ORAL at 22:53

## 2020-08-20 RX ADMIN — Medication 18 UNIT(S): at 12:08

## 2020-08-20 RX ADMIN — Medication 50 MILLIGRAM(S): at 05:42

## 2020-08-20 RX ADMIN — Medication 18 UNIT(S): at 08:06

## 2020-08-20 NOTE — PROGRESS NOTE ADULT - ASSESSMENT
The patient is a 50 year-old female with a PMH of CAD w/ 4 stents (on Plavix), atrial fibrillation (on Xarelto), DM, and HTN, presenting for left eye vision loss, admitted for further workup.    # Left lower hemifield vision loss x 2 days, improving, possibly secondary to occlusion of  a precapillary arteriole vs amaurosis fugax w/ chronic sinus headache  CT Head also negative for acute bleed or mass effect  optic neuritis and vasculitis were rulled out   As per Neuro, does not require MRI Brain of bilateral orbits w/ and w/o IV contrast, cervical, thoracic, lumbar spine w/ and w/o (pt declined due to anxiety)  hold off on IV steroids (patient's vision improving)  Tramadol 50 mg PO x 1; if headache improves can add to standing regimen  Opthalmology exam: 8/19/20: no evidence of optic nerve edema or inflammation OU, there is a cotton wool spot, from an occlusion of  a precapillary arteriole off the nerve at 2-3:00 OS which accounts for her symptoms of a defect in the inferotemporal VF, this is not a thomas-field defect. ischemia in the sector of the optic nerve. retinal or cilioretinal arteriole occlusion, small, OS.  As per Neuro, CT head and neck is ordered. F/u platelet function assay; Patient does not require MRI at this time.    # CAD s/p 4 stents  C/w metoprolol succinate 50 mg qAM, 100 mg qHS  C/w Lipitor 80 mg once daily, Plavix 75 mg once daily    # HTN  C/w metoprolol succinate 50 mg qAM, 100 mg qHS  Vitals per routine    # DM  On Jardiance, metformin, Tresiba at home; hold for now  C/w Lispro 18 U qAC  FS qAC, qHS; IF > 180, add sliding scale  Carb-consistent diet    # CHG  # DVT ppx- Xarelto 20 mg qHS  # GI ppx- not indicated  # Diet- Carb-consistent  # Activity- ambulate w/ assistance  # Code status- full  # Dispo- platelet function assay, CT head and neck

## 2020-08-20 NOTE — PROGRESS NOTE ADULT - SUBJECTIVE AND OBJECTIVE BOX
SUBJECTIVE:    Patient is a 50y old Female who presents with a chief complaint of left eye vision loss (19 Aug 2020 22:01)    Currently admitted to medicine with the primary diagnosis of Vision loss of left eye.      Today is hospital day 2d. This morning she is resting comfortably in bed and reports no new issues. Patient admits to left eye partial vision loss and headache in the left side, which is decreased in severity from yesterday. Otherwise, denied nausea, vomiting, weakness in arm and legs, numbness.  Patient is compliant with medications Plavix and Xarelto.     PAST MEDICAL & SURGICAL HISTORY  Hypertension  DM (diabetes mellitus)  Afib  MI (myocardial infarction): s/p 4 stents (most recent )  H/O  section    SOCIAL HISTORY:  Negative for smoking/alcohol/drug use.     ALLERGIES:  No Known Allergies    MEDICATIONS:  STANDING MEDICATIONS  atorvastatin 80 milliGRAM(s) Oral at bedtime  chlorhexidine 4% Liquid 1 Application(s) Topical <User Schedule>  clopidogrel Tablet 75 milliGRAM(s) Oral daily  insulin lispro Injectable (HumaLOG) 18 Unit(s) SubCutaneous three times a day before meals  metoprolol succinate  milliGRAM(s) Oral at bedtime  metoprolol succinate ER 50 milliGRAM(s) Oral daily  PARoxetine 30 milliGRAM(s) Oral daily  rivaroxaban 20 milliGRAM(s) Oral with dinner    PRN MEDICATIONS  acetaminophen   Tablet .. 650 milliGRAM(s) Oral every 6 hours PRN    VITALS:   T(F): 97.5  HR: 68  BP: 135/75  RR: 18  SpO2: --    LABS:                        13.0   6.38  )-----------( 248      ( 20 Aug 2020 05:40 )             41.2     -    141  |  102  |  13  ----------------------------<  122<H>  4.2   |  29  |  0.7    Ca    9.2      20 Aug 2020 05:40  Mg     1.9         TPro  6.6  /  Alb  4.2  /  TBili  1.0  /  DBili  x   /  AST  17  /  ALT  24  /  AlkPhos  98  -    RADIOLOGY:  < from: CT Head No Cont (20 @ 15:29) >  IMPRESSION:    No acute intracranial pathology. No evidence of midline shift, mass effect or intracranial hemorrhage.      < end of copied text >    PHYSICAL EXAM:  GEN: No acute distress,  obese,   HEENT: normocephalic, atraumatic. Mucous membrane moist. Fundoscopic exam not performed.  LUNGS: Clear to auscultation bilaterally, no wheezes, rales, rhonchi  HEART: Regular rate, + S1 S2,   ABD: Soft, non-tender, non-distended. + BS   EXT: No cyanosis, ne edema. Peripheral pulse LE 2+ b/l. Skin Intact.   NEURO: AAOX3

## 2020-08-21 LAB
ALBUMIN SERPL ELPH-MCNC: 4 G/DL — SIGNIFICANT CHANGE UP (ref 3.5–5.2)
ALP SERPL-CCNC: 93 U/L — SIGNIFICANT CHANGE UP (ref 30–115)
ALT FLD-CCNC: 23 U/L — SIGNIFICANT CHANGE UP (ref 0–41)
ANION GAP SERPL CALC-SCNC: 13 MMOL/L — SIGNIFICANT CHANGE UP (ref 7–14)
AST SERPL-CCNC: 17 U/L — SIGNIFICANT CHANGE UP (ref 0–41)
BILIRUB SERPL-MCNC: 0.8 MG/DL — SIGNIFICANT CHANGE UP (ref 0.2–1.2)
BUN SERPL-MCNC: 12 MG/DL — SIGNIFICANT CHANGE UP (ref 10–20)
CALCIUM SERPL-MCNC: 9.3 MG/DL — SIGNIFICANT CHANGE UP (ref 8.5–10.1)
CHLORIDE SERPL-SCNC: 101 MMOL/L — SIGNIFICANT CHANGE UP (ref 98–110)
CO2 SERPL-SCNC: 27 MMOL/L — SIGNIFICANT CHANGE UP (ref 17–32)
CREAT SERPL-MCNC: 0.6 MG/DL — LOW (ref 0.7–1.5)
GLUCOSE BLDC GLUCOMTR-MCNC: 164 MG/DL — HIGH (ref 70–99)
GLUCOSE BLDC GLUCOMTR-MCNC: 198 MG/DL — HIGH (ref 70–99)
GLUCOSE BLDC GLUCOMTR-MCNC: 213 MG/DL — HIGH (ref 70–99)
GLUCOSE BLDC GLUCOMTR-MCNC: 249 MG/DL — HIGH (ref 70–99)
GLUCOSE SERPL-MCNC: 172 MG/DL — HIGH (ref 70–99)
HCT VFR BLD CALC: 41.5 % — SIGNIFICANT CHANGE UP (ref 37–47)
HGB BLD-MCNC: 13 G/DL — SIGNIFICANT CHANGE UP (ref 12–16)
MCHC RBC-ENTMCNC: 30.2 PG — SIGNIFICANT CHANGE UP (ref 27–31)
MCHC RBC-ENTMCNC: 31.3 G/DL — LOW (ref 32–37)
MCV RBC AUTO: 96.5 FL — SIGNIFICANT CHANGE UP (ref 81–99)
NRBC # BLD: 0 /100 WBCS — SIGNIFICANT CHANGE UP (ref 0–0)
PLATELET # BLD AUTO: 223 K/UL — SIGNIFICANT CHANGE UP (ref 130–400)
POTASSIUM SERPL-MCNC: 4.4 MMOL/L — SIGNIFICANT CHANGE UP (ref 3.5–5)
POTASSIUM SERPL-SCNC: 4.4 MMOL/L — SIGNIFICANT CHANGE UP (ref 3.5–5)
PROT SERPL-MCNC: 6.6 G/DL — SIGNIFICANT CHANGE UP (ref 6–8)
RBC # BLD: 4.3 M/UL — SIGNIFICANT CHANGE UP (ref 4.2–5.4)
RBC # FLD: 14.3 % — SIGNIFICANT CHANGE UP (ref 11.5–14.5)
SODIUM SERPL-SCNC: 141 MMOL/L — SIGNIFICANT CHANGE UP (ref 135–146)
WBC # BLD: 5.29 K/UL — SIGNIFICANT CHANGE UP (ref 4.8–10.8)
WBC # FLD AUTO: 5.29 K/UL — SIGNIFICANT CHANGE UP (ref 4.8–10.8)

## 2020-08-21 PROCEDURE — 99233 SBSQ HOSP IP/OBS HIGH 50: CPT

## 2020-08-21 PROCEDURE — 99222 1ST HOSP IP/OBS MODERATE 55: CPT

## 2020-08-21 RX ORDER — DEXTROSE 50 % IN WATER 50 %
15 SYRINGE (ML) INTRAVENOUS ONCE
Refills: 0 | Status: DISCONTINUED | OUTPATIENT
Start: 2020-08-21 | End: 2020-08-25

## 2020-08-21 RX ORDER — INSULIN GLARGINE 100 [IU]/ML
27 INJECTION, SOLUTION SUBCUTANEOUS AT BEDTIME
Refills: 0 | Status: DISCONTINUED | OUTPATIENT
Start: 2020-08-21 | End: 2020-08-22

## 2020-08-21 RX ORDER — DEXTROSE 50 % IN WATER 50 %
25 SYRINGE (ML) INTRAVENOUS ONCE
Refills: 0 | Status: DISCONTINUED | OUTPATIENT
Start: 2020-08-21 | End: 2020-08-25

## 2020-08-21 RX ORDER — INSULIN LISPRO 100/ML
5 VIAL (ML) SUBCUTANEOUS ONCE
Refills: 0 | Status: COMPLETED | OUTPATIENT
Start: 2020-08-21 | End: 2020-08-21

## 2020-08-21 RX ORDER — DEXTROSE 50 % IN WATER 50 %
12.5 SYRINGE (ML) INTRAVENOUS ONCE
Refills: 0 | Status: DISCONTINUED | OUTPATIENT
Start: 2020-08-21 | End: 2020-08-25

## 2020-08-21 RX ORDER — INSULIN LISPRO 100/ML
VIAL (ML) SUBCUTANEOUS
Refills: 0 | Status: DISCONTINUED | OUTPATIENT
Start: 2020-08-21 | End: 2020-08-25

## 2020-08-21 RX ORDER — GLUCAGON INJECTION, SOLUTION 0.5 MG/.1ML
1 INJECTION, SOLUTION SUBCUTANEOUS ONCE
Refills: 0 | Status: DISCONTINUED | OUTPATIENT
Start: 2020-08-21 | End: 2020-08-25

## 2020-08-21 RX ORDER — SODIUM CHLORIDE 9 MG/ML
1000 INJECTION, SOLUTION INTRAVENOUS
Refills: 0 | Status: DISCONTINUED | OUTPATIENT
Start: 2020-08-21 | End: 2020-08-25

## 2020-08-21 RX ORDER — INSULIN LISPRO 100/ML
9 VIAL (ML) SUBCUTANEOUS
Refills: 0 | Status: DISCONTINUED | OUTPATIENT
Start: 2020-08-21 | End: 2020-08-22

## 2020-08-21 RX ADMIN — RIVAROXABAN 20 MILLIGRAM(S): KIT at 17:23

## 2020-08-21 RX ADMIN — Medication 18 UNIT(S): at 11:58

## 2020-08-21 RX ADMIN — Medication 9 UNIT(S): at 17:23

## 2020-08-21 RX ADMIN — Medication 30 MILLIGRAM(S): at 11:58

## 2020-08-21 RX ADMIN — INSULIN GLARGINE 27 UNIT(S): 100 INJECTION, SOLUTION SUBCUTANEOUS at 22:07

## 2020-08-21 RX ADMIN — ATORVASTATIN CALCIUM 80 MILLIGRAM(S): 80 TABLET, FILM COATED ORAL at 22:06

## 2020-08-21 RX ADMIN — CLOPIDOGREL BISULFATE 75 MILLIGRAM(S): 75 TABLET, FILM COATED ORAL at 11:58

## 2020-08-21 RX ADMIN — Medication 1: at 17:23

## 2020-08-21 RX ADMIN — Medication 5 UNIT(S): at 22:07

## 2020-08-21 RX ADMIN — Medication 18 UNIT(S): at 08:10

## 2020-08-21 RX ADMIN — Medication 7 UNIT(S): at 00:05

## 2020-08-21 RX ADMIN — Medication 50 MILLIGRAM(S): at 06:10

## 2020-08-21 RX ADMIN — Medication 100 MILLIGRAM(S): at 22:07

## 2020-08-21 RX ADMIN — Medication 0.5 MILLIGRAM(S): at 15:36

## 2020-08-21 NOTE — PROGRESS NOTE ADULT - ASSESSMENT
The patient is a 50 year-old female with a PMH of CAD w/ 4 stents (on Plavix), atrial fibrillation (on Xarelto), DM, and HTN, presenting for left eye vision loss, admitted for further workup.    # Left lower hemifield vision loss x 2 days, secondary to occlusion of  a precapillary arteriole  H/o chronic sinus headache  -CT Head also negative for acute bleed or mass effect  -optic neuritis and vasculitis were rulled out  - As per Neuro, does not require MRI Brain of bilateral orbits w/ and w/o IV contrast, cervical, thoracic, lumbar spine w/ and w/o (pt declined due to anxiety)  -hold off on IV steroids (patient's vision improving)  -Tramadol 50 mg PO x 1; if headache improves can add to standing regimen  Opthalmology exam: 8/19/20: no evidence of optic nerve edema or inflammation OU, there is a cotton wool spot, from an occlusion of  a precapillary arteriole off the nerve at 2-3:00 OS which accounts for her symptoms of a defect in the inferotemporal VF, this is not a thomas-field defect. ischemia in the sector of the optic nerve. retinal or cilioretinal arteriole occlusion, small, OS.  As per Neuro, CT head and neck is performed: Severe focal stenosis involving the origin of the right ICA, Moderate-severe focal stenosis involving the origin of the left ICA.  Platelet function assay:94, patient responding well to plavix  f/u Vascular surgery recs    # CAD s/p 4 stents  C/w metoprolol succinate 50 mg qAM, 100 mg qHS  C/w Lipitor 80 mg once daily, Plavix 75 mg once daily    # HTN  C/w metoprolol succinate 50 mg qAM, 100 mg qHS  Vitals per routine    # DM  On Jardiance, metformin, Tresiba at home; hold for now  C/w Lispro 9/9/9, and Lantus 27HS  FS qAC, qHS; -290  Adjust insulin doses, increase as needed  Carb-consistent diet    # CHG  # DVT ppx- Xarelto 20 mg qHS  # GI ppx- not indicated  # Diet- Carb-consistent  # Activity- ambulate w/ assistance  # Code status- full  # Dispo- vascular consult The patient is a 50 year-old female with a PMH of CAD w/ 4 stents (on Plavix), atrial fibrillation (on Xarelto), DM, and HTN, presenting for left eye vision loss, admitted for further workup.    # Left lower hemifield vision loss x 2 days, secondary to occlusion of  a precapillary arteriole  H/o chronic sinus headache  -CT Head also negative for acute bleed or mass effect  -optic neuritis and vasculitis were rulled out  - As per Neuro, does not require MRI Brain of bilateral orbits w/ and w/o IV contrast, cervical, thoracic, lumbar spine w/ and w/o (pt declined due to anxiety)  -hold off on IV steroids (patient's vision improving)  -Tramadol 50 mg PO x 1; if headache improves can add to standing regimen  Opthalmology exam: 8/19/20: no evidence of optic nerve edema or inflammation OU, there is a cotton wool spot, from an occlusion of  a precapillary arteriole off the nerve at 2-3:00 OS which accounts for her symptoms of a defect in the inferotemporal VF, this is not a thomas-field defect. ischemia in the sector of the optic nerve. retinal or cilioretinal arteriole occlusion, small, OS.   CTangio head and neck is performed: Severe focal stenosis involving the origin of the right ICA, Moderate-severe focal stenosis involving the origin of the left ICA.  Platelet function assay:94, patient responding well to plavix  f/u Vascular surgery recs    # CAD s/p 4 stents  C/w metoprolol succinate 50 mg qAM, 100 mg qHS  C/w Lipitor 80 mg once daily, Plavix 75 mg once daily    # HTN  C/w metoprolol succinate 50 mg qAM, 100 mg qHS  Vitals per routine    # DM  On Jardiance, metformin, Tresiba at home; hold for now  C/w Lispro 9/9/9, and Lantus 27HS  FS qAC, qHS; -290  Adjust insulin doses, increase as needed  Carb-consistent diet    # CHG  # DVT ppx- Xarelto 20 mg qHS  # GI ppx- not indicated  # Diet- Carb-consistent  # Activity- ambulate w/ assistance  # Code status- full  # Dispo- vascular consult

## 2020-08-21 NOTE — CONSULT NOTE ADULT - SUBJECTIVE AND OBJECTIVE BOX
MARCIAL MCCORMICK [id number]  50y Female  3d    HPI:  The patient is a 50 year-old female with a PMH of CAD w/ 4 stents (on Plavix), atrial fibrillation (on Xarelto), DM, and HTN, presenting for left eye vision loss. The patient reports that Luis evening while looking at her phone, she began to experience a darkening of the inferior thomas-visual field; it gradually progressed to blackness. She reports about 90 minutes later, while ambulating, she began feeling like the room was spinning and was nauseous while ambulating, though she did not vomit. She went to sleep; by the following morning, the darkness had resolved, replaced by slight blurriness, and she started seeing floaters in her left visual field. The patient denied any associated subjective fever or chills, cough, shortness of breath, recent trauma, sick contacts, focal weakness of any limb, slurred speech or facial asymmetry. Today, she saw her ophthalmologist who did a dilated eye exam and noted optic neuritis of the left eye; she referred the patient to the ED for an MRI and neurological evaluation. She also reports a chronic sinus headache, 6/10 in severity, for which she typically takes a nasal steroid spray, but which does not alleviate the discomfort. Notably, she reports over the last 2-3 months she has been occasionally forgetting names; prior to this she has had no memory deficits.     In the ED, T 98.3 F, HR 72, /69, RR 18; O2 sat 98%. CT Head negative for acute mass effect, midline shift or hemorrhage. Ophtho was consulted; recommended an MRI of b/l orbits and of the spine w/ and w/o contrast, and will see the patient in the morning. (18 Aug 2020 17:58)    CT scan of the Neck showed severe focal stenosis involving the origin of the Right ICA and moderate to severe Focal stenosis involving the origin of the left ICA      PAST MEDICAL & SURGICAL HISTORY:  Hypertension  DM (diabetes mellitus)  Afib  MI (myocardial infarction): s/p 4 stents (most recent )  H/O  section      MEDICATIONS  (STANDING):  atorvastatin 80 milliGRAM(s) Oral at bedtime  chlorhexidine 4% Liquid 1 Application(s) Topical <User Schedule>  clopidogrel Tablet 75 milliGRAM(s) Oral daily  dextrose 5%. 1000 milliLiter(s) (50 mL/Hr) IV Continuous <Continuous>  dextrose 50% Injectable 12.5 Gram(s) IV Push once  dextrose 50% Injectable 25 Gram(s) IV Push once  dextrose 50% Injectable 25 Gram(s) IV Push once  insulin glargine Injectable (LANTUS) 27 Unit(s) SubCutaneous at bedtime  insulin lispro (HumaLOG) corrective regimen sliding scale   SubCutaneous three times a day before meals  insulin lispro Injectable (HumaLOG) 9 Unit(s) SubCutaneous three times a day before meals  metoprolol succinate  milliGRAM(s) Oral at bedtime  metoprolol succinate ER 50 milliGRAM(s) Oral daily  PARoxetine 30 milliGRAM(s) Oral daily  rivaroxaban 20 milliGRAM(s) Oral with dinner    MEDICATIONS  (PRN):  acetaminophen   Tablet .. 650 milliGRAM(s) Oral every 6 hours PRN Severe Pain (7 - 10)  dextrose 40% Gel 15 Gram(s) Oral once PRN Blood Glucose LESS THAN 70 milliGRAM(s)/deciliter  glucagon  Injectable 1 milliGRAM(s) IntraMuscular once PRN Glucose LESS THAN 70 milligrams/deciliter      Allergies    No Known Allergies    Intolerances        REVIEW OF SYSTEMS    [X] A ten-point review of systems was otherwise negative except as noted.  [ ] Due to altered mental status/intubation, subjective information were not able to be obtained from the patient. History was obtained, to the extent possible, from review of the chart and collateral sources of information.      Vital Signs Last 24 Hrs  T(C): 36.3 (21 Aug 2020 14:10), Max: 36.8 (20 Aug 2020 20:01)  T(F): 97.3 (21 Aug 2020 14:10), Max: 98.2 (20 Aug 2020 20:01)  HR: 80 (21 Aug 2020 14:10) (75 - 80)  BP: 110/56 (21 Aug 2020 14:10) (110/56 - 143/63)  RR: 18 (21 Aug 2020 14:10) (18 - 18)  SpO2: 95% (21 Aug 2020 05:00) (95% - 95%)    PHYSICAL EXAM:  GENERAL: NAD, well-appearing  CHEST/LUNG: Clear to auscultation bilaterally  HEART: Regular, HR 80 x min  ABDOMEN: Soft, Nontender, Nondistended;   EXTREMITIES:  No clubbing, cyanosis, or edema  Neuro: Awake A&Ox3, No neurologic deficit at the moment of physical exam      LABS:  Labs:  CAPILLARY BLOOD GLUCOSE      POCT Blood Glucose.: 213 mg/dL (21 Aug 2020 11:20)  POCT Blood Glucose.: 164 mg/dL (21 Aug 2020 07:54)  POCT Blood Glucose.: 290 mg/dL (20 Aug 2020 21:29)  POCT Blood Glucose.: 159 mg/dL (20 Aug 2020 16:38)                          13.0   5.29  )-----------( 223      ( 21 Aug 2020 07:11 )             41.5             141  |  101  |  12  ----------------------------<  172<H>  4.4   |  27  |  0.6<L>      Calcium, Total Serum: 9.3 mg/dL (20 @ 07:11)      LFTs:             6.6  | 0.8  | 17       ------------------[93      ( 21 Aug 2020 07:11 )  4.0  | x    | 23              RADIOLOGY & ADDITIONAL STUDIES:  IMPRESSION:  Severe focal stenosis involving the origin of the right ICA.  Moderate-severe focal stenosis involving the origin of the left ICA.

## 2020-08-21 NOTE — PROGRESS NOTE ADULT - SUBJECTIVE AND OBJECTIVE BOX
SUBJECTIVE:    Patient is a 50y old Female who presents with a chief complaint of left eye vision loss (20 Aug 2020 11:36)  Currently admitted to medicine with the primary diagnosis of Vision loss of left eye. Today is hospital day 3d. This morning she is resting comfortably in bed and reports no new issues. Headache is resolving, better from prior day. loss of vision is the same, not progressing.  PAST MEDICAL & SURGICAL HISTORY  Hypertension  DM (diabetes mellitus)  Afib  MI (myocardial infarction): s/p 4 stents (most recent )  H/O  section    SOCIAL HISTORY:  Negative for smoking/alcohol/drug use.     ALLERGIES:  No Known Allergies    MEDICATIONS:  STANDING MEDICATIONS  ALPRAZolam 0.5 milliGRAM(s) Oral once  atorvastatin 80 milliGRAM(s) Oral at bedtime  chlorhexidine 4% Liquid 1 Application(s) Topical <User Schedule>  clopidogrel Tablet 75 milliGRAM(s) Oral daily  insulin lispro Injectable (HumaLOG) 18 Unit(s) SubCutaneous three times a day before meals  metoprolol succinate  milliGRAM(s) Oral at bedtime  metoprolol succinate ER 50 milliGRAM(s) Oral daily  PARoxetine 30 milliGRAM(s) Oral daily  rivaroxaban 20 milliGRAM(s) Oral with dinner    PRN MEDICATIONS  acetaminophen   Tablet .. 650 milliGRAM(s) Oral every 6 hours PRN    VITALS:   T(F): 97.3  HR: 80  BP: 110/56  RR: 18  SpO2: 95%    LABS:                        13.0   5.29  )-----------( 223      ( 21 Aug 2020 07:11 )             41.5     08-    141  |  101  |  12  ----------------------------<  172<H>  4.4   |  27  |  0.6<L>    Ca    9.3      21 Aug 2020 07:11    TPro  6.6  /  Alb  4.0  /  TBili  0.8  /  DBili  x   /  AST  17  /  ALT  23  /  AlkPhos  93  08-                  RADIOLOGY:  < from: CT Angio Head w/ IV Cont (20 @ 18:07) >  MPRESSION:    Severe focal stenosis involving the origin of the right ICA.    Moderate-severe focal stenosis involving the origin of the left ICA.    < end of copied text >  < from: CT Angio Neck w/ IV Cont (20 @ 18:05) >  IMPRESSION:    Severe focal stenosis involving the origin of the right ICA.    Moderate-severe focal stenosis involving the origin of the left ICA.    < end of copied text >      PHYSICAL EXAM:  GEN: No acute distress, obese  LUNGS: Clear to auscultation bilaterally, no wheezes, rale, rhonchi  HEART: Regular rate, +s1 S2, no murmurs, rubs or gallops  ABD: Soft, non-tender, non-distended. + BS in 4 Quadrants  EXT: no edema no cyanosis, +2 dorsalis pedis pulse b/l, Skin Intact.   NEURO: AAOX3

## 2020-08-21 NOTE — CONSULT NOTE ADULT - ASSESSMENT
This is a 50 year old female admitted to Medicine for left vision loss and dizziness on Sunday that improved after one day. Patient has a Neck CT scan that showed severe focal stenosis involving the origin of the Right ICA and moderate to severe Focal stenosis involving the origin of the left ICA.     Plan:  - Patient would benefit of Carotid endarterectomy  - We will consider performing procedure during this hospitalization  - Please continue to medically optimize   - Vascular surgery will continue to follow, we will notify you when to hold AC and NPO once definitive plan is made with attending  - Please call for question or concerns at spectra # [id number].

## 2020-08-22 LAB
A1C WITH ESTIMATED AVERAGE GLUCOSE RESULT: 7.1 % — HIGH (ref 4–5.6)
ALBUMIN SERPL ELPH-MCNC: 3.9 G/DL — SIGNIFICANT CHANGE UP (ref 3.5–5.2)
ALP SERPL-CCNC: 95 U/L — SIGNIFICANT CHANGE UP (ref 30–115)
ALT FLD-CCNC: 28 U/L — SIGNIFICANT CHANGE UP (ref 0–41)
ANION GAP SERPL CALC-SCNC: 10 MMOL/L — SIGNIFICANT CHANGE UP (ref 7–14)
AST SERPL-CCNC: 18 U/L — SIGNIFICANT CHANGE UP (ref 0–41)
BILIRUB SERPL-MCNC: 0.7 MG/DL — SIGNIFICANT CHANGE UP (ref 0.2–1.2)
BUN SERPL-MCNC: 10 MG/DL — SIGNIFICANT CHANGE UP (ref 10–20)
CALCIUM SERPL-MCNC: 9.2 MG/DL — SIGNIFICANT CHANGE UP (ref 8.5–10.1)
CHLORIDE SERPL-SCNC: 102 MMOL/L — SIGNIFICANT CHANGE UP (ref 98–110)
CO2 SERPL-SCNC: 29 MMOL/L — SIGNIFICANT CHANGE UP (ref 17–32)
CREAT SERPL-MCNC: 0.7 MG/DL — SIGNIFICANT CHANGE UP (ref 0.7–1.5)
ESTIMATED AVERAGE GLUCOSE: 157 MG/DL — HIGH (ref 68–114)
GLUCOSE BLDC GLUCOMTR-MCNC: 184 MG/DL — HIGH (ref 70–99)
GLUCOSE BLDC GLUCOMTR-MCNC: 202 MG/DL — HIGH (ref 70–99)
GLUCOSE BLDC GLUCOMTR-MCNC: 236 MG/DL — HIGH (ref 70–99)
GLUCOSE BLDC GLUCOMTR-MCNC: 253 MG/DL — HIGH (ref 70–99)
GLUCOSE SERPL-MCNC: 199 MG/DL — HIGH (ref 70–99)
HCT VFR BLD CALC: 41.1 % — SIGNIFICANT CHANGE UP (ref 37–47)
HGB BLD-MCNC: 12.8 G/DL — SIGNIFICANT CHANGE UP (ref 12–16)
MCHC RBC-ENTMCNC: 30.5 PG — SIGNIFICANT CHANGE UP (ref 27–31)
MCHC RBC-ENTMCNC: 31.1 G/DL — LOW (ref 32–37)
MCV RBC AUTO: 97.9 FL — SIGNIFICANT CHANGE UP (ref 81–99)
NRBC # BLD: 0 /100 WBCS — SIGNIFICANT CHANGE UP (ref 0–0)
PLATELET # BLD AUTO: 236 K/UL — SIGNIFICANT CHANGE UP (ref 130–400)
POTASSIUM SERPL-MCNC: 4.7 MMOL/L — SIGNIFICANT CHANGE UP (ref 3.5–5)
POTASSIUM SERPL-SCNC: 4.7 MMOL/L — SIGNIFICANT CHANGE UP (ref 3.5–5)
PROT SERPL-MCNC: 6.5 G/DL — SIGNIFICANT CHANGE UP (ref 6–8)
RBC # BLD: 4.2 M/UL — SIGNIFICANT CHANGE UP (ref 4.2–5.4)
RBC # FLD: 14.1 % — SIGNIFICANT CHANGE UP (ref 11.5–14.5)
SODIUM SERPL-SCNC: 141 MMOL/L — SIGNIFICANT CHANGE UP (ref 135–146)
WBC # BLD: 5.68 K/UL — SIGNIFICANT CHANGE UP (ref 4.8–10.8)
WBC # FLD AUTO: 5.68 K/UL — SIGNIFICANT CHANGE UP (ref 4.8–10.8)

## 2020-08-22 PROCEDURE — 99233 SBSQ HOSP IP/OBS HIGH 50: CPT

## 2020-08-22 RX ORDER — ASPIRIN/CALCIUM CARB/MAGNESIUM 324 MG
81 TABLET ORAL DAILY
Refills: 0 | Status: DISCONTINUED | OUTPATIENT
Start: 2020-08-23 | End: 2020-08-24

## 2020-08-22 RX ORDER — INSULIN LISPRO 100/ML
20 VIAL (ML) SUBCUTANEOUS
Refills: 0 | Status: DISCONTINUED | OUTPATIENT
Start: 2020-08-22 | End: 2020-08-25

## 2020-08-22 RX ORDER — INSULIN LISPRO 100/ML
15 VIAL (ML) SUBCUTANEOUS
Refills: 0 | Status: DISCONTINUED | OUTPATIENT
Start: 2020-08-22 | End: 2020-08-22

## 2020-08-22 RX ORDER — INSULIN GLARGINE 100 [IU]/ML
50 INJECTION, SOLUTION SUBCUTANEOUS AT BEDTIME
Refills: 0 | Status: DISCONTINUED | OUTPATIENT
Start: 2020-08-22 | End: 2020-08-22

## 2020-08-22 RX ORDER — INSULIN LISPRO 100/ML
5 VIAL (ML) SUBCUTANEOUS ONCE
Refills: 0 | Status: COMPLETED | OUTPATIENT
Start: 2020-08-22 | End: 2020-08-22

## 2020-08-22 RX ORDER — INSULIN GLARGINE 100 [IU]/ML
64 INJECTION, SOLUTION SUBCUTANEOUS AT BEDTIME
Refills: 0 | Status: DISCONTINUED | OUTPATIENT
Start: 2020-08-22 | End: 2020-08-25

## 2020-08-22 RX ORDER — RIVAROXABAN 15 MG-20MG
20 KIT ORAL
Refills: 0 | Status: COMPLETED | OUTPATIENT
Start: 2020-08-22 | End: 2020-08-22

## 2020-08-22 RX ADMIN — INSULIN GLARGINE 50 UNIT(S): 100 INJECTION, SOLUTION SUBCUTANEOUS at 22:38

## 2020-08-22 RX ADMIN — Medication 30 MILLIGRAM(S): at 12:04

## 2020-08-22 RX ADMIN — Medication 15 UNIT(S): at 17:19

## 2020-08-22 RX ADMIN — Medication 3: at 17:20

## 2020-08-22 RX ADMIN — RIVAROXABAN 20 MILLIGRAM(S): KIT at 17:20

## 2020-08-22 RX ADMIN — Medication 1: at 08:36

## 2020-08-22 RX ADMIN — Medication 15 UNIT(S): at 12:03

## 2020-08-22 RX ADMIN — ATORVASTATIN CALCIUM 80 MILLIGRAM(S): 80 TABLET, FILM COATED ORAL at 22:38

## 2020-08-22 RX ADMIN — Medication 5 UNIT(S): at 22:45

## 2020-08-22 RX ADMIN — Medication 100 MILLIGRAM(S): at 22:38

## 2020-08-22 RX ADMIN — CLOPIDOGREL BISULFATE 75 MILLIGRAM(S): 75 TABLET, FILM COATED ORAL at 12:04

## 2020-08-22 RX ADMIN — Medication 650 MILLIGRAM(S): at 13:56

## 2020-08-22 RX ADMIN — Medication 50 MILLIGRAM(S): at 05:46

## 2020-08-22 RX ADMIN — Medication 9 UNIT(S): at 08:36

## 2020-08-22 RX ADMIN — Medication 650 MILLIGRAM(S): at 16:19

## 2020-08-22 RX ADMIN — Medication 2: at 12:02

## 2020-08-22 NOTE — PROGRESS NOTE ADULT - ASSESSMENT
The patient is a 50 year-old female with a PMH of CAD w/ 4 stents (on Plavix), atrial fibrillation (on Xarelto), DM, and HTN, presenting for left eye vision loss, admitted for further workup. Patient was seen by vascular surgery, planned for carotid endarterectomy next week (date pending).    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 ischemia in the sector of the optic nerve. retinal or cilioretinal arteriole occlusion, small, OS.  -CTangio head and neck is performed: Severe focal stenosis involving the origin of the right ICA, Moderate-severe focal stenosis involving the origin of the left ICA.  Platelet function assay: 94, patient responding well to plavix  - Vascular surgery: planned for carotid endarterectomy, will need to hold AC once the date is confirmed    # CAD s/p 4 stents  C/w metoprolol succinate 50 mg qAM, 100 mg qHS  C/w Lipitor 80 mg once daily, Plavix 75 mg once daily    # HTN  C/w metoprolol succinate 50 mg qAM, 100 mg qHS  Vitals per routine    # DM  On Jardiance, metformin, Tresiba at home; hold for now  -adjusted Lispro 12/12/12( previously on9/9/9), and Lantus 50U HS (previously 27U HS)  FS qAC, qHS;  today  Adjust insulin doses, increase as needed  Carb-consistent diet  hgb A1C in 2019 is 9.9. Pending this admission    # CHG  # DVT ppx- Xarelto 20 mg qHS  # GI ppx- not indicated  # Diet- Carb-consistent  # Activity- ambulate w/ assistance  # Code status- full  # Dispo- carotid endarterectomy per vascular

## 2020-08-22 NOTE — PROGRESS NOTE ADULT - SUBJECTIVE AND OBJECTIVE BOX
SUBJECTIVE:    Patient is a 50y old Female who presents with a chief complaint of left eye vision loss (21 Aug 2020 15:42)    Currently admitted to medicine with the primary diagnosis of Vision loss of left eye     Today is hospital day 4d. This morning she is resting comfortably in bed and reports left sided headache. Patient denies dizziness, chest pain, SOB, abdominal pain, N/v/c, dysuria.     PAST MEDICAL & SURGICAL HISTORY  Hypertension  DM (diabetes mellitus)  Afib  MI (myocardial infarction): s/p 4 stents (most recent )  H/O  section    SOCIAL HISTORY:  Negative for smoking/alcohol/drug use.     ALLERGIES:  No Known Allergies    MEDICATIONS:  STANDING MEDICATIONS  atorvastatin 80 milliGRAM(s) Oral at bedtime  chlorhexidine 4% Liquid 1 Application(s) Topical <User Schedule>  clopidogrel Tablet 75 milliGRAM(s) Oral daily  dextrose 5%. 1000 milliLiter(s) IV Continuous <Continuous>  dextrose 50% Injectable 12.5 Gram(s) IV Push once  dextrose 50% Injectable 25 Gram(s) IV Push once  dextrose 50% Injectable 25 Gram(s) IV Push once  insulin glargine Injectable (LANTUS) 50 Unit(s) SubCutaneous at bedtime  insulin lispro (HumaLOG) corrective regimen sliding scale   SubCutaneous three times a day before meals  insulin lispro Injectable (HumaLOG) 15 Unit(s) SubCutaneous three times a day before meals  metoprolol succinate  milliGRAM(s) Oral at bedtime  metoprolol succinate ER 50 milliGRAM(s) Oral daily  PARoxetine 30 milliGRAM(s) Oral daily  rivaroxaban 20 milliGRAM(s) Oral with dinner    PRN MEDICATIONS  acetaminophen   Tablet .. 650 milliGRAM(s) Oral every 6 hours PRN  dextrose 40% Gel 15 Gram(s) Oral once PRN  glucagon  Injectable 1 milliGRAM(s) IntraMuscular once PRN    VITALS:   T(F): 96.5  HR: 71  BP: 118/59  RR: 18  SpO2: --    LABS:                        13.0   5.29  )-----------( 223      ( 21 Aug 2020 07:11 )             41.5     08-    141  |  101  |  12  ----------------------------<  172<H>  4.4   |  27  |  0.6<L>    Ca    9.3      21 Aug 2020 07:11    TPro  6.6  /  Alb  4.0  /  TBili  0.8  /  DBili  x   /  AST  17  /  ALT  23  /  AlkPhos  93  08-    RADIOLOGY:  < from: CT Angio Head w/ IV Cont (20 @ 18:07) >  MPRESSION:    Severe focal stenosis involving the origin of the right ICA.    Moderate-severe focal stenosis involving the origin of the left ICA.    < end of copied text >  < from: CT Angio Neck w/ IV Cont (20 @ 18:05) >  IMPRESSION:    Severe focal stenosis involving the origin of the right ICA.    Moderate-severe focal stenosis involving the origin of the left ICA.    < end of copied text >      PHYSICAL EXAM:  GEN: No acute distress, obese  Neck: carotid pulse palpable, no carotid bruit   LUNGS: Clear to auscultation bilaterally, no wheezes, rale, rhonchi  HEART: Regular rate, +s1 S2, no murmurs, rubs or gallops  ABD: Soft, non-tender, non-distended. + BS in 4 Quadrants  EXT: no edema no cyanosis on LE, +2 dorsalis pedis pulse b/l, Skin Intact.   NEURO: AAOX3

## 2020-08-23 LAB
ALBUMIN SERPL ELPH-MCNC: 3.8 G/DL — SIGNIFICANT CHANGE UP (ref 3.5–5.2)
ALP SERPL-CCNC: 102 U/L — SIGNIFICANT CHANGE UP (ref 30–115)
ALT FLD-CCNC: 28 U/L — SIGNIFICANT CHANGE UP (ref 0–41)
ANION GAP SERPL CALC-SCNC: 8 MMOL/L — SIGNIFICANT CHANGE UP (ref 7–14)
AST SERPL-CCNC: 16 U/L — SIGNIFICANT CHANGE UP (ref 0–41)
BILIRUB SERPL-MCNC: 0.6 MG/DL — SIGNIFICANT CHANGE UP (ref 0.2–1.2)
BUN SERPL-MCNC: 12 MG/DL — SIGNIFICANT CHANGE UP (ref 10–20)
CALCIUM SERPL-MCNC: 9.2 MG/DL — SIGNIFICANT CHANGE UP (ref 8.5–10.1)
CHLORIDE SERPL-SCNC: 101 MMOL/L — SIGNIFICANT CHANGE UP (ref 98–110)
CO2 SERPL-SCNC: 30 MMOL/L — SIGNIFICANT CHANGE UP (ref 17–32)
CREAT SERPL-MCNC: 0.7 MG/DL — SIGNIFICANT CHANGE UP (ref 0.7–1.5)
GLUCOSE BLDC GLUCOMTR-MCNC: 173 MG/DL — HIGH (ref 70–99)
GLUCOSE BLDC GLUCOMTR-MCNC: 180 MG/DL — HIGH (ref 70–99)
GLUCOSE BLDC GLUCOMTR-MCNC: 204 MG/DL — HIGH (ref 70–99)
GLUCOSE BLDC GLUCOMTR-MCNC: 234 MG/DL — HIGH (ref 70–99)
GLUCOSE SERPL-MCNC: 224 MG/DL — HIGH (ref 70–99)
HCT VFR BLD CALC: 41 % — SIGNIFICANT CHANGE UP (ref 37–47)
HGB BLD-MCNC: 12.8 G/DL — SIGNIFICANT CHANGE UP (ref 12–16)
MCHC RBC-ENTMCNC: 30.5 PG — SIGNIFICANT CHANGE UP (ref 27–31)
MCHC RBC-ENTMCNC: 31.2 G/DL — LOW (ref 32–37)
MCV RBC AUTO: 97.6 FL — SIGNIFICANT CHANGE UP (ref 81–99)
NRBC # BLD: 0 /100 WBCS — SIGNIFICANT CHANGE UP (ref 0–0)
PLATELET # BLD AUTO: 238 K/UL — SIGNIFICANT CHANGE UP (ref 130–400)
POTASSIUM SERPL-MCNC: 4.7 MMOL/L — SIGNIFICANT CHANGE UP (ref 3.5–5)
POTASSIUM SERPL-SCNC: 4.7 MMOL/L — SIGNIFICANT CHANGE UP (ref 3.5–5)
PROT SERPL-MCNC: 6.5 G/DL — SIGNIFICANT CHANGE UP (ref 6–8)
RBC # BLD: 4.2 M/UL — SIGNIFICANT CHANGE UP (ref 4.2–5.4)
RBC # FLD: 14.1 % — SIGNIFICANT CHANGE UP (ref 11.5–14.5)
SODIUM SERPL-SCNC: 139 MMOL/L — SIGNIFICANT CHANGE UP (ref 135–146)
WBC # BLD: 4.82 K/UL — SIGNIFICANT CHANGE UP (ref 4.8–10.8)
WBC # FLD AUTO: 4.82 K/UL — SIGNIFICANT CHANGE UP (ref 4.8–10.8)

## 2020-08-23 PROCEDURE — 99233 SBSQ HOSP IP/OBS HIGH 50: CPT

## 2020-08-23 RX ORDER — KETOROLAC TROMETHAMINE 30 MG/ML
15 SYRINGE (ML) INJECTION ONCE
Refills: 0 | Status: DISCONTINUED | OUTPATIENT
Start: 2020-08-23 | End: 2020-08-23

## 2020-08-23 RX ADMIN — Medication 2: at 08:10

## 2020-08-23 RX ADMIN — Medication 20 UNIT(S): at 12:22

## 2020-08-23 RX ADMIN — Medication 20 UNIT(S): at 17:28

## 2020-08-23 RX ADMIN — Medication 20 UNIT(S): at 08:10

## 2020-08-23 RX ADMIN — Medication 15 MILLIGRAM(S): at 18:55

## 2020-08-23 RX ADMIN — Medication 1: at 12:23

## 2020-08-23 RX ADMIN — Medication 30 MILLIGRAM(S): at 12:23

## 2020-08-23 RX ADMIN — Medication 15 MILLIGRAM(S): at 17:50

## 2020-08-23 RX ADMIN — ATORVASTATIN CALCIUM 80 MILLIGRAM(S): 80 TABLET, FILM COATED ORAL at 21:41

## 2020-08-23 RX ADMIN — INSULIN GLARGINE 64 UNIT(S): 100 INJECTION, SOLUTION SUBCUTANEOUS at 21:41

## 2020-08-23 RX ADMIN — CLOPIDOGREL BISULFATE 75 MILLIGRAM(S): 75 TABLET, FILM COATED ORAL at 12:23

## 2020-08-23 RX ADMIN — Medication 650 MILLIGRAM(S): at 15:02

## 2020-08-23 RX ADMIN — Medication 100 MILLIGRAM(S): at 21:41

## 2020-08-23 RX ADMIN — Medication 50 MILLIGRAM(S): at 06:14

## 2020-08-23 RX ADMIN — Medication 650 MILLIGRAM(S): at 16:12

## 2020-08-23 RX ADMIN — Medication 81 MILLIGRAM(S): at 12:23

## 2020-08-23 RX ADMIN — Medication 1: at 17:28

## 2020-08-23 NOTE — PROGRESS NOTE ADULT - SUBJECTIVE AND OBJECTIVE BOX
SUBJECTIVE:    Patient is a 50y old Female who presents with a chief complaint of left eye vision loss (23 Aug 2020 12:40)    Currently admitted to medicine with the primary diagnosis of Vision loss of left eye     Today is hospital day 5d.     PAST MEDICAL & SURGICAL HISTORY  Hypertension  DM (diabetes mellitus)  Afib  MI (myocardial infarction): s/p 4 stents (most recent )  H/O  section    ALLERGIES:  No Known Allergies    MEDICATIONS:  STANDING MEDICATIONS  aspirin enteric coated 81 milliGRAM(s) Oral daily  atorvastatin 80 milliGRAM(s) Oral at bedtime  chlorhexidine 4% Liquid 1 Application(s) Topical <User Schedule>  clopidogrel Tablet 75 milliGRAM(s) Oral daily  dextrose 5%. 1000 milliLiter(s) IV Continuous <Continuous>  dextrose 50% Injectable 12.5 Gram(s) IV Push once  dextrose 50% Injectable 25 Gram(s) IV Push once  dextrose 50% Injectable 25 Gram(s) IV Push once  insulin glargine Injectable (LANTUS) 64 Unit(s) SubCutaneous at bedtime  insulin lispro (HumaLOG) corrective regimen sliding scale   SubCutaneous three times a day before meals  insulin lispro Injectable (HumaLOG) 20 Unit(s) SubCutaneous three times a day before meals  metoprolol succinate  milliGRAM(s) Oral at bedtime  metoprolol succinate ER 50 milliGRAM(s) Oral daily  PARoxetine 30 milliGRAM(s) Oral daily    PRN MEDICATIONS  acetaminophen   Tablet .. 650 milliGRAM(s) Oral every 6 hours PRN  dextrose 40% Gel 15 Gram(s) Oral once PRN  glucagon  Injectable 1 milliGRAM(s) IntraMuscular once PRN    VITALS:   T(F): 96.4  HR: 71  BP: 128/60  RR: 18  SpO2: --    LABS:                        12.8   4.82  )-----------( 238      ( 23 Aug 2020 07:19 )             41.0     08-    139  |  101  |  12  ----------------------------<  224<H>  4.7   |  30  |  0.7    Ca    9.2      23 Aug 2020 07:19    TPro  6.5  /  Alb  3.8  /  TBili  0.6  /  DBili  x   /  AST  16  /  ALT  28  /  AlkPhos  102  08-                  RADIOLOGY:    PHYSICAL EXAM:  GEN: No acute distress  LUNGS: Clear to auscultation bilaterally   HEART: S1/S2 present. RRR.   ABD/ GI: Soft, non-tender, non-distended. Bowel sounds present  EXT: moving all ext  NEURO: AAOX3

## 2020-08-23 NOTE — PROGRESS NOTE ADULT - ASSESSMENT
s a 50 year-old female with a PMH of CAD w/ 4 stents (on Plavix), atrial fibrillation (on Xarelto), DM, and HTN, presenting for left eye vision loss, admitted for further workup. Patient was seen by vascular surgery, planned for carotid endarterectomy next week (date pending).    # Lt lower field vision loss-- seen by opthalmology and could be sec to precapillary or retinal arteriole. likely embolic event-- was on xarelto from home.  CT angio revealed--rt and lt carotid stenosis-- vascular surgery consulted--they will decide TCAR vs CEA  # A fib on xarelto-- now put on hold since 8/23  # CAD s/p stents on plavix-- seen by cardiology.  # DM uncontrolled increased her dose of lantus and lispro.

## 2020-08-23 NOTE — CONSULT NOTE ADULT - SUBJECTIVE AND OBJECTIVE BOX
Date of Admission:    CHIEF COMPLAINT:    HISTORY OF PRESENT ILLNESS:  The patient is a 50 year-old female with a PMH of CAD w/ 4 stents (on Plavix), atrial fibrillation (on Xarelto), DM, and HTN, presenting for left eye vision loss. The patient reports that Luis evening while looking at her phone, she began to experience a darkening of the inferior thomas-visual field; it gradually progressed to blackness. She reports about 90 minutes later, while ambulating, she began feeling like the room was spinning and was nauseous while ambulating, though she did not vomit. She went to sleep; by the following morning, the darkness had resolved, replaced by slight blurriness, and she started seeing floaters in her left visual field. The patient denied any associated subjective fever or chills, cough, shortness of breath, recent trauma, sick contacts, focal weakness of any limb, slurred speech or facial asymmetry. Today, she saw her ophthalmologist who did a dilated eye exam and noted optic neuritis of the left eye; she referred the patient to the ED for an MRI and neurological evaluation. She also reports a chronic sinus headache, 6/10 in severity, for which she typically takes a nasal steroid spray, but which does not alleviate the discomfort. Notably, she reports over the last 2-3 months she has been occasionally forgetting names; prior to this she has had no memory deficits.     In the ED, T 98.3 F, HR 72, /69, RR 18; O2 sat 98%. CT Head negative for acute mass effect, midline shift or hemorrhage. Ophtho was consulted; recommended an MRI of b/l orbits and of the spine w/ and w/o contrast, and will see the patient in the morning.      PAST MEDICAL & SURGICAL HISTORY:  Hypertension  DM (diabetes mellitus)  Afib  MI (myocardial infarction): s/p 4 stents (most recent )  H/O  section    HEALTH ISSUES - PROBLEM Dx:        FAMILY HISTORY:  Family history of early CAD  FH: stroke: father  FH: pulmonary embolism: brother    Allergies    No Known Allergies    Intolerances    	  Home Medications:  atorvastatin 80 mg oral tablet: 1 tab(s) orally once a day (2018 21:39)  HumaLOG KwikPen 100 units/mL injectable solution: 18 unit(s) injectable 3 times a day (18 Aug 2020 17:40)  Jardiance 25 mg oral tablet: 1 tab(s) orally once a day (in the morning) (07 Oct 2019 20:45)  metFORMIN 1000 mg oral tablet: 1 tab(s) orally 2 times a day (2018 21:39)  PARoxetine 30 mg oral tablet: 1 tab(s) orally once a day (18 Aug 2020 17:43)  pioglitazone 30 mg oral tablet: 1 tab(s) orally once a day (18 Aug 2020 17:44)  Tresiba FlexTouch 100 units/mL subcutaneous solution: 80 unit(s) subcutaneous once a day (18 Aug 2020 17:41)    MEDICATIONS  (STANDING):  aspirin enteric coated 81 milliGRAM(s) Oral daily  atorvastatin 80 milliGRAM(s) Oral at bedtime  chlorhexidine 4% Liquid 1 Application(s) Topical <User Schedule>  clopidogrel Tablet 75 milliGRAM(s) Oral daily  dextrose 5%. 1000 milliLiter(s) (50 mL/Hr) IV Continuous <Continuous>  dextrose 50% Injectable 12.5 Gram(s) IV Push once  dextrose 50% Injectable 25 Gram(s) IV Push once  dextrose 50% Injectable 25 Gram(s) IV Push once  insulin glargine Injectable (LANTUS) 64 Unit(s) SubCutaneous at bedtime  insulin lispro (HumaLOG) corrective regimen sliding scale   SubCutaneous three times a day before meals  insulin lispro Injectable (HumaLOG) 20 Unit(s) SubCutaneous three times a day before meals  metoprolol succinate  milliGRAM(s) Oral at bedtime  metoprolol succinate ER 50 milliGRAM(s) Oral daily  PARoxetine 30 milliGRAM(s) Oral daily    MEDICATIONS  (PRN):  acetaminophen   Tablet .. 650 milliGRAM(s) Oral every 6 hours PRN Severe Pain (7 - 10)  dextrose 40% Gel 15 Gram(s) Oral once PRN Blood Glucose LESS THAN 70 milliGRAM(s)/deciliter  glucagon  Injectable 1 milliGRAM(s) IntraMuscular once PRN Glucose LESS THAN 70 milligrams/deciliter        REVIEW OF SYSTEMS:  CONSTITUTIONAL: No fever, weight loss, or fatigue  CARDIOLOGY:  Patient denies chest pain, shortness of breath or syncopal episodes.   RESPIRATORY: denies shortness of breath, wheezing.   NEUROLOGICAL: NO weakness, no focal deficits to report.   GI: no BRBPR, no Vomiting, no diarrhea.    PSYCHIATRY: normal mood and affect  HEENT: no nose bleed,   SKIN: no ecchymosis, no breakdown  MUSCULOSKELETAL: normal range of motion ,no myalgia      PHYSICAL EXAM:  T(C): 35.8 (20 @ 05:44), Max: 36.5 (20 @ 13:52)  HR: 71 (20 @ 05:44) (71 - 76)  BP: 128/60 (20 @ 05:44) (108/71 - 128/60)  RR: 18 (20 @ 05:44) (18 - 18)  SpO2: --  Wt(kg): --  I&O's Summary    Daily     Daily     General Appearance: Normal	  Cardiovascular: Normal S1 S2, No JVD, No murmurs, No edema  Respiratory: Lungs clear to auscultation	  Psychiatry: A & O x 3, Mood & affect appropriate  Gastrointestinal:  Soft, Non-tender, obese  Skin: No rashes, No ecchymoses, No cyanosis	  Neurologic: Non-focal  Extremities: Normal range of motion, No clubbing, cyanosis or edema  Vascular: Peripheral pulses palpable 2+ bilaterally        LABS:	 	                          12.8   4.82  )-----------( 238      ( 23 Aug 2020 07:19 )             41.0         139  |  101  |  12  ----------------------------<  224<H>  4.7   |  30  |  0.7    Ca    9.2      23 Aug 2020 07:19    TPro  6.5  /  Alb  3.8  /  TBili  0.6  /  DBili  x   /  AST  16  /  ALT  28  /  AlkPhos  102  08-23        PREVIOUS DIAGNOSTIC TESTING:    [ ] Echocardiogram: Echo revealed normal LV systolic function in 2019  [ ] Stress Test:  	Exercise MPI stress test revealed normal perfusion in 2019.  	  ASSESSMENT/PLAN: 	    1)CAD, S/P PTCA/STENT stable     Continue ASA, metoprolol, and statin treatment.    2)Paroxysmal atrial fibrillation , now in sinus rhythm     She was on Xarelto at home, presently on hold,  restart post CEA due to high risk of embolic event ([id number]     score is 5 ).    3)Left eye visual field loss, could be embolic     F/U neurology     F/U vascular surgery regarding the need for CEA.     She will benefit from anticoagulation rather than dual antiplatelet treatment.     There is no cardiac indication for plavix , We can continue ASA along with anticoagulation.    4)HTN stable.    5)Type2 Diabetes     Continue present treatment.     6)Hyperlipidemia     Continue statin treatment.    7)Right Carotid stenosis 70%  If CEA is indicated at this time she is at low risk for malia-op events.

## 2020-08-23 NOTE — PROGRESS NOTE ADULT - SUBJECTIVE AND OBJECTIVE BOX
Progress Note: Surgery  Patient: MARCIAL MCCORMICK , 50y (1970)Female   MRN: [id number]  Location: [place] [id number]  Visit: 08-18-20 Inpatient  Date: 08-23-20 @ 08:19    Procedure/Diagnosis:  Events over 24h: No acute event overnight. No new complaint. Pt is hemodynamically stable.    Vitals: T(F): 96.4 (08-23-20 @ 05:44), Max: 97.7 (08-22-20 @ 13:52)  HR: 71 (08-23-20 @ 05:44)  BP: 128/60 (08-23-20 @ 05:44) (108/71 - 128/60)  RR: 18 (08-23-20 @ 05:44)  SpO2: --      Diet: Diet, Consistent Carbohydrate/No Snacks (08-18-20 @ 17:46)    IV Fluid: dextrose 5%. 1000 milliLiter(s) (50 mL/Hr) IV Continuous <Continuous>      In:   Out:   Net:     Physical Examination:  General Appearance: NAD, alert and cooperative  HEENT: NCAT, WNL  Heart: S1 and S2. No murmurs. Rhythm is regular.  Lungs: Clear to auscultation BL without rales, rhonchi, wheezing, crackles or diminished breath sounds.  Abdomen: Positive bowel sounds. Soft, nondistended, malia-incisional tenderness nontender. Obese. No rigidity, guarding, or rebound tenderness. No masses palpated. No McBurney point tenderness. No Montano's sign. No Rovsing's sign.   MSK/Extremities: ROM intact BL upper/lower extremities. No joint erythema or tenderness. Normal gait. No significant deformity or joint abnormality. No edema. Peripheral pulses intact. No varicosities. WNL  Neuro: CN II-XII intact. Strength and sensation symmetric and intact throughout. Reflexes 2+ throughout. Cerebellar testing normal. Sensation grossly intact.  Skin: Warm/dry, Normal color, No jaundice.   Incisions/Wounds: Dressings in place, clean, dry and intact, no signs of infection/active bleeding/drainage    Medications: [Standing]  aspirin enteric coated 81 milliGRAM(s) Oral daily  atorvastatin 80 milliGRAM(s) Oral at bedtime  chlorhexidine 4% Liquid 1 Application(s) Topical <User Schedule>  clopidogrel Tablet 75 milliGRAM(s) Oral daily  dextrose 5%. 1000 milliLiter(s) (50 mL/Hr) IV Continuous <Continuous>  dextrose 50% Injectable 12.5 Gram(s) IV Push once  dextrose 50% Injectable 25 Gram(s) IV Push once  dextrose 50% Injectable 25 Gram(s) IV Push once  insulin glargine Injectable (LANTUS) 64 Unit(s) SubCutaneous at bedtime  insulin lispro (HumaLOG) corrective regimen sliding scale   SubCutaneous three times a day before meals  insulin lispro Injectable (HumaLOG) 20 Unit(s) SubCutaneous three times a day before meals  metoprolol succinate  milliGRAM(s) Oral at bedtime  metoprolol succinate ER 50 milliGRAM(s) Oral daily  PARoxetine 30 milliGRAM(s) Oral daily    Medications:[PRN]  acetaminophen   Tablet .. 650 milliGRAM(s) Oral every 6 hours PRN  dextrose 40% Gel 15 Gram(s) Oral once PRN  glucagon  Injectable 1 milliGRAM(s) IntraMuscular once PRN    Labs:                        12.8   5.68  )-----------( 236      ( 22 Aug 2020 07:50 )             41.1   08-22    141  |  102  |  10  ----------------------------<  199<H>  4.7   |  29  |  0.7    Ca    9.2      22 Aug 2020 07:50    TPro  6.5  /  Alb  3.9  /  TBili  0.7  /  DBili  x   /  AST  18  /  ALT  28  /  AlkPhos  95  08-22  LIVER FUNCTIONS - ( 22 Aug 2020 07:50 )  Alb: 3.9 g/dL / Pro: 6.5 g/dL / ALK PHOS: 95 U/L / ALT: 28 U/L / AST: 18 U/L / GGT: x             Micro/Urine:    Imaging:  None/24h    Assessment:  This is a 50 year old female admitted to Medicine for left vision loss and dizziness on Sunday that improved after one day. Patient has a Neck CT scan that showed severe focal stenosis involving the origin of the Right ICA and moderate to severe Focal stenosis involving the origin of the left ICA.     Plan:  - Discuss risk vs benefits of TCAR vs CEA  - cardiology clearance (call was made already)  - medical clearance given  - stop xarelto, start ASA/Plavix  - c/w high dose statin  -Pain control as needed  -Hemodynamic monitoring as per routine  -Encourage ambulation and incentive spirometer use (10x/hr when awake)  -GI and DVT prophylaxis  -Check and replete CBC and BMP q daily  -Strict input and output monitoring  -Continue current management    Date/Time: 08-23-20 @ 08:19

## 2020-08-24 LAB
ALBUMIN SERPL ELPH-MCNC: 3.9 G/DL — SIGNIFICANT CHANGE UP (ref 3.5–5.2)
ALBUMIN SERPL ELPH-MCNC: 4 G/DL — SIGNIFICANT CHANGE UP (ref 3.5–5.2)
ALP SERPL-CCNC: 112 U/L — SIGNIFICANT CHANGE UP (ref 30–115)
ALP SERPL-CCNC: 98 U/L — SIGNIFICANT CHANGE UP (ref 30–115)
ALT FLD-CCNC: 27 U/L — SIGNIFICANT CHANGE UP (ref 0–41)
ALT FLD-CCNC: 32 U/L — SIGNIFICANT CHANGE UP (ref 0–41)
ANION GAP SERPL CALC-SCNC: 11 MMOL/L — SIGNIFICANT CHANGE UP (ref 7–14)
ANION GAP SERPL CALC-SCNC: 8 MMOL/L — SIGNIFICANT CHANGE UP (ref 7–14)
APTT BLD: 32.1 SEC — SIGNIFICANT CHANGE UP (ref 27–39.2)
AST SERPL-CCNC: 14 U/L — SIGNIFICANT CHANGE UP (ref 0–41)
AST SERPL-CCNC: 20 U/L — SIGNIFICANT CHANGE UP (ref 0–41)
BILIRUB DIRECT SERPL-MCNC: 0.2 MG/DL — SIGNIFICANT CHANGE UP (ref 0–0.2)
BILIRUB INDIRECT FLD-MCNC: 0.5 MG/DL — SIGNIFICANT CHANGE UP (ref 0.2–1.2)
BILIRUB SERPL-MCNC: 0.7 MG/DL — SIGNIFICANT CHANGE UP (ref 0.2–1.2)
BILIRUB SERPL-MCNC: 0.9 MG/DL — SIGNIFICANT CHANGE UP (ref 0.2–1.2)
BLD GP AB SCN SERPL QL: SIGNIFICANT CHANGE UP
BUN SERPL-MCNC: 15 MG/DL — SIGNIFICANT CHANGE UP (ref 10–20)
BUN SERPL-MCNC: 17 MG/DL — SIGNIFICANT CHANGE UP (ref 10–20)
CALCIUM SERPL-MCNC: 9 MG/DL — SIGNIFICANT CHANGE UP (ref 8.5–10.1)
CALCIUM SERPL-MCNC: 9.7 MG/DL — SIGNIFICANT CHANGE UP (ref 8.5–10.1)
CHLORIDE SERPL-SCNC: 101 MMOL/L — SIGNIFICANT CHANGE UP (ref 98–110)
CHLORIDE SERPL-SCNC: 99 MMOL/L — SIGNIFICANT CHANGE UP (ref 98–110)
CO2 SERPL-SCNC: 27 MMOL/L — SIGNIFICANT CHANGE UP (ref 17–32)
CO2 SERPL-SCNC: 30 MMOL/L — SIGNIFICANT CHANGE UP (ref 17–32)
CREAT SERPL-MCNC: 0.6 MG/DL — LOW (ref 0.7–1.5)
CREAT SERPL-MCNC: 0.7 MG/DL — SIGNIFICANT CHANGE UP (ref 0.7–1.5)
GLUCOSE BLDC GLUCOMTR-MCNC: 153 MG/DL — HIGH (ref 70–99)
GLUCOSE BLDC GLUCOMTR-MCNC: 158 MG/DL — HIGH (ref 70–99)
GLUCOSE BLDC GLUCOMTR-MCNC: 172 MG/DL — HIGH (ref 70–99)
GLUCOSE BLDC GLUCOMTR-MCNC: 174 MG/DL — HIGH (ref 70–99)
GLUCOSE SERPL-MCNC: 186 MG/DL — HIGH (ref 70–99)
GLUCOSE SERPL-MCNC: 202 MG/DL — HIGH (ref 70–99)
HCT VFR BLD CALC: 39.7 % — SIGNIFICANT CHANGE UP (ref 37–47)
HCT VFR BLD CALC: 43.8 % — SIGNIFICANT CHANGE UP (ref 37–47)
HGB BLD-MCNC: 12.5 G/DL — SIGNIFICANT CHANGE UP (ref 12–16)
HGB BLD-MCNC: 13.6 G/DL — SIGNIFICANT CHANGE UP (ref 12–16)
INR BLD: 1.04 RATIO — SIGNIFICANT CHANGE UP (ref 0.65–1.3)
MAGNESIUM SERPL-MCNC: 2 MG/DL — SIGNIFICANT CHANGE UP (ref 1.8–2.4)
MCHC RBC-ENTMCNC: 30.2 PG — SIGNIFICANT CHANGE UP (ref 27–31)
MCHC RBC-ENTMCNC: 30.6 PG — SIGNIFICANT CHANGE UP (ref 27–31)
MCHC RBC-ENTMCNC: 31.1 G/DL — LOW (ref 32–37)
MCHC RBC-ENTMCNC: 31.5 G/DL — LOW (ref 32–37)
MCV RBC AUTO: 97.1 FL — SIGNIFICANT CHANGE UP (ref 81–99)
MCV RBC AUTO: 97.3 FL — SIGNIFICANT CHANGE UP (ref 81–99)
NRBC # BLD: 0 /100 WBCS — SIGNIFICANT CHANGE UP (ref 0–0)
NRBC # BLD: 0 /100 WBCS — SIGNIFICANT CHANGE UP (ref 0–0)
PHOSPHATE SERPL-MCNC: 3.7 MG/DL — SIGNIFICANT CHANGE UP (ref 2.1–4.9)
PLATELET # BLD AUTO: 221 K/UL — SIGNIFICANT CHANGE UP (ref 130–400)
PLATELET # BLD AUTO: 266 K/UL — SIGNIFICANT CHANGE UP (ref 130–400)
POTASSIUM SERPL-MCNC: 4.3 MMOL/L — SIGNIFICANT CHANGE UP (ref 3.5–5)
POTASSIUM SERPL-MCNC: 5.1 MMOL/L — HIGH (ref 3.5–5)
POTASSIUM SERPL-SCNC: 4.3 MMOL/L — SIGNIFICANT CHANGE UP (ref 3.5–5)
POTASSIUM SERPL-SCNC: 5.1 MMOL/L — HIGH (ref 3.5–5)
PROT SERPL-MCNC: 6.2 G/DL — SIGNIFICANT CHANGE UP (ref 6–8)
PROT SERPL-MCNC: 7.1 G/DL — SIGNIFICANT CHANGE UP (ref 6–8)
PROTHROM AB SERPL-ACNC: 12 SEC — SIGNIFICANT CHANGE UP (ref 9.95–12.87)
RBC # BLD: 4.09 M/UL — LOW (ref 4.2–5.4)
RBC # BLD: 4.5 M/UL — SIGNIFICANT CHANGE UP (ref 4.2–5.4)
RBC # FLD: 14 % — SIGNIFICANT CHANGE UP (ref 11.5–14.5)
RBC # FLD: 14 % — SIGNIFICANT CHANGE UP (ref 11.5–14.5)
SODIUM SERPL-SCNC: 137 MMOL/L — SIGNIFICANT CHANGE UP (ref 135–146)
SODIUM SERPL-SCNC: 139 MMOL/L — SIGNIFICANT CHANGE UP (ref 135–146)
WBC # BLD: 6.07 K/UL — SIGNIFICANT CHANGE UP (ref 4.8–10.8)
WBC # BLD: 6.54 K/UL — SIGNIFICANT CHANGE UP (ref 4.8–10.8)
WBC # FLD AUTO: 6.07 K/UL — SIGNIFICANT CHANGE UP (ref 4.8–10.8)
WBC # FLD AUTO: 6.54 K/UL — SIGNIFICANT CHANGE UP (ref 4.8–10.8)

## 2020-08-24 PROCEDURE — 99233 SBSQ HOSP IP/OBS HIGH 50: CPT

## 2020-08-24 PROCEDURE — 93010 ELECTROCARDIOGRAM REPORT: CPT

## 2020-08-24 RX ADMIN — Medication 1: at 12:29

## 2020-08-24 RX ADMIN — Medication 30 MILLIGRAM(S): at 12:29

## 2020-08-24 RX ADMIN — Medication 100 MILLIGRAM(S): at 21:20

## 2020-08-24 RX ADMIN — ATORVASTATIN CALCIUM 80 MILLIGRAM(S): 80 TABLET, FILM COATED ORAL at 21:20

## 2020-08-24 RX ADMIN — Medication 20 UNIT(S): at 09:11

## 2020-08-24 RX ADMIN — INSULIN GLARGINE 64 UNIT(S): 100 INJECTION, SOLUTION SUBCUTANEOUS at 21:20

## 2020-08-24 RX ADMIN — Medication 1: at 09:10

## 2020-08-24 RX ADMIN — Medication 20 UNIT(S): at 17:38

## 2020-08-24 RX ADMIN — Medication 20 UNIT(S): at 12:29

## 2020-08-24 RX ADMIN — Medication 1: at 17:38

## 2020-08-24 RX ADMIN — Medication 50 MILLIGRAM(S): at 05:44

## 2020-08-24 NOTE — PROGRESS NOTE ADULT - ASSESSMENT
Assessment:  This is a 50 year old female admitted to Medicine for left vision loss and dizziness on Sunday that improved after one day. Patient has a Neck CT scan that showed severe focal stenosis involving the origin of the Right ICA and moderate to severe Focal stenosis involving the origin of the left ICA.     Plan:  - Discuss risk vs benefits of TCAR vs CEA  - cardiology clearance (call was made already)  - medical clearance given  - stop xarelto, start ASA/Plavix  - c/w high dose statin  -Pain control as needed  -Hemodynamic monitoring as per routine  -Encourage ambulation and incentive spirometer use (10x/hr when awake)  -GI and DVT prophylaxis  -Check and replete CBC and BMP q daily  -Strict input and output monitoring  -Continue current management  -obtain consent for OR  -pre oped and booked for OR 8/25

## 2020-08-24 NOTE — PROGRESS NOTE ADULT - SUBJECTIVE AND OBJECTIVE BOX
Progress Note: General Surgery  Patient: MARCIAL MCCORMICK , 50y (1970)Female   MRN: [id number]  Location: [address]  Visit: 08-18-20 Inpatient  Date: 08-24-20 @ 11:31    Procedure/Diagnosis:  Events over 24h: No acute event overnight. No new complaint. Pt is hemodynamically stable.    Vitals: T(F): 97 (08-24-20 @ 05:10), Max: 98.4 (08-23-20 @ 19:54)  HR: 57 (08-24-20 @ 05:10)  BP: 109/56 (08-24-20 @ 05:10) (109/56 - 134/59)  RR: 18 (08-24-20 @ 05:10)  SpO2: --    In:   08-24-20 @ 07:01  -  08-24-20 @ 11:31  --------------------------------------------------------  IN: 200 mL      Out:   08-24-20 @ 07:01  -  08-24-20 @ 11:31  --------------------------------------------------------  OUT:  Total OUT: 0 mL        Net:   08-24-20 @ 07:01  -  08-24-20 @ 11:31  --------------------------------------------------------  NET: 200 mL        Diet: Diet, Consistent Carbohydrate/No Snacks (08-18-20 @ 17:46)  Diet, NPO after Midnight:      NPO Start Date: 24-Aug-2020,   NPO Start Time: 23:59 (08-24-20 @ 08:18)  Diet, NPO after Midnight:      NPO Start Date: 24-Aug-2020,   NPO Start Time: 23:59  Except Medications (08-24-20 @ 10:57)    IV Fluids: dextrose 5%. 1000 milliLiter(s) (50 mL/Hr) IV Continuous <Continuous>      Physical Examination:  General Appearance: NAD, alert and cooperative  HEENT: NCAT, WNL  Heart: S1 and S2. No murmurs. Rhythm is regular.  Lungs: Clear to auscultation BL without rales, rhonchi, wheezing, crackles or diminished breath sounds.  Abdomen: Positive bowel sounds. Soft, nondistended, malia-incisional tenderness nontender. Obese. No rigidity, guarding, or rebound tenderness. No masses palpated. No McBurney point    Medications: [Standing]  atorvastatin 80 milliGRAM(s) Oral at bedtime  chlorhexidine 4% Liquid 1 Application(s) Topical <User Schedule>  dextrose 5%. 1000 milliLiter(s) (50 mL/Hr) IV Continuous <Continuous>  dextrose 50% Injectable 12.5 Gram(s) IV Push once  dextrose 50% Injectable 25 Gram(s) IV Push once  dextrose 50% Injectable 25 Gram(s) IV Push once  insulin glargine Injectable (LANTUS) 64 Unit(s) SubCutaneous at bedtime  insulin lispro (HumaLOG) corrective regimen sliding scale   SubCutaneous three times a day before meals  insulin lispro Injectable (HumaLOG) 20 Unit(s) SubCutaneous three times a day before meals  metoprolol succinate  milliGRAM(s) Oral at bedtime  metoprolol succinate ER 50 milliGRAM(s) Oral daily  PARoxetine 30 milliGRAM(s) Oral daily    DVT Prophylaxis:   GI Prophylaxis:   Antibiotics:   Anticoagulation:   Medications:[PRN]  acetaminophen   Tablet .. 650 milliGRAM(s) Oral every 6 hours PRN  dextrose 40% Gel 15 Gram(s) Oral once PRN  glucagon  Injectable 1 milliGRAM(s) IntraMuscular once PRN      Labs:                        12.5   6.07  )-----------( 221      ( 24 Aug 2020 07:04 )             39.7     08-24    139  |  101  |  15  ----------------------------<  186<H>  4.3   |  27  |  0.6<L>    Ca    9.0      24 Aug 2020 07:04    TPro  6.2  /  Alb  3.9  /  TBili  0.7  /  DBili  0.2  /  AST  14  /  ALT  27  /  AlkPhos  98  08-24    LIVER FUNCTIONS - ( 24 Aug 2020 07:04 )  Alb: 3.9 g/dL / Pro: 6.2 g/dL / ALK PHOS: 98 U/L / ALT: 27 U/L / AST: 14 U/L / GGT: x                   Urine/Micro:        Imaging:     < from: CT Angio Head w/ IV Cont (08.20.20 @ 18:07) >  IMPRESSION:    Severe focal stenosis involving the origin of the right ICA.    Moderate-severe focal stenosis involving the origin of the left ICA.    < end of copied text >    Pain control    Date/Time: 08-24-20 @ 11:31

## 2020-08-25 ENCOUNTER — RESULT REVIEW (OUTPATIENT)
Age: 50
End: 2020-08-25

## 2020-08-25 DIAGNOSIS — G45.9 TRANSIENT CEREBRAL ISCHEMIC ATTACK, UNSPECIFIED: ICD-10-CM

## 2020-08-25 LAB
ALBUMIN SERPL ELPH-MCNC: 4.1 G/DL — SIGNIFICANT CHANGE UP (ref 3.5–5.2)
ALP SERPL-CCNC: 106 U/L — SIGNIFICANT CHANGE UP (ref 30–115)
ALT FLD-CCNC: 30 U/L — SIGNIFICANT CHANGE UP (ref 0–41)
ANION GAP SERPL CALC-SCNC: 10 MMOL/L — SIGNIFICANT CHANGE UP (ref 7–14)
ANION GAP SERPL CALC-SCNC: 10 MMOL/L — SIGNIFICANT CHANGE UP (ref 7–14)
ANION GAP SERPL CALC-SCNC: 9 MMOL/L — SIGNIFICANT CHANGE UP (ref 7–14)
AST SERPL-CCNC: 18 U/L — SIGNIFICANT CHANGE UP (ref 0–41)
BASOPHILS # BLD AUTO: 0.02 K/UL — SIGNIFICANT CHANGE UP (ref 0–0.2)
BASOPHILS # BLD AUTO: 0.03 K/UL — SIGNIFICANT CHANGE UP (ref 0–0.2)
BASOPHILS NFR BLD AUTO: 0.2 % — SIGNIFICANT CHANGE UP (ref 0–1)
BASOPHILS NFR BLD AUTO: 0.5 % — SIGNIFICANT CHANGE UP (ref 0–1)
BILIRUB SERPL-MCNC: 0.9 MG/DL — SIGNIFICANT CHANGE UP (ref 0.2–1.2)
BUN SERPL-MCNC: 11 MG/DL — SIGNIFICANT CHANGE UP (ref 10–20)
BUN SERPL-MCNC: 11 MG/DL — SIGNIFICANT CHANGE UP (ref 10–20)
BUN SERPL-MCNC: 12 MG/DL — SIGNIFICANT CHANGE UP (ref 10–20)
CALCIUM SERPL-MCNC: 8.5 MG/DL — SIGNIFICANT CHANGE UP (ref 8.5–10.1)
CALCIUM SERPL-MCNC: 8.8 MG/DL — SIGNIFICANT CHANGE UP (ref 8.5–10.1)
CALCIUM SERPL-MCNC: 9.3 MG/DL — SIGNIFICANT CHANGE UP (ref 8.5–10.1)
CHLORIDE SERPL-SCNC: 100 MMOL/L — SIGNIFICANT CHANGE UP (ref 98–110)
CHLORIDE SERPL-SCNC: 102 MMOL/L — SIGNIFICANT CHANGE UP (ref 98–110)
CHLORIDE SERPL-SCNC: 103 MMOL/L — SIGNIFICANT CHANGE UP (ref 98–110)
CK MB CFR SERPL CALC: 1.3 NG/ML — SIGNIFICANT CHANGE UP (ref 0.6–6.3)
CK MB CFR SERPL CALC: 1.5 NG/ML — SIGNIFICANT CHANGE UP (ref 0.6–6.3)
CK MB CFR SERPL CALC: 1.8 NG/ML — SIGNIFICANT CHANGE UP (ref 0.6–6.3)
CK SERPL-CCNC: 200 U/L — SIGNIFICANT CHANGE UP (ref 0–225)
CK SERPL-CCNC: 26 U/L — SIGNIFICANT CHANGE UP (ref 0–225)
CK SERPL-CCNC: 87 U/L — SIGNIFICANT CHANGE UP (ref 0–225)
CO2 SERPL-SCNC: 25 MMOL/L — SIGNIFICANT CHANGE UP (ref 17–32)
CO2 SERPL-SCNC: 27 MMOL/L — SIGNIFICANT CHANGE UP (ref 17–32)
CO2 SERPL-SCNC: 29 MMOL/L — SIGNIFICANT CHANGE UP (ref 17–32)
CREAT SERPL-MCNC: 0.6 MG/DL — LOW (ref 0.7–1.5)
CREAT SERPL-MCNC: 0.6 MG/DL — LOW (ref 0.7–1.5)
CREAT SERPL-MCNC: 0.7 MG/DL — SIGNIFICANT CHANGE UP (ref 0.7–1.5)
EOSINOPHIL # BLD AUTO: 0.06 K/UL — SIGNIFICANT CHANGE UP (ref 0–0.7)
EOSINOPHIL # BLD AUTO: 0.17 K/UL — SIGNIFICANT CHANGE UP (ref 0–0.7)
EOSINOPHIL NFR BLD AUTO: 0.5 % — SIGNIFICANT CHANGE UP (ref 0–8)
EOSINOPHIL NFR BLD AUTO: 2.6 % — SIGNIFICANT CHANGE UP (ref 0–8)
GLUCOSE BLDC GLUCOMTR-MCNC: 126 MG/DL — HIGH (ref 70–99)
GLUCOSE BLDC GLUCOMTR-MCNC: 189 MG/DL — HIGH (ref 70–99)
GLUCOSE BLDC GLUCOMTR-MCNC: 229 MG/DL — HIGH (ref 70–99)
GLUCOSE BLDC GLUCOMTR-MCNC: 231 MG/DL — HIGH (ref 70–99)
GLUCOSE BLDC GLUCOMTR-MCNC: 240 MG/DL — HIGH (ref 70–99)
GLUCOSE SERPL-MCNC: 129 MG/DL — HIGH (ref 70–99)
GLUCOSE SERPL-MCNC: 206 MG/DL — HIGH (ref 70–99)
GLUCOSE SERPL-MCNC: 227 MG/DL — HIGH (ref 70–99)
HCT VFR BLD CALC: 36.9 % — LOW (ref 37–47)
HCT VFR BLD CALC: 39.4 % — SIGNIFICANT CHANGE UP (ref 37–47)
HCT VFR BLD CALC: 41.3 % — SIGNIFICANT CHANGE UP (ref 37–47)
HGB BLD-MCNC: 11.7 G/DL — LOW (ref 12–16)
HGB BLD-MCNC: 12.2 G/DL — SIGNIFICANT CHANGE UP (ref 12–16)
HGB BLD-MCNC: 13.1 G/DL — SIGNIFICANT CHANGE UP (ref 12–16)
IMM GRANULOCYTES NFR BLD AUTO: 0.2 % — SIGNIFICANT CHANGE UP (ref 0.1–0.3)
IMM GRANULOCYTES NFR BLD AUTO: 0.4 % — HIGH (ref 0.1–0.3)
LYMPHOCYTES # BLD AUTO: 0.76 K/UL — LOW (ref 1.2–3.4)
LYMPHOCYTES # BLD AUTO: 1.82 K/UL — SIGNIFICANT CHANGE UP (ref 1.2–3.4)
LYMPHOCYTES # BLD AUTO: 28.3 % — SIGNIFICANT CHANGE UP (ref 20.5–51.1)
LYMPHOCYTES # BLD AUTO: 6.7 % — LOW (ref 20.5–51.1)
MAGNESIUM SERPL-MCNC: 1.3 MG/DL — LOW (ref 1.8–2.4)
MAGNESIUM SERPL-MCNC: 1.6 MG/DL — LOW (ref 1.8–2.4)
MCHC RBC-ENTMCNC: 29.8 PG — SIGNIFICANT CHANGE UP (ref 27–31)
MCHC RBC-ENTMCNC: 30.5 PG — SIGNIFICANT CHANGE UP (ref 27–31)
MCHC RBC-ENTMCNC: 30.6 PG — SIGNIFICANT CHANGE UP (ref 27–31)
MCHC RBC-ENTMCNC: 31 G/DL — LOW (ref 32–37)
MCHC RBC-ENTMCNC: 31.7 G/DL — LOW (ref 32–37)
MCHC RBC-ENTMCNC: 31.7 G/DL — LOW (ref 32–37)
MCV RBC AUTO: 96 FL — SIGNIFICANT CHANGE UP (ref 81–99)
MCV RBC AUTO: 96.1 FL — SIGNIFICANT CHANGE UP (ref 81–99)
MCV RBC AUTO: 96.6 FL — SIGNIFICANT CHANGE UP (ref 81–99)
MONOCYTES # BLD AUTO: 0.24 K/UL — SIGNIFICANT CHANGE UP (ref 0.1–0.6)
MONOCYTES # BLD AUTO: 0.41 K/UL — SIGNIFICANT CHANGE UP (ref 0.1–0.6)
MONOCYTES NFR BLD AUTO: 2.1 % — SIGNIFICANT CHANGE UP (ref 1.7–9.3)
MONOCYTES NFR BLD AUTO: 6.4 % — SIGNIFICANT CHANGE UP (ref 1.7–9.3)
NEUTROPHILS # BLD AUTO: 10.14 K/UL — HIGH (ref 1.4–6.5)
NEUTROPHILS # BLD AUTO: 4 K/UL — SIGNIFICANT CHANGE UP (ref 1.4–6.5)
NEUTROPHILS NFR BLD AUTO: 62 % — SIGNIFICANT CHANGE UP (ref 42.2–75.2)
NEUTROPHILS NFR BLD AUTO: 90.1 % — HIGH (ref 42.2–75.2)
NRBC # BLD: 0 /100 WBCS — SIGNIFICANT CHANGE UP (ref 0–0)
PHOSPHATE SERPL-MCNC: 2.9 MG/DL — SIGNIFICANT CHANGE UP (ref 2.1–4.9)
PHOSPHATE SERPL-MCNC: 3.4 MG/DL — SIGNIFICANT CHANGE UP (ref 2.1–4.9)
PLATELET # BLD AUTO: 232 K/UL — SIGNIFICANT CHANGE UP (ref 130–400)
PLATELET # BLD AUTO: 245 K/UL — SIGNIFICANT CHANGE UP (ref 130–400)
PLATELET # BLD AUTO: 247 K/UL — SIGNIFICANT CHANGE UP (ref 130–400)
POTASSIUM SERPL-MCNC: 4.1 MMOL/L — SIGNIFICANT CHANGE UP (ref 3.5–5)
POTASSIUM SERPL-MCNC: 4.2 MMOL/L — SIGNIFICANT CHANGE UP (ref 3.5–5)
POTASSIUM SERPL-MCNC: 4.4 MMOL/L — SIGNIFICANT CHANGE UP (ref 3.5–5)
POTASSIUM SERPL-SCNC: 4.1 MMOL/L — SIGNIFICANT CHANGE UP (ref 3.5–5)
POTASSIUM SERPL-SCNC: 4.2 MMOL/L — SIGNIFICANT CHANGE UP (ref 3.5–5)
POTASSIUM SERPL-SCNC: 4.4 MMOL/L — SIGNIFICANT CHANGE UP (ref 3.5–5)
PROT SERPL-MCNC: 6.7 G/DL — SIGNIFICANT CHANGE UP (ref 6–8)
RBC # BLD: 3.82 M/UL — LOW (ref 4.2–5.4)
RBC # BLD: 4.1 M/UL — LOW (ref 4.2–5.4)
RBC # BLD: 4.3 M/UL — SIGNIFICANT CHANGE UP (ref 4.2–5.4)
RBC # FLD: 14.1 % — SIGNIFICANT CHANGE UP (ref 11.5–14.5)
RBC # FLD: 14.1 % — SIGNIFICANT CHANGE UP (ref 11.5–14.5)
RBC # FLD: 14.2 % — SIGNIFICANT CHANGE UP (ref 11.5–14.5)
SODIUM SERPL-SCNC: 136 MMOL/L — SIGNIFICANT CHANGE UP (ref 135–146)
SODIUM SERPL-SCNC: 137 MMOL/L — SIGNIFICANT CHANGE UP (ref 135–146)
SODIUM SERPL-SCNC: 142 MMOL/L — SIGNIFICANT CHANGE UP (ref 135–146)
TROPONIN T SERPL-MCNC: <0.01 NG/ML — SIGNIFICANT CHANGE UP
WBC # BLD: 11.27 K/UL — HIGH (ref 4.8–10.8)
WBC # BLD: 6.44 K/UL — SIGNIFICANT CHANGE UP (ref 4.8–10.8)
WBC # BLD: 8.81 K/UL — SIGNIFICANT CHANGE UP (ref 4.8–10.8)
WBC # FLD AUTO: 11.27 K/UL — HIGH (ref 4.8–10.8)
WBC # FLD AUTO: 6.44 K/UL — SIGNIFICANT CHANGE UP (ref 4.8–10.8)
WBC # FLD AUTO: 8.81 K/UL — SIGNIFICANT CHANGE UP (ref 4.8–10.8)

## 2020-08-25 PROCEDURE — 93010 ELECTROCARDIOGRAM REPORT: CPT

## 2020-08-25 PROCEDURE — 88304 TISSUE EXAM BY PATHOLOGIST: CPT | Mod: 26

## 2020-08-25 PROCEDURE — 35301 RECHANNELING OF ARTERY: CPT

## 2020-08-25 PROCEDURE — 99291 CRITICAL CARE FIRST HOUR: CPT

## 2020-08-25 PROCEDURE — 88311 DECALCIFY TISSUE: CPT | Mod: 26

## 2020-08-25 PROCEDURE — 71045 X-RAY EXAM CHEST 1 VIEW: CPT | Mod: 26

## 2020-08-25 RX ORDER — OXYCODONE AND ACETAMINOPHEN 5; 325 MG/1; MG/1
1 TABLET ORAL EVERY 4 HOURS
Refills: 0 | Status: DISCONTINUED | OUTPATIENT
Start: 2020-08-25 | End: 2020-08-25

## 2020-08-25 RX ORDER — MAGNESIUM SULFATE 500 MG/ML
2 VIAL (ML) INJECTION ONCE
Refills: 0 | Status: COMPLETED | OUTPATIENT
Start: 2020-08-25 | End: 2020-08-25

## 2020-08-25 RX ORDER — OXYCODONE HYDROCHLORIDE 5 MG/1
10 TABLET ORAL EVERY 6 HOURS
Refills: 0 | Status: DISCONTINUED | OUTPATIENT
Start: 2020-08-25 | End: 2020-08-26

## 2020-08-25 RX ORDER — ONDANSETRON 8 MG/1
4 TABLET, FILM COATED ORAL
Refills: 0 | Status: DISCONTINUED | OUTPATIENT
Start: 2020-08-25 | End: 2020-08-26

## 2020-08-25 RX ORDER — HYDROMORPHONE HYDROCHLORIDE 2 MG/ML
1 INJECTION INTRAMUSCULAR; INTRAVENOUS; SUBCUTANEOUS
Refills: 0 | Status: DISCONTINUED | OUTPATIENT
Start: 2020-08-25 | End: 2020-08-25

## 2020-08-25 RX ORDER — PANTOPRAZOLE SODIUM 20 MG/1
40 TABLET, DELAYED RELEASE ORAL
Refills: 0 | Status: DISCONTINUED | OUTPATIENT
Start: 2020-08-25 | End: 2020-08-26

## 2020-08-25 RX ORDER — SODIUM CHLORIDE 9 MG/ML
1000 INJECTION, SOLUTION INTRAVENOUS
Refills: 0 | Status: DISCONTINUED | OUTPATIENT
Start: 2020-08-25 | End: 2020-08-25

## 2020-08-25 RX ORDER — CEFAZOLIN SODIUM 1 G
1000 VIAL (EA) INJECTION EVERY 8 HOURS
Refills: 0 | Status: DISCONTINUED | OUTPATIENT
Start: 2020-08-25 | End: 2020-08-26

## 2020-08-25 RX ORDER — INSULIN LISPRO 100/ML
6 VIAL (ML) SUBCUTANEOUS ONCE
Refills: 0 | Status: COMPLETED | OUTPATIENT
Start: 2020-08-25 | End: 2020-08-25

## 2020-08-25 RX ORDER — METOPROLOL TARTRATE 50 MG
12.5 TABLET ORAL EVERY 8 HOURS
Refills: 0 | Status: DISCONTINUED | OUTPATIENT
Start: 2020-08-25 | End: 2020-08-26

## 2020-08-25 RX ORDER — CEFAZOLIN SODIUM 1 G
VIAL (EA) INJECTION
Refills: 0 | Status: DISCONTINUED | OUTPATIENT
Start: 2020-08-25 | End: 2020-08-26

## 2020-08-25 RX ORDER — SODIUM CHLORIDE 9 MG/ML
1000 INJECTION, SOLUTION INTRAVENOUS
Refills: 0 | Status: DISCONTINUED | OUTPATIENT
Start: 2020-08-25 | End: 2020-08-26

## 2020-08-25 RX ORDER — ACETAMINOPHEN 500 MG
650 TABLET ORAL EVERY 6 HOURS
Refills: 0 | Status: DISCONTINUED | OUTPATIENT
Start: 2020-08-25 | End: 2020-08-26

## 2020-08-25 RX ORDER — MEPERIDINE HYDROCHLORIDE 50 MG/ML
12.5 INJECTION INTRAMUSCULAR; INTRAVENOUS; SUBCUTANEOUS
Refills: 0 | Status: DISCONTINUED | OUTPATIENT
Start: 2020-08-25 | End: 2020-08-26

## 2020-08-25 RX ORDER — ASPIRIN/CALCIUM CARB/MAGNESIUM 324 MG
81 TABLET ORAL DAILY
Refills: 0 | Status: DISCONTINUED | OUTPATIENT
Start: 2020-08-25 | End: 2020-08-26

## 2020-08-25 RX ORDER — HYDROMORPHONE HYDROCHLORIDE 2 MG/ML
1 INJECTION INTRAMUSCULAR; INTRAVENOUS; SUBCUTANEOUS EVERY 4 HOURS
Refills: 0 | Status: DISCONTINUED | OUTPATIENT
Start: 2020-08-25 | End: 2020-08-25

## 2020-08-25 RX ORDER — INSULIN HUMAN 100 [IU]/ML
INJECTION, SOLUTION SUBCUTANEOUS EVERY 4 HOURS
Refills: 0 | Status: DISCONTINUED | OUTPATIENT
Start: 2020-08-25 | End: 2020-08-25

## 2020-08-25 RX ORDER — INSULIN HUMAN 100 [IU]/ML
INJECTION, SOLUTION SUBCUTANEOUS EVERY 4 HOURS
Refills: 0 | Status: DISCONTINUED | OUTPATIENT
Start: 2020-08-25 | End: 2020-08-26

## 2020-08-25 RX ORDER — INSULIN GLARGINE 100 [IU]/ML
35 INJECTION, SOLUTION SUBCUTANEOUS ONCE
Refills: 0 | Status: COMPLETED | OUTPATIENT
Start: 2020-08-25 | End: 2020-08-25

## 2020-08-25 RX ORDER — HYDROMORPHONE HYDROCHLORIDE 2 MG/ML
0.5 INJECTION INTRAMUSCULAR; INTRAVENOUS; SUBCUTANEOUS
Refills: 0 | Status: DISCONTINUED | OUTPATIENT
Start: 2020-08-25 | End: 2020-08-25

## 2020-08-25 RX ORDER — ATORVASTATIN CALCIUM 80 MG/1
80 TABLET, FILM COATED ORAL AT BEDTIME
Refills: 0 | Status: DISCONTINUED | OUTPATIENT
Start: 2020-08-25 | End: 2020-08-26

## 2020-08-25 RX ORDER — SENNA PLUS 8.6 MG/1
2 TABLET ORAL AT BEDTIME
Refills: 0 | Status: DISCONTINUED | OUTPATIENT
Start: 2020-08-25 | End: 2020-08-26

## 2020-08-25 RX ORDER — OXYCODONE HYDROCHLORIDE 5 MG/1
5 TABLET ORAL EVERY 6 HOURS
Refills: 0 | Status: DISCONTINUED | OUTPATIENT
Start: 2020-08-25 | End: 2020-08-26

## 2020-08-25 RX ORDER — CEFAZOLIN SODIUM 1 G
1000 VIAL (EA) INJECTION ONCE
Refills: 0 | Status: COMPLETED | OUTPATIENT
Start: 2020-08-25 | End: 2020-08-25

## 2020-08-25 RX ORDER — METOPROLOL TARTRATE 50 MG
100 TABLET ORAL AT BEDTIME
Refills: 0 | Status: DISCONTINUED | OUTPATIENT
Start: 2020-08-25 | End: 2020-08-25

## 2020-08-25 RX ADMIN — INSULIN GLARGINE 35 UNIT(S): 100 INJECTION, SOLUTION SUBCUTANEOUS at 23:29

## 2020-08-25 RX ADMIN — INSULIN HUMAN 8: 100 INJECTION, SOLUTION SUBCUTANEOUS at 21:58

## 2020-08-25 RX ADMIN — SODIUM CHLORIDE 125 MILLILITER(S): 9 INJECTION, SOLUTION INTRAVENOUS at 22:04

## 2020-08-25 RX ADMIN — ATORVASTATIN CALCIUM 80 MILLIGRAM(S): 80 TABLET, FILM COATED ORAL at 21:46

## 2020-08-25 RX ADMIN — Medication 100 MILLIGRAM(S): at 21:46

## 2020-08-25 RX ADMIN — HYDROMORPHONE HYDROCHLORIDE 1 MILLIGRAM(S): 2 INJECTION INTRAMUSCULAR; INTRAVENOUS; SUBCUTANEOUS at 13:59

## 2020-08-25 RX ADMIN — Medication 650 MILLIGRAM(S): at 20:53

## 2020-08-25 RX ADMIN — SENNA PLUS 2 TABLET(S): 8.6 TABLET ORAL at 21:46

## 2020-08-25 RX ADMIN — HYDROMORPHONE HYDROCHLORIDE 1 MILLIGRAM(S): 2 INJECTION INTRAMUSCULAR; INTRAVENOUS; SUBCUTANEOUS at 13:45

## 2020-08-25 RX ADMIN — Medication 12.5 MILLIGRAM(S): at 19:54

## 2020-08-25 RX ADMIN — Medication 6 UNIT(S): at 16:52

## 2020-08-25 RX ADMIN — Medication 50 MILLIGRAM(S): at 05:01

## 2020-08-25 RX ADMIN — SODIUM CHLORIDE 125 MILLILITER(S): 9 INJECTION, SOLUTION INTRAVENOUS at 13:19

## 2020-08-25 RX ADMIN — CHLORHEXIDINE GLUCONATE 1 APPLICATION(S): 213 SOLUTION TOPICAL at 05:01

## 2020-08-25 RX ADMIN — Medication 81 MILLIGRAM(S): at 13:34

## 2020-08-25 RX ADMIN — Medication 100 MILLIGRAM(S): at 13:34

## 2020-08-25 RX ADMIN — Medication 25 GRAM(S): at 15:05

## 2020-08-25 RX ADMIN — INSULIN HUMAN 8: 100 INJECTION, SOLUTION SUBCUTANEOUS at 19:26

## 2020-08-25 RX ADMIN — Medication 650 MILLIGRAM(S): at 19:32

## 2020-08-25 RX ADMIN — SODIUM CHLORIDE 125 MILLILITER(S): 9 INJECTION, SOLUTION INTRAVENOUS at 14:17

## 2020-08-25 RX ADMIN — Medication 650 MILLIGRAM(S): at 23:08

## 2020-08-25 NOTE — CONSULT NOTE ADULT - ASSESSMENT
Assessment & Plan    50y Female 7d s/p     NEURO:    Acute pain-controlled with     RESP:     Oxygen insufficiency-wean off NC to RA as tolerate    Activity-      CARDS:     Imaging:     Labs:       GI/NUTR:     Diet  Diet, Regular      GI Prophylaxis-    Bowel regimen-      /RENAL:     Strict I/O-    BUN/Cr- 12/0.6   <----,  17/0.7   <----,  15/0.6   <----           HEME/ONC:     DVT prophylaxis-    Acute anemia-H/H 13.1 (08-25 @ 07:02)  13.6 (08-24 @ 17:24)      ID:  ceFAZolin   IVPB 1000 IV Intermittent once  ceFAZolin   IVPB 1000 IV Intermittent every 8 hours  ceFAZolin   IVPB         ENDO:    Glucose Glucose, Serum: 129 (08-25 @ 07:02)      HA1C 9.9 (10-07-19)      LINES/DRAINS:  Toña BARRAGAN Foley     DISPO:    SICU Assessment & Plan    50y Female HD#7 s/p left cea w/ patch, symptomatic, left eye vision loss    NEURO:    Acute pain-controlled with apap, oxy, dilaudid PRN    hx of anxiety-d/w team restarting paxil    RESP:     Oxygen insufficiency-wean off NC to RA as tolerate    hx of JOCELYNE-non compliant with CPAP, f/u post op CXR    Activity-bedrest      CARDS:     s/p left CEA-maintain -150    hx of Afib-currently holding Xarelto    hx of CAD w/ stents-holding plavix    Imaging: Echo 10/2019-EF 65 to 65%, normal left ventricular systolic function    Labs: Trop neg x1, f/u 2 more sets      GI/NUTR:     Diet-regular    GI Prophylaxis-PPI    Bowel regimen-senna    /RENAL:     Strict I/O-    BUN/Cr- 12/0.6   <----,  17/0.7   <----,  15/0.6   <----           HEME/ONC:     DVT prophylaxis-    Acute anemia-H/H 13.1 (08-25 @ 07:02)  13.6 (08-24 @ 17:24)      ID:  ceFAZolin   IVPB 1000 IV Intermittent once  ceFAZolin   IVPB 1000 IV Intermittent every 8 hours  ceFAZolin   IVPB         ENDO:    Glucose Glucose, Serum: 129 (08-25 @ 07:02)      HA1C 9.9 (10-07-19)      LINES/DRAINS:  YUNG, Juan Diego Ding     DISPO:    SICU Assessment & Plan    50y Female HD#7 s/p left cea w/ patch, symptomatic, left eye vision loss    NEURO:    s/p left cea-q1 neuro checks    Acute pain-controlled with apap, oxy, dilaudid PRN    hx of anxiety-d/w team restarting paxil    RESP:     Oxygen insufficiency-wean off NC to RA as tolerate    hx of JOCELYNE-non compliant with CPAP, f/u post op CXR    Activity-bedrest      CARDS:     s/p left CEA-maintain -150    hx of Afib-currently holding Xarelto    hx of CAD w/ stents-holding plavix    Imaging: Echo 10/2019-EF 65 to 65%, normal left ventricular systolic function    Labs: Trop neg x1, f/u 2 more sets    GI/NUTR:     Diet-regular    GI Prophylaxis-PPI    Bowel regimen-senna    /RENAL:     No JACKSON telles pending    BUN/Cr- 12/0.6   <----,  17/0.7   <----,  15/0.6   <----     HEME/ONC:     DVT prophylaxis-none as per vascular, discuss 8/26 AM    H/H:  12.2/39.4   <---,  13.1/41.3   <---,  13.6/43.8   <---    Plts:  245  <---,  247  <---,  266  <---,  221  <---    ID:    post op abx-cefazolin 2 more doses     ENDO:    hx of DM-Lantus 80u at home, lispro 18u TID, will discuss restarting based on PO intake      HA1C 9.9    LINES/DRAINS:  Toña BARRAGAN    DISPO:    SICU will d/w Dr. Maya Assessment & Plan    50y Female HD#7 s/p left cea w/ patch, symptomatic, left eye vision loss    NEURO:    s/p left cea-q1 neuro checks    Acute pain-controlled with apap, oxy, dilaudid PRN    hx of anxiety-d/w team restarting paxil    RESP:     Oxygen insufficiency-wean off NC to RA as tolerate    hx of JOCELYNE-non compliant with CPAP, f/u post op CXR    Activity-bedrest      CARDS:     s/p left CEA-maintain -150    hx of Afib-currently holding Xarelto    hx of CAD w/ stents-holding plavix    Imaging: Echo 10/2019-EF 65 to 65%, normal left ventricular systolic function    Labs: Trop neg x1, f/u 2 more sets    GI/NUTR:     Diet-regular    GI Prophylaxis-PPI    Bowel regimen-senna    /RENAL:     No JACKSON telles pending at 1800    BUN/Cr- 12/0.6   <----,  17/0.7   <----,  15/0.6   <----     HEME/ONC:     DVT prophylaxis-none as per vascular, discuss 8/26 AM    H/H:  12.2/39.4   <---,  13.1/41.3   <---,  13.6/43.8   <---    Plts:  245  <---,  247  <---,  266  <---,  221  <---    ID:    post op abx-cefazolin 2 more doses     ENDO:    hx of DM-Lantus 80u at home, lispro 18u TID, will discuss restarting based on PO intake      HA1C 9.9    LINES/DRAINS:  Toña BARRAGAN    DISPO:    SICU will d/w Dr. Maya

## 2020-08-25 NOTE — CHART NOTE - NSCHARTNOTEFT_GEN_A_CORE
RD Limited Note:    Pt due today for comprehensive nutrition assessment. Pt currently in OR. Will complete assessment 8/26.

## 2020-08-25 NOTE — CONSULT NOTE ADULT - REASON FOR ADMISSION
left eye vision loss

## 2020-08-25 NOTE — CONSULT NOTE ADULT - SUBJECTIVE AND OBJECTIVE BOX
SICU Consultation Note  ======================================================================================================  MARCIAL MCCORMICK  [id number]      50y female pmhx of CAD s/p PCI w/ stents x4 (plavix), Afib (Xarelto), DM and HTN who presented to the hospital  with c/o left eye vision loss. Associated symptoms included nausea while ambulating and blurriness after 24hrs, floaters. Patient initially saw outpatient opthalmologist and who noted optic neuritis of the left eye. Patient was referred to ED for an MRI and neurological evaluation.    In ED patient also stated she        CT Angio Head w/ IV Cont (20 @ 18:07) >  Severe focal stenosis involving the origin of the right ICA.  Moderate-severe focal stenosis involving the origin of the left ICA.                    Cardiology optimization completed 2020 and patient deemed low risk for malia-  [id number]     score is 5  < from: Transthoracic Echocardiogram (10.07.19 @ 11:41) >   1. Left ventricular ejection fraction, by visual estimation, is 60 to   65%.   2. Normal global left ventricular systolic function.   3. Mild concentric left ventricular hypertrophy.   4. Mildly increased LV wall thickness.   5. Normal left ventricular internal cavity size.   6. LA volume Index is 23.2 ml/m² ml/m2.    < end of copied text >      Consults-  Consult Note Adult-Cardiology Attending [SOPHIA Marcelo] (20)  Consult Note Adult-Vascular Surgery Resident [JOSE De La Cruz] (20)  Consult Note Adult-Ophthalmology Attending [SHANIQUA Macario] (20)  Consult Note Adult-Neurology Nurse Practitioner/Attending [EH Mcknight] (20)  Consult Note Adult-Cardiology Attending [EH Giraldo] (10-06-19)      Procedure:  OR Stats  OR Time:               EBL:          IV Fluids:       Blood Products:                UOP:      Findings-    PMH  PAST MEDICAL & SURGICAL HISTORY:  Hypertension  DM (diabetes mellitus)  Afib  MI (myocardial infarction): s/p 4 stents (most recent )  H/O  section      Home Meds:  Home Medications:  atorvastatin 80 mg oral tablet: 1 tab(s) orally once a day (2018 21:39)  HumaLOG KwikPen 100 units/mL injectable solution: 18 unit(s) injectable 3 times a day (18 Aug 2020 17:40)  Jardiance 25 mg oral tablet: 1 tab(s) orally once a day (in the morning) (07 Oct 2019 20:45)  metFORMIN 1000 mg oral tablet: 1 tab(s) orally 2 times a day (2018 21:39)  PARoxetine 30 mg oral tablet: 1 tab(s) orally once a day (18 Aug 2020 17:43)  pioglitazone 30 mg oral tablet: 1 tab(s) orally once a day (18 Aug 2020 17:44)  Tresiba FlexTouch 100 units/mL subcutaneous solution: 80 unit(s) subcutaneous once a day (18 Aug 2020 17:41)         Allergies  Allergies    No Known Allergies    Intolerances         Current Medications:  aspirin enteric coated 81 milliGRAM(s) Oral daily  ceFAZolin   IVPB 1000 milliGRAM(s) IV Intermittent once  ceFAZolin   IVPB 1000 milliGRAM(s) IV Intermittent every 8 hours  ceFAZolin   IVPB      HYDROmorphone  Injectable 1 milliGRAM(s) IV Push every 4 hours PRN Moderate Pain (4 - 6)  lactated ringers. 1000 milliLiter(s) (125 mL/Hr) IV Continuous <Continuous>  meperidine     Injectable 12.5 milliGRAM(s) IV Push every 10 minutes PRN Shivering  ondansetron Injectable 4 milliGRAM(s) IV Push every 15 minutes PRN Nausea and/or Vomiting  oxycodone    5 mG/acetaminophen 325 mG 1 Tablet(s) Oral every 4 hours PRN Moderate Pain (4 - 6)      Advanced Directives: Presumed Full Code    VITAL SIGNS, INS/OUTS (Last 24hours):    ICU Vital Signs Last 24 Hrs  T(C): 37.3 (25 Aug 2020 12:48), Max: 37.3 (25 Aug 2020 12:48)  T(F): 99.2 (25 Aug 2020 12:48), Max: 99.2 (25 Aug 2020 12:48)  HR: 79 (25 Aug 2020 13:30) (69 - 79)  BP: 99/46 (25 Aug 2020 13:30) (96/47 - 135/69)  BP(mean): --  ABP: 133/51 (25 Aug 2020 13:15) (122/45 - 136/50)  ABP(mean): --  RR: 16 (25 Aug 2020 13:30) (15 - 20)  SpO2: 95% (25 Aug 2020 13:30) (92% - 99%)    I&O's Summary    24 Aug 2020 07:01  -  25 Aug 2020 07:00  --------------------------------------------------------  IN: 436 mL / OUT: 0 mL / NET: 436 mL        Height (cm): 167.64 (20)  Weight (kg): 132 (20)  BMI (kg/m2): 47 (20)  BSA (m2): 2.35 (20)    Physical Exam:   ---------------------------------------------------------------------------------------  GCS:      Exam: A&Ox3, no focal deficits    RESPIRATORY:  Normal expansion/effort  Mechanical Ventilation:     CARDIOVASCULAR:   S1/S2.  RRR  No peripheral edema    GASTROINTESTINAL:  Abdomen soft, non-tender, non-distended    MUSCULOSKELETAL:  Extremities warm, pink, well-perfused.    DERM:  No skin breakdown     :   Exam: Adamson catheter in place.       Tubes/Lines/Drains   ----------------------------------------------------------------------------------------------------------  [x] Peripheral IV  [X] Central Venous Line          IJ/Femoral             Date Placed:    [X] Arterial Line		      Radial/Femoral    Date Placed:   [X] PICC:         	  [X] Midline		                                  Date Placed:   [X] Urinary Catheter Adamson                                             Date Placed:       LABS  --------------------------------------------------------------------------------------  LFTs  LIVER FUNCTIONS - ( 25 Aug 2020 07:02 )  Alb: 4.1 g/dL / Pro: 6.7 g/dL / ALK PHOS: 106 U/L / ALT: 30 U/L / AST: 18 U/L / GGT: x             Cardiac Markers        Coagulation  PT/INR - ( 24 Aug 2020 17:24 )   PT: 12.00 sec;   INR: 1.04 ratio         PTT - ( 24 Aug 2020 17:24 )  PTT:32.1 sec    Arterial Blood Gas      Blood Sugar  CAPILLARY BLOOD GLUCOSE      POCT Blood Glucose.: 126 mg/dL (25 Aug 2020 07:54)  POCT Blood Glucose.: 153 mg/dL (24 Aug 2020 21:17)  POCT Blood Glucose.: 174 mg/dL (24 Aug 2020 16:39)      Urinalysis          CT/XRAY/ECHO/TCD/EEG  ----------------------------------------------------------------------------------------------      Transthoracic Echocardiogram:  (10-07-19)        -------------------------------------------------------------------------------------- SICU Consultation Note  ======================================================================================================  MARCIAL MCCORMICK  [id number]      50y female pmhx of CAD s/p PCI w/ stents x4 (plavix), Afib (Xarelto), DM and HTN who presented to the hospital  with c/o left eye vision loss. Associated symptoms included nausea while ambulating and blurriness after 24hrs, floaters. Patient initially saw outpatient opthalmologist and who noted optic neuritis of the left eye. Patient was referred to ED for an MRI and neurological evaluation.  Ophthalmology Consult  ,no optic nerve edema or inflammation b/l, OS cotton wool spot, most likely arterioloe occlusion, no symptoms of GCA, f/u Dr. Werner Sneed in 2-3 weeks. Patient underwent CTA Head and Neck with findings of b/l stenosis of ICA. MRI unable to be completed 2/2 anxiety. Neurology recs MRI if significant carotid stenosis and determination if silent unilateral cerebral ischemic events are necessary to qualify for surgery.  Vascular surgery consult rec left CEA. Cardiology optimization completed  (CHADsVASC 5), patient deemed low for risk. Patient underwent the procedure today without intraop complications. Patient easily extubated and brought to PACU. SICU consulted for q1 neurochecks.    CT Angio Head w/ IV Cont (20 @ 18:07) >  Severe focal stenosis involving the origin of the right ICA.  Moderate-severe focal stenosis involving the origin of the left ICA.    Transthoracic Echocardiogram (10.07.19 @ 11:41) >   1. Left ventricular ejection fraction, by visual estimation, is 60 to 65%.   2. Normal global left ventricular systolic function.   3. Mild concentric left ventricular hypertrophy.   4. Mildly increased LV wall thickness.   5. Normal left ventricular internal cavity size.   6. LA volume Index is 23.2 ml/m² ml/m2.    Procedure:  OR Stats  OR Time:               EBL:          IV Fluids:       Blood Products:                UOP:      Findings-    PMH  PAST MEDICAL & SURGICAL HISTORY:  Hypertension  DM (diabetes mellitus)  Afib  MI (myocardial infarction): s/p 4 stents (most recent )  H/O  section      Home Meds:  Home Medications:  atorvastatin 80 mg oral tablet: 1 tab(s) orally once a day (2018 21:39)  HumaLOG KwikPen 100 units/mL injectable solution: 18 unit(s) injectable 3 times a day (18 Aug 2020 17:40)  Jardiance 25 mg oral tablet: 1 tab(s) orally once a day (in the morning) (07 Oct 2019 20:45)  metFORMIN 1000 mg oral tablet: 1 tab(s) orally 2 times a day (2018 21:39)  PARoxetine 30 mg oral tablet: 1 tab(s) orally once a day (18 Aug 2020 17:43)  pioglitazone 30 mg oral tablet: 1 tab(s) orally once a day (18 Aug 2020 17:44)  Tresiba FlexTouch 100 units/mL subcutaneous solution: 80 unit(s) subcutaneous once a day (18 Aug 2020 17:41)         Allergies  Allergies    No Known Allergies    Intolerances         Current Medications:  aspirin enteric coated 81 milliGRAM(s) Oral daily  ceFAZolin   IVPB 1000 milliGRAM(s) IV Intermittent once  ceFAZolin   IVPB 1000 milliGRAM(s) IV Intermittent every 8 hours  ceFAZolin   IVPB      HYDROmorphone  Injectable 1 milliGRAM(s) IV Push every 4 hours PRN Moderate Pain (4 - 6)  lactated ringers. 1000 milliLiter(s) (125 mL/Hr) IV Continuous <Continuous>  meperidine     Injectable 12.5 milliGRAM(s) IV Push every 10 minutes PRN Shivering  ondansetron Injectable 4 milliGRAM(s) IV Push every 15 minutes PRN Nausea and/or Vomiting  oxycodone    5 mG/acetaminophen 325 mG 1 Tablet(s) Oral every 4 hours PRN Moderate Pain (4 - 6)      Advanced Directives: Presumed Full Code    VITAL SIGNS, INS/OUTS (Last 24hours):    ICU Vital Signs Last 24 Hrs  T(C): 37.3 (25 Aug 2020 12:48), Max: 37.3 (25 Aug 2020 12:48)  T(F): 99.2 (25 Aug 2020 12:48), Max: 99.2 (25 Aug 2020 12:48)  HR: 79 (25 Aug 2020 13:30) (69 - 79)  BP: 99/46 (25 Aug 2020 13:30) (96/47 - 135/69)  BP(mean): --  ABP: 133/51 (25 Aug 2020 13:15) (122/45 - 136/50)  ABP(mean): --  RR: 16 (25 Aug 2020 13:30) (15 - 20)  SpO2: 95% (25 Aug 2020 13:30) (92% - 99%)    I&O's Summary    24 Aug 2020 07:01  -  25 Aug 2020 07:00  --------------------------------------------------------  IN: 436 mL / OUT: 0 mL / NET: 436 mL        Height (cm): 167.64 (20)  Weight (kg): 132 (20)  BMI (kg/m2): 47 (20)  BSA (m2): 2.35 (20)    Physical Exam:   ---------------------------------------------------------------------------------------  GCS:      Exam: A&Ox3, no focal deficits    RESPIRATORY:  Normal expansion/effort  Mechanical Ventilation:     CARDIOVASCULAR:   S1/S2.  RRR  No peripheral edema    GASTROINTESTINAL:  Abdomen soft, non-tender, non-distended    MUSCULOSKELETAL:  Extremities warm, pink, well-perfused.    DERM:  No skin breakdown     :   Exam: Adamson catheter in place.       Tubes/Lines/Drains   ----------------------------------------------------------------------------------------------------------  [x] Peripheral IV  [X] Central Venous Line          IJ/Femoral             Date Placed:    [X] Arterial Line		      Radial/Femoral    Date Placed:   [X] PICC:         	  [X] Midline		                                  Date Placed:   [X] Urinary Catheter Adamson                                             Date Placed:       LABS  --------------------------------------------------------------------------------------  LFTs  LIVER FUNCTIONS - ( 25 Aug 2020 07:02 )  Alb: 4.1 g/dL / Pro: 6.7 g/dL / ALK PHOS: 106 U/L / ALT: 30 U/L / AST: 18 U/L / GGT: x             Cardiac Markers        Coagulation  PT/INR - ( 24 Aug 2020 17:24 )   PT: 12.00 sec;   INR: 1.04 ratio         PTT - ( 24 Aug 2020 17:24 )  PTT:32.1 sec    Arterial Blood Gas      Blood Sugar  CAPILLARY BLOOD GLUCOSE      POCT Blood Glucose.: 126 mg/dL (25 Aug 2020 07:54)  POCT Blood Glucose.: 153 mg/dL (24 Aug 2020 21:17)  POCT Blood Glucose.: 174 mg/dL (24 Aug 2020 16:39)      Urinalysis          CT/XRAY/ECHO/TCD/EEG  ----------------------------------------------------------------------------------------------      Transthoracic Echocardiogram:  (10-07-19)        -------------------------------------------------------------------------------------- SICU Consultation Note  ======================================================================================================  MARCIAL MCCORMICK  [id number]      50y female pmhx of CAD s/p PCI w/ stents x4 (plavix), Afib (Xarelto), DM and HTN who presented to the hospital  with c/o left eye vision loss. Associated symptoms included nausea while ambulating and blurriness after 24hrs, floaters. Patient initially saw outpatient opthalmologist and who noted optic neuritis of the left eye. Patient was referred to ED for an MRI and neurological evaluation.  Ophthalmology Consult  ,no optic nerve edema or inflammation b/l, OS cotton wool spot, most likely arterioloe occlusion, no symptoms of GCA, f/u Dr. Werner Sneed in 2-3 weeks. Patient underwent CTA Head and Neck with findings of b/l stenosis of ICA. MRI unable to be completed 2/2 anxiety. Neurology recs MRI if significant carotid stenosis and determination if silent unilateral cerebral ischemic events are necessary to qualify for surgery.  Vascular surgery consult rec left CEA. Cardiology optimization completed  (CHADsVASC 5), patient deemed low for risk. Patient underwent the procedure today without intraop complications. Patient easily extubated and brought to PACU. SICU consulted for q1 neurochecks.    CT Angio Head w/ IV Cont (20 @ 18:07) >  Severe focal stenosis involving the origin of the right ICA.  Moderate-severe focal stenosis involving the origin of the left ICA.    Transthoracic Echocardiogram (10.07.19 @ 11:41) >   1. Left ventricular ejection fraction, by visual estimation, is 60 to 65%.   2. Normal global left ventricular systolic function.   3. Mild concentric left ventricular hypertrophy.   4. Mildly increased LV wall thickness.   5. Normal left ventricular internal cavity size.   6. LA volume Index is 23.2 ml/m² ml/m2.    Procedure:  OR Stats  OR Time:  2hrs             EBL:          IV Fluids:       Blood Products:                UOP:      Findings-    PMH  PAST MEDICAL & SURGICAL HISTORY:  Hypertension  DM (diabetes mellitus)  Afib  MI (myocardial infarction): s/p 4 stents (most recent )  H/O  section      Home Meds:  Home Medications:  atorvastatin 80 mg oral tablet: 1 tab(s) orally once a day (2018 21:39)  HumaLOG KwikPen 100 units/mL injectable solution: 18 unit(s) injectable 3 times a day (18 Aug 2020 17:40)  Jardiance 25 mg oral tablet: 1 tab(s) orally once a day (in the morning) (07 Oct 2019 20:45)  metFORMIN 1000 mg oral tablet: 1 tab(s) orally 2 times a day (2018 21:39)  PARoxetine 30 mg oral tablet: 1 tab(s) orally once a day (18 Aug 2020 17:43)  pioglitazone 30 mg oral tablet: 1 tab(s) orally once a day (18 Aug 2020 17:44)  Tresiba FlexTouch 100 units/mL subcutaneous solution: 80 unit(s) subcutaneous once a day (18 Aug 2020 17:41)         Allergies  Allergies    No Known Allergies    Intolerances         Current Medications:  aspirin enteric coated 81 milliGRAM(s) Oral daily  ceFAZolin   IVPB 1000 milliGRAM(s) IV Intermittent once  ceFAZolin   IVPB 1000 milliGRAM(s) IV Intermittent every 8 hours  ceFAZolin   IVPB      HYDROmorphone  Injectable 1 milliGRAM(s) IV Push every 4 hours PRN Moderate Pain (4 - 6)  lactated ringers. 1000 milliLiter(s) (125 mL/Hr) IV Continuous <Continuous>  meperidine     Injectable 12.5 milliGRAM(s) IV Push every 10 minutes PRN Shivering  ondansetron Injectable 4 milliGRAM(s) IV Push every 15 minutes PRN Nausea and/or Vomiting  oxycodone    5 mG/acetaminophen 325 mG 1 Tablet(s) Oral every 4 hours PRN Moderate Pain (4 - 6)      Advanced Directives: Presumed Full Code    VITAL SIGNS, INS/OUTS (Last 24hours):    ICU Vital Signs Last 24 Hrs  T(C): 37.3 (25 Aug 2020 12:48), Max: 37.3 (25 Aug 2020 12:48)  T(F): 99.2 (25 Aug 2020 12:48), Max: 99.2 (25 Aug 2020 12:48)  HR: 79 (25 Aug 2020 13:30) (69 - 79)  BP: 99/46 (25 Aug 2020 13:30) (96/47 - 135/69)  BP(mean): --  ABP: 133/51 (25 Aug 2020 13:15) (122/45 - 136/50)  ABP(mean): --  RR: 16 (25 Aug 2020 13:30) (15 - 20)  SpO2: 95% (25 Aug 2020 13:30) (92% - 99%)    I&O's Summary    24 Aug 2020 07:01  -  25 Aug 2020 07:00  --------------------------------------------------------  IN: 436 mL / OUT: 0 mL / NET: 436 mL        Height (cm): 167.64 (20)  Weight (kg): 132 (20)  BMI (kg/m2): 47 (20)  BSA (m2): 2.35 (20)    Physical Exam:   ---------------------------------------------------------------------------------------  a&ox3  left neck dressing in place, no hematoma  no focal deficits noted  nc satting well  no acute distress  NSR on monitor  abdomen soft no pain    ----------------------------------------------------------------------------------------------------------  PIV  Left radial mustapha    LABS  --------------------------------------------------------------------------------------  LFTs  LIVER FUNCTIONS - ( 25 Aug 2020 07:02 )  Alb: 4.1 g/dL / Pro: 6.7 g/dL / ALK PHOS: 106 U/L / ALT: 30 U/L / AST: 18 U/L / GGT: x             Cardiac Markers        Coagulation  PT/INR - ( 24 Aug 2020 17:24 )   PT: 12.00 sec;   INR: 1.04 ratio         PTT - ( 24 Aug 2020 17:24 )  PTT:32.1 sec    Arterial Blood Gas      Blood Sugar  CAPILLARY BLOOD GLUCOSE      POCT Blood Glucose.: 126 mg/dL (25 Aug 2020 07:54)  POCT Blood Glucose.: 153 mg/dL (24 Aug 2020 21:17)  POCT Blood Glucose.: 174 mg/dL (24 Aug 2020 16:39)      Urinalysis          CT/XRAY/ECHO/TCD/EEG  ----------------------------------------------------------------------------------------------      Transthoracic Echocardiogram:  (10-07-19)        -------------------------------------------------------------------------------------- SICU Consultation Note  ======================================================================================================  MARCIAL MCCORMICK  MRN-[id number]      50y female pmhx of CAD s/p PCI w/ stents x4 (plavix last dose ), Afib (Xarelto last dose ), DM and HTN who presented to the hospital  with c/o left eye vision loss. Associated symptoms included nausea while ambulating and blurriness after 24hrs, floaters. Patient initially saw outpatient opthalmologist and who noted optic neuritis of the left eye. Patient was referred to ED for an MRI and neurological evaluation.  Ophthalmology Consult  ,no optic nerve edema or inflammation b/l, OS cotton wool spot, most likely arterioloe occlusion, no symptoms of GCA, f/u Dr. Werner Sneed in 2-3 weeks. Patient underwent CTA Head and Neck with findings of b/l stenosis of ICA. MRI unable to be completed 2/2 anxiety. Neurology recs MRI if significant carotid stenosis and determination if silent unilateral cerebral ischemic events are necessary to qualify for surgery.  Vascular surgery consult rec left CEA. Cardiology optimization completed  (CHADsVASC 5), patient deemed low for risk. Patient underwent the procedure today without intraop complications. Patient easily extubated and brought to PACU. SICU consulted for q1 neurochecks.    CT Angio Head w/ IV Cont (20 @ 18:07) >  Severe focal stenosis involving the origin of the right ICA.  Moderate-severe focal stenosis involving the origin of the left ICA.    Transthoracic Echocardiogram (10.07.19 @ 11:41) >   1. Left ventricular ejection fraction, by visual estimation, is 60 to 65%.   2. Normal global left ventricular systolic function.   3. Mild concentric left ventricular hypertrophy.   4. Mildly increased LV wall thickness.   5. Normal left ventricular internal cavity size.   6. LA volume Index is 23.2 ml/m² ml/m2.    Procedure:  OR Stats  OR Time:  2hrs             EBL:          IV Fluids:       Blood Products:                UOP:      Findings-    PMH  PAST MEDICAL & SURGICAL HISTORY:  Hypertension  DM (diabetes mellitus)  Afib  MI (myocardial infarction): s/p 4 stents (most recent )  H/O  section      Home Meds:  Home Medications:  atorvastatin 80 mg oral tablet: 1 tab(s) orally once a day (2018 21:39)  HumaLOG KwikPen 100 units/mL injectable solution: 18 unit(s) injectable 3 times a day (18 Aug 2020 17:40)  Jardiance 25 mg oral tablet: 1 tab(s) orally once a day (in the morning) (07 Oct 2019 20:45)  metFORMIN 1000 mg oral tablet: 1 tab(s) orally 2 times a day (2018 21:39)  PARoxetine 30 mg oral tablet: 1 tab(s) orally once a day (18 Aug 2020 17:43)  pioglitazone 30 mg oral tablet: 1 tab(s) orally once a day (18 Aug 2020 17:44)  Tresiba FlexTouch 100 units/mL subcutaneous solution: 80 unit(s) subcutaneous once a day (18 Aug 2020 17:41)         Allergies  Allergies    No Known Allergies    Intolerances         Current Medications:  aspirin enteric coated 81 milliGRAM(s) Oral daily  ceFAZolin   IVPB 1000 milliGRAM(s) IV Intermittent once  ceFAZolin   IVPB 1000 milliGRAM(s) IV Intermittent every 8 hours  ceFAZolin   IVPB      HYDROmorphone  Injectable 1 milliGRAM(s) IV Push every 4 hours PRN Moderate Pain (4 - 6)  lactated ringers. 1000 milliLiter(s) (125 mL/Hr) IV Continuous <Continuous>  meperidine     Injectable 12.5 milliGRAM(s) IV Push every 10 minutes PRN Shivering  ondansetron Injectable 4 milliGRAM(s) IV Push every 15 minutes PRN Nausea and/or Vomiting  oxycodone    5 mG/acetaminophen 325 mG 1 Tablet(s) Oral every 4 hours PRN Moderate Pain (4 - 6)      Advanced Directives: Presumed Full Code    VITAL SIGNS, INS/OUTS (Last 24hours):    ICU Vital Signs Last 24 Hrs  T(C): 37.3 (25 Aug 2020 12:48), Max: 37.3 (25 Aug 2020 12:48)  T(F): 99.2 (25 Aug 2020 12:48), Max: 99.2 (25 Aug 2020 12:48)  HR: 79 (25 Aug 2020 13:30) (69 - 79)  BP: 99/46 (25 Aug 2020 13:30) (96/47 - 135/69)  BP(mean): --  ABP: 133/51 (25 Aug 2020 13:15) (122/45 - 136/50)  ABP(mean): --  RR: 16 (25 Aug 2020 13:30) (15 - 20)  SpO2: 95% (25 Aug 2020 13:30) (92% - 99%)    I&O's Summary    24 Aug 2020 07:01  -  25 Aug 2020 07:00  --------------------------------------------------------  IN: 436 mL / OUT: 0 mL / NET: 436 mL        Height (cm): 167.64 (20)  Weight (kg): 132 (20)  BMI (kg/m2): 47 (20)  BSA (m2): 2.35 (20)    Physical Exam:   ---------------------------------------------------------------------------------------  a&ox3  left neck dressing in place, no hematoma  no focal deficits noted  nc satting well  no acute distress  NSR on monitor  abdomen soft no pain    ----------------------------------------------------------------------------------------------------------  PIV  Left radial mustapha    LABS  --------------------------------------------------------------------------------------  LFTs  LIVER FUNCTIONS - ( 25 Aug 2020 07:02 )  Alb: 4.1 g/dL / Pro: 6.7 g/dL / ALK PHOS: 106 U/L / ALT: 30 U/L / AST: 18 U/L / GGT: x             Cardiac Markers        Coagulation  PT/INR - ( 24 Aug 2020 17:24 )   PT: 12.00 sec;   INR: 1.04 ratio         PTT - ( 24 Aug 2020 17:24 )  PTT:32.1 sec    Arterial Blood Gas      Blood Sugar  CAPILLARY BLOOD GLUCOSE      POCT Blood Glucose.: 126 mg/dL (25 Aug 2020 07:54)  POCT Blood Glucose.: 153 mg/dL (24 Aug 2020 21:17)  POCT Blood Glucose.: 174 mg/dL (24 Aug 2020 16:39)      Urinalysis          CT/XRAY/ECHO/TCD/EEG  ----------------------------------------------------------------------------------------------      Transthoracic Echocardiogram:  (10-07-19)        -------------------------------------------------------------------------------------- SICU Consultation Note  ======================================================================================================  MARCIAL MCCORMICK  MRN-[id number]      50y female pmhx of CAD s/p PCI w/ stents x4 (plavix last dose ), Afib (Xarelto last dose ), DM and HTN who presented to the hospital  with c/o left eye vision loss. Associated symptoms included nausea while ambulating and blurriness after 24hrs, floaters. Patient initially saw outpatient opthalmologist and who noted optic neuritis of the left eye. Patient was referred to ED for an MRI and neurological evaluation.  Ophthalmology Consult  ,no optic nerve edema or inflammation b/l, OS cotton wool spot, most likely arterioloe occlusion, no symptoms of GCA, f/u Dr. Werner Sneed in 2-3 weeks. Patient underwent CTA Head and Neck with findings of b/l stenosis of ICA. MRI unable to be completed 2/2 anxiety. Neurology recs MRI if significant carotid stenosis and determination if silent unilateral cerebral ischemic events are necessary to qualify for surgery.  Vascular surgery consult rec left CEA. Cardiology optimization completed  (CHADsVASC 5), patient deemed low for risk. Patient underwent the procedure today without intraop complications. Patient easily extubated and brought to PACU. SICU consulted for q1 neurochecks.    CT Angio Head w/ IV Cont (20 @ 18:07) >  Severe focal stenosis involving the origin of the right ICA.  Moderate-severe focal stenosis involving the origin of the left ICA.    Transthoracic Echocardiogram (10.07.19 @ 11:41) >   1. Left ventricular ejection fraction, by visual estimation, is 60 to 65%.   2. Normal global left ventricular systolic function.   3. Mild concentric left ventricular hypertrophy.   4. Mildly increased LV wall thickness.   5. Normal left ventricular internal cavity size.   6. LA volume Index is 23.2 ml/m² ml/m2.    Procedure:  OR Stats  OR Time:  2hrs             EBL: 30cc          IV Fluids:  2L     Blood Products: None                UOP:    None, induction at 1025    PMH  PAST MEDICAL & SURGICAL HISTORY:  Hypertension  DM (diabetes mellitus)  Afib  MI (myocardial infarction): s/p 4 stents (most recent )  H/O  section      Home Meds:  Home Medications:  atorvastatin 80 mg oral tablet: 1 tab(s) orally once a day (2018 21:39)  HumaLOG KwikPen 100 units/mL injectable solution: 18 unit(s) injectable 3 times a day (18 Aug 2020 17:40)  Jardiance 25 mg oral tablet: 1 tab(s) orally once a day (in the morning) (07 Oct 2019 20:45)  metFORMIN 1000 mg oral tablet: 1 tab(s) orally 2 times a day (2018 21:39)  PARoxetine 30 mg oral tablet: 1 tab(s) orally once a day (18 Aug 2020 17:43)  pioglitazone 30 mg oral tablet: 1 tab(s) orally once a day (18 Aug 2020 17:44)  Tresiba FlexTouch 100 units/mL subcutaneous solution: 80 unit(s) subcutaneous once a day (18 Aug 2020 17:41)         Allergies  Allergies    No Known Allergies    Intolerances         Current Medications:  aspirin enteric coated 81 milliGRAM(s) Oral daily  ceFAZolin   IVPB 1000 milliGRAM(s) IV Intermittent once  ceFAZolin   IVPB 1000 milliGRAM(s) IV Intermittent every 8 hours  ceFAZolin   IVPB      HYDROmorphone  Injectable 1 milliGRAM(s) IV Push every 4 hours PRN Moderate Pain (4 - 6)  lactated ringers. 1000 milliLiter(s) (125 mL/Hr) IV Continuous <Continuous>  meperidine     Injectable 12.5 milliGRAM(s) IV Push every 10 minutes PRN Shivering  ondansetron Injectable 4 milliGRAM(s) IV Push every 15 minutes PRN Nausea and/or Vomiting  oxycodone    5 mG/acetaminophen 325 mG 1 Tablet(s) Oral every 4 hours PRN Moderate Pain (4 - 6)      Advanced Directives: Presumed Full Code    VITAL SIGNS, INS/OUTS (Last 24hours):    ICU Vital Signs Last 24 Hrs  T(C): 37.3 (25 Aug 2020 12:48), Max: 37.3 (25 Aug 2020 12:48)  T(F): 99.2 (25 Aug 2020 12:48), Max: 99.2 (25 Aug 2020 12:48)  HR: 79 (25 Aug 2020 13:30) (69 - 79)  BP: 99/46 (25 Aug 2020 13:30) (96/47 - 135/69)  BP(mean): --  ABP: 133/51 (25 Aug 2020 13:15) (122/45 - 136/50)  ABP(mean): --  RR: 16 (25 Aug 2020 13:30) (15 - 20)  SpO2: 95% (25 Aug 2020 13:30) (92% - 99%)    I&O's Summary    24 Aug 2020 07:01  -  25 Aug 2020 07:00  --------------------------------------------------------  IN: 436 mL / OUT: 0 mL / NET: 436 mL        Height (cm): 167.64 (20)  Weight (kg): 132 (20)  BMI (kg/m2): 47 (20)  BSA (m2): 2.35 (20)    Physical Exam:   ---------------------------------------------------------------------------------------  a&ox3  left neck dressing in place, no hematoma  no focal deficits noted  nc satting well  no acute distress  NSR on monitor  abdomen soft no pain    ----------------------------------------------------------------------------------------------------------  PIV  Left radial mustapha    LABS  --------------------------------------------------------------------------------------  LFTs  LIVER FUNCTIONS - ( 25 Aug 2020 07:02 )  Alb: 4.1 g/dL / Pro: 6.7 g/dL / ALK PHOS: 106 U/L / ALT: 30 U/L / AST: 18 U/L / GGT: x             Cardiac Markers        Coagulation  PT/INR - ( 24 Aug 2020 17:24 )   PT: 12.00 sec;   INR: 1.04 ratio         PTT - ( 24 Aug 2020 17:24 )  PTT:32.1 sec    Arterial Blood Gas      Blood Sugar  CAPILLARY BLOOD GLUCOSE      POCT Blood Glucose.: 126 mg/dL (25 Aug 2020 07:54)  POCT Blood Glucose.: 153 mg/dL (24 Aug 2020 21:17)  POCT Blood Glucose.: 174 mg/dL (24 Aug 2020 16:39)      Urinalysis          CT/XRAY/ECHO/TCD/EEG  ----------------------------------------------------------------------------------------------      Transthoracic Echocardiogram:  (10-07-19)        -------------------------------------------------------------------------------------- SICU Consultation Note  ======================================================================================================  MARCIAL MCCORMICK  MRN-[id number]      50y female pmhx of CAD s/p PCI w/ stents x4 (plavix last dose ), Afib (Xarelto last dose ), DM and HTN who presented to the hospital  with c/o left eye vision loss. Associated symptoms included nausea while ambulating and blurriness after 24hrs, floaters. Patient initially saw outpatient opthalmologist and who noted optic neuritis of the left eye. Patient was referred to ED for an MRI and neurological evaluation.  Ophthalmology Consult  ,no optic nerve edema or inflammation b/l, OS cotton wool spot, most likely arterioloe occlusion, no symptoms of GCA, f/u Dr. Werner Sneed in 2-3 weeks. Patient underwent CTA Head and Neck with findings of b/l stenosis of ICA. MRI unable to be completed 2/2 anxiety. Neurology recs MRI if significant carotid stenosis and determination if silent unilateral cerebral ischemic events are necessary to qualify for surgery.  Vascular surgery consult rec left CEA. Cardiology optimization completed  (CHADsVASC 5), patient deemed low for risk. Patient underwent the procedure today without intraop complications. Patient easily extubated and brought to PACU. SICU consulted for q1 neurochecks.    CT Angio Head w/ IV Cont (20 @ 18:07) >  Severe focal stenosis involving the origin of the right ICA.  Moderate-severe focal stenosis involving the origin of the left ICA.    Transthoracic Echocardiogram (10.07.19 @ 11:41) >   1. Left ventricular ejection fraction, by visual estimation, is 60 to 65%.   2. Normal global left ventricular systolic function.   3. Mild concentric left ventricular hypertrophy.   4. Mildly increased LV wall thickness.   5. Normal left ventricular internal cavity size.   6. LA volume Index is 23.2 ml/m² ml/m2.    Procedure:  OR Stats  OR Time:  2hrs             EBL: 30cc          IV Fluids:  2L     Blood Products: None                UOP:    None, induction at 1025    PMH  PAST MEDICAL & SURGICAL HISTORY:  Hypertension  DM (diabetes mellitus)  Afib  MI (myocardial infarction): s/p 4 stents (most recent )  H/O  section      Home Meds:  Home Medications:  atorvastatin 80 mg oral tablet: 1 tab(s) orally once a day (2018 21:39)  HumaLOG KwikPen 100 units/mL injectable solution: 18 unit(s) injectable 3 times a day (18 Aug 2020 17:40)  Jardiance 25 mg oral tablet: 1 tab(s) orally once a day (in the morning) (07 Oct 2019 20:45)  metFORMIN 1000 mg oral tablet: 1 tab(s) orally 2 times a day (2018 21:39)  PARoxetine 30 mg oral tablet: 1 tab(s) orally once a day (18 Aug 2020 17:43)  pioglitazone 30 mg oral tablet: 1 tab(s) orally once a day (18 Aug 2020 17:44)  Tresiba FlexTouch 100 units/mL subcutaneous solution: 80 unit(s) subcutaneous once a day (18 Aug 2020 17:41)         Allergies  Allergies    No Known Allergies    Intolerances         Current Medications:  aspirin enteric coated 81 milliGRAM(s) Oral daily  ceFAZolin   IVPB 1000 milliGRAM(s) IV Intermittent once  ceFAZolin   IVPB 1000 milliGRAM(s) IV Intermittent every 8 hours  ceFAZolin   IVPB      HYDROmorphone  Injectable 1 milliGRAM(s) IV Push every 4 hours PRN Moderate Pain (4 - 6)  lactated ringers. 1000 milliLiter(s) (125 mL/Hr) IV Continuous <Continuous>  meperidine     Injectable 12.5 milliGRAM(s) IV Push every 10 minutes PRN Shivering  ondansetron Injectable 4 milliGRAM(s) IV Push every 15 minutes PRN Nausea and/or Vomiting  oxycodone    5 mG/acetaminophen 325 mG 1 Tablet(s) Oral every 4 hours PRN Moderate Pain (4 - 6)      Advanced Directives: Presumed Full Code    VITAL SIGNS, INS/OUTS (Last 24hours):    ICU Vital Signs Last 24 Hrs  T(C): 37.3 (25 Aug 2020 12:48), Max: 37.3 (25 Aug 2020 12:48)  T(F): 99.2 (25 Aug 2020 12:48), Max: 99.2 (25 Aug 2020 12:48)  HR: 79 (25 Aug 2020 13:30) (69 - 79)  BP: 99/46 (25 Aug 2020 13:30) (96/47 - 135/69)  BP(mean): --  ABP: 133/51 (25 Aug 2020 13:15) (122/45 - 136/50)  ABP(mean): --  RR: 16 (25 Aug 2020 13:30) (15 - 20)  SpO2: 95% (25 Aug 2020 13:30) (92% - 99%)    I&O's Summary    24 Aug 2020 07:01  -  25 Aug 2020 07:00  --------------------------------------------------------  IN: 436 mL / OUT: 0 mL / NET: 436 mL        Height (cm): 167.64 (20)  Weight (kg): 132 (20)  BMI (kg/m2): 47 (20)  BSA (m2): 2.35 (20)    Physical Exam:   ---------------------------------------------------------------------------------------  a&ox3  left neck dressing in place, no hematoma  no focal deficits noted  left eye blurriness baseline prior to OR  right eye no defecits   nc satting well  no acute distress  NSR on monitor  abdomen soft no pain    ----------------------------------------------------------------------------------------------------------  PIV  Left radial mustapha    LABS  --------------------------------------------------------------------------------------  LFTs  LIVER FUNCTIONS - ( 25 Aug 2020 07:02 )  Alb: 4.1 g/dL / Pro: 6.7 g/dL / ALK PHOS: 106 U/L / ALT: 30 U/L / AST: 18 U/L / GGT: x             Cardiac Markers        Coagulation  PT/INR - ( 24 Aug 2020 17:24 )   PT: 12.00 sec;   INR: 1.04 ratio         PTT - ( 24 Aug 2020 17:24 )  PTT:32.1 sec    Arterial Blood Gas      Blood Sugar  CAPILLARY BLOOD GLUCOSE      POCT Blood Glucose.: 126 mg/dL (25 Aug 2020 07:54)  POCT Blood Glucose.: 153 mg/dL (24 Aug 2020 21:17)  POCT Blood Glucose.: 174 mg/dL (24 Aug 2020 16:39)      Urinalysis          CT/XRAY/ECHO/TCD/EEG  ----------------------------------------------------------------------------------------------      Transthoracic Echocardiogram:  (10-07-19)        --------------------------------------------------------------------------------------

## 2020-08-25 NOTE — CONSULT NOTE ADULT - CONSULT REASON
Acute vision changes. Loss
Left eye peripheral vision loss
Pre-op
s/p left carotid endarterectomy, symptomatic
B/L ICA stenosis

## 2020-08-25 NOTE — CONSULT NOTE ADULT - ATTENDING COMMENTS
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  Agree with CTA head and neck.  Patient unable to tolerate brain MRI even with anxiolytic.  Would only pursue MRI if significant carotid stenosis and determination if silent unilateral cerebral ischemic events are necessary to qualify for surgery.      Plan:  1.  CTA head and neck.  2.  Optimize blood sugar control (over past few months per patient a1c went from 11 to 7.  3.  Diabetic diet and incorporate elements of [place] diet.  4.  Exercise.  5.  Resume treatment of JOCELYNE, f/u with sleep medicine for appropriate CPAP machine.  6.  Optimize blood pressure control target SBP < 130  7.  Check P2Y12 Platelet function Plavix assay to determine if she is sensitive to plavix or resistant.
s/p carotid endarterectomy   BP control   neuro checks   admit to SICU   IVL   resume diet and home meds

## 2020-08-25 NOTE — CHART NOTE - NSCHARTNOTEFT_GEN_A_CORE
Post Operative Note  Patient: MARCIAL MCCORMICK 50y (1970) Female   MRN: [id number]  Location: [place] [address] [address]  Visit: 08-18-20 Inpatient  Date: 08-25-20 @ 16:32    Procedure: S/P left carotid endarterectomy    Subjective:   Nausea: no, Vomiting: no, Ambulating: no, Flatus: no  Pain Assessment: yes, Location: left side of neck, Pain Intensity: 4 /10 , Quality: Aching Sore    Objective:  Vitals: T(F): 99.2 (08-25-20 @ 13:45), Max: 99.2 (08-25-20 @ 12:48)  HR: 76 (08-25-20 @ 15:00)  BP: 110/54 (08-25-20 @ 15:00) (89/44 - 135/69)  RR: 14 (08-25-20 @ 15:00)  SpO2: 94% (08-25-20 @ 15:00)  Vent Settings:     In:   08-24-20 @ 07:01  -  08-25-20 @ 07:00  --------------------------------------------------------  IN: 436 mL    08-25-20 @ 07:01  -  08-25-20 @ 16:32  --------------------------------------------------------  IN: 250 mL      IV Fluids: lactated ringers. 1000 milliLiter(s) (125 mL/Hr) IV Continuous <Continuous>      Out:   08-24-20 @ 07:01  -  08-25-20 @ 07:00  --------------------------------------------------------  OUT: 0 mL    08-25-20 @ 07:01  -  08-25-20 @ 16:32  --------------------------------------------------------  OUT: 0 mL      EBL:     Voided Urine:   08-24-20 @ 07:01  -  08-25-20 @ 07:00  --------------------------------------------------------  OUT: 0 mL    08-25-20 @ 07:01  -  08-25-20 @ 16:32  --------------------------------------------------------  OUT: 0 mL      Adamson Catheter: yes no   Drains:   ISMA:    ,   Chest Tube:      NG Tube:       Physical Examination:  General Appearance: NAD  HEENT: EOMI, sclera non-icteric. Left neck wound dressing clean dry and intact  Heart: RRR  Lungs: CTABL  Abdomen:  Soft, nontender, nondistended. No rigidity, guarding, or rebound tenderness.   MSK/Extremities: Warm & well-perfused. Peripheral pulses intact.  Skin: Warm, dry. No jaundice.   Incisions/Wounds: Dressings in place, clean, dry and intact, no signs of infection/active bleeding/drainage    Medications: [Standing]  aspirin enteric coated 81 milliGRAM(s) Oral daily  atorvastatin 80 milliGRAM(s) Oral at bedtime  ceFAZolin   IVPB 1000 milliGRAM(s) IV Intermittent every 8 hours  ceFAZolin   IVPB      HYDROmorphone  Injectable 1 milliGRAM(s) IV Push every 4 hours PRN  insulin regular  human corrective regimen sliding scale   IV Push every 4 hours  lactated ringers. 1000 milliLiter(s) IV Continuous <Continuous>  meperidine     Injectable 12.5 milliGRAM(s) IV Push every 10 minutes PRN  metoprolol succinate  milliGRAM(s) Oral at bedtime  ondansetron Injectable 4 milliGRAM(s) IV Push every 15 minutes PRN  oxycodone    5 mG/acetaminophen 325 mG 1 Tablet(s) Oral every 4 hours PRN  pantoprazole    Tablet 40 milliGRAM(s) Oral before breakfast  senna 2 Tablet(s) Oral at bedtime    Medications: [PRN]  aspirin enteric coated 81 milliGRAM(s) Oral daily  atorvastatin 80 milliGRAM(s) Oral at bedtime  ceFAZolin   IVPB 1000 milliGRAM(s) IV Intermittent every 8 hours  ceFAZolin   IVPB      HYDROmorphone  Injectable 1 milliGRAM(s) IV Push every 4 hours PRN  insulin regular  human corrective regimen sliding scale   IV Push every 4 hours  lactated ringers. 1000 milliLiter(s) IV Continuous <Continuous>  meperidine     Injectable 12.5 milliGRAM(s) IV Push every 10 minutes PRN  metoprolol succinate  milliGRAM(s) Oral at bedtime  ondansetron Injectable 4 milliGRAM(s) IV Push every 15 minutes PRN  oxycodone    5 mG/acetaminophen 325 mG 1 Tablet(s) Oral every 4 hours PRN  pantoprazole    Tablet 40 milliGRAM(s) Oral before breakfast  senna 2 Tablet(s) Oral at bedtime    Labs:                        12.2   11.27 )-----------( 245      ( 25 Aug 2020 13:14 )             39.4     08-25    137  |  102  |  11  ----------------------------<  227<H>  4.1   |  25  |  0.6<L>    Ca    8.5      25 Aug 2020 14:02  Phos  3.4     08-25  Mg     1.3     08-25    TPro  6.7  /  Alb  4.1  /  TBili  0.9  /  DBili  x   /  AST  18  /  ALT  30  /  AlkPhos  106  08-25    PT/INR - ( 24 Aug 2020 17:24 )   PT: 12.00 sec;   INR: 1.04 ratio         PTT - ( 24 Aug 2020 17:24 )  PTT:32.1 sec  CARDIAC MARKERS ( 25 Aug 2020 14:02 )  x     / <0.01 ng/mL / 87 U/L / x     / 1.5 ng/mL      Imaging:  No post-op imaging studies    Assessment:  50yFemale patient S/P left carotid endarterectomy    Plan:      50y Female HD#7 s/p  left carotid endarterectomy w/ patch, symptomatic, left eye vision loss              CARDS:     s/p left CEA-maintain -150    hx of Afib-currently holding Xarelto    hx of CAD w/ stents-holding plavix    Imaging: Echo 10/2019-EF 65 to 65%, normal left ventricular systolic function    Labs: Trop neg x1, f/u 2 more sets    Diet-regular    GI Prophylaxis-PPI    Bowel regimen-senna    DVT prophylaxis-none as per vascular, discuss 8/26 AM    post op abx-cefazolin 2 more doses     hx of DM-Lantus 80u at home, lispro 18u TID, will discuss restarting based on PO intake      HA1C 9.9      Date/Time: 08-25-20 @ 16:32

## 2020-08-26 ENCOUNTER — TRANSCRIPTION ENCOUNTER (OUTPATIENT)
Age: 50
End: 2020-08-26

## 2020-08-26 VITALS
HEART RATE: 88 BPM | TEMPERATURE: 98 F | RESPIRATION RATE: 17 BRPM | DIASTOLIC BLOOD PRESSURE: 68 MMHG | OXYGEN SATURATION: 96 % | SYSTOLIC BLOOD PRESSURE: 137 MMHG

## 2020-08-26 LAB
GLUCOSE BLDC GLUCOMTR-MCNC: 192 MG/DL — HIGH (ref 70–99)
GLUCOSE BLDC GLUCOMTR-MCNC: 196 MG/DL — HIGH (ref 70–99)

## 2020-08-26 PROCEDURE — 99291 CRITICAL CARE FIRST HOUR: CPT

## 2020-08-26 PROCEDURE — 71045 X-RAY EXAM CHEST 1 VIEW: CPT | Mod: 26

## 2020-08-26 RX ORDER — METFORMIN HYDROCHLORIDE 850 MG/1
1000 TABLET ORAL EVERY 12 HOURS
Refills: 0 | Status: DISCONTINUED | OUTPATIENT
Start: 2020-08-26 | End: 2020-08-26

## 2020-08-26 RX ORDER — ASPIRIN/CALCIUM CARB/MAGNESIUM 324 MG
1 TABLET ORAL
Qty: 0 | Refills: 0 | DISCHARGE
Start: 2020-08-26

## 2020-08-26 RX ORDER — INSULIN LISPRO 100/ML
18 VIAL (ML) SUBCUTANEOUS
Refills: 0 | Status: DISCONTINUED | OUTPATIENT
Start: 2020-08-26 | End: 2020-08-26

## 2020-08-26 RX ORDER — MAGNESIUM SULFATE 500 MG/ML
2 VIAL (ML) INJECTION ONCE
Refills: 0 | Status: COMPLETED | OUTPATIENT
Start: 2020-08-26 | End: 2020-08-26

## 2020-08-26 RX ORDER — PIOGLITAZONE HYDROCHLORIDE 15 MG/1
30 TABLET ORAL DAILY
Refills: 0 | Status: DISCONTINUED | OUTPATIENT
Start: 2020-08-26 | End: 2020-08-26

## 2020-08-26 RX ORDER — INSULIN GLARGINE 100 [IU]/ML
80 INJECTION, SOLUTION SUBCUTANEOUS AT BEDTIME
Refills: 0 | Status: DISCONTINUED | OUTPATIENT
Start: 2020-08-26 | End: 2020-08-26

## 2020-08-26 RX ADMIN — Medication 650 MILLIGRAM(S): at 00:00

## 2020-08-26 RX ADMIN — Medication 100 MILLIGRAM(S): at 05:31

## 2020-08-26 RX ADMIN — INSULIN HUMAN 6: 100 INJECTION, SOLUTION SUBCUTANEOUS at 06:30

## 2020-08-26 RX ADMIN — Medication 650 MILLIGRAM(S): at 05:57

## 2020-08-26 RX ADMIN — Medication 20 MILLIGRAM(S): at 11:53

## 2020-08-26 RX ADMIN — Medication 650 MILLIGRAM(S): at 11:46

## 2020-08-26 RX ADMIN — INSULIN HUMAN 6: 100 INJECTION, SOLUTION SUBCUTANEOUS at 02:09

## 2020-08-26 RX ADMIN — Medication 62.5 MILLIMOLE(S): at 01:30

## 2020-08-26 RX ADMIN — PANTOPRAZOLE SODIUM 40 MILLIGRAM(S): 20 TABLET, DELAYED RELEASE ORAL at 07:32

## 2020-08-26 RX ADMIN — METFORMIN HYDROCHLORIDE 1000 MILLIGRAM(S): 850 TABLET ORAL at 09:11

## 2020-08-26 RX ADMIN — SODIUM CHLORIDE 125 MILLILITER(S): 9 INJECTION, SOLUTION INTRAVENOUS at 04:31

## 2020-08-26 RX ADMIN — Medication 650 MILLIGRAM(S): at 06:27

## 2020-08-26 RX ADMIN — Medication 12.5 MILLIGRAM(S): at 05:57

## 2020-08-26 RX ADMIN — Medication 81 MILLIGRAM(S): at 12:00

## 2020-08-26 RX ADMIN — Medication 25 GRAM(S): at 01:25

## 2020-08-26 NOTE — DISCHARGE NOTE PROVIDER - NSDCCPCAREPLAN_GEN_ALL_CORE_FT
PRINCIPAL DISCHARGE DIAGNOSIS  Diagnosis: Vision loss of left eye  Assessment and Plan of Treatment: s/p left carotid endarterectomy  diet: resume regular diet   activity: no heavy lifting (10lbs) for 2 weeks or until told otherwise by doctor at follow up  medication: restart home medication. please take aspirin and plavix as discussed. Restart taking Xarelto tomorrow 8/27/2020. Please take percocet every 6 hours for 4 days for pain. Please be aware that pain medication may cause drowsiness so use with care  dressing: you can shower with dressing as it its water proof. ok to remove dressing tomorrow. Do not remove stables below.  follow up: please follow up with Dr. Underwood in office in 2 weeks. please call [phone] to confirm schedule

## 2020-08-26 NOTE — DISCHARGE NOTE PROVIDER - CARE PROVIDERS DIRECT ADDRESSES
[email or web address]

## 2020-08-26 NOTE — DISCHARGE NOTE NURSING/CASE MANAGEMENT/SOCIAL WORK - NSDCPEPTSTRK_GEN_ALL_CORE
Prescribed medications/Stroke support groups for patients, families, and friends/Risk factors for stroke/Call 911 for stroke/Need for follow up after discharge/Stroke education booklet/Stroke warning signs and symptoms/Signs and symptoms of stroke

## 2020-08-26 NOTE — PROGRESS NOTE ADULT - ASSESSMENT
ASSESSMENT/PLAN:  - Patient tolerating PO diet.   - Patient is to go on ASA & statin and start xarelto tomorrow.  - will follow up in 2 weeks  - DC today to home

## 2020-08-26 NOTE — PHYSICAL THERAPY INITIAL EVALUATION ADULT - GENERAL OBSERVATIONS, REHAB EVAL
10:30-11:00. chart reviewed. Pt received semi-cedeno at B/S, alert, oriented, able to follow multi-step instructions and agreeable to PT evaluation. + IV, + monitoring, + L neck side dressing, denies pain or discomfort, NAD

## 2020-08-26 NOTE — DISCHARGE NOTE NURSING/CASE MANAGEMENT/SOCIAL WORK - PATIENT PORTAL LINK FT
You can access the FollowMyHealth Patient Portal offered by [facility] by registering at the following website: [email or web address]. By joining [facility]’s FollowMyHealth portal, you will also be able to view your health information using other applications (apps) compatible with our system.

## 2020-08-26 NOTE — PROGRESS NOTE ADULT - ASSESSMENT
Emelina Gutierrez is a 50y Female with symptomatic carotid stenosis s/p Left Carotid Endarterectomy. She is currently neurologically and hemodynamically stable following CEA.     NEURO:    s/p left cea-q1 neuro checks    Acute pain-controlled with apap, oxy, dilaudid PRN    hx of anxiety-restarted paxil    RESP:     Oxygen insufficiency-wean off NC to RA as tolerate    hx of JOCELYNE-non compliant with CPAP, f/u post op CXR    Activity-bedrest      CARDS:     s/p left CEA-maintain -150    hx of Afib-currently holding Xarelto    hx of CAD w/ stents-holding plavix    Imaging: Echo 10/2019-EF 65 to 65%, normal left ventricular systolic function    Labs: Trop neg x2, f/u 1 more sets    GI/NUTR:     Diet-regular    GI Prophylaxis-PPI    Bowel regimen-senna    /RENAL:     voided    BUN/Cr- 12/0.6   <----,  17/0.7   <----,  15/0.6   <----     HEME/ONC:     DVT prophylaxis-none as per vascular, discuss 8/26 AM    H/H:  12.2/39.4   <---,  13.1/41.3   <---,  13.6/43.8   <---    Plts:  245  <---,  247  <---,  266  <---,  221  <---    ID:    post op abx-cefazolin 2 more doses     ENDO:    hx of DM-Lantus 80u at home, lispro 18u TID, will discuss restarting based on PO intake      HA1C 9.9    LINES/DRAINS:  Toña BARRAGAN    DISPO:    SICU will d/w Dr. Maya        DVT Prophylaxis: aspirin enteric coated 81 milliGRAM(s) Oral daily      GI Prophylaxis:pantoprazole    Tablet 40 milliGRAM(s) Oral before breakfast  senna 2 Tablet(s) Oral at bedtime      ***Tubes/Lines/Drains  ***  Peripheral IV  Arterial Line		                  Central Line                            Urinary Catheter		Indication: Strict I&O

## 2020-08-26 NOTE — PROGRESS NOTE ADULT - SUBJECTIVE AND OBJECTIVE BOX
VASCULAR SURGERY PROGRESS NOTE    Post-Op Day #1    Procedure: Left carotid endarterectomy     Events of past 24 hours: No acute events overnight. Patient found in NAD. dressing clean and dry. pain under control. BP under control.    ROS otherwise negative except per subjective and HPI      PAST MEDICAL & SURGICAL HISTORY:  Hypertension  DM (diabetes mellitus)  Afib  MI (myocardial infarction): s/p 4 stents (most recent )  H/O  section      Vital Signs Last 24 Hrs  T(C): 37.2 (26 Aug 2020 04:00), Max: 38.1 (25 Aug 2020 19:00)  T(F): 98.9 (26 Aug 2020 04:00), Max: 100.6 (25 Aug 2020 19:00)  HR: 86 (26 Aug 2020 07:00) (68 - 108)  BP: 127/62 (26 Aug 2020 07:00) (89/44 - 150/67)  BP(mean): --  RR: 18 (26 Aug 2020 07:00) (14 - 20)  SpO2: 98% (26 Aug 2020 07:00) (92% - 99%)    Pain Control: Tylenol    Diet: tolerating    I&O's Detail    25 Aug 2020 07:01  -  26 Aug 2020 07:00  --------------------------------------------------------  IN:    IV PiggyBack: 400 mL    lactated ringers.: 2125 mL    lactated ringers.: 125 mL    Oral Fluid: 430 mL  Total IN: 3080 mL    OUT:    Voided: 825 mL  Total OUT: 825 mL    Total NET: 2255 mL          Bowel Movement: : [] YES [] NO  Flatus: : [] YES [] NO    PHYSICAL EXAM    Appearance: Normal	  Cardiovascular: RRR  HEENT: NC, dressing clean and dry. mild tenderness.   Respiratory: symmetric chest wall expansion	  Gastrointestinal:  Soft, Non-tender,   Skin: No rashes, No ecchymoses, No cyanosis  Extremities: Normal range of motion, No clubbing, cyanosis or edema        MEDICATIONS:   MEDICATIONS  (STANDING):  acetaminophen   Tablet .. 650 milliGRAM(s) Oral every 6 hours  aspirin enteric coated 81 milliGRAM(s) Oral daily  atorvastatin 80 milliGRAM(s) Oral at bedtime  ceFAZolin   IVPB 1000 milliGRAM(s) IV Intermittent every 8 hours  ceFAZolin   IVPB      insulin glargine Injectable (LANTUS) 80 Unit(s) SubCutaneous at bedtime  insulin lispro Injectable (HumaLOG) 18 Unit(s) SubCutaneous three times a day before meals  metFORMIN 1000 milliGRAM(s) Oral every 12 hours  metoprolol tartrate 12.5 milliGRAM(s) Oral every 8 hours  pantoprazole    Tablet 40 milliGRAM(s) Oral before breakfast  PARoxetine 30 milliGRAM(s) Oral daily  pioglitazone 30 milliGRAM(s) Oral daily  senna 2 Tablet(s) Oral at bedtime    MEDICATIONS  (PRN):  ondansetron Injectable 4 milliGRAM(s) IV Push every 15 minutes PRN Nausea and/or Vomiting      ANTICOAGULATION: [] YES [] NO  ANTIBIOTICS: [] YES [] NO  GI PROPHYLAXIS: [] YES [] NO    LAB/STUDIES:                        11.7   8.81  )-----------( 232      ( 25 Aug 2020 23:20 )             36.9     08-25    136  |  100  |  11  ----------------------------<  206<H>  4.2   |  27  |  0.7    Ca    8.8      25 Aug 2020 23:20  Phos  2.9     08-25  Mg     1.6     0825    TPro  6.7  /  Alb  4.1  /  TBili  0.9  /  DBili  x   /  AST  18  /  ALT  30  /  AlkPhos  106  08-25    PT/INR - ( 24 Aug 2020 17:24 )   PT: 12.00 sec;   INR: 1.04 ratio         PTT - ( 24 Aug 2020 17:24 )  PTT:32.1 sec  LIVER FUNCTIONS - ( 25 Aug 2020 07:02 )  Alb: 4.1 g/dL / Pro: 6.7 g/dL / ALK PHOS: 106 U/L / ALT: 30 U/L / AST: 18 U/L / GGT: x               CARDIAC MARKERS ( 25 Aug 2020 23:20 )  x     / <0.01 ng/mL / 200 U/L / x     / 1.8 ng/mL  CARDIAC MARKERS ( 25 Aug 2020 18:07 )  x     / <0.01 ng/mL / 26 U/L / x     / 1.3 ng/mL  CARDIAC MARKERS ( 25 Aug 2020 14:02 )  x     / <0.01 ng/mL / 87 U/L / x     / 1.5 ng/mL                IMAGING:              SPECTRA:

## 2020-08-26 NOTE — PROGRESS NOTE ADULT - SUBJECTIVE AND OBJECTIVE BOX
SMOOTH PRAKASH  [id number]  25y Female    Indication for ICU admission: s/p repair of spondylolysis of L-spine by posterior approach spinal fusion, L3-S1 posterior approach L5-S1 laminectomy  Admit Date:   ICU Date:   OR Date:     penicillin (Hives)    PAST MEDICAL & SURGICAL HISTORY:  Chronic back pain: lower back  H/O  section    Home Medications:        24HRS EVENT:  Overnight: Nausea, zofran and compazine     Neurologic: hx of back pain, spondylolysis  -bedrest, HOB >30 deg  -continue valium (for spasticity)  -pain control w/ tylenol, dilaudid PCA pump    Respiratory: No chronic dx  -on NC, sating well, wean to RA as tolerated, encourage IS  -f/u AM CXR    Cardiovascular: No chronic dx   -intra-op hypotension requiring phenylephrine gtt, post op hemodynamically stable  -keep normotensive, post op EKG     Gastrointestinal/Nutrition: No chronic dx  -Regular diet  -GI PPX w/ PPI, BR    Renal/Genitourinary: No chronic dx  -telles, strict i/o's  -IVF NS @100cc  -BUN/Cr 9/0.6  -replete electrolytes prn  -LA 1.2>1.0    Hematologic: No chronic dx  -s/p 1.2L EBL in OR, s/p 4 PRBC and 694cc cell saver  -Hgb 11>11.8  -holding DVT PPX as per primary team, SCDs only    Infectious Disease: No chronic dx  -malia op clinda and tobramycin x1 day  -monitor for leukocytosis and fever    Endocrine: No chronic dx       DVT PTX: held as per primary team    GI PTX: PPI    ***Tubes/Lines/Drains  ***  Peripheral IV  Arterial Line	R rad	                Date   Urinary Catheter		Indication: Strict I&O    Date Placed:       REVIEW OF SYSTEMS    [ x] A ten-point review of systems was otherwise negative except as noted.  [ ] Due to altered mental status/intubation, subjective information were not able to be obtained from the patient. History was obtained, to the extent possible, from review of the chart and collateral sources of information. SMOOTH PRAKASH  [id number]  25y Female    Indication for ICU admission: s/p repair of spondylolysis of L-spine by posterior approach spinal fusion, L3-S1 posterior approach L5-S1 laminectomy  Admit Date:   ICU Date:   OR Date:     penicillin (Hives)    PAST MEDICAL & SURGICAL HISTORY:  Chronic back pain: lower back  H/O  section    Home Medications:        24HRS EVENT:  Overnight: noted L sided tongue deviation, primary team aware     Neurologic: hx of back pain, spondylolysis  -bedrest, HOB >30 deg  -continue valium (for spasticity)  -pain control w/ tylenol, dilaudid PCA pump    Respiratory: No chronic dx  -on NC, sating well, wean to RA as tolerated, encourage IS  -f/u AM CXR    Cardiovascular: No chronic dx   -intra-op hypotension requiring phenylephrine gtt, post op hemodynamically stable  -keep normotensive, post op EKG     Gastrointestinal/Nutrition: No chronic dx  -Regular diet  -GI PPX w/ PPI, BR    Renal/Genitourinary: No chronic dx  -telles, strict i/o's  -IVF NS @100cc  -BUN/Cr 9/0.6  -replete electrolytes prn  -LA 1.2>1.0    Hematologic: No chronic dx  -s/p 1.2L EBL in OR, s/p 4 PRBC and 694cc cell saver  -Hgb 11>11.8  -holding DVT PPX as per primary team, SCDs only    Infectious Disease: No chronic dx  -amlia op clinda and tobramycin x1 day  -monitor for leukocytosis and fever    Endocrine: No chronic dx       DVT PTX: held as per primary team    GI PTX: PPI    ***Tubes/Lines/Drains  ***  Peripheral IV  Arterial Line	R rad	                Date   Urinary Catheter		Indication: Strict I&O    Date Placed:       REVIEW OF SYSTEMS    [ x] A ten-point review of systems was otherwise negative except as noted.  [ ] Due to altered mental status/intubation, subjective information were not able to be obtained from the patient. History was obtained, to the extent possible, from review of the chart and collateral sources of information.

## 2020-08-26 NOTE — PROGRESS NOTE ADULT - ATTENDING COMMENTS
Patient seen and examined independently. Agree with resident note.  # Lt lower field vision loss-- patient has b/l carotid stenosis-- awaiting surgery TVAR vs CEA-- currently off aspirin, plavix and xarelto. I discussed with vascular resident.  #HX of CAD s/p stents-- last one 2 yrs ago.  # A fib on xarelto  # DM on insulin-- blood sugar in control on current regimen
Patient seen and examined independently. Agree with resident note.  # Lt lower field vision loss-- seen by opthalmology and could be sec to precapillary or retinal arteriole.  CT angio revealed--rt and lt carotid stenosis-- vascular surgery consulted--awaiting plan from attending.  # A fib on xarelto  # CAD s/p stents on plavix  # DM uncontrolled increased her dose of lantus and lispro.
pt seen and examined independently. pt states that her vision has slightly improved however, continues to endorse blurry vision. states she develops a headache at the end of the day but the headache which she initially presented with has resolved. ambulating with no difficulty.    #acute left hemifield visual loss ? 2/2 precapillary arteriole occlusion vs. cva  pt unable to tolerate MRI therefore will obtain CTA head and neck to eval for CVA  pain well controlled   cont tylenol PRN   outpt opthalmology in 2-3wks  no acute intervention recommended while admitted     #afib   rate controlled  cont Xarelto    #remainder of plan as documented in resident note above.     Progress Note Handoff  Pending:  CTA head and neck, neuro follow up  Patient/Family discussion: POC discussed with pt agreeable to plan  Disposition: from home
s/p CEA   neuro intact   advance diet   resume home meds   OOB and ambulate   d/c home.
Patient seen and examined independently. Agree with resident note  # Lt lower field visual loss--as per opthalmology--  peripheral brach of retinal artery occlusion   CTA head and neck revealed severe stenosis on rt and mod to severe stenosis on lt-- vascular sx eval is requested.  platelet function assay was normal  # CAD s/p stents on plavix and metoprolol along with lipitor.  # DM on insulin.

## 2020-08-26 NOTE — DISCHARGE NOTE PROVIDER - NSDCMRMEDTOKEN_GEN_ALL_CORE_FT
aspirin 81 mg oral delayed release tablet: 1 tab(s) orally once a day  atorvastatin 80 mg oral tablet: 1 tab(s) orally once a day  HumaLOG KwikPen 100 units/mL injectable solution: 18 unit(s) injectable 3 times a day  Jardiance 25 mg oral tablet: 1 tab(s) orally once a day (in the morning)  metFORMIN 1000 mg oral tablet: 1 tab(s) orally 2 times a day  metoprolol succinate 100 mg oral tablet, extended release: 1 tab(s) orally once a day at night  metoprolol succinate 50 mg oral tablet, extended release: 1 tab(s) orally once a day in the morning  oxycodone-acetaminophen 5 mg-325 mg oral tablet: 1 tab(s) orally 4 times a day MDD:4  PARoxetine 30 mg oral tablet: 1 tab(s) orally once a day  pioglitazone 30 mg oral tablet: 1 tab(s) orally once a day  Plavix 75 mg oral tablet: 1 tab(s) orally once a day  rivaroxaban 20 mg oral tablet: 1 tab(s) orally once a day   Tresiba FlexTouch 100 units/mL subcutaneous solution: 80 unit(s) subcutaneous once a day

## 2020-08-26 NOTE — PROGRESS NOTE ADULT - REASON FOR ADMISSION
left eye vision loss

## 2020-08-26 NOTE — PROGRESS NOTE ADULT - ASSESSMENT
Assessment & Plan    50y Female HD#7 s/p left cea w/ patch, symptomatic, left eye vision loss    NEURO:    s/p left cea-q1 neuro checks    Acute pain-controlled with apap, oxy, dilaudid PRN    hx of anxiety-d/w team restarting paxil    Overnight noted L sided tongue deviation, vascular aware     RESP:     Oxygen insufficiency-wean off NC to RA as tolerate    hx of JOCELYNE-non compliant with CPAP, f/u post op CXR    Activity-bedrest      CARDS:     s/p left CEA-maintain -150    hx of Afib-currently holding Xarelto    hx of CAD w/ stents-holding plavix    Imaging: Echo 10/2019-EF 65 to 65%, normal left ventricular systolic function    Labs: Trop neg x1, f/u 2 more sets    GI/NUTR:     Diet-regular    GI Prophylaxis-PPI    Bowel regimen-senna    /RENAL:     voiding     BUN/Cr- 11/0.7<-- 12/0.6   <----,  17/0.7   <----,  15/0.6   <----     HEME/ONC:     DVT prophylaxis-none as per vascular, discuss 8/26 AM    H/H:  11.7/36 <-- 12.2/39.4   <---,  13.1/41.3   <---,  13.6/43.8   <---    Plts:  232<-- 245  <---,  247  <---,  266  <---,  221  <---    ID:    post op abx-cefazolin 2 more doses     ENDO:    hx of DM-Lantus 80u at home, lispro 18u TID, will discuss restarting based on PO intake    overnight f/s elevated, gave lantus 35U      HA1C 9.9    LINES/DRAINS:  Toña BARRAGAN    DISPO:    SICU will d/w Dr. Maya

## 2020-08-26 NOTE — DISCHARGE NOTE PROVIDER - HOSPITAL COURSE
8/18    The patient is a 50 year-old female with a PMH of CAD w/ 4 stents (on Plavix), atrial fibrillation (on Xarelto), DM, and HTN, presenting for left eye vision loss. On Sunday evening she began to experience a darkening of the inferior thomas-visual field gradually turning complete dark. While ambulating she began feeling like the room was spinning and was nauseous. She went to sleep & the blindness resolved, but stayed blurry and saw floaters. Denied fever or chills, cough, SOB recent trauma, sick contacts, focal weakness of any limb, slurred speech or facial asymmetry. She saw her ophthalmologist and she was referred to the ED for an MRI and neurological eval.. Notably, she reports over the last 2-3 months she has been occasionally forgetting names; prior to this she has had no memory deficits.     In the ED, T 98.3 F, HR 72, /69, RR 18; O2 sat 98%. CT Head negative for acute mass effect, midline shift or hemorrhage. Ophtho was consulted; recommended an MRI of b/l orbits and of the spine w/ and w/o contrast, and will see the patient in the morning.        8/18-19    patient continues with blurry vision, floaters and left sided head sensitivity to touch and headaches. denies n/v, dizziness. NAD        8/19-20    headache on left side with partial vision loss continues but has decreased in severity.        8/20-21    headache resolving, vision loss continues but has not progressed        8/21-22    left sided headache. CT angio showed focal stenosis involving the origin of the R ICA, mod-severe focal stenosis involving the origin the L ICA.        8/22-23    no acute events overnight. no new complaints. hemodynamically stable        8/23-24    NAD, patient status unchanged. preoped and consented for 8/25 8/24-25    carotid endarterectomy with carotid having 80% occlusion. EBL: 80ml.         8/25-26    Patient stable, dressing clean and dry. tolerating PO. BP under control. \        Patient was stable at time of discharge.

## 2020-08-26 NOTE — PHYSICAL THERAPY INITIAL EVALUATION ADULT - PERTINENT HX OF CURRENT PROBLEM, REHAB EVAL
49 yO RHF with a PMH of CAD w/ 4 stents (on Plavix), atrial fibrillation (on Xarelto), DM, and HTN, p.w 3 days of  left eye vision loss. The patient reports that this past Sunday evening after she woke up she experienced sudden loss of vision in the left inferior temporal visual field.  She reports about 90 minutes later, while ambulating, she began feeling like the room was spinning and was nauseous while ambulating

## 2020-08-26 NOTE — PROGRESS NOTE ADULT - SUBJECTIVE AND OBJECTIVE BOX
MARCIAL MCCORMICK  [id number]  50y Female    Indication for ICU admission: s/p L CEA   Admit Date:20  ICU Date:   OR Date:     No Known Allergies    PAST MEDICAL & SURGICAL HISTORY:  Hypertension  DM (diabetes mellitus)  Afib  MI (myocardial infarction): s/p 4 stents (most recent )  H/O  section    Home Medications:  atorvastatin 80 mg oral tablet: 1 tab(s) orally once a day (2018 21:39)  HumaLOG KwikPen 100 units/mL injectable solution: 18 unit(s) injectable 3 times a day (18 Aug 2020 17:40)  Jardiance 25 mg oral tablet: 1 tab(s) orally once a day (in the morning) (07 Oct 2019 20:45)  metFORMIN 1000 mg oral tablet: 1 tab(s) orally 2 times a day (2018 21:39)  PARoxetine 30 mg oral tablet: 1 tab(s) orally once a day (18 Aug 2020 17:43)  pioglitazone 30 mg oral tablet: 1 tab(s) orally once a day (18 Aug 2020 17:44)  Tresiba FlexTouch 100 units/mL subcutaneous solution: 80 unit(s) subcutaneous once a day (18 Aug 2020 17:41)        24HRS EVENT:  Overnight: noted L sided tongue deviation, vascular aware. Vital signs stable, patient without acute issue or complaint. Patient states that Left eye vision loss is stable from pre-op.     NEURO:    s/p left cea-q1 neuro checks    Acute pain-controlled with apap, oxy, dilaudid PRN    hx of anxiety-restarted paxil    RESP:     Oxygen insufficiency-wean off NC to RA as tolerate    hx of JOCELYNE-non compliant with CPAP, f/u post op CXR    Activity-bedrest      CARDS:     s/p left CEA-maintain -150    hx of Afib-currently holding Xarelto    hx of CAD w/ stents-holding plavix    Imaging: Echo 10/2019-EF 65 to 65%, normal left ventricular systolic function    Labs: Trop neg x2, f/u 1 more sets    GI/NUTR:     Diet-regular    GI Prophylaxis-PPI    Bowel regimen-senna    /RENAL:     voided    BUN/Cr- 12/0.6   <----,  17/0.7   <----,  15/0.6   <----     HEME/ONC:     DVT prophylaxis-none as per vascular, discuss  AM    H/H:  12.2/39.4   <---,  13.1/41.3   <---,  13.6/43.8   <---    Plts:  245  <---,  247  <---,  266  <---,  221  <---    ID:    post op abx-cefazolin 2 more doses     ENDO:    hx of DM-Lantus 80u at home, lispro 18u TID, will discuss restarting based on PO intake      HA1C 9.9    LINES/DRAINS:  PIVToña    DISPO:    SICU will d/w Dr. Maya        DVT Prophylaxis: aspirin enteric coated 81 milliGRAM(s) Oral daily      GI Prophylaxis:pantoprazole    Tablet 40 milliGRAM(s) Oral before breakfast  senna 2 Tablet(s) Oral at bedtime      ***Tubes/Lines/Drains  ***  Peripheral IV  Arterial Line		                  Central Line                            Urinary Catheter		Indication: Strict I&O          [X] A ten-point review of systems was otherwise negative except as noted above.  [  ] Due to altered mental status/intubation, subjective information was not attained from the patient. History was obtained, to the extent possible, from review of the chart and collateral sources of information.

## 2020-08-26 NOTE — PROGRESS NOTE ADULT - PROVIDER SPECIALTY LIST ADULT
Hospitalist
Internal Medicine
SICU
SICU
Vascular Surgery
Internal Medicine
Hospitalist

## 2020-08-26 NOTE — DISCHARGE NOTE PROVIDER - CARE PROVIDER_API CALL
Alfredo Underwood  SURGERY  [address] [place] [address]  Phone: [phone]  Fax: [phone]  Follow Up Time:

## 2020-08-27 PROBLEM — I10 ESSENTIAL (PRIMARY) HYPERTENSION: Chronic | Status: ACTIVE | Noted: 2020-08-18

## 2020-08-27 LAB — SURGICAL PATHOLOGY STUDY: SIGNIFICANT CHANGE UP

## 2020-09-05 ENCOUNTER — INPATIENT (INPATIENT)
Facility: HOSPITAL | Age: 50
LOS: 0 days | Discharge: HOME | End: 2020-09-06
Attending: INTERNAL MEDICINE | Admitting: INTERNAL MEDICINE
Payer: MEDICAID

## 2020-09-05 VITALS
TEMPERATURE: 98 F | SYSTOLIC BLOOD PRESSURE: 124 MMHG | DIASTOLIC BLOOD PRESSURE: 60 MMHG | OXYGEN SATURATION: 97 % | HEART RATE: 66 BPM | RESPIRATION RATE: 16 BRPM

## 2020-09-05 DIAGNOSIS — Z98.891 HISTORY OF UTERINE SCAR FROM PREVIOUS SURGERY: Chronic | ICD-10-CM

## 2020-09-05 DIAGNOSIS — Z98.890 OTHER SPECIFIED POSTPROCEDURAL STATES: Chronic | ICD-10-CM

## 2020-09-05 DIAGNOSIS — Z90.49 ACQUIRED ABSENCE OF OTHER SPECIFIED PARTS OF DIGESTIVE TRACT: Chronic | ICD-10-CM

## 2020-09-05 LAB
ALBUMIN SERPL ELPH-MCNC: 4.3 G/DL — SIGNIFICANT CHANGE UP (ref 3.5–5.2)
ALP SERPL-CCNC: 104 U/L — SIGNIFICANT CHANGE UP (ref 30–115)
ALT FLD-CCNC: 26 U/L — SIGNIFICANT CHANGE UP (ref 0–41)
ANION GAP SERPL CALC-SCNC: 12 MMOL/L — SIGNIFICANT CHANGE UP (ref 7–14)
APTT BLD: 39.2 SEC — SIGNIFICANT CHANGE UP (ref 27–39.2)
AST SERPL-CCNC: 27 U/L — SIGNIFICANT CHANGE UP (ref 0–41)
BASOPHILS # BLD AUTO: 0.06 K/UL — SIGNIFICANT CHANGE UP (ref 0–0.2)
BASOPHILS NFR BLD AUTO: 0.9 % — SIGNIFICANT CHANGE UP (ref 0–1)
BILIRUB SERPL-MCNC: 0.5 MG/DL — SIGNIFICANT CHANGE UP (ref 0.2–1.2)
BLD GP AB SCN SERPL QL: SIGNIFICANT CHANGE UP
BUN SERPL-MCNC: <3 MG/DL — LOW (ref 10–20)
CALCIUM SERPL-MCNC: 9.6 MG/DL — SIGNIFICANT CHANGE UP (ref 8.5–10.1)
CHLORIDE SERPL-SCNC: 98 MMOL/L — SIGNIFICANT CHANGE UP (ref 98–110)
CO2 SERPL-SCNC: 28 MMOL/L — SIGNIFICANT CHANGE UP (ref 17–32)
CREAT SERPL-MCNC: 0.7 MG/DL — SIGNIFICANT CHANGE UP (ref 0.7–1.5)
EOSINOPHIL # BLD AUTO: 0.32 K/UL — SIGNIFICANT CHANGE UP (ref 0–0.7)
EOSINOPHIL NFR BLD AUTO: 5 % — SIGNIFICANT CHANGE UP (ref 0–8)
ERYTHROCYTE [SEDIMENTATION RATE] IN BLOOD: 22 MM/HR — HIGH (ref 0–20)
GLUCOSE BLDC GLUCOMTR-MCNC: 189 MG/DL — HIGH (ref 70–99)
GLUCOSE BLDC GLUCOMTR-MCNC: 206 MG/DL — HIGH (ref 70–99)
GLUCOSE BLDC GLUCOMTR-MCNC: 220 MG/DL — HIGH (ref 70–99)
GLUCOSE SERPL-MCNC: 124 MG/DL — HIGH (ref 70–99)
HCG SERPL-ACNC: 1.5 MIU/ML — SIGNIFICANT CHANGE UP
HCT VFR BLD CALC: 43.9 % — SIGNIFICANT CHANGE UP (ref 37–47)
HGB BLD-MCNC: 13.7 G/DL — SIGNIFICANT CHANGE UP (ref 12–16)
IMM GRANULOCYTES NFR BLD AUTO: 0.3 % — SIGNIFICANT CHANGE UP (ref 0.1–0.3)
INR BLD: 1.15 RATIO — SIGNIFICANT CHANGE UP (ref 0.65–1.3)
LYMPHOCYTES # BLD AUTO: 2.33 K/UL — SIGNIFICANT CHANGE UP (ref 1.2–3.4)
LYMPHOCYTES # BLD AUTO: 36.3 % — SIGNIFICANT CHANGE UP (ref 20.5–51.1)
MCHC RBC-ENTMCNC: 29.8 PG — SIGNIFICANT CHANGE UP (ref 27–31)
MCHC RBC-ENTMCNC: 31.2 G/DL — LOW (ref 32–37)
MCV RBC AUTO: 95.6 FL — SIGNIFICANT CHANGE UP (ref 81–99)
MONOCYTES # BLD AUTO: 0.36 K/UL — SIGNIFICANT CHANGE UP (ref 0.1–0.6)
MONOCYTES NFR BLD AUTO: 5.6 % — SIGNIFICANT CHANGE UP (ref 1.7–9.3)
NEUTROPHILS # BLD AUTO: 3.33 K/UL — SIGNIFICANT CHANGE UP (ref 1.4–6.5)
NEUTROPHILS NFR BLD AUTO: 51.9 % — SIGNIFICANT CHANGE UP (ref 42.2–75.2)
NRBC # BLD: 0 /100 WBCS — SIGNIFICANT CHANGE UP (ref 0–0)
PLATELET # BLD AUTO: 322 K/UL — SIGNIFICANT CHANGE UP (ref 130–400)
POTASSIUM SERPL-MCNC: 4.8 MMOL/L — SIGNIFICANT CHANGE UP (ref 3.5–5)
POTASSIUM SERPL-SCNC: 4.8 MMOL/L — SIGNIFICANT CHANGE UP (ref 3.5–5)
PROT SERPL-MCNC: 7.4 G/DL — SIGNIFICANT CHANGE UP (ref 6–8)
PROTHROM AB SERPL-ACNC: 13.2 SEC — HIGH (ref 9.95–12.87)
RBC # BLD: 4.59 M/UL — SIGNIFICANT CHANGE UP (ref 4.2–5.4)
RBC # FLD: 14 % — SIGNIFICANT CHANGE UP (ref 11.5–14.5)
SARS-COV-2 RNA SPEC QL NAA+PROBE: SIGNIFICANT CHANGE UP
SODIUM SERPL-SCNC: 138 MMOL/L — SIGNIFICANT CHANGE UP (ref 135–146)
TROPONIN T SERPL-MCNC: <0.01 NG/ML — SIGNIFICANT CHANGE UP
WBC # BLD: 6.42 K/UL — SIGNIFICANT CHANGE UP (ref 4.8–10.8)
WBC # FLD AUTO: 6.42 K/UL — SIGNIFICANT CHANGE UP (ref 4.8–10.8)

## 2020-09-05 PROCEDURE — 70496 CT ANGIOGRAPHY HEAD: CPT | Mod: 26

## 2020-09-05 PROCEDURE — 93010 ELECTROCARDIOGRAM REPORT: CPT

## 2020-09-05 PROCEDURE — 70498 CT ANGIOGRAPHY NECK: CPT | Mod: 26

## 2020-09-05 PROCEDURE — 99291 CRITICAL CARE FIRST HOUR: CPT

## 2020-09-05 PROCEDURE — 70450 CT HEAD/BRAIN W/O DYE: CPT | Mod: 26,59

## 2020-09-05 PROCEDURE — 71045 X-RAY EXAM CHEST 1 VIEW: CPT | Mod: 26

## 2020-09-05 PROCEDURE — 99223 1ST HOSP IP/OBS HIGH 75: CPT

## 2020-09-05 PROCEDURE — 70551 MRI BRAIN STEM W/O DYE: CPT | Mod: 26

## 2020-09-05 PROCEDURE — 99283 EMERGENCY DEPT VISIT LOW MDM: CPT

## 2020-09-05 RX ORDER — ENOXAPARIN SODIUM 100 MG/ML
120 INJECTION SUBCUTANEOUS EVERY 12 HOURS
Refills: 0 | Status: DISCONTINUED | OUTPATIENT
Start: 2020-09-05 | End: 2020-09-05

## 2020-09-05 RX ORDER — ATORVASTATIN CALCIUM 80 MG/1
80 TABLET, FILM COATED ORAL AT BEDTIME
Refills: 0 | Status: DISCONTINUED | OUTPATIENT
Start: 2020-09-05 | End: 2020-09-06

## 2020-09-05 RX ORDER — ENOXAPARIN SODIUM 100 MG/ML
130 INJECTION SUBCUTANEOUS EVERY 12 HOURS
Refills: 0 | Status: DISCONTINUED | OUTPATIENT
Start: 2020-09-05 | End: 2020-09-05

## 2020-09-05 RX ORDER — SODIUM CHLORIDE 9 MG/ML
1000 INJECTION, SOLUTION INTRAVENOUS
Refills: 0 | Status: DISCONTINUED | OUTPATIENT
Start: 2020-09-05 | End: 2020-09-06

## 2020-09-05 RX ORDER — DEXTROSE 50 % IN WATER 50 %
12.5 SYRINGE (ML) INTRAVENOUS ONCE
Refills: 0 | Status: DISCONTINUED | OUTPATIENT
Start: 2020-09-05 | End: 2020-09-06

## 2020-09-05 RX ORDER — INSULIN GLARGINE 100 [IU]/ML
40 INJECTION, SOLUTION SUBCUTANEOUS AT BEDTIME
Refills: 0 | Status: DISCONTINUED | OUTPATIENT
Start: 2020-09-05 | End: 2020-09-06

## 2020-09-05 RX ORDER — DEXTROSE 50 % IN WATER 50 %
25 SYRINGE (ML) INTRAVENOUS ONCE
Refills: 0 | Status: DISCONTINUED | OUTPATIENT
Start: 2020-09-05 | End: 2020-09-06

## 2020-09-05 RX ORDER — INSULIN LISPRO 100/ML
10 VIAL (ML) SUBCUTANEOUS
Refills: 0 | Status: DISCONTINUED | OUTPATIENT
Start: 2020-09-05 | End: 2020-09-06

## 2020-09-05 RX ORDER — METOPROLOL TARTRATE 50 MG
50 TABLET ORAL DAILY
Refills: 0 | Status: DISCONTINUED | OUTPATIENT
Start: 2020-09-05 | End: 2020-09-05

## 2020-09-05 RX ORDER — CLOPIDOGREL BISULFATE 75 MG/1
75 TABLET, FILM COATED ORAL DAILY
Refills: 0 | Status: DISCONTINUED | OUTPATIENT
Start: 2020-09-05 | End: 2020-09-06

## 2020-09-05 RX ORDER — DEXTROSE 50 % IN WATER 50 %
15 SYRINGE (ML) INTRAVENOUS ONCE
Refills: 0 | Status: DISCONTINUED | OUTPATIENT
Start: 2020-09-05 | End: 2020-09-06

## 2020-09-05 RX ORDER — GLUCAGON INJECTION, SOLUTION 0.5 MG/.1ML
1 INJECTION, SOLUTION SUBCUTANEOUS ONCE
Refills: 0 | Status: DISCONTINUED | OUTPATIENT
Start: 2020-09-05 | End: 2020-09-06

## 2020-09-05 RX ORDER — INSULIN LISPRO 100/ML
VIAL (ML) SUBCUTANEOUS
Refills: 0 | Status: DISCONTINUED | OUTPATIENT
Start: 2020-09-05 | End: 2020-09-06

## 2020-09-05 RX ORDER — METOPROLOL TARTRATE 50 MG
100 TABLET ORAL DAILY
Refills: 0 | Status: DISCONTINUED | OUTPATIENT
Start: 2020-09-05 | End: 2020-09-06

## 2020-09-05 RX ADMIN — Medication 100 MILLIGRAM(S): at 17:42

## 2020-09-05 RX ADMIN — INSULIN GLARGINE 40 UNIT(S): 100 INJECTION, SOLUTION SUBCUTANEOUS at 22:41

## 2020-09-05 RX ADMIN — ATORVASTATIN CALCIUM 80 MILLIGRAM(S): 80 TABLET, FILM COATED ORAL at 22:23

## 2020-09-05 RX ADMIN — Medication 10 UNIT(S): at 17:44

## 2020-09-05 RX ADMIN — Medication 30 MILLIGRAM(S): at 17:43

## 2020-09-05 RX ADMIN — Medication 4: at 17:44

## 2020-09-05 RX ADMIN — Medication 2 MILLIGRAM(S): at 19:36

## 2020-09-05 RX ADMIN — CLOPIDOGREL BISULFATE 75 MILLIGRAM(S): 75 TABLET, FILM COATED ORAL at 17:42

## 2020-09-05 NOTE — OCCUPATIONAL THERAPY INITIAL EVALUATION ADULT - SPECIFY REASON(S)
OT attempted to see pt in PM for IE, however pt currently without activity orders and unable to be seen at this time. OT will f/u with pt when appropriate.

## 2020-09-05 NOTE — CONSULT NOTE ADULT - ASSESSMENT
13. Impression:  49 yo F with PMHx of afib on xarelto, DM, HTN, MI s/p PCI x4 on ASA/plavix, TIA, bilateral carotid stenosis s/p L endarterectomy who presents with acute onset of constant vision loss in superior field of L eye which started at 12am today with mild headache. Underwent L carotid endarterectomy 8/25 (Dr. Underwood) for similar presentation, also found to have severe stenosis of R ICA but asymptomatic on that side. Didnt get MRI last time for anxiety.     14. Probable cause/s of Stroke:  May be due to thromboembolic or embolic stroke.     15. Suggestions:   Routine stroke workup including:  CTH   CTA head and neck  MRI brain without chuy(will need sedation)  TTE  LDL A1C  Telemetry monitoring  PT OT Rehab      16. Disposition:  Stroke unit    Chicho Rivera NP  [phone] 49 yo F with PMHx of afib on xarelto, DM, HTN, MI s/p PCI x4 on ASA/plavix, TIA, bilateral carotid stenosis s/p L endarterectomy who presents with worsening vision loss in superior field of L eye which and left sided mild headache. Underwent L carotid endarterectomy 8/25 (Dr. Underwood) for similar presentation, also found to have severe stenosis of R ICA but asymptomatic on that side. Didnt get MRI last time for anxiety.     Differentials include left branched retinal artery occlusion versus PCA or MCA ischemia     CTA head and neck showed interval left carotid endarterectomy with resolution of previously seen moderate to severe focal stenosis at the origin of the left ICA and unchanged severe focal stenosis involving the origin of the right ICA.  MRI brain without chuy(will need sedation)  TTE  LDL A1C  Telemetry monitoring  PT OT Rehab      16. Disposition:  Stroke unit    Chicho Rivera NP  [phone]

## 2020-09-05 NOTE — ED PROVIDER NOTE - PMH
Afib    DM (diabetes mellitus)    Hypertension    MI (myocardial infarction)  s/p 4 stents (most recent 2018)  TIA (transient ischemic attack) Afib    Carotid stenosis, bilateral    DM (diabetes mellitus)    Hypertension    MI (myocardial infarction)  s/p 4 stents (most recent 2018)  TIA (transient ischemic attack)

## 2020-09-05 NOTE — ED PROVIDER NOTE - ATTENDING CONTRIBUTION TO CARE
50 year old female, pmhx as documented, presenting with acute vision loss in the superior field of the left eye which started 12am last night. Patient states this is the same exact symptom as she had prior to requiring the carotid endarterectomy. She called Dr. Underwood who requested she come to the ER for evaluation. Endorses a mild headache as well, described as achy, non-radiating, no palliative or provocative factors, mild severity. Otherwise denies fevers, eye pain, pain with EOM, N/V, weakness/numbness on either side of her body, or any other symptoms.    Vital Signs: I have reviewed the initial vital signs.  Constitutional: NAD, well-nourished, appears stated age, no acute distress.  HEENT: Airway patent, moist MM, no erythema/swelling/deformity of oral structures. EOMI, PERRLA. (+) well healed surgical scar to left neck  CV: regular rate, regular rhythm, well-perfused extremities, 2+ b/l DP and radial pulses equal.  Lungs: BCTA, no increased WOB.  ABD: NTND, no guarding or rebound, no pulsatile mass, no hernias.   MSK: Neck supple, nontender, nl ROM, no stepoff. Chest nontender. Back nontender in TLS spine or to b/l bony structures or flanks. Ext nontender, nl rom, no deformity.   INTEG: Skin warm, dry, no rash.  NEURO: A&Ox3, normal strength, nl sensation throughout, normal speech.   PSYCH: Calm, cooperative, normal affect and interaction.    Neurovascularly intact at this time aside from decreased vision in left eye. Will obtain labs, CTA head/neck, consult vascular, neuro, ophtho, re-eval.

## 2020-09-05 NOTE — H&P ADULT - ATTENDING COMMENTS
50 yr old female with pmhx of afib on xarelto, DM, HTN, MI s/p PCIx4 on ASA/plavix, TIA, bilateral carotid stenosis s/p left endarterectomy presenting for acute left eye partial vision loss. As per patient, she lost upper quadrant vison loss of the left eye since midnight last night associated with intermittent left sided headaches.     # Left eye vision loss / Hx of TIA  - likely secondary to Retinal artery occlusion secondary to suspected Afib   - exam findings significant for retinal ischemia  - opthalmology consult appreciated: no acute intervention; fluorescein angiography in [facility] ophthalmology clinic as an outpatient (Tuesday morning at 8:30am; [phone]; [address])  - CTH showed possible small lacunar infarct  - CTA showed resolution of previously seen severe focal stenosis of the left ICA (s/p left endarterectomy) with unchanged severe stenosis of the right ICA  - f/u MR brain; hyper coag profile   - neuro checks q4  - c/w ASA and statin  - check Lipid panel, ECHO (bubble study)  - OT   - vascular following: no intervention for now   - neurology consult     # Paroxysmal Afib  - rate controlled  - Ekg: NSR  - CHADsVASC 6  - c/w metoprolol  - hold Eliquis for now: restart if ok with neurology    # CAD s/p PCI  - stable, denies any chest pain, sob  - c/w ASA, metoprolol and atorvastatin   - Plavix on hold: restart if ok with neurology     # Hx of HTN  - controlled   - c/w metoprolol    # DM  - HbA1C 7.1 in 8/2020  - c/w sliding scale insulin    # Full Code 50 yr old female with pmhx of afib on xarelto, DM, HTN, MI s/p PCIx4 on ASA/plavix, TIA, bilateral carotid stenosis s/p left endarterectomy presenting for acute left eye partial vision loss. As per patient, she lost upper quadrant vison loss of the left eye since midnight last night associated with intermittent left sided headaches.     # Left eye vision loss / Hx of TIA  - likely secondary to Retinal artery occlusion secondary to suspected Afib   - exam findings significant for retinal ischemia  - opthalmology consult appreciated: no acute intervention; fluorescein angiography in [facility] ophthalmology clinic as an outpatient (Tuesday morning at 8:30am; [phone]; [address])  - CTH showed possible small lacunar infarct  - CTA showed resolution of previously seen severe focal stenosis of the left ICA (s/p left endarterectomy) with unchanged severe stenosis of the right ICA  - f/u MR brain; hyper coag profile   - neuro checks q4  - c/w DAPT and statin  - check Lipid panel, ECHO (bubble study)  - OT   - vascular following: no intervention for now   - neurology consult     # Paroxysmal Afib  - rate controlled  - Ekg: NSR  - CHADsVASC 6  - c/w metoprolol  - hold Eliquis for now: restart if ok with neurology    # CAD s/p PCI  - stable, denies any chest pain, sob  - c/w ASA, Plavix, metoprolol and atorvastatin     # Hx of HTN  - controlled   - c/w metoprolol    # DM  - HbA1C 7.1 in 8/2020  - c/w sliding scale insulin    # Full Code Patient seen and examined at bedside. Patient upset about her vision loss. Crying and anxious.  at bedside.   Agree with the assessment and plan above. Changes made to HPI as needed.   Please note the changes below.    50 yr old female with pmhx of afib on xarelto, DM, HTN, MI s/p PCIx4 on plavix, TIA, bilateral carotid stenosis s/p left endarterectomy presenting for acute left eye partial vision loss. As per patient, she lost upper quadrant vison loss of the left eye since midnight last night associated with intermittent left sided headaches.     # Left eye vision loss / Hx of TIA  - likely secondary to Retinal artery occlusion secondary to suspected Afib   - vision loss progressing as per pt, initially left lower quad and now also right upper quad  - exam findings significant for retinal ischemia  - opthalmology consult appreciated: no acute intervention; fluorescein angiography in [facility] ophthalmology clinic as an outpatient (Tuesday morning at 8:30am; [phone]; [address])  - CTH showed possible small lacunar infarct  - CTA showed resolution of previously seen severe focal stenosis of the left ICA (s/p left endarterectomy) with unchanged severe stenosis of the right ICA  - f/u MR brain; hyper coag profile   - neuro checks q4  - c/w Plavix and statin  - check Lipid panel, ECHO (bubble study)  - OT   - vascular following: no intervention for now   - neurology consult     # Paroxysmal Afib  - rate controlled  - Ekg: NSR  - CHADsVASC 6  - c/w metoprolol  - hold Eliquis for now: restart if ok with neurology    # CAD s/p PCI  - stable, denies any chest pain, sob  - c/w Plavix, metoprolol and atorvastatin     # Hx of HTN  - controlled   - c/w metoprolol    # DM  - HbA1C 7.1 in 8/2020  - c/w sliding scale insulin    # Full Code

## 2020-09-05 NOTE — CONSULT NOTE ADULT - ASSESSMENT
---------------------------------------------------------------------------------------    ASSESSMENT:   50y Female  HPI:  50 year-old female with a PMH of CAD w/ 4 stents (on Plavix), atrial fibrillation (on Xarelto), DM, and HTN, TIA s/p Left CEA comes in with c/c of visual spots. CTA head and neck shows resolution of previously seen moderate to severe focal stenosis at the origin of the left ICA with interval widening of the left carotid bulb. Surgery unlikely cause of symptoms.    PLAN:   - Admit to medicine  - Neuro workup  - Opthalmology workup  - Vascular will continue to follow     --------------------------------------------------------------------------------------    09-05-20 @ 15:26 ---------------------------------------------------------------------------------------    ASSESSMENT:   50y Female  HPI:  50 year-old female with a PMH of CAD w/ 4 stents (on Plavix), atrial fibrillation (on Xarelto), DM, and HTN, TIA s/p Left CEA comes in with c/c of visual spots. CTA head and neck shows resolution of previously seen moderate to severe focal stenosis at the origin of the left ICA with interval widening of the left carotid bulb. Surgery unlikely cause of symptoms.    PLAN:   - Neurology workup  - Opthalmology workup  - Vascular will continue to follow     --------------------------------------------------------------------------------------    09-05-20 @ 15:26

## 2020-09-05 NOTE — CONSULT NOTE ADULT - SUBJECTIVE AND OBJECTIVE BOX
VASCULAR SURGERY CONSULT NOTE      HPI:  50 year-old female with a PMH of CAD w/ 4 stents (on Plavix), atrial fibrillation (on Xarelto), DM, and HTN, TIA, presenting for left eye vision loss. Around 12 AM last night patient had sudden onset of vision loss in the left eye in the superior quadrants associated with headache. The patient had similar symptoms 2 weeks ago for which she was hospitalized. Work up showed focal stenosis of the R ICA at the origin and moderate to severe stenosis involving the origin of the left ICA. Left Carotid endarterectomy was performed on  with no complications. Patient said that her vision has improved since last hospitalization but did not return to baseline. She denied loss of consciousness, ophthalmoplegia, limb weakness or paresthesia, dysarthria, facial asymmetry, diplopia, dizziness, dysphagia, nausea, vomiting, fevers, chills or other associated symptoms.    Pt now complains of spots in her left eye vision at the upper right corner. She states it started last night and has not gotten worse or resolved since then. Denies weakness, loss of sensation, fevers, chills rigors. No complains of erythema, pain, or swelling per incision site.      PAST MEDICAL & SURGICAL HISTORY:  Carotid stenosis, bilateral  TIA (transient ischemic attack)  Hypertension  DM (diabetes mellitus)  Afib  MI (myocardial infarction): s/p 4 stents (most recent )  H/O hernia repair  H/O endarterectomy  History of cholecystectomy  H/O  section    No Known Allergies    Home Medications:  atorvastatin 80 mg oral tablet: 1 tab(s) orally once a day (05 Sep 2020 14:18)  HumaLOG KwikPen 100 units/mL injectable solution: 18 unit(s) injectable 3 times a day (05 Sep 2020 14:18)  Jardiance 25 mg oral tablet: 1 tab(s) orally once a day (in the morning) (05 Sep 2020 14:18)  metFORMIN 1000 mg oral tablet: 1 tab(s) orally 2 times a day (05 Sep 2020 14:18)  PARoxetine 30 mg oral tablet: 1 tab(s) orally once a day (05 Sep 2020 14:18)  pioglitazone 30 mg oral tablet: 1 tab(s) orally once a day (05 Sep 2020 14:18)  Tresiba FlexTouch 100 units/mL subcutaneous solution: 80 unit(s) subcutaneous once a day (05 Sep 2020 14:18)    No permtinent family history of PVD    REVIEW OF SYSTEMS:  GENERAL:                                         negative  SKIN:                                                 negative  OPTHALMOLOGIC:                          negative  ENMT:                                               negative  RESPIRATORY AND THORAX:        negative  CARDIOVASCULAR:                         negative  GASTROINTESTINAL:                       negative  NEPHROLOGY:                                  negative  MUSCULOSKELETAL:                       negative  NEUROLOGIC:                                   negative  PSYCHIATRIC:                                    negative  HEMATOLOGY/LYMPHATICS:         negative  ENDOCRINE:                                     negative  ALLERGIC/IMMUNOLOGIC:            negative    12 point ROS otherwise normal except as stated in HPI    PHYSICAL EXAM  Vital Signs Last 24 Hrs  T(C): 36.8 (05 Sep 2020 07:04), Max: 36.8 (05 Sep 2020 07:04)  T(F): 98.2 (05 Sep 2020 07:04), Max: 98.2 (05 Sep 2020 07:04)  HR: 66 (05 Sep 2020 07:04) (66 - 66)  BP: 124/60 (05 Sep 2020 07:04) (124/60 - 124/60)  BP(mean): --  RR: 16 (05 Sep 2020 07:04) (16 - 16)  SpO2: 97% (05 Sep 2020 07:04) (97% - 97%)    Appearance: Normal	  HEENT: Incision site is clean, no erythema, tenderness, discharge.  Cardiovascular: Normal S1 S2, No JVD, No murmurs,   Respiratory: Lungs clear to auscultation, No Rales, Rhonchi, Wheezing	  Gastrointestinal:  Soft, Non-tender, positive BS	  Skin: No rashes, No ecchymoses, No cyanosis  Extremities: Normal range of motion, sensations intact  Vascular: Peripheral pulses palpable 2+ bilaterally  Neurologic: CN: Slight residual tongue deviation that has been present since the surgery. facial smile symmetrical.  Psychiatry: A & O x 3, Mood & affect appropriate      MEDICATIONS:   MEDICATIONS  (STANDING):  atorvastatin 80 milliGRAM(s) Oral at bedtime  clopidogrel Tablet 75 milliGRAM(s) Oral daily  dextrose 5%. 1000 milliLiter(s) (50 mL/Hr) IV Continuous <Continuous>  dextrose 50% Injectable 12.5 Gram(s) IV Push once  dextrose 50% Injectable 25 Gram(s) IV Push once  dextrose 50% Injectable 25 Gram(s) IV Push once  enoxaparin Injectable 120 milliGRAM(s) SubCutaneous every 12 hours  insulin glargine Injectable (LANTUS) 40 Unit(s) SubCutaneous at bedtime  insulin lispro (HumaLOG) corrective regimen sliding scale   SubCutaneous three times a day before meals  insulin lispro Injectable (HumaLOG) 10 Unit(s) SubCutaneous three times a day before meals  LORazepam   Injectable 2 milliGRAM(s) IV Push once  metoprolol succinate  milliGRAM(s) Oral daily  metoprolol succinate ER 50 milliGRAM(s) Oral daily  PARoxetine 30 milliGRAM(s) Oral daily    MEDICATIONS  (PRN):  dextrose 40% Gel 15 Gram(s) Oral once PRN Blood Glucose LESS THAN 70 milliGRAM(s)/deciliter  glucagon  Injectable 1 milliGRAM(s) IntraMuscular once PRN Glucose LESS THAN 70 milligrams/deciliter      LAB/STUDIES:                        13.7   6.42  )-----------( 322      ( 05 Sep 2020 07:29 )             43.9     09-    138  |  98  |  <3<L>  ----------------------------<  124<H>  4.8   |  28  |  0.7    Ca    9.6      05 Sep 2020 07:29    TPro  7.4  /  Alb  4.3  /  TBili  0.5  /  DBili  x   /  AST  27  /  ALT  26  /  AlkPhos  104  09-05    PT/INR - ( 05 Sep 2020 07:29 )   PT: 13.20 sec;   INR: 1.15 ratio         PTT - ( 05 Sep 2020 07:29 )  PTT:39.2 sec  LIVER FUNCTIONS - ( 05 Sep 2020 07:29 )  Alb: 4.3 g/dL / Pro: 7.4 g/dL / ALK PHOS: 104 U/L / ALT: 26 U/L / AST: 27 U/L / GGT: x               CARDIAC MARKERS ( 05 Sep 2020 07:29 )  x     / <0.01 ng/mL / x     / x     / x                    IMAGING:  < from: CT Angio Head w/ IV Cont (20 @ 10:55) >  IMPRESSION:    Interval left carotid endarterectomy withresolution of previously seen moderate to severe focal stenosis at the origin of the left ICA. There is interval widening of the left carotid bulb. Expected surrounding postoperative changes.    Unchanged severe focal stenosis involving the origin ofthe right ICA.    _____________  NASCET criteria for internal carotid artery stenosis:  Mild: 0% to 49%  Moderate: 50% to 69%  Severe: 70% to 99%  Complete Occlusion    < end of copied text >

## 2020-09-05 NOTE — CONSULT NOTE ADULT - ATTENDING COMMENTS
I have personally seen and examined this patient in the ED I have fully participated in the care of this patient.  I have reviewed all pertinent clinical information, including history, physical exam, plan and note.  Patient reports worsening left eye visual field deficit in the superior quadrant. Dilated eye exam showed large CWS temp to disc and retinal ischemia along branch of inferior arcade and into the macula OS suspected for branch retinal artery occlusion. Recommend admission for Brain MRI to r/o CNS infarct.    I have reviewed all pertinent clinical information and reviewed all relevant imaging and diagnostic studies personally.  Recommendations as above.  Agree with above assessment except as noted.

## 2020-09-05 NOTE — ED PROVIDER NOTE - PHYSICAL EXAMINATION
English CONSTITUTIONAL: well developed, well nourished, in no acute distress, speaking in full sentences, nontoxic appearing  SKIN: warm, dry, no rash  HEAD: normocephalic, atraumatic  EYES: PERRL at 3mm, EOMI, no conjunctival erythema, no nystagmus, decreased visual acuity superior field of L eye  ENT: patent airway, moist mucous membranes, + tongue deviation to L (baseline since endarterectomy per pt)  NECK: supple, no masses, full flexion/extension without pain  CV:  irregularly irregular, 2+ radial pulses bilaterally  RESP: no wheezes, no rales, no rhonchi, normal work of breathing  ABD: soft, nontender, nondistended, no rebound, no guarding  MSK: normal ROM, no cyanosis, no edema  NEURO: alert, oriented, CN 2-12 grossly intact, sensation intact to light touch symmetrically, 5/5 motor strength in all extremities, normal finger to nose, no pronator drift, no facial asymmetry, normal gait  PSYCH: cooperative, appropriate

## 2020-09-05 NOTE — ED PROVIDER NOTE - NS ED ROS FT
GEN:  no fever, no chills, no generalized weakness  NEURO:  + headache, no dizziness  EYES: + vision change  CV:  no chest pain, no palpitations  RESP:  no sob, no cough  GI:  no nausea, no vomiting, no abdominal pain, no diarrhea  :  no dysuria, no urinary frequency, no hematuria  MSK:  no joint pain, no edema  SKIN:  no rash, no bruising  HEME: no hematochezia, no melena

## 2020-09-05 NOTE — ED PROVIDER NOTE - PROGRESS NOTE DETAILS
TC: SPoke with vascular TC: Spoke with vascular Dr. Hanson who will see pt. TC: 49 yo F with PMHx of afib on xarelto, DM, HTN, MI s/p PCI x4 on ASA/plavix, TIA, bilateral carotid stenosis s/p L endarterectomy on 8/25 who presents with vision loss of L eye superior field since 12am. No other neuro sx. Here in ED, vitals wnl. Has chronic L tongue deviation since surgery, otherwise no other focal neuro deficits. Given NIHSS 1 in setting of known carotid stenosis with recent surg, stroke code not activated. Ordered labs, ekg, cxr, CT, CTA. Spoke with vascular Dr. Hanson who will see pt. TC: Labs wnl. Pending CT, CTA. Called by vascular who saw pt, requested neuro and ophtho c/s. TC: Labs wnl. Pending CT, CTA. Called by vascular who saw pt, requested neuro and ophtho c/s. Placed neuro c/s. Spoke with ophtho Dr. Valero who will see pt. TEODORA: Case endorsed to Dr. Sanders pending CTAs, final recs from vascular, ophtho and neuro, re-eval, dispo. NIH unchanged. sp optho eval. dw vascular, after review of imaging, sx unlikely related to procedure, can admit to medicine. sp neuro eval, will admit to stroke unit

## 2020-09-05 NOTE — H&P ADULT - NSHPLABSRESULTS_GEN_ALL_CORE
13.7   6.42  )-----------( 322      ( 05 Sep 2020 07:29 )             43.9     09-05    138  |  98  |  <3<L>  ----------------------------<  124<H>  4.8   |  28  |  0.7    Ca    9.6      05 Sep 2020 07:29    TPro  7.4  /  Alb  4.3  /  TBili  0.5  /  DBili  x   /  AST  27  /  ALT  26  /  AlkPhos  104  09-05    < from: CT Head No Cont (09.05.20 @ 10:55) >    IMPRESSION:    Subtle focal hypodensity in the right paramedian odilia, not appreciated on the prior CT from 8/18/2020, could reflect a small lacunar infarct versus artifact.    No acute intracranial hemorrhage, extra-axial fluid collection, or hydrocephalus.    < end of copied text >      < from: CT Angio Neck w/ IV Cont (09.05.20 @ 10:55) >    IMPRESSION:    Interval left carotid endarterectomy withresolution of previously seen moderate to severe focal stenosis at the origin of the left ICA. There is interval widening of the left carotid bulb. Expected surrounding postoperative changes.    Unchanged severe focal stenosis involving the origin ofthe right ICA.    < end of copied text >

## 2020-09-05 NOTE — CONSULT NOTE ADULT - SUBJECTIVE AND OBJECTIVE BOX
51 yo F with PMHx of afib on xarelto, DM, HTN, MI s/p PCI x4 on ASA/plavix, TIA, bilateral carotid stenosis s/p endarterectomy who presents with acute onset of constant vision loss in superior field of L eye which started at 12am today with mild headache. No alleviating/aggravating factors. No fever, chills, slurred speech, difficulty swallowing/walking, unilateral weakness, numbness, tingling, cp, sob, recent head trauma. Underwent L carotid endarterectomy 8/25 (Dr. Underwood) for similar presentation, also found to have severe stenosis of R ICA but asymptomatic on that side.    Visual Acuity:  IOP ([facility]):   Pupils:    EOM  VF on confrontation    L/L  C/S  K  AC  Lens  Vit    DFE  nerves  macula  vessels  periphery      Labs:  Plt 322    Imaging: HPI    51yo F with hx afib on xarelto, DM, HTN, MI s/p PCIx4 on ASA/plavix, TIA, bilateral carotid stenosis s/p left endarterectomy presenting with acute onset vision loss in superior field OS. Started at midnight last night and has been persistent. She had a similar presentation a few weeks prior, and underwent L CEA 8/25 after which the vision loss had largely resolved. Aside from the sudden vision loss, pt says she has had a left sided headache, left sinus pain, and generalized fatigue. Denies fever, weight loss, proximal muscle pain, numbness. She is compliant with all her medications.     Ocular hx: none  Gtts: none  Fam hx: father with glaucoma  Social hx: former smoker, no etoh    Exam:    Visual Acuity: 20/30 PH 20/25 OD, 20/100 OS on eccentric gaze  IOP ([facility]): 10/10  Pupils: PERRLA 5 -->3 OU, no APD    EOMs: full  VF on confrontation: full OD, superior field defect OS    Penlight exam:  L/L: normal  C/S: white and quiet  K: clear  AC: formed  Lens: clear    DFE  nerves: 0.2/0.2, pink, symmetric, no edema  macula: flat OU, large cotton wool spot temporal to disc OS, area of whitening/ischemia inferiorly OS  vessels: tortuous OU  periphery: normal without heme, holes, tears OU    Labs:  Plt 322    Imaging:   CT head: unremarkable    CTA head/neck:  IMPRESSION (preliminary):  In comparison with CT angiogram of head and neck dated 8/20/2020,  Post left carotid endarterectomy with resolution of previously seen moderate to severe focal stenosis at the left carotid bulb.  Unchanged severe focal stenosis (70-99%) involving the origin of the right ICA.

## 2020-09-05 NOTE — H&P ADULT - NSICDXPASTMEDICALHX_GEN_ALL_CORE_FT
PAST MEDICAL HISTORY:  Afib     Carotid stenosis, bilateral     DM (diabetes mellitus)     Hypertension     MI (myocardial infarction) s/p 4 stents (most recent 2018)    TIA (transient ischemic attack)

## 2020-09-05 NOTE — ED PROVIDER NOTE - CLINICAL SUMMARY MEDICAL DECISION MAKING FREE TEXT BOX
pt presenting with recent CEA presenting with L vision loss/hemianopsia. no other deficits. NIH1. labs ekg imaging reviewed. NIH unchanged. sp optho eval. dw vascular, after review of imaging, sx unlikely related to procedure, can admit to medicine. sp neuro eval, will admit to stroke unit

## 2020-09-05 NOTE — ED ADULT NURSE REASSESSMENT NOTE - NS ED NURSE REASSESS COMMENT FT1
RN received report by previous RN. Pt stable at this time, spouse at bedside, will continue to monitor.

## 2020-09-05 NOTE — ED PROVIDER NOTE - OBJECTIVE STATEMENT
51 yo F with PMHx of afib on xarelto, DM, HTN, MI s/p PCI x4 on ASA/plavix, TIA, bilateral carotid stenosis s/p L endarterectomy who presents with acute onset of constant vision loss in superior field of L eye which started at 12am today with mild headache. No alleviating/aggravating factors. No fever, chills, slurred speech, difficulty swallowing/walking, unilateral weakness, numbness, tingling, cp, sob, recent head trauma. Underwent L carotid endarterectomy 8/25 (Dr. Underwood) for similar presentation, also found to have severe stenosis of R ICA but asymptomatic on that side.

## 2020-09-05 NOTE — CONSULT NOTE ADULT - SUBJECTIVE AND OBJECTIVE BOX
Stroke Progress Note:    MARCIAL MCCORMICK    1. Chief Complaint: L superior vision loss    HPI:  51 yo F with PMHx of afib on xarelto, DM, HTN, MI s/p PCI x4 on ASA/plavix, TIA, bilateral carotid stenosis s/p L endarterectomy who presents with acute onset of constant vision loss in superior field of L eye which started at 12am today with mild headache. Underwent L carotid endarterectomy 8/25 (Dr. Underwood) for similar presentation, also found to have severe stenosis of R ICA but asymptomatic on that side.    2. Relevant PMH:   Prior ischemic stroke/TIA[x ], Afib [ x], CAD [ ], HTN [ x], DLD [ ], DM [x ], PVD [ ], Obesity [ ],   Sedentary lifestyle [ ], CHF [ ], JOCELYNE [ ], Cancer Hx [ ].    3. Social History: Smoking [ ], Drug Use [ ], Alcohol Use [ ], Other [ ]    4. Possible Location of Stroke:  Unknown will have a better understanding post stroke workup.   5. Relevant Brain Tissue Imaging:  pending  6. Relevant Cerebrovascular Imaging:          pending      7. Relevant blood tests:    pending  8. Relevant cardiac rhythm monitoring:  pending  9. Relevant Cardiac Structure: (TTE/IBIS +/-):[ ]No intracardiac thrombus/[ ] no vegetation/[ ]no [place]/EF:  pending  Home Medications:  aspirin 81 mg oral delayed release tablet: 1 tab(s) orally once a day (26 Aug 2020 10:04)  atorvastatin 80 mg oral tablet: 1 tab(s) orally once a day (05 Jul 2018 21:39)  HumaLOG KwikPen 100 units/mL injectable solution: 18 unit(s) injectable 3 times a day (18 Aug 2020 17:40)  Jardiance 25 mg oral tablet: 1 tab(s) orally once a day (in the morning) (07 Oct 2019 20:45)  metFORMIN 1000 mg oral tablet: 1 tab(s) orally 2 times a day (05 Jul 2018 21:39)  PARoxetine 30 mg oral tablet: 1 tab(s) orally once a day (18 Aug 2020 17:43)  pioglitazone 30 mg oral tablet: 1 tab(s) orally once a day (18 Aug 2020 17:44)  Tresiba FlexTouch 100 units/mL subcutaneous solution: 80 unit(s) subcutaneous once a day (18 Aug 2020 17:41)      MEDICATIONS  (STANDING):      10. PT/OT/Speech/Rehab/S&Sw/ Cognitive eval results and recommendations:  pending  11. Exam:    Vital Signs Last 24 Hrs  T(C): 36.8 (05 Sep 2020 07:04), Max: 36.8 (05 Sep 2020 07:04)  T(F): 98.2 (05 Sep 2020 07:04), Max: 98.2 (05 Sep 2020 07:04)  HR: 66 (05 Sep 2020 07:04) (66 - 66)  BP: 124/60 (05 Sep 2020 07:04) (124/60 - 124/60)  BP(mean): --  RR: 16 (05 Sep 2020 07:04) (16 - 16)  SpO2: 97% (05 Sep 2020 07:04) (97% - 97%)    12.   NIH STROKE SCALE  Item	                                                        Score  1 a.	Level of Consciousness	               	0  1 b. LOC Questions	                                0  1 c.	LOC Commands	                               	0  2.	Best Gaze	                                        0  3.	Visual	                                             1  4.	Facial Palsy	                                        0  5 a.	Motor Arm - Left	                                0  5 b.	Motor Arm - Right	                        0  6 a.	Motor Leg - Left	                                0  6 b.	Motor Leg - Right	                                0  7.	Limb Ataxia	                                        0  8.	Sensory	                                                0  9.	Language	                                        0  10.	Dysarthria	                                        0  11.	Extinction and Inattention  	        0  ______________________________________  TOTAL	                                                 1  Left tounge deviation    Total NIHSS on admission:   1    mRS:1  0 No symptoms at all  1 No significant disability despite symptoms; able to carry out all usual duties and activities without assistance  2 Slight disability; unable to carry out all previous activities, but able to look after own affairs  3 Moderate disability; requiring some help, but able to walk without assistance  4 Moderately severe disability; unable to walk without assistance and unable to attend to own bodily needs without assistance  5 Severe disability; bedridden, incontinent and requiring constant nursing care and attention  6 Dead

## 2020-09-05 NOTE — H&P ADULT - ASSESSMENT
50 year-old female with a PMH of CAD w/ 4 stents (on Plavix), atrial fibrillation (on Xarelto), DM, and HTN, TIA, presenting for acute left eye vision loss.    # Left eye vision loss  - Retinal artery occlusion vs occipital stroke  - Recent hospitalization for similar symptoms s/p left endarterectomy  - Dilated eye exam showed large CWS temp to disc and retinal ischemia along branch of inferior arcade and into the macula OS  - CTH showed possible small lacunar infarct, does not explain patient's symptoms however  - CTA showed resolution of previously seen severe focal stenosis of the left ICA with unchanged severe stenosis of the right ICA  - MRI brain  - Lipid panel  - hba1c 7.1 in 8/2020  - TTE  - admit to stroke unit  - PT OT rehab  - c/w plavix and statin    # Paroxysmal Afib  - ECG om 8/25 showed NSR  - Rate controlled  - c/w metoprolol  - CHADsVASC 6  - on Xarelto, will switch to therapeutic lovenox. Consider stopping AC if MR showed large stroke    # CAD s/ PCI  - c/w plavix, statin, BB    # DM  - Basal and nutritional insulin  - patient takes 60 u of Tresiba and 18 units of humalog before meals at home  - will start 40 u of lantus and 10 units of lispro  - f/u BS and adjust accordingly    # HTN  - Controlled  - c/w metorprolol    # DVT ppx: Lovenox  # PPI ppx: no need  # DIET: DASH carb consistent  # ACTIVITY: IAT  # DISPO: Stroke unit 50 year-old female with a PMH of CAD w/ 4 stents (on Plavix), atrial fibrillation (on Xarelto), DM, and HTN, TIA, presenting for acute left eye vision loss.    # Left eye vision loss  - Retinal artery occlusion vs occipital stroke  - Recent hospitalization for similar symptoms s/p left endarterectomy  - Dilated eye exam showed large CWS temp to disc and retinal ischemia along branch of inferior arcade and into the macula OS  - CTH showed possible small lacunar infarct, does not explain patient's symptoms however  - CTA showed resolution of previously seen severe focal stenosis of the left ICA with unchanged severe stenosis of the right ICA  - MRI brain  - Lipid panel  - hba1c 7.1 in 8/2020  - TTE  - admit to stroke unit  - PT OT rehab  - c/w plavix and statin    # Paroxysmal Afib  - ECG om 8/25 showed NSR  - Rate controlled  - c/w metoprolol  - CHADsVASC 6  - on Xarelto. Hold anticoagulation pending MRI brain result. Resume if no large ischemic stroke     # CAD s/ PCI  - c/w plavix, statin, BB    # DM  - Basal and nutritional insulin  - patient takes 60 u of Tresiba and 18 units of humalog before meals at home  - will start 40 u of lantus and 10 units of lispro  - f/u BS and adjust accordingly    # HTN  - Controlled  - c/w metorprolol    # DVT ppx: Lovenox  # PPI ppx: no need  # DIET: DASH carb consistent  # ACTIVITY: IAT  # DISPO: Stroke unit

## 2020-09-05 NOTE — H&P ADULT - NSICDXPASTSURGICALHX_GEN_ALL_CORE_FT
PAST SURGICAL HISTORY:  H/O  section     H/O endarterectomy     H/O hernia repair     History of cholecystectomy

## 2020-09-05 NOTE — CONSULT NOTE ADULT - ASSESSMENT
1. Amaurosis fugax OS likely 2/2   recommend ESR CRP  f/u imaging  f/u neurology and vascular recs 1. Superior field defect OS   - exam findings significant for large CWS temp to disc and retinal ischemia along branch of inferior arcade and into the macula OS  - given patient's medical history, similar recent presentation, and findings of bilateral carotid stenosis, etiology of vision loss most likely from to branched retinal artery occlusion   - low suspicion of GCA given patient without fevers, weight loss, and only partial vision loss; exam without optic disc pallor or edema; no thrombocytosis on labs, though can consider ESR, hsCRP for further workup  - f/u further stroke workup per neurology and vascular   - rest of management per primary team  - can undergo fluorescein angiography in ophthalmology clinic as an outpatient    Please have patient call and follow up at [facility] Ophthalmology clinic Tuesday morning at 8:30am  Clinic phone number: [phone]  Clinic address: [facility] Faisal Valero PGY2

## 2020-09-05 NOTE — ED ADULT TRIAGE NOTE - CHIEF COMPLAINT QUOTE
Hx TIA, carotid endarterectomy on L side. Lost vision in L eye around midnight, was instructed by MD to come to ER.

## 2020-09-05 NOTE — H&P ADULT - HISTORY OF PRESENT ILLNESS
50 year-old female with a PMH of CAD w/ 4 stents (on Plavix), atrial fibrillation (on Xarelto), DM, and HTN, TIA, presenting for left eye vision loss. Around 12 AM last night patient had sudden onset of vision loss in the left eye in the superior quadrants associated with headache. The patient had similar symptoms 2 weeks ago for which she was hospitalized. Work up showed focal stenosis of the R ICA at the origin and moderate to severe stenosis involving the origin of the left ICA. Left Carotid endarterectomy was performed on 8/25 with no complications. Patient said that her vision has improved since last hospitalization but did not return to baseline. She denied loss of consciousness, ophthalmoplegia, limb weakness or paresthesia, dysarthria, facial asymmetry, diplopia, dizziness, dysphagia, nausea, vomiting, fevers, chills or other associated symptoms.    In the ED VS were stable.  CTH showed possible small lacunar infarct. CTA head and neck showed resolution of the previously seen focal stenosis of the left ICA with unchanged severe stenosis of the right ICA.

## 2020-09-06 ENCOUNTER — TRANSCRIPTION ENCOUNTER (OUTPATIENT)
Age: 50
End: 2020-09-06

## 2020-09-06 VITALS
TEMPERATURE: 97 F | SYSTOLIC BLOOD PRESSURE: 120 MMHG | OXYGEN SATURATION: 96 % | HEART RATE: 75 BPM | DIASTOLIC BLOOD PRESSURE: 59 MMHG | RESPIRATION RATE: 18 BRPM

## 2020-09-06 LAB
ALBUMIN SERPL ELPH-MCNC: 4.3 G/DL — SIGNIFICANT CHANGE UP (ref 3.5–5.2)
ALP SERPL-CCNC: 106 U/L — SIGNIFICANT CHANGE UP (ref 30–115)
ALT FLD-CCNC: 24 U/L — SIGNIFICANT CHANGE UP (ref 0–41)
ANION GAP SERPL CALC-SCNC: 10 MMOL/L — SIGNIFICANT CHANGE UP (ref 7–14)
AST SERPL-CCNC: 16 U/L — SIGNIFICANT CHANGE UP (ref 0–41)
BASOPHILS # BLD AUTO: 0.05 K/UL — SIGNIFICANT CHANGE UP (ref 0–0.2)
BASOPHILS NFR BLD AUTO: 0.7 % — SIGNIFICANT CHANGE UP (ref 0–1)
BILIRUB SERPL-MCNC: 0.7 MG/DL — SIGNIFICANT CHANGE UP (ref 0.2–1.2)
BUN SERPL-MCNC: 11 MG/DL — SIGNIFICANT CHANGE UP (ref 10–20)
CALCIUM SERPL-MCNC: 9.6 MG/DL — SIGNIFICANT CHANGE UP (ref 8.5–10.1)
CHLORIDE SERPL-SCNC: 102 MMOL/L — SIGNIFICANT CHANGE UP (ref 98–110)
CHOLEST SERPL-MCNC: 148 MG/DL — SIGNIFICANT CHANGE UP (ref 100–200)
CO2 SERPL-SCNC: 32 MMOL/L — SIGNIFICANT CHANGE UP (ref 17–32)
CREAT SERPL-MCNC: 0.7 MG/DL — SIGNIFICANT CHANGE UP (ref 0.7–1.5)
CRP SERPL-MCNC: 0.36 MG/DL — SIGNIFICANT CHANGE UP (ref 0–0.4)
EOSINOPHIL # BLD AUTO: 0.35 K/UL — SIGNIFICANT CHANGE UP (ref 0–0.7)
EOSINOPHIL NFR BLD AUTO: 4.9 % — SIGNIFICANT CHANGE UP (ref 0–8)
GLUCOSE BLDC GLUCOMTR-MCNC: 144 MG/DL — HIGH (ref 70–99)
GLUCOSE BLDC GLUCOMTR-MCNC: 164 MG/DL — HIGH (ref 70–99)
GLUCOSE SERPL-MCNC: 134 MG/DL — HIGH (ref 70–99)
HCT VFR BLD CALC: 43.4 % — SIGNIFICANT CHANGE UP (ref 37–47)
HDLC SERPL-MCNC: 50 MG/DL — SIGNIFICANT CHANGE UP
HGB BLD-MCNC: 13.8 G/DL — SIGNIFICANT CHANGE UP (ref 12–16)
IMM GRANULOCYTES NFR BLD AUTO: 0.4 % — HIGH (ref 0.1–0.3)
LIPID PNL WITH DIRECT LDL SERPL: 71 MG/DL — SIGNIFICANT CHANGE UP (ref 4–129)
LYMPHOCYTES # BLD AUTO: 2.25 K/UL — SIGNIFICANT CHANGE UP (ref 1.2–3.4)
LYMPHOCYTES # BLD AUTO: 31.8 % — SIGNIFICANT CHANGE UP (ref 20.5–51.1)
MCHC RBC-ENTMCNC: 30.8 PG — SIGNIFICANT CHANGE UP (ref 27–31)
MCHC RBC-ENTMCNC: 31.8 G/DL — LOW (ref 32–37)
MCV RBC AUTO: 96.9 FL — SIGNIFICANT CHANGE UP (ref 81–99)
MONOCYTES # BLD AUTO: 0.5 K/UL — SIGNIFICANT CHANGE UP (ref 0.1–0.6)
MONOCYTES NFR BLD AUTO: 7.1 % — SIGNIFICANT CHANGE UP (ref 1.7–9.3)
NEUTROPHILS # BLD AUTO: 3.9 K/UL — SIGNIFICANT CHANGE UP (ref 1.4–6.5)
NEUTROPHILS NFR BLD AUTO: 55.1 % — SIGNIFICANT CHANGE UP (ref 42.2–75.2)
NRBC # BLD: 0 /100 WBCS — SIGNIFICANT CHANGE UP (ref 0–0)
PLATELET # BLD AUTO: 295 K/UL — SIGNIFICANT CHANGE UP (ref 130–400)
POTASSIUM SERPL-MCNC: 4.6 MMOL/L — SIGNIFICANT CHANGE UP (ref 3.5–5)
POTASSIUM SERPL-SCNC: 4.6 MMOL/L — SIGNIFICANT CHANGE UP (ref 3.5–5)
PROT SERPL-MCNC: 7 G/DL — SIGNIFICANT CHANGE UP (ref 6–8)
RBC # BLD: 4.48 M/UL — SIGNIFICANT CHANGE UP (ref 4.2–5.4)
RBC # FLD: 14 % — SIGNIFICANT CHANGE UP (ref 11.5–14.5)
SODIUM SERPL-SCNC: 144 MMOL/L — SIGNIFICANT CHANGE UP (ref 135–146)
TOTAL CHOLESTEROL/HDL RATIO MEASUREMENT: 3 RATIO — LOW (ref 4–5.5)
TRIGL SERPL-MCNC: 130 MG/DL — SIGNIFICANT CHANGE UP (ref 10–149)
WBC # BLD: 7.08 K/UL — SIGNIFICANT CHANGE UP (ref 4.8–10.8)
WBC # FLD AUTO: 7.08 K/UL — SIGNIFICANT CHANGE UP (ref 4.8–10.8)

## 2020-09-06 PROCEDURE — 99239 HOSP IP/OBS DSCHRG MGMT >30: CPT

## 2020-09-06 PROCEDURE — 93306 TTE W/DOPPLER COMPLETE: CPT | Mod: 26

## 2020-09-06 PROCEDURE — 99222 1ST HOSP IP/OBS MODERATE 55: CPT | Mod: 24

## 2020-09-06 RX ORDER — RIVAROXABAN 15 MG-20MG
20 KIT ORAL
Refills: 0 | Status: DISCONTINUED | OUTPATIENT
Start: 2020-09-06 | End: 2020-09-06

## 2020-09-06 RX ADMIN — Medication 10 UNIT(S): at 12:15

## 2020-09-06 RX ADMIN — Medication 2: at 12:15

## 2020-09-06 RX ADMIN — Medication 10 UNIT(S): at 07:33

## 2020-09-06 RX ADMIN — Medication 30 MILLIGRAM(S): at 12:16

## 2020-09-06 RX ADMIN — CLOPIDOGREL BISULFATE 75 MILLIGRAM(S): 75 TABLET, FILM COATED ORAL at 12:15

## 2020-09-06 RX ADMIN — Medication 100 MILLIGRAM(S): at 05:14

## 2020-09-06 NOTE — PHYSICAL THERAPY INITIAL EVALUATION ADULT - PERTINENT HX OF CURRENT PROBLEM, REHAB EVAL
pt is a 50 year-old female with a PMH of CAD w/ 4 stents (on Plavix), atrial fibrillation (on Xarelto), DM, and HTN, TIA, presenting for left eye vision loss. Around 12 AM last night patient had sudden onset of vision loss in the left eye in the superior quadrants associated with headache. The patient had similar symptoms 2 weeks ago for which she was hospitalized

## 2020-09-06 NOTE — DISCHARGE NOTE PROVIDER - HOSPITAL COURSE
50 year-old female with a PMH of CAD w/ 4 stents (on Plavix), atrial fibrillation (on Xarelto), DM, and HTN, TIA, presenting for left eye vision loss. Around 12 AM last night patient had sudden onset of vision loss in the left eye in the superior quadrants associated with headache. The patient had similar symptoms 2 weeks ago for which she was hospitalized. Work up showed focal stenosis of the R ICA at the origin and moderate to severe stenosis involving the origin of the left ICA. Left Carotid endarterectomy was performed on 8/25 with no complications. Patient said that her vision has improved since last hospitalization but did not return to baseline. She denied loss of consciousness, ophthalmoplegia, limb weakness or paresthesia, dysarthria, facial asymmetry, diplopia, dizziness, dysphagia, nausea, vomiting, fevers, chills or other associated symptoms.        In the ED VS were stable.        CTH showed possible small lacunar infarct. NIHSS on admission: 1. CTA head and neck showed resolution of the previously seen focal stenosis of the left ICA with unchanged severe stenosis of the right ICA. However, MRI brain revealed no evidence of acute intracranial pathology, no evidence of acute infarct, intracranial hemorrhage or mass effect.        Dilated eye exam showed large cotton wool spot temporal to disc and retinal ischemia along branch of inferior arcade and into the macula OS. Given patient's medical history, similar recent presentation, and findings of bilateral carotid stenosis, etiology of vision loss most likely from to branched retinal artery occlusion.        The patient is now stable for d/c home w/ outpt f/u w/ Optho and Vascular Neurology. 50 year-old female with a PMH of CAD w/ 4 stents (on Plavix), atrial fibrillation (on Xarelto), DM, and HTN, TIA, presenting for left eye vision loss. Around 12 AM last night patient had sudden onset of vision loss in the left eye in the superior quadrants associated with headache. The patient had similar symptoms 2 weeks ago for which she was hospitalized. Work up showed focal stenosis of the R ICA at the origin and moderate to severe stenosis involving the origin of the left ICA. Left Carotid endarterectomy was performed on 8/25 with no complications. Patient said that her vision has improved since last hospitalization but did not return to baseline. She denied loss of consciousness, ophthalmoplegia, limb weakness or paresthesia, dysarthria, facial asymmetry, diplopia, dizziness, dysphagia, nausea, vomiting, fevers, chills or other associated symptoms.        In the ED VS were stable.        CTH showed possible small lacunar infarct. NIHSS on admission: 1. CTA head and neck showed resolution of the previously seen focal stenosis of the left ICA with unchanged severe stenosis of the right ICA. However, MRI brain revealed no evidence of acute intracranial pathology, no evidence of acute infarct, intracranial hemorrhage or mass effect.        Dilated eye exam showed large cotton wool spot temporal to disc and retinal ischemia along branch of inferior arcade and into the macula OS. Given patient's medical history, similar recent presentation, and findings of bilateral carotid stenosis, etiology of vision loss most likely from to branched retinal artery occlusion. Ophthalmology seen and suggested out patient follow ups . Vascular seen in hospital. Out patient follow up is scheduled with vascular. Patient advised to follow up out patient pulmonary for the Sleep apnea and also CPAP management she has at home already         The patient is now stable for d/c home w/ outpt f/u w/ Optho and Vascular Neurology.

## 2020-09-06 NOTE — PROGRESS NOTE ADULT - SUBJECTIVE AND OBJECTIVE BOX
Patient Information:  MARCIAL MCCORMICK / 50y / Female / MRN#:[id number]    Hospital Day: 1d    HPI:  50 year-old female with a PMH of CAD w/ 4 stents (on Plavix), atrial fibrillation (on Xarelto), DM, and HTN, TIA, presenting for left eye vision loss. Around 12 AM last night patient had sudden onset of vision loss in the left eye in the superior quadrants associated with headache. The patient had similar symptoms 2 weeks ago for which she was hospitalized. Work up showed focal stenosis of the R ICA at the origin and moderate to severe stenosis involving the origin of the left ICA. Left Carotid endarterectomy was performed on  with no complications. Patient said that her vision has improved since last hospitalization but did not return to baseline. She denied loss of consciousness, ophthalmoplegia, limb weakness or paresthesia, dysarthria, facial asymmetry, diplopia, dizziness, dysphagia, nausea, vomiting, fevers, chills or other associated symptoms.    In the ED VS were stable.  CTH showed possible small lacunar infarct. CTA head and neck showed resolution of the previously seen focal stenosis of the left ICA with unchanged severe stenosis of the right ICA.    Interval History:  Patient seen and examined at bedside. No acute events overnight.    Past Medical History:  Carotid stenosis, bilateral  TIA (transient ischemic attack)  Hypertension  DM (diabetes mellitus)  Afib  MI (myocardial infarction)    Past Surgical History:  H/O hernia repair  H/O endarterectomy  History of cholecystectomy  H/O  section    Allergies:  No Known Allergies    Medications:  PRN:  dextrose 40% Gel 15 Gram(s) Oral once PRN Blood Glucose LESS THAN 70 milliGRAM(s)/deciliter  glucagon  Injectable 1 milliGRAM(s) IntraMuscular once PRN Glucose LESS THAN 70 milligrams/deciliter    Standing:  atorvastatin 80 milliGRAM(s) Oral at bedtime  clopidogrel Tablet 75 milliGRAM(s) Oral daily  dextrose 5%. 1000 milliLiter(s) (50 mL/Hr) IV Continuous <Continuous>  dextrose 50% Injectable 12.5 Gram(s) IV Push once  dextrose 50% Injectable 25 Gram(s) IV Push once  dextrose 50% Injectable 25 Gram(s) IV Push once  insulin glargine Injectable (LANTUS) 40 Unit(s) SubCutaneous at bedtime  insulin lispro (HumaLOG) corrective regimen sliding scale   SubCutaneous three times a day before meals  insulin lispro Injectable (HumaLOG) 10 Unit(s) SubCutaneous three times a day before meals  metoprolol succinate  milliGRAM(s) Oral daily  PARoxetine 30 milliGRAM(s) Oral daily    Home:  atorvastatin 80 mg oral tablet: 1 tab(s) orally once a day  HumaLOG KwikPen 100 units/mL injectable solution: 18 unit(s) injectable 3 times a day  Jardiance 25 mg oral tablet: 1 tab(s) orally once a day (in the morning)  metFORMIN 1000 mg oral tablet: 1 tab(s) orally 2 times a day  PARoxetine 30 mg oral tablet: 1 tab(s) orally once a day  pioglitazone 30 mg oral tablet: 1 tab(s) orally once a day  Tresiba FlexTouch 100 units/mL subcutaneous solution: 80 unit(s) subcutaneous once a day    Vitals:  T(C): 36.1, Max: 36.7 (20 @ 15:57)  T(F): 96.9, Max: 98.1 (20 @ 15:57)  HR: 80 (79 - 80)  BP: 144/66 (99/49 - 151/67)  RR: 18 (17 - 18)  SpO2: 97% (97% - 99%)    Physical Exam:  General: Awake, Alert. Not in acute distress.  Heart: Regular rate and rhythm; S1, S2; No murmurs.  Lungs: Clear to auscultation bilaterally.  Abdomen: Soft, nontender, nondistended.  Extremities: No edema in upper or lower extremities.  Neuro: AAOx3, LT Eye visual field compromised in the superior quadrants    Labs:  CBC ( 05:29)                        Hgb: 13.8   WBC: 7.08  )-----------------( Plts: 295                              Hct: 43.4     Chem ( 07:29)  Na: 138  |     Cl: 98     |  BUN: <3  -----------------------------------------< Gluc: 124    K: 4.8   |    HCO3: 28  |  Cr: 0.7    Ca 9.6 ( 07:29)    LFTs (09-05 @ 07:29)  TPro 7.4  /  Alb 4.3  TBili 0.5  /  DBili     AST 27  /  ALT 26  /  AlkPhos 104    PT/INR ( @ 07:29)  PT: 13.20; INR: 1.15   PTT: 39.2               Microbiology:    Radiology:

## 2020-09-06 NOTE — DISCHARGE NOTE PROVIDER - CARE PROVIDER_API CALL
Alfredo Underwood  SURGERY  [address] [facility], [facility] [[email or web address]] [address]  [place] [address]  Phone: [phone]  Fax: [phone]  Follow Up Time: 1-3 days    Christina Sanabria)  Neurology; Vascular Neurology  [address], [facility] [address]  [place], [place] [address]  Phone: [phone]  Fax: [phone]  Follow Up Time: 2 weeks    Tatiana Wallace  [address] [address]  Phone: [phone]  Fax: [phone]  Follow Up Time: Routine    Werner Sneed  OPHTHALMOLOGY  [address] [facility] [address]  [place] [address]  Phone: [phone]  Fax: [phone]  Follow Up Time: 2 weeks

## 2020-09-06 NOTE — PROGRESS NOTE ADULT - SUBJECTIVE AND OBJECTIVE BOX
Progress Note: General Surgery  Patient: MARCIAL MCCORMICK , 50y (1970)Female   MRN: [id number]  Location: [facility] [id number] [facility] [address]  Visit: 09-05-20 Inpatient  Date: 09-06-20 @ 11:19    50 year-old female with a PMH of CAD w/ 4 stents (on Plavix), atrial fibrillation (on Xarelto), DM, and HTN, TIA, presenting for left eye vision loss. Around 12 AM last night patient had sudden onset of vision loss in the left eye in the superior quadrants associated with headache. The patient had similar symptoms 2 weeks ago for which she was hospitalized. Work up showed focal stenosis of the R ICA at the origin and moderate to severe stenosis involving the origin of the left ICA. Left Carotid endarterectomy was performed on 8/25 with no complications. Patient said that her vision has improved since last hospitalization but did not return to baseline. She denied loss of consciousness, ophthalmoplegia, limb weakness or paresthesia, dysarthria, facial asymmetry, diplopia, dizziness, dysphagia, nausea, vomiting, fevers, chills or other associated symptoms.    Pt now complains of spots in her left eye vision at the upper right corner. She states it started last night and has not gotten worse or resolved since then. Denies weakness, loss of sensation, fevers, chills rigors. No complains of erythema, pain, or swelling per incision site.      Vitals: T(F): 96.9 (09-06-20 @ 05:22), Max: 98.1 (09-05-20 @ 15:57)  HR: 80 (09-06-20 @ 05:22)  BP: 144/66 (09-06-20 @ 05:22) (99/49 - 151/67)  RR: 18 (09-06-20 @ 05:22)  SpO2: 97% (09-05-20 @ 15:57)    In:   Out:   Net:     Diet: Diet, DASH/TLC:   Sodium & Cholesterol Restricted  Consistent Carbohydrate Evening Snack (09-05-20 @ 14:44)    IV Fluids: dextrose 5%. 1000 milliLiter(s) (50 mL/Hr) IV Continuous <Continuous>      Medications: [Standing]  atorvastatin 80 milliGRAM(s) Oral at bedtime  clopidogrel Tablet 75 milliGRAM(s) Oral daily  dextrose 5%. 1000 milliLiter(s) (50 mL/Hr) IV Continuous <Continuous>  dextrose 50% Injectable 12.5 Gram(s) IV Push once  dextrose 50% Injectable 25 Gram(s) IV Push once  dextrose 50% Injectable 25 Gram(s) IV Push once  insulin glargine Injectable (LANTUS) 40 Unit(s) SubCutaneous at bedtime  insulin lispro (HumaLOG) corrective regimen sliding scale   SubCutaneous three times a day before meals  insulin lispro Injectable (HumaLOG) 10 Unit(s) SubCutaneous three times a day before meals  metoprolol succinate  milliGRAM(s) Oral daily  PARoxetine 30 milliGRAM(s) Oral daily  rivaroxaban 20 milliGRAM(s) Oral with dinner    Medications:[PRN]  dextrose 40% Gel 15 Gram(s) Oral once PRN  glucagon  Injectable 1 milliGRAM(s) IntraMuscular once PRN      Labs:                        13.8   7.08  )-----------( 295      ( 06 Sep 2020 05:29 )             43.4     09-06    144  |  102  |  11  ----------------------------<  134<H>  4.6   |  32  |  0.7    Ca    9.6      06 Sep 2020 05:29    TPro  7.0  /  Alb  4.3  /  TBili  0.7  /  DBili  x   /  AST  16  /  ALT  24  /  AlkPhos  106  09-06    LIVER FUNCTIONS - ( 06 Sep 2020 05:29 )  Alb: 4.3 g/dL / Pro: 7.0 g/dL / ALK PHOS: 106 U/L / ALT: 24 U/L / AST: 16 U/L / GGT: x           PT/INR - ( 05 Sep 2020 07:29 )   PT: 13.20 sec;   INR: 1.15 ratio         PTT - ( 05 Sep 2020 07:29 )  PTT:39.2 sec    CARDIAC MARKERS ( 05 Sep 2020 07:29 )  x     / <0.01 ng/mL / x     / x     / x            Imaging:   < from: MR Head No Cont (09.05.20 @ 20:27) >  IMPRESSION:  There is no evidence of acute intracranial pathology. No evidence of acute infarct, intracranial hemorrhage or mass effect.  Orbits are grossly unremarkable.  < end of copied text >      Assessment:  50 year-old female with a PMH of CAD w/ 4 stents (on Plavix), atrial fibrillation (on Xarelto), DM, and HTN, TIA s/p Left CEA comes in with c/c of visual spots. CTA head and neck shows resolution of previously seen moderate to severe focal stenosis at the origin of the left ICA with interval widening of the left carotid bulb. Surgery unlikely cause of symptoms. Opthalmology saw the patient and is recommending outpatient followup this week. Neurology saw the patient recommended admission for Brain MRI to r/o CNS infarct. MRI Brain came back showing no acute intracranial pathology. Patient is clear from vascular standpoint at this time and can f/u as outpatient.       PLAN:   - Dispo as per primary team and neurology team.             Date/Time: 09-06-20 @ 11:19

## 2020-09-06 NOTE — DISCHARGE NOTE PROVIDER - CARE PROVIDERS DIRECT ADDRESSES
[email or web address],DirectAddress_Unknown

## 2020-09-06 NOTE — PHYSICAL THERAPY INITIAL EVALUATION ADULT - GENERAL OBSERVATIONS, REHAB EVAL
9:35-9:57 Pt encountered reclined in b/s chair in NAD. + tele. Agreeable for PT. Pt reports L eye decreased vision superior quadrant.

## 2020-09-06 NOTE — DISCHARGE NOTE PROVIDER - NSDCCPCAREPLAN_GEN_ALL_CORE_FT
PRINCIPAL DISCHARGE DIAGNOSIS  Diagnosis: Branch retinal artery occlusion, left eye  Assessment and Plan of Treatment: PRINCIPAL DISCHARGE DIAGNOSIS  Diagnosis: Branch retinal artery occlusion, left eye  Assessment and Plan of Treatment: Retinal artery occlusion (CANSECO) refers to blockage of the retinal artery carrying oxygen to the nerve cells in the retina at the back of the eye. The lack of oxygen delivery to the retina may result in loss of vision.  There are two types of RAOs:  -Branch retinal artery occlusion (BRAO) blocks the small arteries in your retina.  -Central retinal artery occlusion (CRAO) is a blockage in the central artery in your retina.   Please continue taking your medications as prescribed and PRINCIPAL DISCHARGE DIAGNOSIS  Diagnosis: Branch retinal artery occlusion, left eye  Assessment and Plan of Treatment: Retinal artery occlusion (CANSECO) refers to blockage of the retinal artery carrying oxygen to the nerve cells in the retina at the back of the eye. The lack of oxygen delivery to the retina may result in loss of vision.  There are two types of RAOs:  -Branch retinal artery occlusion (BRAO) blocks the small arteries in your retina.  -Central retinal artery occlusion (CRAO) is a blockage in the central artery in your retina.   Please continue taking your medications as prescribed and follow up with the careproviders listed.

## 2020-09-06 NOTE — PROGRESS NOTE ADULT - ASSESSMENT
50 year-old female with a PMH of CAD w/ 4 stents (on Plavix), atrial fibrillation (on Xarelto), DM, and HTN, TIA, presenting for acute left eye vision loss.    # Left eye vision loss  # Branched retinal artery occlusion  -NIHSS on admission: 1  -Retinal artery occlusion vs occipital stroke  -Recent hospitalization for similar symptoms s/p left endarterectomy  -Dilated eye exam showed large cotton wool spot temporal to disc and retinal ischemia along branch of inferior arcade and into the macula OS  -CTH showed possible small lacunar infarct, does not explain patient's symptoms however  -CTA showed resolution of previously seen severe focal stenosis of the left ICA with unchanged severe stenosis of the right ICA  -hba1c 7.1 in 8/2020  -No evidence of GCA  Plan  - f/u MRI brain > There is no evidence of acute intracranial pathology. No evidence of acute infarct, intracranial hemorrhage or mass effect.  - given patient's medical history, similar recent presentation, and findings of bilateral carotid stenosis, etiology of vision loss most likely from to branched retinal artery occlusion  - f/u Lipid panel  - f/u TTE w/ bubble study  - vascular following: no intervention for now   - f/u Hypercog panel  - c/w plavix and statin    # Paroxysmal Afib  -CHADsVASC 6  -ECG om 8/25 showed NSR  -Rate controlled  Plan  - c/w metoprolol  - Resuming Xarelto as no large ischemic stroke on MRI.     # CAD s/ PCI  -stable, denies any chest pain, sob  Plan  - c/w plavix, statin, BB    # DM  -hba1c 7.1 in 8/2020  -patient takes 60 u of Tresiba and 18 units of humalog before meals at home  Plan  - will start 40 u of lantus and 10 units of lispro  - f/u BS and adjust accordingly    # HTN  - Controlled  - c/w metorprolol    DVT ppx: Lovenox  PPI ppx: no need  DIET: DASH carb consistent  ACTIVITY: IAT  DISPO: downgrade from Stroke unit if neurology agreeable 50 year-old female with a PMH of CAD w/ 4 stents (on Plavix), atrial fibrillation (on Xarelto), DM, and HTN, TIA, presenting for acute left eye vision loss.    # Left eye vision loss  # Branched retinal artery occlusion  -NIHSS on admission: 1  -Retinal artery occlusion vs occipital stroke  -Recent hospitalization for similar symptoms s/p left endarterectomy  -Dilated eye exam showed large cotton wool spot temporal to disc and retinal ischemia along branch of inferior arcade and into the macula OS  -CTH showed possible small lacunar infarct, does not explain patient's symptoms however  -CTA showed resolution of previously seen severe focal stenosis of the left ICA with unchanged severe stenosis of the right ICA  -hba1c 7.1 in 8/2020  -No evidence of GCA  Plan  - f/u MRI brain > There is no evidence of acute intracranial pathology. No evidence of acute infarct, intracranial hemorrhage or mass effect.  - given patient's medical history, similar recent presentation, and findings of bilateral carotid stenosis, etiology of vision loss most likely from to branched retinal artery occlusion  - f/u Lipid panel  - f/u TTE w/ bubble study  - vascular following: no intervention for now   - f/u Hypercog panel  - c/w plavix and statin  - should f/u w/ Optho and Vascular Neurology as outpt    # Paroxysmal Afib  -CHADsVASC 6  -ECG om 8/25 showed NSR  -Rate controlled  Plan  - c/w metoprolol  - Resuming Xarelto as no large ischemic stroke on MRI.     # CAD s/ PCI  -stable, denies any chest pain, sob  Plan  - c/w plavix, statin, BB    # DM  -hba1c 7.1 in 8/2020  -patient takes 60 u of Tresiba and 18 units of humalog before meals at home  Plan  - will start 40 u of lantus and 10 units of lispro  - f/u BS and adjust accordingly    # HTN  - Controlled  - c/w metorprolol    DVT ppx: Lovenox  PPI ppx: no need  DIET: DASH carb consistent  ACTIVITY: IAT  DISPO: downgrade from Stroke unit if neurology agreeable

## 2020-09-06 NOTE — DISCHARGE NOTE PROVIDER - PROVIDER TOKENS
PROVIDER:[TOKEN:[[id number]],FOLLOWUP:[1-3 days]],PROVIDER:[TOKEN:[[id number]],FOLLOWUP:[2 weeks]],PROVIDER:[TOKEN:[[id number]],FOLLOWUP:[Routine]],PROVIDER:[TOKEN:[[id number]],FOLLOWUP:[2 weeks]]

## 2020-09-06 NOTE — DISCHARGE NOTE PROVIDER - NSDCMRMEDTOKEN_GEN_ALL_CORE_FT
atorvastatin 80 mg oral tablet: 1 tab(s) orally once a day  HumaLOG KwikPen 100 units/mL injectable solution: 18 unit(s) injectable 3 times a day  Jardiance 25 mg oral tablet: 1 tab(s) orally once a day (in the morning)  metFORMIN 1000 mg oral tablet: 1 tab(s) orally 2 times a day  metoprolol succinate 100 mg oral tablet, extended release: 1 tab(s) orally once a day at night  metoprolol succinate 50 mg oral tablet, extended release: 1 tab(s) orally once a day in the morning  oxycodone-acetaminophen 5 mg-325 mg oral tablet: 1 tab(s) orally 4 times a day MDD:4  PARoxetine 30 mg oral tablet: 1 tab(s) orally once a day  pioglitazone 30 mg oral tablet: 1 tab(s) orally once a day  Plavix 75 mg oral tablet: 1 tab(s) orally once a day  rivaroxaban 20 mg oral tablet: 1 tab(s) orally once a day   Tresiba FlexTouch 100 units/mL subcutaneous solution: 80 unit(s) subcutaneous once a day

## 2020-09-06 NOTE — CONSULT NOTE ADULT - SUBJECTIVE AND OBJECTIVE BOX
T(C): 36.1 (09-06-20 @ 05:22), Max: 36.7 (09-05-20 @ 15:57)  HR: 80 (09-06-20 @ 05:22) (79 - 80)  BP: 144/66 (09-06-20 @ 05:22) (99/49 - 151/67)  RR: 18 (09-06-20 @ 05:22) (17 - 18)  SpO2: 97% (09-05-20 @ 15:57) (97% - 99%)    MOTOR EXAM      Physical Medicine And Rehabilitation Services are not indicated in this patient for the following Reason(s):    [    ] Patient is medically unstable      [    ]  Patient does not have appropriate activity orders      [     ] Patient has no weight bearing status for:      [  XX   ] Patient is independently ambulating -  Patient admitted for loss of vision left eye. Reported by therapists to be independent in transfers and ambulation      [     ]  Patient is from Skilled Nursing Facility and is appropriate to return      [     ] Patient was non-ambulatory prior to admission      [     ]  Other      WILL CANCEL PM&R

## 2020-09-06 NOTE — DISCHARGE NOTE NURSING/CASE MANAGEMENT/SOCIAL WORK - PATIENT PORTAL LINK FT
You can access the FollowMyHealth Patient Portal offered by [facility] by registering at the following website: [email or web address]. By joining [employer]’s FollowMyHealth portal, you will also be able to view your health information using other applications (apps) compatible with our system.

## 2020-09-06 NOTE — OCCUPATIONAL THERAPY INITIAL EVALUATION ADULT - PERTINENT HX OF CURRENT PROBLEM, REHAB EVAL
50 year-old female with a PMH of CAD w/ 4 stents (on Plavix), atrial fibrillation (on Xarelto), DM, and HTN, TIA, presenting for left eye vision loss. Around 12 AM last night patient had sudden onset of vision loss in the left eye in the superior quadrants associated with headache. The patient had similar symptoms 2 weeks ago for which she was hospitalized.

## 2020-09-06 NOTE — PHYSICAL THERAPY INITIAL EVALUATION ADULT - SPECIFY REASON(S)
pt is independent with transfers and ambulation . Pt in hospital setting is not required at this time.

## 2020-09-06 NOTE — DISCHARGE NOTE PROVIDER - NSDCFUSCHEDAPPT_GEN_ALL_CORE_FT
MARCIAL MCCORMICK ; 09/09/2020 ; NPP Surg Vasc [facility] MARCIAL MCCORMICK ; 09/09/2020 ; NPP Surg Vasc [facility] MARCIAL MCCORMICK ; 09/09/2020 ; NPP Surg Vasc [facility] MARCIAL MCCORMICK ; 09/09/2020 ; NPP Surg Vasc [facility]

## 2020-09-07 ENCOUNTER — TRANSCRIPTION ENCOUNTER (OUTPATIENT)
Age: 50
End: 2020-09-07

## 2020-09-08 LAB
AT III ACT/NOR PPP CHRO: 110 % — SIGNIFICANT CHANGE UP (ref 85–135)
CARDIOLIPIN AB SER-ACNC: NEGATIVE — SIGNIFICANT CHANGE UP
HCYS SERPL-MCNC: 27.9 UMOL/L — HIGH

## 2020-09-09 ENCOUNTER — APPOINTMENT (OUTPATIENT)
Dept: VASCULAR SURGERY | Facility: CLINIC | Age: 50
End: 2020-09-09
Payer: COMMERCIAL

## 2020-09-09 VITALS
BODY MASS INDEX: 46.81 KG/M2 | TEMPERATURE: 97.8 F | DIASTOLIC BLOOD PRESSURE: 81 MMHG | WEIGHT: 290 LBS | SYSTOLIC BLOOD PRESSURE: 145 MMHG

## 2020-09-09 DIAGNOSIS — I25.10 ATHEROSCLEROTIC HEART DISEASE OF NATIVE CORONARY ARTERY WITHOUT ANGINA PECTORIS: ICD-10-CM

## 2020-09-09 DIAGNOSIS — G47.33 OBSTRUCTIVE SLEEP APNEA (ADULT) (PEDIATRIC): ICD-10-CM

## 2020-09-09 DIAGNOSIS — I48.0 PAROXYSMAL ATRIAL FIBRILLATION: ICD-10-CM

## 2020-09-09 DIAGNOSIS — I11.9 HYPERTENSIVE HEART DISEASE WITHOUT HEART FAILURE: ICD-10-CM

## 2020-09-09 DIAGNOSIS — Z91.19 PATIENT'S NONCOMPLIANCE WITH OTHER MEDICAL TREATMENT AND REGIMEN: ICD-10-CM

## 2020-09-09 DIAGNOSIS — F41.9 ANXIETY DISORDER, UNSPECIFIED: ICD-10-CM

## 2020-09-09 DIAGNOSIS — I65.23 OCCLUSION AND STENOSIS OF BILATERAL CAROTID ARTERIES: ICD-10-CM

## 2020-09-09 DIAGNOSIS — H34.232 RETINAL ARTERY BRANCH OCCLUSION, LEFT EYE: ICD-10-CM

## 2020-09-09 DIAGNOSIS — Z79.01 LONG TERM (CURRENT) USE OF ANTICOAGULANTS: ICD-10-CM

## 2020-09-09 DIAGNOSIS — E11.65 TYPE 2 DIABETES MELLITUS WITH HYPERGLYCEMIA: ICD-10-CM

## 2020-09-09 DIAGNOSIS — E78.5 HYPERLIPIDEMIA, UNSPECIFIED: ICD-10-CM

## 2020-09-09 DIAGNOSIS — H25.10 AGE-RELATED NUCLEAR CATARACT, UNSPECIFIED EYE: ICD-10-CM

## 2020-09-09 DIAGNOSIS — Z79.4 LONG TERM (CURRENT) USE OF INSULIN: ICD-10-CM

## 2020-09-09 DIAGNOSIS — Z87.891 PERSONAL HISTORY OF NICOTINE DEPENDENCE: ICD-10-CM

## 2020-09-09 DIAGNOSIS — Z95.5 PRESENCE OF CORONARY ANGIOPLASTY IMPLANT AND GRAFT: ICD-10-CM

## 2020-09-09 DIAGNOSIS — E11.3299 TYPE 2 DIABETES MELLITUS WITH MILD NONPROLIFERATIVE DIABETIC RETINOPATHY WITHOUT MACULAR EDEMA, UNSPECIFIED EYE: ICD-10-CM

## 2020-09-09 DIAGNOSIS — H53.132 SUDDEN VISUAL LOSS, LEFT EYE: ICD-10-CM

## 2020-09-09 DIAGNOSIS — H54.7 UNSPECIFIED VISUAL LOSS: ICD-10-CM

## 2020-09-09 DIAGNOSIS — H47.012 ISCHEMIC OPTIC NEUROPATHY, LEFT EYE: ICD-10-CM

## 2020-09-09 PROCEDURE — 99024 POSTOP FOLLOW-UP VISIT: CPT

## 2020-09-09 NOTE — DISCUSSION/SUMMARY
[FreeTextEntry1] : 50 year old status post left CEA for symptomatic disease also has severe right Carotid stenosis also complains of continued  visual problem in the left eye.  The patient has also been complaining of left sided head ache and worsening visual disturbance. will schedule for a left temporal arty biopsy prior to right CEA although  ESR 22 her symptoms of left temporal headaches, left eye pain may be consistent with temp arteritis.

## 2020-09-09 NOTE — REASON FOR VISIT
[de-identified] : left caritid endarterectomy  [de-identified] : 8/25/20 [de-identified] : 50 year-old female with a PMH of CAD w/ 4 stents (on Plavix), atrial\par fibrillation (on Xarelto), DM, and HTN, TIA, presented to [facility] with  left eye vision\par loss sudden onset .\par  The patient had similar symptoms 4 weeks ago for which she was hospitalized. Work up showed\par focal stenosis of the R ICA at the origin and moderate to severe stenosis\par involving the origin of the left ICA. Left Carotid endarterectomy was performed\par on 8/25 with no complications. Patient said that her vision has improved since\par last hospitalization but did not return to baseline. She had presented back to [facility] 2 weeks latter with new left eye vision loss. She also has know right ICA stenosis

## 2020-09-10 DIAGNOSIS — H54.7 UNSPECIFIED VISUAL LOSS: ICD-10-CM

## 2020-09-10 DIAGNOSIS — H34.232 RETINAL ARTERY BRANCH OCCLUSION, LEFT EYE: ICD-10-CM

## 2020-09-10 DIAGNOSIS — Z95.5 PRESENCE OF CORONARY ANGIOPLASTY IMPLANT AND GRAFT: ICD-10-CM

## 2020-09-10 DIAGNOSIS — I25.10 ATHEROSCLEROTIC HEART DISEASE OF NATIVE CORONARY ARTERY WITHOUT ANGINA PECTORIS: ICD-10-CM

## 2020-09-10 DIAGNOSIS — I65.23 OCCLUSION AND STENOSIS OF BILATERAL CAROTID ARTERIES: ICD-10-CM

## 2020-09-10 DIAGNOSIS — E11.9 TYPE 2 DIABETES MELLITUS WITHOUT COMPLICATIONS: ICD-10-CM

## 2020-09-10 DIAGNOSIS — Z86.73 PERSONAL HISTORY OF TRANSIENT ISCHEMIC ATTACK (TIA), AND CEREBRAL INFARCTION WITHOUT RESIDUAL DEFICITS: ICD-10-CM

## 2020-09-10 DIAGNOSIS — I48.0 PAROXYSMAL ATRIAL FIBRILLATION: ICD-10-CM

## 2020-09-10 DIAGNOSIS — I10 ESSENTIAL (PRIMARY) HYPERTENSION: ICD-10-CM

## 2020-09-10 LAB — PROT C ACT/NOR PPP: 119 % — SIGNIFICANT CHANGE UP (ref 65–129)

## 2020-09-11 LAB — APCR PPP: 2.77 RATIO — SIGNIFICANT CHANGE UP

## 2020-09-12 LAB — PROT S FREE PPP-ACNC: 76 % NORMAL — SIGNIFICANT CHANGE UP (ref 60–140)

## 2020-09-15 LAB — PTR INTERPRETATION: SIGNIFICANT CHANGE UP

## 2020-09-16 LAB — DNA PLOIDY SPEC FC-IMP: SIGNIFICANT CHANGE UP

## 2020-09-17 NOTE — H&P ADULT - NSTELEHEALTH_GEN_ALL_CORE
No Cephalexin Counseling: I counseled the patient regarding use of cephalexin as an antibiotic for prophylactic and/or therapeutic purposes. Cephalexin (commonly prescribed under brand name Keflex) is a cephalosporin antibiotic which is active against numerous classes of bacteria, including most skin bacteria. Side effects may include nausea, diarrhea, gastrointestinal upset, rash, hives, yeast infections, and in rare cases, hepatitis, kidney disease, seizures, fever, confusion, neurologic symptoms, and others. Patients with severe allergies to penicillin medications are cautioned that there is about a 10% incidence of cross-reactivity with cephalosporins. When possible, patients with penicillin allergies should use alternatives to cephalosporins for antibiotic therapy.

## 2020-09-22 ENCOUNTER — OUTPATIENT (OUTPATIENT)
Dept: OUTPATIENT SERVICES | Facility: HOSPITAL | Age: 50
LOS: 1 days | Discharge: HOME | End: 2020-09-22

## 2020-09-22 VITALS
HEART RATE: 70 BPM | WEIGHT: 289.91 LBS | SYSTOLIC BLOOD PRESSURE: 132 MMHG | RESPIRATION RATE: 16 BRPM | HEIGHT: 66 IN | DIASTOLIC BLOOD PRESSURE: 76 MMHG | OXYGEN SATURATION: 96 % | TEMPERATURE: 98 F

## 2020-09-22 DIAGNOSIS — M31.6 OTHER GIANT CELL ARTERITIS: ICD-10-CM

## 2020-09-22 DIAGNOSIS — Z98.891 HISTORY OF UTERINE SCAR FROM PREVIOUS SURGERY: Chronic | ICD-10-CM

## 2020-09-22 DIAGNOSIS — Z98.890 OTHER SPECIFIED POSTPROCEDURAL STATES: Chronic | ICD-10-CM

## 2020-09-22 DIAGNOSIS — Z01.818 ENCOUNTER FOR OTHER PREPROCEDURAL EXAMINATION: ICD-10-CM

## 2020-09-22 DIAGNOSIS — Z90.49 ACQUIRED ABSENCE OF OTHER SPECIFIED PARTS OF DIGESTIVE TRACT: Chronic | ICD-10-CM

## 2020-09-22 RX ORDER — INSULIN DEGLUDEC 100 U/ML
80 INJECTION, SOLUTION SUBCUTANEOUS
Qty: 0 | Refills: 0 | DISCHARGE

## 2020-09-22 NOTE — H&P PST ADULT - HISTORY OF PRESENT ILLNESS
50yr old female states has been having pain LT side of the head and jaw "did not go  away after my carotid surgery, so they are going to do a temporal artery biopsy"- presents to pretesting for LT TEMPORAL ARTERY BIOPSY-S/P  LT CEA 8/2020. Denies COVID S/S. Denies sick contacts. Advised to self quarantine and follow preventive measures for COVID- verbalized understanding. Exercise jeffy 1 FOS- LTD by fatigue.  Anesthesia Alert  NO--Difficult Airway  NO--History of neck surgery or radiation  NO--Limited ROM of neck  NO--History of Malignant hyperthermia  NO--Personal or family history of Pseudocholinesterase deficiency  NO--Prior Anesthesia Complication  NO--Latex Allergy  NO--Loose teeth  NO--History of Rheumatoid Arthritis  YES--JOCELYNE  NO--Other_____

## 2020-09-22 NOTE — H&P PST ADULT - NSICDXPASTSURGICALHX_GEN_ALL_CORE_FT
PAST SURGICAL HISTORY:  H/O  section     H/O endarterectomy LT-2020    H/O hernia repair     History of cholecystectomy

## 2020-09-22 NOTE — H&P PST ADULT - NSICDXFAMILYHX_GEN_ALL_CORE_FT
FAMILY HISTORY:  Family history of early CAD  FH: pulmonary embolism, brother  FH: stroke, father

## 2020-09-22 NOTE — H&P PST ADULT - NSICDXPASTMEDICALHX_GEN_ALL_CORE_FT
PAST MEDICAL HISTORY:  Afib     CAD (coronary artery disease) MI-2016    Carotid stenosis, bilateral     Depression denies suicidal ideas    DM (diabetes mellitus)     H/O blurred vision left --TIA    Hypertension     MI (myocardial infarction) s/p 4 stents (most recent 2018)    Obesity     JOCELYNE on CPAP     TIA (transient ischemic attack) 2- 8/19/2020 and 9/4/2020

## 2020-09-26 ENCOUNTER — OUTPATIENT (OUTPATIENT)
Dept: OUTPATIENT SERVICES | Facility: HOSPITAL | Age: 50
LOS: 1 days | Discharge: HOME | End: 2020-09-26

## 2020-09-26 ENCOUNTER — LABORATORY RESULT (OUTPATIENT)
Age: 50
End: 2020-09-26

## 2020-09-26 DIAGNOSIS — Z98.891 HISTORY OF UTERINE SCAR FROM PREVIOUS SURGERY: Chronic | ICD-10-CM

## 2020-09-26 DIAGNOSIS — Z90.49 ACQUIRED ABSENCE OF OTHER SPECIFIED PARTS OF DIGESTIVE TRACT: Chronic | ICD-10-CM

## 2020-09-26 DIAGNOSIS — Z11.59 ENCOUNTER FOR SCREENING FOR OTHER VIRAL DISEASES: ICD-10-CM

## 2020-09-26 DIAGNOSIS — Z98.890 OTHER SPECIFIED POSTPROCEDURAL STATES: Chronic | ICD-10-CM

## 2020-09-26 PROBLEM — I21.9 ACUTE MYOCARDIAL INFARCTION, UNSPECIFIED: Chronic | Status: ACTIVE | Noted: 2018-07-05

## 2020-09-26 PROBLEM — I25.10 ATHEROSCLEROTIC HEART DISEASE OF NATIVE CORONARY ARTERY WITHOUT ANGINA PECTORIS: Chronic | Status: ACTIVE | Noted: 2020-09-22

## 2020-09-26 PROBLEM — E66.9 OBESITY, UNSPECIFIED: Chronic | Status: ACTIVE | Noted: 2020-09-22

## 2020-09-26 PROBLEM — F32.9 MAJOR DEPRESSIVE DISORDER, SINGLE EPISODE, UNSPECIFIED: Chronic | Status: ACTIVE | Noted: 2020-09-22

## 2020-09-26 PROBLEM — Z86.69 PERSONAL HISTORY OF OTHER DISEASES OF THE NERVOUS SYSTEM AND SENSE ORGANS: Chronic | Status: ACTIVE | Noted: 2020-09-22

## 2020-09-26 PROBLEM — G45.9 TRANSIENT CEREBRAL ISCHEMIC ATTACK, UNSPECIFIED: Chronic | Status: ACTIVE | Noted: 2020-09-05

## 2020-09-26 PROBLEM — G47.33 OBSTRUCTIVE SLEEP APNEA (ADULT) (PEDIATRIC): Chronic | Status: ACTIVE | Noted: 2020-09-22

## 2020-09-29 ENCOUNTER — APPOINTMENT (OUTPATIENT)
Dept: VASCULAR SURGERY | Facility: HOSPITAL | Age: 50
End: 2020-09-29

## 2020-09-29 ENCOUNTER — OUTPATIENT (OUTPATIENT)
Dept: OUTPATIENT SERVICES | Facility: HOSPITAL | Age: 50
LOS: 1 days | Discharge: HOME | End: 2020-09-29
Payer: COMMERCIAL

## 2020-09-29 ENCOUNTER — RESULT REVIEW (OUTPATIENT)
Age: 50
End: 2020-09-29

## 2020-09-29 VITALS
HEART RATE: 82 BPM | OXYGEN SATURATION: 96 % | DIASTOLIC BLOOD PRESSURE: 55 MMHG | RESPIRATION RATE: 17 BRPM | TEMPERATURE: 98 F | SYSTOLIC BLOOD PRESSURE: 120 MMHG

## 2020-09-29 VITALS
SYSTOLIC BLOOD PRESSURE: 126 MMHG | WEIGHT: 289.91 LBS | TEMPERATURE: 98 F | RESPIRATION RATE: 18 BRPM | OXYGEN SATURATION: 95 % | DIASTOLIC BLOOD PRESSURE: 61 MMHG | HEART RATE: 69 BPM | HEIGHT: 66 IN

## 2020-09-29 DIAGNOSIS — Z98.890 OTHER SPECIFIED POSTPROCEDURAL STATES: Chronic | ICD-10-CM

## 2020-09-29 DIAGNOSIS — Z98.891 HISTORY OF UTERINE SCAR FROM PREVIOUS SURGERY: Chronic | ICD-10-CM

## 2020-09-29 DIAGNOSIS — Z90.49 ACQUIRED ABSENCE OF OTHER SPECIFIED PARTS OF DIGESTIVE TRACT: Chronic | ICD-10-CM

## 2020-09-29 PROCEDURE — 88313 SPECIAL STAINS GROUP 2: CPT | Mod: 26

## 2020-09-29 PROCEDURE — 88305 TISSUE EXAM BY PATHOLOGIST: CPT | Mod: 26

## 2020-09-29 PROCEDURE — 37609 LIGATION/BX TEMPORAL ARTERY: CPT | Mod: 58

## 2020-09-29 RX ORDER — KETOROLAC TROMETHAMINE 30 MG/ML
30 SYRINGE (ML) INJECTION ONCE
Refills: 0 | Status: DISCONTINUED | OUTPATIENT
Start: 2020-09-29 | End: 2020-09-29

## 2020-09-29 RX ORDER — SODIUM CHLORIDE 9 MG/ML
1000 INJECTION, SOLUTION INTRAVENOUS
Refills: 0 | Status: DISCONTINUED | OUTPATIENT
Start: 2020-09-29 | End: 2020-09-29

## 2020-09-29 RX ORDER — MORPHINE SULFATE 50 MG/1
2 CAPSULE, EXTENDED RELEASE ORAL
Refills: 0 | Status: DISCONTINUED | OUTPATIENT
Start: 2020-09-29 | End: 2020-09-29

## 2020-09-29 RX ORDER — OXYCODONE AND ACETAMINOPHEN 5; 325 MG/1; MG/1
1 TABLET ORAL ONCE
Refills: 0 | Status: DISCONTINUED | OUTPATIENT
Start: 2020-09-29 | End: 2020-09-29

## 2020-09-29 RX ORDER — ONDANSETRON 8 MG/1
4 TABLET, FILM COATED ORAL ONCE
Refills: 0 | Status: DISCONTINUED | OUTPATIENT
Start: 2020-09-29 | End: 2020-09-29

## 2020-09-29 RX ORDER — OXYCODONE AND ACETAMINOPHEN 5; 325 MG/1; MG/1
1 TABLET ORAL EVERY 4 HOURS
Refills: 0 | Status: DISCONTINUED | OUTPATIENT
Start: 2020-09-29 | End: 2020-09-29

## 2020-09-29 RX ADMIN — MORPHINE SULFATE 2 MILLIGRAM(S): 50 CAPSULE, EXTENDED RELEASE ORAL at 11:00

## 2020-09-29 RX ADMIN — SODIUM CHLORIDE 100 MILLILITER(S): 9 INJECTION, SOLUTION INTRAVENOUS at 10:05

## 2020-09-29 RX ADMIN — MORPHINE SULFATE 2 MILLIGRAM(S): 50 CAPSULE, EXTENDED RELEASE ORAL at 11:15

## 2020-09-29 NOTE — ASU PATIENT PROFILE, ADULT - PMH
Afib    CAD (coronary artery disease)  MI-2016  Carotid stenosis, bilateral    Depression  denies suicidal ideas  DM (diabetes mellitus)    H/O blurred vision  left --TIA  Hypertension    MI (myocardial infarction)  s/p 4 stents (most recent 2018)  Obesity    JOCELYNE on CPAP    TIA (transient ischemic attack)  2- 8/19/2020 and 9/4/2020

## 2020-09-29 NOTE — PRE-ANESTHESIA EVALUATION ADULT - NSANTHADDINFOFT_GEN_ALL_CORE
Pt with multiple medical issues, but all optimized.  Plan is MAC.  Pt agrees.  Questions answered.  Pt declines detailed risk discussion.

## 2020-09-29 NOTE — ASU DISCHARGE PLAN (ADULT/PEDIATRIC) - CARE PROVIDER_API CALL
Alfredo Underwood  SURGERY  [address] [place] [id number]  Phone: [phone]  Fax: [phone]  Established Patient  Follow Up Time: 2 weeks

## 2020-09-30 LAB — SURGICAL PATHOLOGY STUDY: SIGNIFICANT CHANGE UP

## 2020-10-02 DIAGNOSIS — R51 HEADACHE: ICD-10-CM

## 2020-10-02 DIAGNOSIS — Z79.02 LONG TERM (CURRENT) USE OF ANTITHROMBOTICS/ANTIPLATELETS: ICD-10-CM

## 2020-10-02 DIAGNOSIS — E11.9 TYPE 2 DIABETES MELLITUS WITHOUT COMPLICATIONS: ICD-10-CM

## 2020-10-02 DIAGNOSIS — I25.2 OLD MYOCARDIAL INFARCTION: ICD-10-CM

## 2020-10-02 DIAGNOSIS — E66.01 MORBID (SEVERE) OBESITY DUE TO EXCESS CALORIES: ICD-10-CM

## 2020-10-02 DIAGNOSIS — Z99.89 DEPENDENCE ON OTHER ENABLING MACHINES AND DEVICES: ICD-10-CM

## 2020-10-02 DIAGNOSIS — Z87.891 PERSONAL HISTORY OF NICOTINE DEPENDENCE: ICD-10-CM

## 2020-10-02 DIAGNOSIS — I10 ESSENTIAL (PRIMARY) HYPERTENSION: ICD-10-CM

## 2020-10-02 DIAGNOSIS — I48.91 UNSPECIFIED ATRIAL FIBRILLATION: ICD-10-CM

## 2020-10-02 DIAGNOSIS — Z79.01 LONG TERM (CURRENT) USE OF ANTICOAGULANTS: ICD-10-CM

## 2020-10-02 DIAGNOSIS — I25.10 ATHEROSCLEROTIC HEART DISEASE OF NATIVE CORONARY ARTERY WITHOUT ANGINA PECTORIS: ICD-10-CM

## 2020-10-02 DIAGNOSIS — Z79.4 LONG TERM (CURRENT) USE OF INSULIN: ICD-10-CM

## 2020-10-02 DIAGNOSIS — E78.5 HYPERLIPIDEMIA, UNSPECIFIED: ICD-10-CM

## 2020-10-02 DIAGNOSIS — Z95.5 PRESENCE OF CORONARY ANGIOPLASTY IMPLANT AND GRAFT: ICD-10-CM

## 2020-10-02 DIAGNOSIS — G47.33 OBSTRUCTIVE SLEEP APNEA (ADULT) (PEDIATRIC): ICD-10-CM

## 2020-10-14 ENCOUNTER — APPOINTMENT (OUTPATIENT)
Dept: VASCULAR SURGERY | Facility: CLINIC | Age: 50
End: 2020-10-14
Payer: COMMERCIAL

## 2020-10-14 VITALS — SYSTOLIC BLOOD PRESSURE: 138 MMHG | DIASTOLIC BLOOD PRESSURE: 80 MMHG

## 2020-10-14 DIAGNOSIS — I65.23 OCCLUSION AND STENOSIS OF BILATERAL CAROTID ARTERIES: ICD-10-CM

## 2020-10-14 PROCEDURE — 93880 EXTRACRANIAL BILAT STUDY: CPT

## 2020-10-14 PROCEDURE — 99024 POSTOP FOLLOW-UP VISIT: CPT

## 2020-10-14 NOTE — CONSULT LETTER
[Dear  ___] : Dear  [unfilled], [Courtesy Letter:] : I had the pleasure of seeing your patient, [unfilled], in my office today. [Please see my note below.] : Please see my note below. [FreeTextEntry2] : Dear Dr. Sean Castellano,

## 2020-11-02 ENCOUNTER — OUTPATIENT (OUTPATIENT)
Dept: OUTPATIENT SERVICES | Facility: HOSPITAL | Age: 50
LOS: 1 days | Discharge: HOME | End: 2020-11-02
Payer: MEDICAID

## 2020-11-02 VITALS
TEMPERATURE: 99 F | RESPIRATION RATE: 18 BRPM | SYSTOLIC BLOOD PRESSURE: 117 MMHG | DIASTOLIC BLOOD PRESSURE: 56 MMHG | OXYGEN SATURATION: 97 % | HEIGHT: 66 IN | HEART RATE: 71 BPM | WEIGHT: 289.91 LBS

## 2020-11-02 DIAGNOSIS — Z98.890 OTHER SPECIFIED POSTPROCEDURAL STATES: Chronic | ICD-10-CM

## 2020-11-02 DIAGNOSIS — Z98.891 HISTORY OF UTERINE SCAR FROM PREVIOUS SURGERY: Chronic | ICD-10-CM

## 2020-11-02 DIAGNOSIS — I65.23 OCCLUSION AND STENOSIS OF BILATERAL CAROTID ARTERIES: ICD-10-CM

## 2020-11-02 DIAGNOSIS — Z90.49 ACQUIRED ABSENCE OF OTHER SPECIFIED PARTS OF DIGESTIVE TRACT: Chronic | ICD-10-CM

## 2020-11-02 DIAGNOSIS — Z01.818 ENCOUNTER FOR OTHER PREPROCEDURAL EXAMINATION: ICD-10-CM

## 2020-11-02 LAB
A1C WITH ESTIMATED AVERAGE GLUCOSE RESULT: 6.9 % — HIGH (ref 4–5.6)
ALBUMIN SERPL ELPH-MCNC: 4.2 G/DL — SIGNIFICANT CHANGE UP (ref 3.5–5.2)
ALP SERPL-CCNC: 108 U/L — SIGNIFICANT CHANGE UP (ref 30–115)
ALT FLD-CCNC: 27 U/L — SIGNIFICANT CHANGE UP (ref 0–41)
ANION GAP SERPL CALC-SCNC: 12 MMOL/L — SIGNIFICANT CHANGE UP (ref 7–14)
APPEARANCE UR: CLEAR — SIGNIFICANT CHANGE UP
APTT BLD: 35.4 SEC — SIGNIFICANT CHANGE UP (ref 27–39.2)
AST SERPL-CCNC: 16 U/L — SIGNIFICANT CHANGE UP (ref 0–41)
BASOPHILS # BLD AUTO: 0.03 K/UL — SIGNIFICANT CHANGE UP (ref 0–0.2)
BASOPHILS NFR BLD AUTO: 0.5 % — SIGNIFICANT CHANGE UP (ref 0–1)
BILIRUB SERPL-MCNC: 0.5 MG/DL — SIGNIFICANT CHANGE UP (ref 0.2–1.2)
BILIRUB UR-MCNC: NEGATIVE — SIGNIFICANT CHANGE UP
BLD GP AB SCN SERPL QL: SIGNIFICANT CHANGE UP
BUN SERPL-MCNC: 18 MG/DL — SIGNIFICANT CHANGE UP (ref 10–20)
CALCIUM SERPL-MCNC: 9.8 MG/DL — SIGNIFICANT CHANGE UP (ref 8.5–10.1)
CHLORIDE SERPL-SCNC: 101 MMOL/L — SIGNIFICANT CHANGE UP (ref 98–110)
CO2 SERPL-SCNC: 27 MMOL/L — SIGNIFICANT CHANGE UP (ref 17–32)
COLOR SPEC: SIGNIFICANT CHANGE UP
CREAT SERPL-MCNC: 0.8 MG/DL — SIGNIFICANT CHANGE UP (ref 0.7–1.5)
DIFF PNL FLD: NEGATIVE — SIGNIFICANT CHANGE UP
EOSINOPHIL # BLD AUTO: 0.14 K/UL — SIGNIFICANT CHANGE UP (ref 0–0.7)
EOSINOPHIL NFR BLD AUTO: 2.5 % — SIGNIFICANT CHANGE UP (ref 0–8)
ESTIMATED AVERAGE GLUCOSE: 151 MG/DL — HIGH (ref 68–114)
GLUCOSE SERPL-MCNC: 176 MG/DL — HIGH (ref 70–99)
GLUCOSE UR QL: ABNORMAL
HCT VFR BLD CALC: 44 % — SIGNIFICANT CHANGE UP (ref 37–47)
HGB BLD-MCNC: 13.8 G/DL — SIGNIFICANT CHANGE UP (ref 12–16)
IMM GRANULOCYTES NFR BLD AUTO: 0.4 % — HIGH (ref 0.1–0.3)
INR BLD: 1.06 RATIO — SIGNIFICANT CHANGE UP (ref 0.65–1.3)
KETONES UR-MCNC: NEGATIVE — SIGNIFICANT CHANGE UP
LEUKOCYTE ESTERASE UR-ACNC: NEGATIVE — SIGNIFICANT CHANGE UP
LYMPHOCYTES # BLD AUTO: 1.48 K/UL — SIGNIFICANT CHANGE UP (ref 1.2–3.4)
LYMPHOCYTES # BLD AUTO: 26.5 % — SIGNIFICANT CHANGE UP (ref 20.5–51.1)
MCHC RBC-ENTMCNC: 30.1 PG — SIGNIFICANT CHANGE UP (ref 27–31)
MCHC RBC-ENTMCNC: 31.4 G/DL — LOW (ref 32–37)
MCV RBC AUTO: 96.1 FL — SIGNIFICANT CHANGE UP (ref 81–99)
MONOCYTES # BLD AUTO: 0.36 K/UL — SIGNIFICANT CHANGE UP (ref 0.1–0.6)
MONOCYTES NFR BLD AUTO: 6.4 % — SIGNIFICANT CHANGE UP (ref 1.7–9.3)
NEUTROPHILS # BLD AUTO: 3.56 K/UL — SIGNIFICANT CHANGE UP (ref 1.4–6.5)
NEUTROPHILS NFR BLD AUTO: 63.7 % — SIGNIFICANT CHANGE UP (ref 42.2–75.2)
NITRITE UR-MCNC: NEGATIVE — SIGNIFICANT CHANGE UP
NRBC # BLD: 0 /100 WBCS — SIGNIFICANT CHANGE UP (ref 0–0)
PH UR: 5.5 — SIGNIFICANT CHANGE UP (ref 5–8)
PLATELET # BLD AUTO: 242 K/UL — SIGNIFICANT CHANGE UP (ref 130–400)
POTASSIUM SERPL-MCNC: 4.8 MMOL/L — SIGNIFICANT CHANGE UP (ref 3.5–5)
POTASSIUM SERPL-SCNC: 4.8 MMOL/L — SIGNIFICANT CHANGE UP (ref 3.5–5)
PROT SERPL-MCNC: 6.8 G/DL — SIGNIFICANT CHANGE UP (ref 6–8)
PROT UR-MCNC: NEGATIVE — SIGNIFICANT CHANGE UP
PROTHROM AB SERPL-ACNC: 12.2 SEC — SIGNIFICANT CHANGE UP (ref 9.95–12.87)
RBC # BLD: 4.58 M/UL — SIGNIFICANT CHANGE UP (ref 4.2–5.4)
RBC # FLD: 14.3 % — SIGNIFICANT CHANGE UP (ref 11.5–14.5)
SODIUM SERPL-SCNC: 140 MMOL/L — SIGNIFICANT CHANGE UP (ref 135–146)
SP GR SPEC: 1.03 — HIGH (ref 1.01–1.03)
UROBILINOGEN FLD QL: SIGNIFICANT CHANGE UP
WBC # BLD: 5.59 K/UL — SIGNIFICANT CHANGE UP (ref 4.8–10.8)
WBC # FLD AUTO: 5.59 K/UL — SIGNIFICANT CHANGE UP (ref 4.8–10.8)

## 2020-11-02 PROCEDURE — 93010 ELECTROCARDIOGRAM REPORT: CPT | Mod: NC

## 2020-11-02 NOTE — H&P PST ADULT - HISTORY OF PRESENT ILLNESS
51 y/o female with PMH of CAD w/ 4 stents (on Plavix), last coronary stent 2 years ago, atrial fibrillation (on Xarelto), DM, and HTN, TIA, presented to [facility] with left eye vision changes, work up revealed bilateral high-grade ICA stenosis, underwent left CEA on 8/25/2020, left temporal artery biopsy on 9/29/2020 that was negative for temporal arteritis, still with persistent left eye vision problems.CTA of neck done on 9/5/2020 showed high-grade right carotid stenosis.  11/2/2002 Patient presents to [facility] for Right CEA due to left eye stroke and vision changes. Denies any c/o cp, sob, palpitations fever, cough or dysuria.Ex tolerance of 1 fos walk with out SOB. Positive JOCELYNE and CPAP uses at night.   Patient denies any covid s/s, or tested positive in the past  Patient advised self quarantine till day of procedure  PT DENIES ANY RASHES, ABRASION, OR OPEN WOUNDS OR CUTS  AS PER THE PT, THIS IS HIS/HER COMPLETE MEDICAL AND SURGICAL HX, INCLUDING MEDICATIONS PRESCRIBED AND OVER THE COUNTER  Anesthesia Alert  NO--Difficult Airway  NO--History of neck surgery or radiation  NO--Limited ROM of neck  NO--History of Malignant hyperthermia  NO--Personal or family history of Pseudocholinesterase deficiency  NO--Prior Anesthesia Complication  NO--Latex Allergy  NO--Loose teeth  NO--History of Rheumatoid Arthritis  Positive--JOCELYNE, CPAP uses at night   NO--Other_____       51 y/o female with PMH of CAD w/ 4 stents (on Plavix), last coronary stent 2 years ago, atrial fibrillation (on Xarelto), DM, and HTN, TIA, presented to [facility] with left eye vision changes, work up revealed bilateral high-grade ICA stenosis, underwent left CEA on 8/25/2020, left temporal artery biopsy on 9/29/2020 that was negative for temporal arteritis, still with persistent left eye vision problems.CTA of neck done on 9/5/2020 showed high-grade right carotid stenosis.  11/2/2002 Patient presents to [facility] for Right CEA due to left eye stroke and vision changes. Denies any c/o cp, sob, palpitations fever, cough or dysuria.Ex tolerance of 1 fos walk with out SOB. Positive JOCELYNE and CPAP uses at night.   Patient denies any covid s/s, or tested positive in the past  Patient advised self quarantine till day of procedure  PT DENIES ANY RASHES, ABRASION, OR OPEN WOUNDS OR CUTS  AS PER THE PT, THIS IS HIS/HER COMPLETE MEDICAL AND SURGICAL HX, INCLUDING MEDICATIONS PRESCRIBED AND OVER THE COUNTER  Anesthesia Alert  NO--Difficult Airway  NO--History of neck surgery or radiation  NO--Limited ROM of neck  NO--History of Malignant hyperthermia  NO--Personal or family history of Pseudocholinesterase deficiency  NO--Prior Anesthesia Complication  NO--Latex Allergy  NO--Loose teeth  NO--History of Rheumatoid Arthritis  Positive--JOCELYNE, CPAP uses at night   BMI 46.8

## 2020-11-02 NOTE — H&P PST ADULT - REASON FOR ADMISSION
51 Yo m presents to [facility] for Right caroid endarterectomy with patch under GA on 11/17/2020 at [facility] by CLARISSA Sandhu   Covid testing on 11/14/2020 at 0940 at [facility]

## 2020-11-02 NOTE — H&P PST ADULT - NSICDXPASTSURGICALHX_GEN_ALL_CORE_FT
PAST SURGICAL HISTORY:  H/O  section     H/O endarterectomy LT-2020    H/O hernia repair     History of cholecystectomy     History of temporal artery biopsy 2020

## 2020-11-09 ENCOUNTER — OUTPATIENT (OUTPATIENT)
Dept: OUTPATIENT SERVICES | Facility: HOSPITAL | Age: 50
LOS: 1 days | Discharge: HOME | End: 2020-11-09
Payer: MEDICAID

## 2020-11-09 ENCOUNTER — APPOINTMENT (OUTPATIENT)
Dept: OPHTHALMOLOGY | Facility: CLINIC | Age: 50
End: 2020-11-09

## 2020-11-09 DIAGNOSIS — Z90.49 ACQUIRED ABSENCE OF OTHER SPECIFIED PARTS OF DIGESTIVE TRACT: Chronic | ICD-10-CM

## 2020-11-09 DIAGNOSIS — Z98.891 HISTORY OF UTERINE SCAR FROM PREVIOUS SURGERY: Chronic | ICD-10-CM

## 2020-11-09 DIAGNOSIS — Z98.890 OTHER SPECIFIED POSTPROCEDURAL STATES: Chronic | ICD-10-CM

## 2020-11-09 PROCEDURE — 92004 COMPRE OPH EXAM NEW PT 1/>: CPT

## 2020-11-12 ENCOUNTER — APPOINTMENT (OUTPATIENT)
Dept: OBGYN | Facility: CLINIC | Age: 50
End: 2020-11-12
Payer: COMMERCIAL

## 2020-11-12 VITALS
WEIGHT: 290 LBS | BODY MASS INDEX: 46.61 KG/M2 | DIASTOLIC BLOOD PRESSURE: 80 MMHG | HEIGHT: 66 IN | SYSTOLIC BLOOD PRESSURE: 135 MMHG

## 2020-11-12 PROCEDURE — 99072 ADDL SUPL MATRL&STAF TM PHE: CPT

## 2020-11-12 PROCEDURE — 99396 PREV VISIT EST AGE 40-64: CPT

## 2020-11-12 NOTE — COUNSELING
[Breast Self Exam] : breast self exam [Bladder Hygiene] : bladder hygiene [Lab Results] : lab results [FreeTextEntry2] : Discussed yeast and dm.  hemoglobiin a1c 6.9

## 2020-11-14 ENCOUNTER — OUTPATIENT (OUTPATIENT)
Dept: OUTPATIENT SERVICES | Facility: HOSPITAL | Age: 50
LOS: 1 days | Discharge: HOME | End: 2020-11-14

## 2020-11-14 ENCOUNTER — LABORATORY RESULT (OUTPATIENT)
Age: 50
End: 2020-11-14

## 2020-11-14 DIAGNOSIS — Z98.890 OTHER SPECIFIED POSTPROCEDURAL STATES: Chronic | ICD-10-CM

## 2020-11-14 DIAGNOSIS — Z98.891 HISTORY OF UTERINE SCAR FROM PREVIOUS SURGERY: Chronic | ICD-10-CM

## 2020-11-14 DIAGNOSIS — Z90.49 ACQUIRED ABSENCE OF OTHER SPECIFIED PARTS OF DIGESTIVE TRACT: Chronic | ICD-10-CM

## 2020-11-14 DIAGNOSIS — Z11.59 ENCOUNTER FOR SCREENING FOR OTHER VIRAL DISEASES: ICD-10-CM

## 2020-11-17 ENCOUNTER — APPOINTMENT (OUTPATIENT)
Dept: VASCULAR SURGERY | Facility: HOSPITAL | Age: 50
End: 2020-11-17

## 2020-11-17 ENCOUNTER — RESULT REVIEW (OUTPATIENT)
Age: 50
End: 2020-11-17

## 2020-11-17 ENCOUNTER — INPATIENT (INPATIENT)
Facility: HOSPITAL | Age: 50
LOS: 0 days | Discharge: HOME | End: 2020-11-18
Attending: SURGERY | Admitting: SURGERY
Payer: COMMERCIAL

## 2020-11-17 VITALS
DIASTOLIC BLOOD PRESSURE: 63 MMHG | HEIGHT: 66 IN | OXYGEN SATURATION: 96 % | HEART RATE: 73 BPM | WEIGHT: 293 LBS | SYSTOLIC BLOOD PRESSURE: 134 MMHG | RESPIRATION RATE: 18 BRPM | TEMPERATURE: 98 F

## 2020-11-17 DIAGNOSIS — Z98.890 OTHER SPECIFIED POSTPROCEDURAL STATES: Chronic | ICD-10-CM

## 2020-11-17 DIAGNOSIS — Z90.49 ACQUIRED ABSENCE OF OTHER SPECIFIED PARTS OF DIGESTIVE TRACT: Chronic | ICD-10-CM

## 2020-11-17 DIAGNOSIS — Z98.891 HISTORY OF UTERINE SCAR FROM PREVIOUS SURGERY: Chronic | ICD-10-CM

## 2020-11-17 LAB
A VAGINAE DNA VAG QL NAA+PROBE: NORMAL
ALBUMIN SERPL ELPH-MCNC: 3.8 G/DL — SIGNIFICANT CHANGE UP (ref 3.5–5.2)
ALP SERPL-CCNC: 101 U/L — SIGNIFICANT CHANGE UP (ref 30–115)
ALT FLD-CCNC: 27 U/L — SIGNIFICANT CHANGE UP (ref 0–41)
ANION GAP SERPL CALC-SCNC: 11 MMOL/L — SIGNIFICANT CHANGE UP (ref 7–14)
APTT BLD: 56.7 SEC — HIGH (ref 27–39.2)
AST SERPL-CCNC: 20 U/L — SIGNIFICANT CHANGE UP (ref 0–41)
BILIRUB SERPL-MCNC: 0.9 MG/DL — SIGNIFICANT CHANGE UP (ref 0.2–1.2)
BUN SERPL-MCNC: 11 MG/DL — SIGNIFICANT CHANGE UP (ref 10–20)
BVAB2 DNA VAG QL NAA+PROBE: NORMAL
C KRUSEI DNA VAG QL NAA+PROBE: NEGATIVE
C TRACH RRNA SPEC QL NAA+PROBE: NEGATIVE
CALCIUM SERPL-MCNC: 8.9 MG/DL — SIGNIFICANT CHANGE UP (ref 8.5–10.1)
CHLORIDE SERPL-SCNC: 101 MMOL/L — SIGNIFICANT CHANGE UP (ref 98–110)
CO2 SERPL-SCNC: 25 MMOL/L — SIGNIFICANT CHANGE UP (ref 17–32)
CREAT SERPL-MCNC: 0.6 MG/DL — LOW (ref 0.7–1.5)
GLUCOSE BLDC GLUCOMTR-MCNC: 150 MG/DL — HIGH (ref 70–99)
GLUCOSE BLDC GLUCOMTR-MCNC: 181 MG/DL — HIGH (ref 70–99)
GLUCOSE BLDC GLUCOMTR-MCNC: 196 MG/DL — HIGH (ref 70–99)
GLUCOSE SERPL-MCNC: 180 MG/DL — HIGH (ref 70–99)
HCT VFR BLD CALC: 36.6 % — LOW (ref 37–47)
HCT VFR BLD CALC: 38.4 % — SIGNIFICANT CHANGE UP (ref 37–47)
HGB BLD-MCNC: 11.7 G/DL — LOW (ref 12–16)
HGB BLD-MCNC: 12.2 G/DL — SIGNIFICANT CHANGE UP (ref 12–16)
HPV HIGH+LOW RISK DNA PNL CVX: NOT DETECTED
INR BLD: 1.11 RATIO — SIGNIFICANT CHANGE UP (ref 0.65–1.3)
MAGNESIUM SERPL-MCNC: 1.4 MG/DL — LOW (ref 1.8–2.4)
MCHC RBC-ENTMCNC: 30.3 PG — SIGNIFICANT CHANGE UP (ref 27–31)
MCHC RBC-ENTMCNC: 30.5 PG — SIGNIFICANT CHANGE UP (ref 27–31)
MCHC RBC-ENTMCNC: 31.8 G/DL — LOW (ref 32–37)
MCHC RBC-ENTMCNC: 32 G/DL — SIGNIFICANT CHANGE UP (ref 32–37)
MCV RBC AUTO: 95.3 FL — SIGNIFICANT CHANGE UP (ref 81–99)
MCV RBC AUTO: 95.3 FL — SIGNIFICANT CHANGE UP (ref 81–99)
MEGA1 DNA VAG QL NAA+PROBE: NORMAL
N GONORRHOEA RRNA SPEC QL NAA+PROBE: NEGATIVE
NRBC # BLD: 0 /100 WBCS — SIGNIFICANT CHANGE UP (ref 0–0)
NRBC # BLD: 0 /100 WBCS — SIGNIFICANT CHANGE UP (ref 0–0)
PHOSPHATE SERPL-MCNC: 2.7 MG/DL — SIGNIFICANT CHANGE UP (ref 2.1–4.9)
PLATELET # BLD AUTO: 230 K/UL — SIGNIFICANT CHANGE UP (ref 130–400)
PLATELET # BLD AUTO: 232 K/UL — SIGNIFICANT CHANGE UP (ref 130–400)
POTASSIUM SERPL-MCNC: 4 MMOL/L — SIGNIFICANT CHANGE UP (ref 3.5–5)
POTASSIUM SERPL-SCNC: 4 MMOL/L — SIGNIFICANT CHANGE UP (ref 3.5–5)
PROT SERPL-MCNC: 6.2 G/DL — SIGNIFICANT CHANGE UP (ref 6–8)
PROTHROM AB SERPL-ACNC: 12.8 SEC — SIGNIFICANT CHANGE UP (ref 9.95–12.87)
RBC # BLD: 3.84 M/UL — LOW (ref 4.2–5.4)
RBC # BLD: 4.03 M/UL — LOW (ref 4.2–5.4)
RBC # FLD: 14.5 % — SIGNIFICANT CHANGE UP (ref 11.5–14.5)
RBC # FLD: 14.6 % — HIGH (ref 11.5–14.5)
SODIUM SERPL-SCNC: 137 MMOL/L — SIGNIFICANT CHANGE UP (ref 135–146)
T VAGINALIS RRNA SPEC QL NAA+PROBE: NEGATIVE
TROPONIN T SERPL-MCNC: <0.01 NG/ML — SIGNIFICANT CHANGE UP
WBC # BLD: 7.84 K/UL — SIGNIFICANT CHANGE UP (ref 4.8–10.8)
WBC # BLD: 8.71 K/UL — SIGNIFICANT CHANGE UP (ref 4.8–10.8)
WBC # FLD AUTO: 7.84 K/UL — SIGNIFICANT CHANGE UP (ref 4.8–10.8)
WBC # FLD AUTO: 8.71 K/UL — SIGNIFICANT CHANGE UP (ref 4.8–10.8)

## 2020-11-17 PROCEDURE — 35301 RECHANNELING OF ARTERY: CPT | Mod: RT,79

## 2020-11-17 PROCEDURE — 88311 DECALCIFY TISSUE: CPT | Mod: 26

## 2020-11-17 PROCEDURE — 99291 CRITICAL CARE FIRST HOUR: CPT

## 2020-11-17 PROCEDURE — 88304 TISSUE EXAM BY PATHOLOGIST: CPT | Mod: 26

## 2020-11-17 PROCEDURE — 93010 ELECTROCARDIOGRAM REPORT: CPT

## 2020-11-17 RX ORDER — SODIUM CHLORIDE 9 MG/ML
1000 INJECTION, SOLUTION INTRAVENOUS
Refills: 0 | Status: DISCONTINUED | OUTPATIENT
Start: 2020-11-17 | End: 2020-11-18

## 2020-11-17 RX ORDER — DEXTROSE 50 % IN WATER 50 %
15 SYRINGE (ML) INTRAVENOUS ONCE
Refills: 0 | Status: DISCONTINUED | OUTPATIENT
Start: 2020-11-17 | End: 2020-11-18

## 2020-11-17 RX ORDER — CEFAZOLIN SODIUM 1 G
2000 VIAL (EA) INJECTION EVERY 8 HOURS
Refills: 0 | Status: COMPLETED | OUTPATIENT
Start: 2020-11-17 | End: 2020-11-18

## 2020-11-17 RX ORDER — SENNA PLUS 8.6 MG/1
2 TABLET ORAL AT BEDTIME
Refills: 0 | Status: DISCONTINUED | OUTPATIENT
Start: 2020-11-17 | End: 2020-11-18

## 2020-11-17 RX ORDER — EMPAGLIFLOZIN 10 MG/1
1 TABLET, FILM COATED ORAL
Qty: 0 | Refills: 0 | DISCHARGE

## 2020-11-17 RX ORDER — DEXTROSE 50 % IN WATER 50 %
25 SYRINGE (ML) INTRAVENOUS ONCE
Refills: 0 | Status: DISCONTINUED | OUTPATIENT
Start: 2020-11-17 | End: 2020-11-18

## 2020-11-17 RX ORDER — INSULIN HUMAN 100 [IU]/ML
INJECTION, SOLUTION SUBCUTANEOUS
Refills: 0 | Status: DISCONTINUED | OUTPATIENT
Start: 2020-11-17 | End: 2020-11-18

## 2020-11-17 RX ORDER — HYDROMORPHONE HYDROCHLORIDE 2 MG/ML
1 INJECTION INTRAMUSCULAR; INTRAVENOUS; SUBCUTANEOUS EVERY 4 HOURS
Refills: 0 | Status: DISCONTINUED | OUTPATIENT
Start: 2020-11-17 | End: 2020-11-17

## 2020-11-17 RX ORDER — DEXTROSE 50 % IN WATER 50 %
12.5 SYRINGE (ML) INTRAVENOUS ONCE
Refills: 0 | Status: DISCONTINUED | OUTPATIENT
Start: 2020-11-17 | End: 2020-11-18

## 2020-11-17 RX ORDER — ONDANSETRON 8 MG/1
4 TABLET, FILM COATED ORAL ONCE
Refills: 0 | Status: DISCONTINUED | OUTPATIENT
Start: 2020-11-17 | End: 2020-11-17

## 2020-11-17 RX ORDER — METOPROLOL TARTRATE 50 MG
100 TABLET ORAL AT BEDTIME
Refills: 0 | Status: DISCONTINUED | OUTPATIENT
Start: 2020-11-17 | End: 2020-11-18

## 2020-11-17 RX ORDER — RIVAROXABAN 15 MG-20MG
20 KIT ORAL
Refills: 0 | Status: DISCONTINUED | OUTPATIENT
Start: 2020-11-18 | End: 2020-11-18

## 2020-11-17 RX ORDER — METOPROLOL TARTRATE 50 MG
50 TABLET ORAL DAILY
Refills: 0 | Status: DISCONTINUED | OUTPATIENT
Start: 2020-11-18 | End: 2020-11-18

## 2020-11-17 RX ORDER — ASPIRIN/CALCIUM CARB/MAGNESIUM 324 MG
81 TABLET ORAL DAILY
Refills: 0 | Status: DISCONTINUED | OUTPATIENT
Start: 2020-11-18 | End: 2020-11-18

## 2020-11-17 RX ORDER — ATORVASTATIN CALCIUM 80 MG/1
80 TABLET, FILM COATED ORAL AT BEDTIME
Refills: 0 | Status: DISCONTINUED | OUTPATIENT
Start: 2020-11-17 | End: 2020-11-18

## 2020-11-17 RX ORDER — INSULIN HUMAN 100 [IU]/ML
INJECTION, SOLUTION SUBCUTANEOUS EVERY 4 HOURS
Refills: 0 | Status: DISCONTINUED | OUTPATIENT
Start: 2020-11-17 | End: 2020-11-17

## 2020-11-17 RX ORDER — ACETAMINOPHEN 500 MG
650 TABLET ORAL EVERY 6 HOURS
Refills: 0 | Status: DISCONTINUED | OUTPATIENT
Start: 2020-11-17 | End: 2020-11-18

## 2020-11-17 RX ORDER — PANTOPRAZOLE SODIUM 20 MG/1
40 TABLET, DELAYED RELEASE ORAL
Refills: 0 | Status: DISCONTINUED | OUTPATIENT
Start: 2020-11-17 | End: 2020-11-18

## 2020-11-17 RX ORDER — HYDROMORPHONE HYDROCHLORIDE 2 MG/ML
0.5 INJECTION INTRAMUSCULAR; INTRAVENOUS; SUBCUTANEOUS
Refills: 0 | Status: DISCONTINUED | OUTPATIENT
Start: 2020-11-17 | End: 2020-11-17

## 2020-11-17 RX ORDER — MAGNESIUM SULFATE 500 MG/ML
2 VIAL (ML) INJECTION ONCE
Refills: 0 | Status: COMPLETED | OUTPATIENT
Start: 2020-11-17 | End: 2020-11-17

## 2020-11-17 RX ORDER — GLUCAGON INJECTION, SOLUTION 0.5 MG/.1ML
1 INJECTION, SOLUTION SUBCUTANEOUS ONCE
Refills: 0 | Status: DISCONTINUED | OUTPATIENT
Start: 2020-11-17 | End: 2020-11-18

## 2020-11-17 RX ADMIN — Medication 62.5 MILLIMOLE(S): at 20:30

## 2020-11-17 RX ADMIN — ATORVASTATIN CALCIUM 80 MILLIGRAM(S): 80 TABLET, FILM COATED ORAL at 21:44

## 2020-11-17 RX ADMIN — Medication 50 GRAM(S): at 20:45

## 2020-11-17 RX ADMIN — INSULIN HUMAN 3: 100 INJECTION, SOLUTION SUBCUTANEOUS at 23:06

## 2020-11-17 RX ADMIN — INSULIN HUMAN 3: 100 INJECTION, SOLUTION SUBCUTANEOUS at 17:57

## 2020-11-17 RX ADMIN — Medication 100 MILLIGRAM(S): at 21:44

## 2020-11-17 NOTE — CHART NOTE - NSCHARTNOTEFT_GEN_A_CORE
Surgery post-op Note 11-17-20 @ 19:48    Procedure: Right CEA    S: 49 y/o female s/p right CEA . Patient is laying comfortably in the bed. Denies any pain at the incision site, fever, chills, chest pain, shortness of breath.    O:   T(C): 37.1 (11-17-20 @ 17:30), Max: 37.1 (11-17-20 @ 16:30)  HR: 74 (11-17-20 @ 19:15) (69 - 89)  BP: 115/52 (11-17-20 @ 19:15) (102/51 - 134/63)  RR: 19 (11-17-20 @ 19:15) (15 - 20)  SpO2: 95% (11-17-20 @ 19:15) (94% - 99%)    PE:  General:  NAD  Heart: S1 and S2 noted  Lungs: Clear to auscultation bilaterally  Abdomen: Soft Non tender, Non distended.   Extremities: Normal in color and temperatue.   Neuro: AAOx 3. Motor and sensory grosly intact, cranial nerves intact  Wound: No bleeding or discharge noted from the incision site in the neck. dressing with some serosangenous drainage. No hematoma noted      11-17-20 @ 07:01  -  11-17-20 @ 19:48  --------------------------------------------------------  IN: 0 mL / OUT: 100 mL / NET: -100 mL    acetaminophen   Tablet .. 650 milliGRAM(s) Oral every 6 hours PRN  atorvastatin 80 milliGRAM(s) Oral at bedtime  ceFAZolin   IVPB 2000 milliGRAM(s) IV Intermittent every 8 hours  dextrose 40% Gel 15 Gram(s) Oral once  dextrose 5%. 1000 milliLiter(s) IV Continuous <Continuous>  dextrose 50% Injectable 25 Gram(s) IV Push once  dextrose 50% Injectable 12.5 Gram(s) IV Push once  dextrose 50% Injectable 25 Gram(s) IV Push once  glucagon  Injectable 1 milliGRAM(s) IntraMuscular once  insulin regular  human corrective regimen sliding scale   SubCutaneous Before meals and at bedtime  lactated ringers. 1000 milliLiter(s) IV Continuous <Continuous>  pantoprazole    Tablet 40 milliGRAM(s) Oral before breakfast  PARoxetine 30 milliGRAM(s) Oral daily  senna 2 Tablet(s) Oral at bedtime PRN  sodium phosphate IVPB 15 milliMole(s) IV Intermittent once    Assessment:  49 y/o female s/p right CEA.   appears well, neurologically intact.     Plan:  Pain control  BP control  neurological checks

## 2020-11-17 NOTE — ASU PATIENT PROFILE, ADULT - PSH
H/O  section    H/O endarterectomy  LT-2020  H/O hernia repair    History of cholecystectomy    History of temporal artery biopsy  2020

## 2020-11-17 NOTE — CONSULT NOTE ADULT - ASSESSMENT
ASSESSMENT/PLAN: 50yFemale POD 0 s/p      Neurologic:    Respiratory:    Cardiovascular:    Gastrointestinal/Nutrition:    Genitourinary/Renal:    Hematologic:    Infectious Disease:    Endocrine:    Disposition: ASSESSMENT/PLAN: 50yFemale POD 0 s/p RCEA      Neurologic:  S/p RCEA:  - Dressing C/d/i, no hematoma   - q1 vascular checks     Pain control:   - Tylenol     Respiratory:  JOCELYNE:  - Did not bring home CPAP, does not remember settings  - CPAP overnight  - IS/PT  - AM CXR    Cardiovascular:   CAD s/p PCI w/stents x4 (2018), afib   - On home ASA, restart 11/18 per primary team   - Restart home xeralto on 11/18 per vascular  - Restart home metoprolol/statin   -  Echo       . LV Ejection Fraction by Leos's Method with a biplane EF of 64 %.   2. Mildly increased LV wall thickness.   3. Spectral Doppler shows impaired relaxation pattern of left ventricular myocardial filling (Grade I diastolic dysfunction).   4. Mildly enlarged left atrium.   5. Normal right atrial size.   6. Mild mitral annular calcification.   7. Mild mitral valve regurgitation.   8. Color flow doppler and intravenous injection of agitated saline demonstrates the presence of an intact intra atrial    1)CAD, S/P PTCA/STENT stable     Continue ASA, metoprolol, and statin treatment.    2)Paroxysmal atrial fibrillation , now in sinus rhythm     She was on Xarelto at home, presently on hold,  restart post CEA due to high risk of embolic event ([id number]     score is 5 ).    3)Left eye visual field loss, could be embolic     F/U neurology     F/U vascular surgery regarding the need for CEA.     She will benefit from anticoagulation rather than dual antiplatelet treatment.     There is no cardiac indication for plavix , We can continue ASA along with anticoagulation.    4)HTN stable.    5)Type2 Diabetes     Continue present treatment.     6)Hyperlipidemia     Continue statin treatment.    7)Right Carotid stenosis 70%  If CEA is indicated at this time she is at low risk for malia-op events.        Gastrointestinal/Nutrition:    Genitourinary/Renal:    Hematologic:    Infectious Disease:    Endocrine:    Disposition: ASSESSMENT/PLAN: 50yFemale POD 0 s/p RCEA      Neurologic:  S/p RCEA:  - Dressing C/d/i, no hematoma   - q1 vascular checks     Pain control:   - Tylenol     Respiratory:  JOCELYNE:  - Did not bring home CPAP, does not remember settings  - CPAP overnight  - IS/PT  - AM CXR    Cardiovascular:   CAD s/p PCI w/stents x4 (2018), afib , Chads vasc 5   - On home ASA, restart 11/18 per primary team   - Restart home Xarelto on 11/18 per vascular  - Restart home metoprolol/statin   - 9/20 Echo: EF 64%, g1dd,   - Cardiologist?    Gastrointestinal/Nutrition:  Diet:  - Dash    Prophylaxis:  - PPI  - Senna    Genitourinary/Renal:  - No acute issues  - No koki TOTERESA 23:00   - F/u lytes and replete  - IVF, IVL when tolerating diet     Hematologic:    Infectious Disease:    Endocrine:    Disposition: ASSESSMENT/PLAN: 50yFemale POD 0 s/p RCEA      Neurologic:  S/p RCEA:  - Dressing C/d/i, no hematoma   - q1 vascular checks     Pain control:   - Tylenol     Respiratory:  JOCELYNE:  - Did not bring home CPAP, does not remember settings  - CPAP overnight  - IS/PT  - AM CXR    Cardiovascular:   CAD s/p PCI w/stents x4 (2018), afib , Chads vasc 5   - On home ASA, restart 11/18 per primary team   - Restart home Xarelto on 11/18 per vascular  - Restart home metoprolol/statin   - 9/20 Echo: EF 64%, g1dd,   - Cardiologist?  - F/u CE x3    Gastrointestinal/Nutrition:  Diet:  - Dash    Prophylaxis:  - PPI  - Senna    Genitourinary/Renal:  - No acute issues  - No telles, TOV 23:00   - F/u lytes and replete  - IVF, IVL when tolerating diet     Hematologic:  - No acute issues  - Restart home Xarelto, ASA in am   - Monitor H/H     Infectious Disease:  - No acute issues  - Periop abx  - Monitor for S/S of infection     Endocrine:  Hx of DM:   - ISS, maintain -180     Disposition: SICU

## 2020-11-17 NOTE — ASU PATIENT PROFILE, ADULT - PREOP PAIN SCORE
[FreeTextEntry1] : 07/17/2020\par Pulmonary function testing\par FEV1, FVC, and FEV1/FVC are within normal limits. There was not a significant response to inhaled bronchodilator. TLC and subdivisions are normal. RV/TLC ratio is normal. Single breath diffusion capacity is normal. 
0

## 2020-11-18 ENCOUNTER — TRANSCRIPTION ENCOUNTER (OUTPATIENT)
Age: 50
End: 2020-11-18

## 2020-11-18 VITALS
RESPIRATION RATE: 32 BRPM | OXYGEN SATURATION: 96 % | SYSTOLIC BLOOD PRESSURE: 175 MMHG | DIASTOLIC BLOOD PRESSURE: 84 MMHG | HEART RATE: 72 BPM

## 2020-11-18 LAB
ANION GAP SERPL CALC-SCNC: 9 MMOL/L — SIGNIFICANT CHANGE UP (ref 7–14)
BUN SERPL-MCNC: 12 MG/DL — SIGNIFICANT CHANGE UP (ref 10–20)
CALCIUM SERPL-MCNC: 8.7 MG/DL — SIGNIFICANT CHANGE UP (ref 8.5–10.1)
CHLORIDE SERPL-SCNC: 102 MMOL/L — SIGNIFICANT CHANGE UP (ref 98–110)
CK MB CFR SERPL CALC: 1.2 NG/ML — SIGNIFICANT CHANGE UP (ref 0.6–6.3)
CK MB CFR SERPL CALC: 1.4 NG/ML — SIGNIFICANT CHANGE UP (ref 0.6–6.3)
CK SERPL-CCNC: 122 U/L — SIGNIFICANT CHANGE UP (ref 0–225)
CK SERPL-CCNC: 150 U/L — SIGNIFICANT CHANGE UP (ref 0–225)
CO2 SERPL-SCNC: 27 MMOL/L — SIGNIFICANT CHANGE UP (ref 17–32)
CREAT SERPL-MCNC: 0.6 MG/DL — LOW (ref 0.7–1.5)
GLUCOSE BLDC GLUCOMTR-MCNC: 145 MG/DL — HIGH (ref 70–99)
GLUCOSE SERPL-MCNC: 178 MG/DL — HIGH (ref 70–99)
MAGNESIUM SERPL-MCNC: 1.7 MG/DL — LOW (ref 1.8–2.4)
PHOSPHATE SERPL-MCNC: 4.6 MG/DL — SIGNIFICANT CHANGE UP (ref 2.1–4.9)
POTASSIUM SERPL-MCNC: 4 MMOL/L — SIGNIFICANT CHANGE UP (ref 3.5–5)
POTASSIUM SERPL-SCNC: 4 MMOL/L — SIGNIFICANT CHANGE UP (ref 3.5–5)
SODIUM SERPL-SCNC: 138 MMOL/L — SIGNIFICANT CHANGE UP (ref 135–146)
TROPONIN T SERPL-MCNC: <0.01 NG/ML — SIGNIFICANT CHANGE UP
TROPONIN T SERPL-MCNC: <0.01 NG/ML — SIGNIFICANT CHANGE UP

## 2020-11-18 PROCEDURE — 71045 X-RAY EXAM CHEST 1 VIEW: CPT | Mod: 26

## 2020-11-18 PROCEDURE — 99291 CRITICAL CARE FIRST HOUR: CPT

## 2020-11-18 RX ORDER — RIVAROXABAN 15 MG-20MG
20 KIT ORAL
Refills: 0 | Status: DISCONTINUED | OUTPATIENT
Start: 2020-11-18 | End: 2020-11-18

## 2020-11-18 RX ORDER — ACETAMINOPHEN 500 MG
1000 TABLET ORAL ONCE
Refills: 0 | Status: DISCONTINUED | OUTPATIENT
Start: 2020-11-18 | End: 2020-11-18

## 2020-11-18 RX ORDER — ASPIRIN/CALCIUM CARB/MAGNESIUM 324 MG
81 TABLET ORAL DAILY
Refills: 0 | Status: DISCONTINUED | OUTPATIENT
Start: 2020-11-18 | End: 2020-11-18

## 2020-11-18 RX ORDER — ACETAMINOPHEN 500 MG
1000 TABLET ORAL ONCE
Refills: 0 | Status: COMPLETED | OUTPATIENT
Start: 2020-11-18 | End: 2020-11-18

## 2020-11-18 RX ORDER — ACETAMINOPHEN 500 MG
2 TABLET ORAL
Qty: 0 | Refills: 0 | DISCHARGE
Start: 2020-11-18

## 2020-11-18 RX ORDER — MAGNESIUM SULFATE 500 MG/ML
2 VIAL (ML) INJECTION ONCE
Refills: 0 | Status: COMPLETED | OUTPATIENT
Start: 2020-11-18 | End: 2020-11-18

## 2020-11-18 RX ORDER — INFLUENZA VIRUS VACCINE 15; 15; 15; 15 UG/.5ML; UG/.5ML; UG/.5ML; UG/.5ML
0.5 SUSPENSION INTRAMUSCULAR ONCE
Refills: 0 | Status: COMPLETED | OUTPATIENT
Start: 2020-11-18 | End: 2020-11-18

## 2020-11-18 RX ADMIN — Medication 100 MILLIGRAM(S): at 05:41

## 2020-11-18 RX ADMIN — Medication 50 MILLIGRAM(S): at 11:18

## 2020-11-18 RX ADMIN — Medication 650 MILLIGRAM(S): at 00:30

## 2020-11-18 RX ADMIN — Medication 81 MILLIGRAM(S): at 08:40

## 2020-11-18 RX ADMIN — Medication 400 MILLIGRAM(S): at 06:30

## 2020-11-18 RX ADMIN — Medication 25 GRAM(S): at 05:41

## 2020-11-18 RX ADMIN — Medication 650 MILLIGRAM(S): at 11:10

## 2020-11-18 RX ADMIN — RIVAROXABAN 20 MILLIGRAM(S): KIT at 08:40

## 2020-11-18 RX ADMIN — Medication 650 MILLIGRAM(S): at 10:32

## 2020-11-18 RX ADMIN — Medication 650 MILLIGRAM(S): at 01:00

## 2020-11-18 RX ADMIN — PANTOPRAZOLE SODIUM 40 MILLIGRAM(S): 20 TABLET, DELAYED RELEASE ORAL at 05:42

## 2020-11-18 NOTE — DISCHARGE NOTE PROVIDER - NSDCCPCAREPLAN_GEN_ALL_CORE_FT
PRINCIPAL DISCHARGE DIAGNOSIS  Diagnosis: Carotid stenosis, right  Assessment and Plan of Treatment:

## 2020-11-18 NOTE — DISCHARGE NOTE NURSING/CASE MANAGEMENT/SOCIAL WORK - PATIENT PORTAL LINK FT
You can access the FollowMyHealth Patient Portal offered by [facility] by registering at the following website: [email or web address]. By joining [employer]’s FollowMyHealth portal, you will also be able to view your health information using other applications (apps) compatible with our system.

## 2020-11-18 NOTE — DISCHARGE NOTE PROVIDER - NSDCACTIVITY_GEN_ALL_CORE
No heavy lifting/straining/Walking - Outdoors allowed/Stairs allowed/Walking - Indoors allowed/Do not make important decisions/Do not drive or operate machinery/Showering allowed

## 2020-11-18 NOTE — DISCHARGE NOTE PROVIDER - CARE PROVIDER_API CALL
Alfredo Underwood  SURGERY  [address] [place] [address]  Phone: [phone]  Fax: [phone]  Follow Up Time: 2 weeks

## 2020-11-18 NOTE — PROGRESS NOTE ADULT - SUBJECTIVE AND OBJECTIVE BOX
MARCIAL MCCORMICK  [id number]  50y Female s/p RCEA    Indication for ICU admission: S/P rcea upgraded for close HD monitoring    Admit Date:   ICU Date:   OR Date:     No Known Allergies    PAST MEDICAL & SURGICAL HISTORY:  H/O blurred vision  left --TIA  Depression  denies suicidal ideas  JOCELYNE on CPAP  Obesity  CAD (coronary artery disease)  MI-2016  Carotid stenosis, bilateral  TIA (transient ischemic attack)  2- 2020 and 2020  Hypertension  DM (diabetes mellitus)  Afib  MI (myocardial infarction)  s/p 4 stents (most recent )  History of temporal artery biopsy  2020  H/O hernia repair  H/O endarterectomy  LT-2020  History of cholecystectomy  H/O  section      Home Medications:  aspirin 81 mg oral tablet:  (2020 08:09)  atorvastatin 80 mg oral tablet: 1 tab(s) orally once a day (2020 08:09)  HumaLOG KwikPen 100 units/mL injectable solution: 18 unit(s) injectable 3 times a day (2020 08:09)  metFORMIN 1000 mg oral tablet: 1 tab(s) orally 2 times a day (2020 08:09)  PARoxetine 30 mg oral tablet: 1 tab(s) orally once a day (2020 08:09)  pioglitazone 30 mg oral tablet: 1 tab(s) orally once a day (2020 08:09)        24HRS EVENT:  OVERNIGHT  Saturating well on 2L NC  CE neg x2, AM received  IVL in AM, was on LR at 75 overnight  Repleted 2g Mg sulfate   ASA and Xarelto to start in AM  Hgb dropped 13.8 to 11.7, f/u 11am repeat       DVT PTX:  holding pharmacological prophylaxis    GI PTX: PPI    ***Tubes/Lines/Drains  ***  Peripheral IV   radial mustapha     REVIEW OF SYSTEMS    [x ] A ten-point review of systems was otherwise negative except as noted.  [ ] Due to altered mental status/intubation, subjective information were not able to be obtained from the patient. History was obtained, to the extent possible, from review of the chart and collateral sources of information.   MARCIAL MCCORMICK  [id number]  50y Female s/p RCEA    Indication for ICU admission: S/P rcea upgraded for close HD monitoring    Admit Date:   ICU Date:   OR Date:     No Known Allergies    PAST MEDICAL & SURGICAL HISTORY:  H/O blurred vision  left --TIA  Depression  denies suicidal ideas  JOCELYNE on CPAP  Obesity  CAD (coronary artery disease)  MI-2016  Carotid stenosis, bilateral  TIA (transient ischemic attack)  2- 2020 and 2020  Hypertension  DM (diabetes mellitus)  Afib  MI (myocardial infarction)  s/p 4 stents (most recent )  History of temporal artery biopsy  2020  H/O hernia repair  H/O endarterectomy  LT-2020  History of cholecystectomy  H/O  section      Home Medications:  aspirin 81 mg oral tablet:  (2020 08:09)  atorvastatin 80 mg oral tablet: 1 tab(s) orally once a day (2020 08:09)  HumaLOG KwikPen 100 units/mL injectable solution: 18 unit(s) injectable 3 times a day (2020 08:09)  metFORMIN 1000 mg oral tablet: 1 tab(s) orally 2 times a day (2020 08:09)  PARoxetine 30 mg oral tablet: 1 tab(s) orally once a day (2020 08:09)  pioglitazone 30 mg oral tablet: 1 tab(s) orally once a day (2020 08:09)        24HRS EVENT:  OVERNIGHT  Saturating well on 2L NC  CE neg x2, AM received  IVL in AM, was on LR at 75 overnight  Repleted 2g Mg sulfate   ASA and Xarelto to start in AM  Hgb dropped 13.8 to 11.7, f/u 11am repeat       DVT PTX:  holding pharmacological prophylaxis    GI PTX: PPI    ***Tubes/Lines/Drains  ***  Peripheral IV   radial mustapha     REVIEW OF SYSTEMS    [x ] A ten-point review of systems was otherwise negative except as noted.  [ ] Due to altered mental status/intubation, subjective information were not able to be obtained from the patient. History was obtained, to the extent possible, from review of the chart and collateral sources of information.  Daily Height in cm: 167.64 (2020 11:24)    Daily     Diet, DASH/TLC:   Sodium & Cholesterol Restricted (20 @ 17:19)      CURRENT MEDS:  Neurologic Medications  acetaminophen   Tablet .. 650 milliGRAM(s) Oral every 6 hours PRN Moderate Pain (4 - 6)  PARoxetine 30 milliGRAM(s) Oral daily    Respiratory Medications    Cardiovascular Medications  metoprolol succinate  milliGRAM(s) Oral at bedtime  metoprolol succinate ER 50 milliGRAM(s) Oral daily    Gastrointestinal Medications  dextrose 5%. 1000 milliLiter(s) IV Continuous <Continuous>  pantoprazole    Tablet 40 milliGRAM(s) Oral before breakfast  senna 2 Tablet(s) Oral at bedtime PRN Constipation    Genitourinary Medications    Hematologic/Oncologic Medications  aspirin enteric coated 81 milliGRAM(s) Oral daily  rivaroxaban 20 milliGRAM(s) Oral with dinner    Antimicrobial/Immunologic Medications    Endocrine/Metabolic Medications  atorvastatin 80 milliGRAM(s) Oral at bedtime  dextrose 50% Injectable 25 Gram(s) IV Push once  insulin regular  human corrective regimen sliding scale   SubCutaneous Before meals and at bedtime    Topical/Other Medications      ICU Vital Signs Last 24 Hrs  T(C): 36.9 (2020 06:00), Max: 37.3 (2020 00:00)  T(F): 98.4 (2020 06:00), Max: 99.1 (2020 00:00)  HR: 60 (2020 06:00) (57 - 89)  BP: 113/53 (2020 06:00) (102/51 - 134/63)  BP(mean): 76 (2020 06:00) (76 - 86)  ABP: 119/58 (2020 06:00) (101/58 - 153/61)  ABP(mean): 73 (2020 02:00) (71 - 87)  RR: 20 (2020 06:00) (15 - 22)  SpO2: 99% (2020 06:00) (94% - 99%)      Adult Advanced Hemodynamics Last 24 Hrs  CVP(mm Hg): --  CVP(cm H2O): --  CO: --  CI: --  PA: --  PA(mean): --  PCWP: --  SVR: --  SVRI: --  PVR: --  PVRI: --          I&O's Summary    2020 07:01  -  2020 07:00  --------------------------------------------------------  IN: 600 mL / OUT: 600 mL / NET: 0 mL      I&O's Detail    2020 07:01  -  2020 07:00  --------------------------------------------------------  IN:    IV PiggyBack: 400 mL    Oral Fluid: 200 mL  Total IN: 600 mL    OUT:    Voided (mL): 600 mL  Total OUT: 600 mL    Total NET: 0 mL          PHYSICAL EXAM:    General/Neuro  Deficits:                             alert & oriented x 3, R side tongue deviation  Pupils: PERRLA    Lungs:      clear to auscultation, Normal expansion/effort.     Cardiovascular : S1, S2.  Regular rate and rhythm.  Peripheral edema   Cardiac Rhythm: Normal Sinus Rhythm    GI: Abdomen soft, Non-tender, Non-distended.        Extremities: Extremities warm, pink, well-perfused. Pulses: palpable dp/pt b/l    Derm: Good skin turgor, no skin breakdown.      :      voiding      CXR:     LABS:  CAPILLARY BLOOD GLUCOSE      POCT Blood Glucose.: 145 mg/dL (2020 07:18)  POCT Blood Glucose.: 181 mg/dL (2020 22:59)  POCT Blood Glucose.: 196 mg/dL (2020 17:50)                          11.7   7.84  )-----------( 232      ( 2020 23:38 )             36.6           138  |  102  |  12  ----------------------------<  178<H>  4.0   |  27  |  0.6<L>    Ca    8.7      2020 23:38  Phos  4.6       Mg     1.7         TPro  6.2  /  Alb  3.8  /  TBili  0.9  /  DBili  x   /  AST  20  /  ALT  27  /  AlkPhos  101        PT/INR - ( 2020 15:52 )   PT: 12.80 sec;   INR: 1.11 ratio         PTT - ( 2020 15:52 )  PTT:56.7 sec  CARDIAC MARKERS ( 2020 04:35 )  x     / <0.01 ng/mL / 150 U/L / x     / 1.4 ng/mL  CARDIAC MARKERS ( 2020 23:38 )  x     / <0.01 ng/mL / 122 U/L / x     / 1.2 ng/mL  CARDIAC MARKERS ( 2020 15:52 )  x     / <0.01 ng/mL / x     / x     / x

## 2020-11-18 NOTE — DISCHARGE NOTE PROVIDER - NSDCFUADDINST_GEN_ALL_CORE_FT
May shower on Saturday, remove dressing, run water over the incision, keep sterri strips on, pat dry, keep open to air    Go to ED with bleeding, swelling, difficulty swallowing or breathing, dizziness, fevers chills

## 2020-11-18 NOTE — DISCHARGE NOTE PROVIDER - HOSPITAL COURSE
50F with PMHx of CAD s/p PCI w/stents x4, Afib on Xarelto, DM, HTN, JOCELYNE on CPAP, GCA s/p left temporal artery excision..    She initially presented to [facility] on 8/18 c/o left eye vision loss associated w/nausea which resolved shortly after. Upon workup, she was found to have B/L carotid artery stenosis and under went a left CEA on 8/25 and underwent a R CEA 11/17    Pt was observed overnight under SICU. Ambulated and voided. Home medications restarted

## 2020-11-18 NOTE — PROGRESS NOTE ADULT - ASSESSMENT
Assessment:  49 y/o female s/p right CEA.   appears well, neurologically intact.     Plan:  Pain control  BP control  q1h neurological checks.  possible discharge home today

## 2020-11-18 NOTE — PROGRESS NOTE ADULT - SUBJECTIVE AND OBJECTIVE BOX
MARCIAL MCCORMICK  50y Female   [id number]    Procedure: Right CEA    S: 49 y/o female s/p right CEA . Patient is laying comfortably in the bed. Denies any pain at the incision site, fever, chills, chest pain, shortness of breath.    Patient is a 50y old  Female who presents with a chief complaint of S/P RCEA upgraded for close HD monitoring (2020 15:55)    PAST MEDICAL & SURGICAL HISTORY:  H/O blurred vision  left --TIA    Depression  denies suicidal ideas    JOCELYNE on CPAP    Obesity    CAD (coronary artery disease)  MI-2016    Carotid stenosis, bilateral    TIA (transient ischemic attack)  2- 2020 and 2020    Hypertension    DM (diabetes mellitus)    Afib    MI (myocardial infarction)  s/p 4 stents (most recent )    History of temporal artery biopsy  2020    H/O hernia repair    H/O endarterectomy  LT-2020    History of cholecystectomy    H/O  section        Vital Signs Last 24 Hrs  T(C): 37.3 (2020 00:00), Max: 37.3 (2020 00:00)  T(F): 99.1 (2020 00:00), Max: 99.1 (2020 00:00)  HR: 61 (2020 00:00) (61 - 89)  BP: 115/55 (2020 00:00) (102/51 - 134/63)  BP(mean): 79 (2020 00:00) (79 - 83)  RR: 22 (2020 00:00) (15 - 22)  SpO2: 95% (2020 00:00) (94% - 99%)        Diet, DASH/TLC:   Sodium & Cholesterol Restricted (20 @ 17:19)      I&O's Detail    2020 07:01  -  2020 01:33  --------------------------------------------------------  IN:    IV PiggyBack: 300 mL    Oral Fluid: 120 mL  Total IN: 420 mL    OUT:    Voided (mL): 325 mL  Total OUT: 325 mL    Total NET: 95 mL          MEDICATIONS  (STANDING):  aspirin  chewable 81 milliGRAM(s) Oral daily  atorvastatin 80 milliGRAM(s) Oral at bedtime  ceFAZolin   IVPB 2000 milliGRAM(s) IV Intermittent every 8 hours  dextrose 40% Gel 15 Gram(s) Oral once  dextrose 5%. 1000 milliLiter(s) (100 mL/Hr) IV Continuous <Continuous>  dextrose 50% Injectable 25 Gram(s) IV Push once  dextrose 50% Injectable 12.5 Gram(s) IV Push once  dextrose 50% Injectable 25 Gram(s) IV Push once  glucagon  Injectable 1 milliGRAM(s) IntraMuscular once  insulin regular  human corrective regimen sliding scale   SubCutaneous Before meals and at bedtime  lactated ringers. 1000 milliLiter(s) (75 mL/Hr) IV Continuous <Continuous>  metoprolol succinate  milliGRAM(s) Oral at bedtime  metoprolol succinate ER 50 milliGRAM(s) Oral daily  pantoprazole    Tablet 40 milliGRAM(s) Oral before breakfast  PARoxetine 30 milliGRAM(s) Oral daily  rivaroxaban 20 milliGRAM(s) Oral with dinner    MEDICATIONS  (PRN):  acetaminophen   Tablet .. 650 milliGRAM(s) Oral every 6 hours PRN Moderate Pain (4 - 6)  senna 2 Tablet(s) Oral at bedtime PRN Constipation    PE:  General:  NAD  Heart: S1 and S2 noted  Lungs: Clear to auscultation bilaterally  Abdomen: Soft Non tender, Non distended.   Extremities: Normal in color and temperature   Neuro: AAOx 3. Motor and sensory grosly intact, cranial nerves intact  Wound: No bleeding or discharge noted from the incision site in the neck. dressing with some serosangenous drainage. No hematoma noted    LABS:                         11.7   7.84  )-----------( 232      ( 2020 23:38 )             36.6            138  |  102  |  12  ----------------------------<  178<H>  4.0   |  27  |  0.6<L>    Ca    8.7      2020 23:38  Phos  4.6       Mg     1.7         TPro  6.2  /  Alb  3.8  /  TBili  0.9  /  DBili  x   /  AST  20  /  ALT  27  /  AlkPhos  101      LIVER FUNCTIONS - ( 2020 15:52 )  Alb: 3.8 g/dL / Pro: 6.2 g/dL / ALK PHOS: 101 U/L / ALT: 27 U/L / AST: 20 U/L / GGT: x           PT/INR - ( 2020 15:52 )   PT: 12.80 sec;   INR: 1.11 ratio         PTT - ( 2020 15:52 )  PTT:56.7 sec  CARDIAC MARKERS ( 2020 23:38 )  x     / <0.01 ng/mL / 122 U/L / x     / 1.2 ng/mL  CARDIAC MARKERS ( 2020 15:52 )  x     / <0.01 ng/mL / x     / x     / x

## 2020-11-18 NOTE — PHYSICAL THERAPY INITIAL EVALUATION ADULT - PERTINENT HX OF CURRENT PROBLEM, REHAB EVAL
50F initially presented to [facility] on 8/18 c/o left eye vision loss associated w/nausea which resolved shortly after. Upon workup, she was found to have B/L carotid artery stenosis and under went a left CEA on 8/25 and underwent a R CEA 11/17

## 2020-11-18 NOTE — PATIENT PROFILE ADULT - SURGICAL SITE INCISION
yes Clindamycin Pregnancy And Lactation Text: This medication can be used in pregnancy if certain situations. Clindamycin is also present in breast milk.

## 2020-11-18 NOTE — PROGRESS NOTE ADULT - ASSESSMENT
ASSESSMENT: 51 yo F with hx of CAD s/p PCI 4 stents, Afib on Xarelto, JOCELYNE s/p R CEA for symptomatic carotid stenosis with vision loss    Neurologic:  S/p RCEA:  - Dressing C/d/i, no hematoma   - q1 vascular checks   - On Tylenol and Paxil  - R tongue deviation    Respiratory:  JOCELYNE:  - Did not bring home CPAP, does not remember settings  - Refused CPAP overnight, saturating well on 2L NC  - IS/PT  - AM CXR    Cardiovascular:   CAD s/p PCI w/stents x4 (2018), afib , Chads vasc 5   - On home ASA, restart 11/18 per primary team   - Restart home Xarelto on 11/18 per vascular  - Restart home metoprolol/statin   - 9/20 Echo: EF 64%, g1dd,   - Cardiologist?  - CE neg x2, AM received    Gastrointestinal/Nutrition:  Diet:  - Dash    Prophylaxis:  - PPI  - Senna    Genitourinary/Renal:  - No acute issues  - No telles, Primafit, voiding   - Lytes-Na 138 // K 4.0 // Phos 4.6 //  Mag 1.7 (repleted 2g Mg)  - IVL     Hematologic:  - No acute issues  - Restart home Xarelto, ASA in am   - Monitor H/H dropped 13.8 > 11.7 , f/u 11am    Infectious Disease:  - No acute issues  - Periop abx  - Monitor for S/S of infection   -WBC 8.7 > 7.8    Endocrine:  Hx of DM:   - ISS, maintain -180     Disposition: SICU           ASSESSMENT: 49 yo F with hx of CAD s/p PCI 4 stents, Afib on Xarelto, JOCELYNE s/p R CEA for symptomatic carotid stenosis with vision loss    Neurologic:  S/p RCEA:  - Dressing C/d/i, no hematoma   - q1 vascular checks   - On Tylenol and Paxil  - R tongue deviation    Respiratory:  JOCELYNE:  - Did not bring home CPAP, does not remember settings  - Refused CPAP overnight, saturating well on 2L NC  - IS/PT  - AM CXR    Cardiovascular:   CAD s/p PCI w/stents x4 (2018), afib , Chads vasc 5   - On home ASA, restart 11/18 per primary team   - Restart home Xarelto on 11/18 per vascular  - Restart home metoprolol/statin   - 9/20 Echo: EF 64%, g1dd,   - Cardiologist?  - CE neg x3    Gastrointestinal/Nutrition:  Diet:  - Dash    Prophylaxis:  - PPI  - Senna    Genitourinary/Renal:  - No acute issues  - No telles, Primafit, voiding   - Lytes-Na 138 // K 4.0 // Phos 4.6 //  Mag 1.7 (repleted 2g Mg)  - IVL     Hematologic:  - No acute issues  - Restarted home Xarelto, ASA today  - Monitor H/H dropped 12.2 > 11.7    Infectious Disease:  - No acute issues  - Periop abx  - Monitor for S/S of infection   -WBC 8.7 > 7.8    Endocrine:  Hx of DM:   - ISS, maintain -180     Disposition: d/g today as per vascular

## 2020-11-18 NOTE — PHYSICAL THERAPY INITIAL EVALUATION ADULT - GENERAL OBSERVATIONS, REHAB EVAL
11:15-11:45. chart reviewed. Pt received semi-cedeno at B/S, alert, oriented, able to follow multi-step instructions and agreeable to PT evaluation. + IV, + monitoring, denies pain or discomfort, on RA, stable vitals, R neck side dressing s/o CEA. Pt is independent with overall functional mobility, ambulate using No AD for 200 ft, negotiate 6 steps using 1 HR, alternative pattern. Pt is not candidate for PT. further PT referral PRN.

## 2020-11-18 NOTE — DISCHARGE NOTE PROVIDER - NSDCMRMEDTOKEN_GEN_ALL_CORE_FT
acetaminophen 325 mg oral tablet: 2 tab(s) orally every 6 hours, As needed, Moderate Pain (4 - 6)  aspirin 81 mg oral tablet:   atorvastatin 80 mg oral tablet: 1 tab(s) orally once a day  HumaLOG KwikPen 100 units/mL injectable solution: 18 unit(s) injectable 3 times a day  metFORMIN 1000 mg oral tablet: 1 tab(s) orally 2 times a day  metoprolol succinate 100 mg oral tablet, extended release: 1 tab(s) orally once a day at night  metoprolol succinate 50 mg oral tablet, extended release: 1 tab(s) orally once a day in the morning  PARoxetine 30 mg oral tablet: 1 tab(s) orally once a day  pioglitazone 30 mg oral tablet: 1 tab(s) orally once a day  rivaroxaban 20 mg oral tablet: 1 tab(s) orally once a day

## 2020-11-23 LAB — SURGICAL PATHOLOGY STUDY: SIGNIFICANT CHANGE UP

## 2020-11-24 DIAGNOSIS — G62.9 POLYNEUROPATHY, UNSPECIFIED: ICD-10-CM

## 2020-12-01 DIAGNOSIS — I10 ESSENTIAL (PRIMARY) HYPERTENSION: ICD-10-CM

## 2020-12-01 DIAGNOSIS — Z87.891 PERSONAL HISTORY OF NICOTINE DEPENDENCE: ICD-10-CM

## 2020-12-01 DIAGNOSIS — Z79.01 LONG TERM (CURRENT) USE OF ANTICOAGULANTS: ICD-10-CM

## 2020-12-01 DIAGNOSIS — F32.9 MAJOR DEPRESSIVE DISORDER, SINGLE EPISODE, UNSPECIFIED: ICD-10-CM

## 2020-12-01 DIAGNOSIS — Z95.5 PRESENCE OF CORONARY ANGIOPLASTY IMPLANT AND GRAFT: ICD-10-CM

## 2020-12-01 DIAGNOSIS — E11.9 TYPE 2 DIABETES MELLITUS WITHOUT COMPLICATIONS: ICD-10-CM

## 2020-12-01 DIAGNOSIS — I65.21 OCCLUSION AND STENOSIS OF RIGHT CAROTID ARTERY: ICD-10-CM

## 2020-12-01 DIAGNOSIS — Z79.82 LONG TERM (CURRENT) USE OF ASPIRIN: ICD-10-CM

## 2020-12-01 DIAGNOSIS — I25.2 OLD MYOCARDIAL INFARCTION: ICD-10-CM

## 2020-12-01 DIAGNOSIS — Z79.02 LONG TERM (CURRENT) USE OF ANTITHROMBOTICS/ANTIPLATELETS: ICD-10-CM

## 2020-12-01 DIAGNOSIS — I25.10 ATHEROSCLEROTIC HEART DISEASE OF NATIVE CORONARY ARTERY WITHOUT ANGINA PECTORIS: ICD-10-CM

## 2020-12-01 DIAGNOSIS — E66.9 OBESITY, UNSPECIFIED: ICD-10-CM

## 2020-12-01 DIAGNOSIS — Z79.4 LONG TERM (CURRENT) USE OF INSULIN: ICD-10-CM

## 2020-12-01 DIAGNOSIS — H54.7 UNSPECIFIED VISUAL LOSS: ICD-10-CM

## 2020-12-01 DIAGNOSIS — I69.998 OTHER SEQUELAE FOLLOWING UNSPECIFIED CEREBROVASCULAR DISEASE: ICD-10-CM

## 2020-12-01 DIAGNOSIS — G47.33 OBSTRUCTIVE SLEEP APNEA (ADULT) (PEDIATRIC): ICD-10-CM

## 2020-12-01 DIAGNOSIS — I48.91 UNSPECIFIED ATRIAL FIBRILLATION: ICD-10-CM

## 2020-12-09 ENCOUNTER — APPOINTMENT (OUTPATIENT)
Dept: VASCULAR SURGERY | Facility: CLINIC | Age: 50
End: 2020-12-09
Payer: COMMERCIAL

## 2020-12-09 VITALS
DIASTOLIC BLOOD PRESSURE: 80 MMHG | TEMPERATURE: 95.6 F | WEIGHT: 290 LBS | HEIGHT: 66 IN | HEART RATE: 69 BPM | BODY MASS INDEX: 46.61 KG/M2 | SYSTOLIC BLOOD PRESSURE: 140 MMHG

## 2020-12-09 PROCEDURE — 99024 POSTOP FOLLOW-UP VISIT: CPT

## 2020-12-09 NOTE — CONSULT LETTER
[Dear  ___] : Dear  [unfilled], [Courtesy Letter:] : I had the pleasure of seeing your patient, [unfilled], in my office today. [Please see my note below.] : Please see my note below. [FreeTextEntry2] : Dear Dr. Sean Callejas,

## 2020-12-10 ENCOUNTER — OUTPATIENT (OUTPATIENT)
Dept: OUTPATIENT SERVICES | Facility: HOSPITAL | Age: 50
LOS: 1 days | Discharge: HOME | End: 2020-12-10

## 2020-12-10 DIAGNOSIS — Z98.890 OTHER SPECIFIED POSTPROCEDURAL STATES: Chronic | ICD-10-CM

## 2020-12-10 DIAGNOSIS — Z98.891 HISTORY OF UTERINE SCAR FROM PREVIOUS SURGERY: Chronic | ICD-10-CM

## 2020-12-10 DIAGNOSIS — Z01.21 ENCOUNTER FOR DENTAL EXAMINATION AND CLEANING WITH ABNORMAL FINDINGS: ICD-10-CM

## 2020-12-10 DIAGNOSIS — Z90.49 ACQUIRED ABSENCE OF OTHER SPECIFIED PARTS OF DIGESTIVE TRACT: Chronic | ICD-10-CM

## 2020-12-21 PROBLEM — B37.3 CANDIDIASIS OF FEMALE GENITALIA: Status: RESOLVED | Noted: 2019-09-10 | Resolved: 2020-12-21

## 2020-12-28 ENCOUNTER — OUTPATIENT (OUTPATIENT)
Dept: OUTPATIENT SERVICES | Facility: HOSPITAL | Age: 50
LOS: 1 days | Discharge: HOME | End: 2020-12-28

## 2020-12-28 DIAGNOSIS — Z90.49 ACQUIRED ABSENCE OF OTHER SPECIFIED PARTS OF DIGESTIVE TRACT: Chronic | ICD-10-CM

## 2020-12-28 DIAGNOSIS — Z98.890 OTHER SPECIFIED POSTPROCEDURAL STATES: Chronic | ICD-10-CM

## 2020-12-28 DIAGNOSIS — Z98.891 HISTORY OF UTERINE SCAR FROM PREVIOUS SURGERY: Chronic | ICD-10-CM

## 2021-01-06 ENCOUNTER — APPOINTMENT (OUTPATIENT)
Dept: VASCULAR SURGERY | Facility: CLINIC | Age: 51
End: 2021-01-06
Payer: COMMERCIAL

## 2021-01-06 VITALS
HEIGHT: 66 IN | DIASTOLIC BLOOD PRESSURE: 80 MMHG | SYSTOLIC BLOOD PRESSURE: 140 MMHG | WEIGHT: 290 LBS | TEMPERATURE: 97.7 F | BODY MASS INDEX: 46.61 KG/M2 | HEART RATE: 72 BPM

## 2021-01-06 PROCEDURE — 93880 EXTRACRANIAL BILAT STUDY: CPT

## 2021-01-06 PROCEDURE — 99024 POSTOP FOLLOW-UP VISIT: CPT

## 2021-01-06 NOTE — PHYSICAL EXAM
[de-identified] : Neck incisions are healed bilaterally. Cranial nerves II through XII intact grossly

## 2021-04-17 NOTE — ED PROVIDER NOTE - NS ED MD DISPO ISOLATION TYPES
decreased ability to use arms for pushing/pulling/decreased ability to use legs for bridging/pushing/impaired ability to control trunk for mobility None

## 2021-05-07 ENCOUNTER — OUTPATIENT (OUTPATIENT)
Dept: OUTPATIENT SERVICES | Facility: HOSPITAL | Age: 51
LOS: 1 days | Discharge: HOME | End: 2021-05-07

## 2021-05-07 DIAGNOSIS — Z98.890 OTHER SPECIFIED POSTPROCEDURAL STATES: Chronic | ICD-10-CM

## 2021-05-07 DIAGNOSIS — Z90.49 ACQUIRED ABSENCE OF OTHER SPECIFIED PARTS OF DIGESTIVE TRACT: Chronic | ICD-10-CM

## 2021-05-07 DIAGNOSIS — Z98.891 HISTORY OF UTERINE SCAR FROM PREVIOUS SURGERY: Chronic | ICD-10-CM

## 2021-05-07 DIAGNOSIS — K02.53 DENTAL CARIES ON PIT AND FISSURE SURFACE PENETRATING INTO PULP: ICD-10-CM

## 2021-06-02 ENCOUNTER — OUTPATIENT (OUTPATIENT)
Dept: OUTPATIENT SERVICES | Facility: HOSPITAL | Age: 51
LOS: 1 days | Discharge: HOME | End: 2021-06-02

## 2021-06-02 DIAGNOSIS — Z98.891 HISTORY OF UTERINE SCAR FROM PREVIOUS SURGERY: Chronic | ICD-10-CM

## 2021-06-02 DIAGNOSIS — Z98.890 OTHER SPECIFIED POSTPROCEDURAL STATES: Chronic | ICD-10-CM

## 2021-06-02 DIAGNOSIS — Z90.49 ACQUIRED ABSENCE OF OTHER SPECIFIED PARTS OF DIGESTIVE TRACT: Chronic | ICD-10-CM

## 2021-07-07 ENCOUNTER — APPOINTMENT (OUTPATIENT)
Dept: VASCULAR SURGERY | Facility: CLINIC | Age: 51
End: 2021-07-07
Payer: COMMERCIAL

## 2021-07-07 VITALS — BODY MASS INDEX: 47.09 KG/M2 | TEMPERATURE: 98 F | WEIGHT: 293 LBS | HEIGHT: 66 IN

## 2021-07-07 VITALS — SYSTOLIC BLOOD PRESSURE: 142 MMHG | DIASTOLIC BLOOD PRESSURE: 80 MMHG | HEART RATE: 82 BPM

## 2021-07-07 PROCEDURE — 99213 OFFICE O/P EST LOW 20 MIN: CPT

## 2021-07-07 PROCEDURE — 93880 EXTRACRANIAL BILAT STUDY: CPT

## 2021-07-07 NOTE — PHYSICAL EXAM
[de-identified] : Neck incisions are healed bilaterally. Cranial nerves II through XII intact grossly

## 2021-07-23 ENCOUNTER — APPOINTMENT (OUTPATIENT)
Dept: SURGERY | Facility: CLINIC | Age: 51
End: 2021-07-23
Payer: COMMERCIAL

## 2021-07-23 VITALS
DIASTOLIC BLOOD PRESSURE: 86 MMHG | HEART RATE: 81 BPM | OXYGEN SATURATION: 98 % | SYSTOLIC BLOOD PRESSURE: 144 MMHG | WEIGHT: 293 LBS | HEIGHT: 66 IN | BODY MASS INDEX: 47.09 KG/M2 | TEMPERATURE: 97.1 F

## 2021-07-23 DIAGNOSIS — Z86.73 PERSONAL HISTORY OF TRANSIENT ISCHEMIC ATTACK (TIA), AND CEREBRAL INFARCTION W/OUT RESIDUAL DEFICITS: ICD-10-CM

## 2021-07-23 DIAGNOSIS — K21.9 GASTRO-ESOPHAGEAL REFLUX DISEASE W/OUT ESOPHAGITIS: ICD-10-CM

## 2021-07-23 DIAGNOSIS — L30.9 DERMATITIS, UNSPECIFIED: ICD-10-CM

## 2021-07-23 DIAGNOSIS — I25.2 OLD MYOCARDIAL INFARCTION: ICD-10-CM

## 2021-07-23 DIAGNOSIS — I65.23 OCCLUSION AND STENOSIS OF BILATERAL CAROTID ARTERIES: ICD-10-CM

## 2021-07-23 DIAGNOSIS — E11.9 TYPE 2 DIABETES MELLITUS W/OUT COMPLICATIONS: ICD-10-CM

## 2021-07-23 DIAGNOSIS — E28.2 POLYCYSTIC OVARIAN SYNDROME: ICD-10-CM

## 2021-07-23 DIAGNOSIS — Z87.19 PERSONAL HISTORY OF OTHER DISEASES OF THE DIGESTIVE SYSTEM: ICD-10-CM

## 2021-07-23 PROCEDURE — 99214 OFFICE O/P EST MOD 30 MIN: CPT

## 2021-07-23 RX ORDER — INSULIN LISPRO 100 [IU]/ML
INJECTION, SOLUTION INTRAVENOUS; SUBCUTANEOUS
Refills: 0 | Status: ACTIVE | COMMUNITY

## 2021-07-23 RX ORDER — METFORMIN HYDROCHLORIDE 625 MG/1
TABLET ORAL
Refills: 0 | Status: ACTIVE | COMMUNITY

## 2021-07-23 RX ORDER — PIOGLITAZONE HYDROCHLORIDE AND METFORMIN HYDROCHLORIDE 15; 850 MG/1; MG/1
15-850 TABLET, FILM COATED ORAL
Refills: 0 | Status: ACTIVE | COMMUNITY

## 2021-07-23 RX ORDER — PAROXETINE HYDROCHLORIDE 40 MG/1
TABLET, FILM COATED ORAL
Refills: 0 | Status: ACTIVE | COMMUNITY

## 2021-07-23 RX ORDER — SULFAMETHOXAZOLE AND TRIMETHOPRIM 800; 160 MG/1; MG/1
800-160 TABLET ORAL
Qty: 20 | Refills: 0 | Status: COMPLETED | COMMUNITY
Start: 2019-09-10 | End: 2021-07-23

## 2021-07-23 RX ORDER — GABAPENTIN 100 MG/1
100 CAPSULE ORAL 3 TIMES DAILY
Qty: 90 | Refills: 0 | Status: COMPLETED | COMMUNITY
Start: 2020-11-24 | End: 2021-07-23

## 2021-07-23 RX ORDER — INSULIN DEGLUDEC INJECTION 100 U/ML
100 INJECTION, SOLUTION SUBCUTANEOUS
Refills: 0 | Status: ACTIVE | COMMUNITY

## 2021-07-23 RX ORDER — RIVAROXABAN 2.5 MG/1
TABLET, FILM COATED ORAL
Refills: 0 | Status: ACTIVE | COMMUNITY

## 2021-07-23 RX ORDER — CLOPIDOGREL 75 MG/1
TABLET, FILM COATED ORAL
Refills: 0 | Status: ACTIVE | COMMUNITY

## 2021-07-23 RX ORDER — FLUCONAZOLE 200 MG/1
200 TABLET ORAL DAILY
Qty: 7 | Refills: 3 | Status: COMPLETED | COMMUNITY
Start: 2020-11-12 | End: 2021-07-23

## 2021-07-23 RX ORDER — FLUCONAZOLE 200 MG/1
200 TABLET ORAL DAILY
Qty: 7 | Refills: 0 | Status: COMPLETED | COMMUNITY
Start: 2019-09-10 | End: 2021-07-23

## 2021-07-23 RX ORDER — METOPROLOL TARTRATE 75 MG/1
TABLET, FILM COATED ORAL
Refills: 0 | Status: ACTIVE | COMMUNITY

## 2021-07-23 RX ORDER — ATORVASTATIN CALCIUM 80 MG/1
TABLET, FILM COATED ORAL
Refills: 0 | Status: ACTIVE | COMMUNITY

## 2021-07-23 NOTE — PHYSICAL EXAM
[Obese, well nourished, in no acute distress] : obese, well nourished, in no acute distress [Normal] : affect appropriate [de-identified] : no CVA tenderness B/L

## 2021-07-23 NOTE — HISTORY OF PRESENT ILLNESS
[de-identified] : 52yo female with PMHx of HLD, CAD (MI 5 years ago), atrial fibrillation, JOCELYNE (on CPAP but machine does not work), previous stroke (1 year ago), previous carotid endarterectomies B/L (, Dr. Underwood), DM (last A1C 6.9), previous cholecystectomy, previous , previous umbilical hernia repair with mesh, GERD, frequent diarrhea, depression and anxiety, PCOS and class III obesity BMI 50.5 presenting for consultation of weight loss surgery/management. Patient states she has struggled with her weight for her entire life, but feels that her overall health has declined in the past 5 years. Previous weight loss attempts include various diet and exercise regimens with limited success. Patient reports dyspnea upon exertion.  She reports lower back and lower extremity joint pain, which she attributes to her weight. She states that she has occasional heartburn related to eating certain foods, which is self-limited. She has never had an EGD or colonoscopy. Regarding depression and anxiety, she used to see a therapist and has never been hospitalized for mental health reasons.\par

## 2021-07-23 NOTE — CONSULT LETTER
[Dear  ___] : Dear  [unfilled], [Courtesy Letter:] : I had the pleasure of seeing your patient, [unfilled], in my office today. [Please see my note below.] : Please see my note below. [Sincerely,] : Sincerely, [FreeTextEntry3] : Joyce Francois MD FACS\par Bariatric & Minimally Invasive Surgery\par [facility]\par [phone]

## 2021-07-23 NOTE — ASSESSMENT
[FreeTextEntry1] : 52yo female with PMHx of HLD, CAD (MI 5 years ago), atrial fibrillation, JOCELYNE (on CPAP but machine does not work), previous stroke (1 year ago), previous carotid endarterectomies B/L (, Dr. Underwood), DM (last A1C 6.9), previous cholecystectomy, previous , previous umbilical hernia repair with mesh, GERD, frequent diarrhea, depression and anxiety, PCOS and class III obesity BMI 50.5 presenting for consultation of weight loss surgery/management. \par -discussed surgical options - gastric band, sleeve gastrectomy, [id number]-Y gastric bypass\par -discussed potential surgical complications for each option - including bleeding, infection, pain, hernia, leak, stricture, and blood clots\par -discussed smoking of any kind (cigarettes, marijuana, e-cigarettes) is prohibited - quit 5 years ago and not planning to start again\par -at this time, the patient is interested in SLEEVE GASTRECTOMY, but I recommend gastric bypass for her considering her medical issues and BMI\par -GERD - will recommend pre-operative EGD, will Rx PPI in perioperative period and monitor for resolution/progression of symptoms post-operatively\par -recommend high protein, low carb, low fat diet with protein shakes\par -encourage exercise regimen - starting with 10 minutes per day combining cardio and resistance training with the goal of 30 minutes of exercise 4-5 times per week\par -next step - appointment with nutritionist and information session\par -pre-operative preparation - will include PCP evaluation, cardiac evaluation, pulmonary evaluation, EGD, nutritional evaluation (3 months), labs\par

## 2021-08-06 ENCOUNTER — TRANSCRIPTION ENCOUNTER (OUTPATIENT)
Age: 51
End: 2021-08-06

## 2021-08-09 ENCOUNTER — TRANSCRIPTION ENCOUNTER (OUTPATIENT)
Age: 51
End: 2021-08-09

## 2021-08-11 ENCOUNTER — TRANSCRIPTION ENCOUNTER (OUTPATIENT)
Age: 51
End: 2021-08-11

## 2021-08-23 ENCOUNTER — OUTPATIENT (OUTPATIENT)
Dept: OUTPATIENT SERVICES | Facility: HOSPITAL | Age: 51
LOS: 1 days | Discharge: HOME | End: 2021-08-23

## 2021-08-23 DIAGNOSIS — Z98.890 OTHER SPECIFIED POSTPROCEDURAL STATES: Chronic | ICD-10-CM

## 2021-08-23 DIAGNOSIS — Z90.49 ACQUIRED ABSENCE OF OTHER SPECIFIED PARTS OF DIGESTIVE TRACT: Chronic | ICD-10-CM

## 2021-08-23 DIAGNOSIS — F50.9 EATING DISORDER, UNSPECIFIED: ICD-10-CM

## 2021-08-23 DIAGNOSIS — Z98.891 HISTORY OF UTERINE SCAR FROM PREVIOUS SURGERY: Chronic | ICD-10-CM

## 2021-08-26 ENCOUNTER — LABORATORY RESULT (OUTPATIENT)
Age: 51
End: 2021-08-26

## 2021-08-30 DIAGNOSIS — E55.9 VITAMIN D DEFICIENCY, UNSPECIFIED: ICD-10-CM

## 2021-08-30 LAB
25(OH)D3 SERPL-MCNC: 19 NG/ML
ALBUMIN SERPL ELPH-MCNC: 4.2 G/DL
ALP BLD-CCNC: 113 U/L
ALT SERPL-CCNC: 27 U/L
ANION GAP SERPL CALC-SCNC: 13 MMOL/L
AST SERPL-CCNC: 19 U/L
BASOPHILS # BLD AUTO: 0.04 K/UL
BASOPHILS NFR BLD AUTO: 0.7 %
BILIRUB SERPL-MCNC: 0.7 MG/DL
BUN SERPL-MCNC: 16 MG/DL
CALCIUM SERPL-MCNC: 9.7 MG/DL
CALCIUM SERPL-MCNC: 9.7 MG/DL
CHLORIDE SERPL-SCNC: 98 MMOL/L
CHOLEST SERPL-MCNC: 125 MG/DL
CO2 SERPL-SCNC: 30 MMOL/L
CREAT SERPL-MCNC: 0.7 MG/DL
EOSINOPHIL # BLD AUTO: 0.16 K/UL
EOSINOPHIL NFR BLD AUTO: 2.7 %
ESTIMATED AVERAGE GLUCOSE: 140 MG/DL
FERRITIN SERPL-MCNC: 58 NG/ML
FOLATE RBC-MCNC: 1159 NG/ML
GLUCOSE SERPL-MCNC: 100 MG/DL
HBA1C MFR BLD HPLC: 6.5 %
HCT VFR BLD CALC: 42.3 %
HCT VFR BLD CALC: 45.9 %
HDLC SERPL-MCNC: 60 MG/DL
HGB BLD-MCNC: 13.2 G/DL
IMM GRANULOCYTES NFR BLD AUTO: 0.3 %
IRON SATN MFR SERPL: 17 %
IRON SERPL-MCNC: 71 UG/DL
LDLC SERPL CALC-MCNC: 48 MG/DL
LYMPHOCYTES # BLD AUTO: 1.7 K/UL
LYMPHOCYTES NFR BLD AUTO: 28.5 %
MAN DIFF?: NORMAL
MCHC RBC-ENTMCNC: 30.1 PG
MCHC RBC-ENTMCNC: 31.2 G/DL
MCV RBC AUTO: 96.6 FL
MONOCYTES # BLD AUTO: 0.35 K/UL
MONOCYTES NFR BLD AUTO: 5.9 %
NEUTROPHILS # BLD AUTO: 3.69 K/UL
NEUTROPHILS NFR BLD AUTO: 61.9 %
NONHDLC SERPL-MCNC: 65 MG/DL
PARATHYROID HORMONE INTACT: 31 PG/ML
PLATELET # BLD AUTO: 282 K/UL
POTASSIUM SERPL-SCNC: 4.3 MMOL/L
PROT SERPL-MCNC: 7.5 G/DL
RBC # BLD: 4.38 M/UL
RBC # FLD: 14.3 %
SODIUM SERPL-SCNC: 141 MMOL/L
T3FREE SERPL-MCNC: 3.6 PG/ML
T4 FREE SERPL-MCNC: 1.3 NG/DL
TIBC SERPL-MCNC: 420 UG/DL
TRIGL SERPL-MCNC: 88 MG/DL
TSH SERPL-ACNC: 1.95 UIU/ML
UIBC SERPL-MCNC: 349 UG/DL
VIT B12 SERPL-MCNC: 389 PG/ML
WBC # FLD AUTO: 5.96 K/UL

## 2021-08-30 RX ORDER — ERGOCALCIFEROL 1.25 MG/1
1.25 MG CAPSULE, LIQUID FILLED ORAL WEEKLY
Qty: 4 | Refills: 2 | Status: ACTIVE | COMMUNITY
Start: 2021-08-30 | End: 1900-01-01

## 2021-08-31 ENCOUNTER — APPOINTMENT (OUTPATIENT)
Dept: SURGERY | Facility: CLINIC | Age: 51
End: 2021-08-31
Payer: COMMERCIAL

## 2021-08-31 VITALS — WEIGHT: 293 LBS | HEIGHT: 66 IN | BODY MASS INDEX: 47.09 KG/M2

## 2021-08-31 LAB — VIT B1 SERPL-MCNC: 135.6 NMOL/L

## 2021-08-31 PROCEDURE — ZZZZZ: CPT

## 2021-08-31 RX ORDER — TERCONAZOLE 8 MG/G
0.8 CREAM VAGINAL
Qty: 1 | Refills: 1 | Status: DISCONTINUED | COMMUNITY
Start: 2020-02-13 | End: 2021-08-31

## 2021-08-31 RX ORDER — TERCONAZOLE 8 MG/G
0.8 CREAM VAGINAL
Qty: 1 | Refills: 3 | Status: DISCONTINUED | COMMUNITY
Start: 2019-09-10 | End: 2021-08-31

## 2021-08-31 RX ORDER — TERCONAZOLE 8 MG/G
0.8 CREAM VAGINAL
Qty: 1 | Refills: 1 | Status: DISCONTINUED | COMMUNITY
Start: 2020-10-08 | End: 2021-08-31

## 2021-08-31 RX ORDER — TERCONAZOLE 8 MG/G
0.8 CREAM VAGINAL
Qty: 1 | Refills: 1 | Status: DISCONTINUED | COMMUNITY
Start: 2020-05-18 | End: 2021-08-31

## 2021-08-31 RX ORDER — TERCONAZOLE 8 MG/G
0.8 CREAM VAGINAL
Qty: 1 | Refills: 1 | Status: DISCONTINUED | COMMUNITY
Start: 2020-07-23 | End: 2021-08-31

## 2021-09-29 ENCOUNTER — APPOINTMENT (OUTPATIENT)
Dept: SURGERY | Facility: CLINIC | Age: 51
End: 2021-09-29
Payer: COMMERCIAL

## 2021-09-29 VITALS — BODY MASS INDEX: 47.09 KG/M2 | HEIGHT: 66 IN | WEIGHT: 293 LBS

## 2021-09-29 PROCEDURE — ZZZZZ: CPT

## 2021-10-13 ENCOUNTER — OUTPATIENT (OUTPATIENT)
Dept: OUTPATIENT SERVICES | Facility: HOSPITAL | Age: 51
LOS: 1 days | Discharge: HOME | End: 2021-10-13

## 2021-10-13 ENCOUNTER — APPOINTMENT (OUTPATIENT)
Dept: PSYCHIATRY | Facility: CLINIC | Age: 51
End: 2021-10-13

## 2021-10-13 DIAGNOSIS — I10 ESSENTIAL (PRIMARY) HYPERTENSION: ICD-10-CM

## 2021-10-13 DIAGNOSIS — E78.00 PURE HYPERCHOLESTEROLEMIA, UNSPECIFIED: ICD-10-CM

## 2021-10-13 DIAGNOSIS — F32.9 MAJOR DEPRESSIVE DISORDER, SINGLE EPISODE, UNSPECIFIED: ICD-10-CM

## 2021-10-13 DIAGNOSIS — Z98.890 OTHER SPECIFIED POSTPROCEDURAL STATES: Chronic | ICD-10-CM

## 2021-10-13 DIAGNOSIS — Z98.891 HISTORY OF UTERINE SCAR FROM PREVIOUS SURGERY: Chronic | ICD-10-CM

## 2021-10-13 DIAGNOSIS — F41.9 ANXIETY DISORDER, UNSPECIFIED: ICD-10-CM

## 2021-10-13 DIAGNOSIS — Z90.49 ACQUIRED ABSENCE OF OTHER SPECIFIED PARTS OF DIGESTIVE TRACT: Chronic | ICD-10-CM

## 2021-10-21 ENCOUNTER — APPOINTMENT (OUTPATIENT)
Dept: SURGERY | Facility: CLINIC | Age: 51
End: 2021-10-21
Payer: COMMERCIAL

## 2021-10-21 VITALS — WEIGHT: 293 LBS | BODY MASS INDEX: 47.09 KG/M2 | HEIGHT: 66 IN

## 2021-10-21 DIAGNOSIS — E66.9 OBESITY, UNSPECIFIED: ICD-10-CM

## 2021-10-21 DIAGNOSIS — Z86.39 PERSONAL HISTORY OF OTHER ENDOCRINE, NUTRITIONAL AND METABOLIC DISEASE: ICD-10-CM

## 2021-10-21 DIAGNOSIS — Z01.419 ENCOUNTER FOR GYNECOLOGICAL EXAMINATION (GENERAL) (ROUTINE) W/OUT ABNORMAL FINDINGS: ICD-10-CM

## 2021-10-21 PROCEDURE — ZZZZZ: CPT

## 2021-10-21 RX ORDER — TERCONAZOLE 8 MG/G
0.8 CREAM VAGINAL
Qty: 1 | Refills: 1 | Status: DISCONTINUED | COMMUNITY
Start: 2020-11-12 | End: 2021-10-21

## 2021-10-28 ENCOUNTER — APPOINTMENT (OUTPATIENT)
Dept: PSYCHIATRY | Facility: CLINIC | Age: 51
End: 2021-10-28
Payer: COMMERCIAL

## 2021-10-28 ENCOUNTER — OUTPATIENT (OUTPATIENT)
Dept: OUTPATIENT SERVICES | Facility: HOSPITAL | Age: 51
LOS: 1 days | Discharge: HOME | End: 2021-10-28

## 2021-10-28 ENCOUNTER — NON-APPOINTMENT (OUTPATIENT)
Age: 51
End: 2021-10-28

## 2021-10-28 DIAGNOSIS — Z86.79 PERSONAL HISTORY OF OTHER DISEASES OF THE CIRCULATORY SYSTEM: ICD-10-CM

## 2021-10-28 DIAGNOSIS — Z98.890 OTHER SPECIFIED POSTPROCEDURAL STATES: Chronic | ICD-10-CM

## 2021-10-28 DIAGNOSIS — Z90.49 ACQUIRED ABSENCE OF OTHER SPECIFIED PARTS OF DIGESTIVE TRACT: Chronic | ICD-10-CM

## 2021-10-28 DIAGNOSIS — Z98.891 HISTORY OF UTERINE SCAR FROM PREVIOUS SURGERY: Chronic | ICD-10-CM

## 2021-10-28 DIAGNOSIS — F41.9 ANXIETY DISORDER, UNSPECIFIED: ICD-10-CM

## 2021-10-28 DIAGNOSIS — F32.9 MAJOR DEPRESSIVE DISORDER, SINGLE EPISODE, UNSPECIFIED: ICD-10-CM

## 2021-10-28 PROCEDURE — 90792 PSYCH DIAG EVAL W/MED SRVCS: CPT

## 2021-11-09 ENCOUNTER — OUTPATIENT (OUTPATIENT)
Dept: OUTPATIENT SERVICES | Facility: HOSPITAL | Age: 51
LOS: 1 days | Discharge: HOME | End: 2021-11-09
Payer: COMMERCIAL

## 2021-11-09 DIAGNOSIS — R07.9 CHEST PAIN, UNSPECIFIED: ICD-10-CM

## 2021-11-09 DIAGNOSIS — Z98.891 HISTORY OF UTERINE SCAR FROM PREVIOUS SURGERY: Chronic | ICD-10-CM

## 2021-11-09 DIAGNOSIS — Z98.890 OTHER SPECIFIED POSTPROCEDURAL STATES: Chronic | ICD-10-CM

## 2021-11-09 DIAGNOSIS — Z90.49 ACQUIRED ABSENCE OF OTHER SPECIFIED PARTS OF DIGESTIVE TRACT: Chronic | ICD-10-CM

## 2021-11-09 PROCEDURE — 93018 CV STRESS TEST I&R ONLY: CPT

## 2021-11-09 PROCEDURE — 78452 HT MUSCLE IMAGE SPECT MULT: CPT | Mod: 26,MF

## 2021-11-09 PROCEDURE — G1004: CPT

## 2021-11-09 PROCEDURE — 93016 CV STRESS TEST SUPVJ ONLY: CPT

## 2021-11-10 ENCOUNTER — APPOINTMENT (OUTPATIENT)
Dept: PSYCHIATRY | Facility: CLINIC | Age: 51
End: 2021-11-10

## 2021-11-10 ENCOUNTER — OUTPATIENT (OUTPATIENT)
Dept: OUTPATIENT SERVICES | Facility: HOSPITAL | Age: 51
LOS: 1 days | Discharge: HOME | End: 2021-11-10

## 2021-11-10 DIAGNOSIS — Z98.890 OTHER SPECIFIED POSTPROCEDURAL STATES: Chronic | ICD-10-CM

## 2021-11-10 DIAGNOSIS — Z98.891 HISTORY OF UTERINE SCAR FROM PREVIOUS SURGERY: Chronic | ICD-10-CM

## 2021-11-10 DIAGNOSIS — F41.9 ANXIETY DISORDER, UNSPECIFIED: ICD-10-CM

## 2021-11-10 DIAGNOSIS — F32.9 MAJOR DEPRESSIVE DISORDER, SINGLE EPISODE, UNSPECIFIED: ICD-10-CM

## 2021-11-10 DIAGNOSIS — Z90.49 ACQUIRED ABSENCE OF OTHER SPECIFIED PARTS OF DIGESTIVE TRACT: Chronic | ICD-10-CM

## 2021-11-12 ENCOUNTER — OUTPATIENT (OUTPATIENT)
Dept: OUTPATIENT SERVICES | Facility: HOSPITAL | Age: 51
LOS: 1 days | Discharge: HOME | End: 2021-11-12

## 2021-11-12 ENCOUNTER — APPOINTMENT (OUTPATIENT)
Dept: PSYCHIATRY | Facility: CLINIC | Age: 51
End: 2021-11-12
Payer: COMMERCIAL

## 2021-11-12 DIAGNOSIS — Z98.890 OTHER SPECIFIED POSTPROCEDURAL STATES: Chronic | ICD-10-CM

## 2021-11-12 DIAGNOSIS — F41.0 PANIC DISORDER [EPISODIC PAROXYSMAL ANXIETY]: ICD-10-CM

## 2021-11-12 DIAGNOSIS — Z90.49 ACQUIRED ABSENCE OF OTHER SPECIFIED PARTS OF DIGESTIVE TRACT: Chronic | ICD-10-CM

## 2021-11-12 DIAGNOSIS — F33.1 MAJOR DEPRESSIVE DISORDER, RECURRENT, MODERATE: ICD-10-CM

## 2021-11-12 DIAGNOSIS — F41.1 GENERALIZED ANXIETY DISORDER: ICD-10-CM

## 2021-11-12 DIAGNOSIS — Z98.891 HISTORY OF UTERINE SCAR FROM PREVIOUS SURGERY: Chronic | ICD-10-CM

## 2021-11-12 PROCEDURE — 99214 OFFICE O/P EST MOD 30 MIN: CPT | Mod: 95

## 2021-11-15 ENCOUNTER — APPOINTMENT (OUTPATIENT)
Dept: SURGERY | Facility: CLINIC | Age: 51
End: 2021-11-15
Payer: COMMERCIAL

## 2021-11-15 VITALS — WEIGHT: 293 LBS | HEIGHT: 66 IN | BODY MASS INDEX: 47.09 KG/M2

## 2021-11-15 DIAGNOSIS — E66.01 MORBID (SEVERE) OBESITY DUE TO EXCESS CALORIES: ICD-10-CM

## 2021-11-15 PROCEDURE — ZZZZZ: CPT

## 2021-11-18 ENCOUNTER — NON-APPOINTMENT (OUTPATIENT)
Age: 51
End: 2021-11-18

## 2021-11-26 ENCOUNTER — APPOINTMENT (OUTPATIENT)
Dept: PSYCHIATRY | Facility: CLINIC | Age: 51
End: 2021-11-26
Payer: COMMERCIAL

## 2021-11-26 ENCOUNTER — OUTPATIENT (OUTPATIENT)
Dept: OUTPATIENT SERVICES | Facility: HOSPITAL | Age: 51
LOS: 1 days | Discharge: HOME | End: 2021-11-26

## 2021-11-26 DIAGNOSIS — Z90.49 ACQUIRED ABSENCE OF OTHER SPECIFIED PARTS OF DIGESTIVE TRACT: Chronic | ICD-10-CM

## 2021-11-26 DIAGNOSIS — Z98.891 HISTORY OF UTERINE SCAR FROM PREVIOUS SURGERY: Chronic | ICD-10-CM

## 2021-11-26 DIAGNOSIS — F41.1 GENERALIZED ANXIETY DISORDER: ICD-10-CM

## 2021-11-26 DIAGNOSIS — Z98.890 OTHER SPECIFIED POSTPROCEDURAL STATES: Chronic | ICD-10-CM

## 2021-11-26 DIAGNOSIS — F41.0 PANIC DISORDER [EPISODIC PAROXYSMAL ANXIETY]: ICD-10-CM

## 2021-11-26 DIAGNOSIS — F33.1 MAJOR DEPRESSIVE DISORDER, RECURRENT, MODERATE: ICD-10-CM

## 2021-11-26 PROCEDURE — 99214 OFFICE O/P EST MOD 30 MIN: CPT | Mod: 95

## 2021-11-29 ENCOUNTER — APPOINTMENT (OUTPATIENT)
Dept: PSYCHIATRY | Facility: CLINIC | Age: 51
End: 2021-11-29

## 2021-11-29 ENCOUNTER — OUTPATIENT (OUTPATIENT)
Dept: OUTPATIENT SERVICES | Facility: HOSPITAL | Age: 51
LOS: 1 days | Discharge: HOME | End: 2021-11-29

## 2021-11-29 DIAGNOSIS — F33.1 MAJOR DEPRESSIVE DISORDER, RECURRENT, MODERATE: ICD-10-CM

## 2021-11-29 DIAGNOSIS — Z98.891 HISTORY OF UTERINE SCAR FROM PREVIOUS SURGERY: Chronic | ICD-10-CM

## 2021-11-29 DIAGNOSIS — F41.1 GENERALIZED ANXIETY DISORDER: ICD-10-CM

## 2021-11-29 DIAGNOSIS — Z90.49 ACQUIRED ABSENCE OF OTHER SPECIFIED PARTS OF DIGESTIVE TRACT: Chronic | ICD-10-CM

## 2021-11-29 DIAGNOSIS — Z98.890 OTHER SPECIFIED POSTPROCEDURAL STATES: Chronic | ICD-10-CM

## 2021-11-29 DIAGNOSIS — F41.0 PANIC DISORDER [EPISODIC PAROXYSMAL ANXIETY]: ICD-10-CM

## 2021-12-01 ENCOUNTER — APPOINTMENT (OUTPATIENT)
Dept: VASCULAR SURGERY | Facility: CLINIC | Age: 51
End: 2021-12-01

## 2021-12-07 ENCOUNTER — APPOINTMENT (OUTPATIENT)
Dept: PSYCHIATRY | Facility: CLINIC | Age: 51
End: 2021-12-07

## 2021-12-07 ENCOUNTER — OUTPATIENT (OUTPATIENT)
Dept: OUTPATIENT SERVICES | Facility: HOSPITAL | Age: 51
LOS: 1 days | Discharge: HOME | End: 2021-12-07

## 2021-12-07 DIAGNOSIS — Z90.49 ACQUIRED ABSENCE OF OTHER SPECIFIED PARTS OF DIGESTIVE TRACT: Chronic | ICD-10-CM

## 2021-12-07 DIAGNOSIS — Z98.890 OTHER SPECIFIED POSTPROCEDURAL STATES: Chronic | ICD-10-CM

## 2021-12-07 DIAGNOSIS — F41.0 PANIC DISORDER [EPISODIC PAROXYSMAL ANXIETY]: ICD-10-CM

## 2021-12-07 DIAGNOSIS — Z98.891 HISTORY OF UTERINE SCAR FROM PREVIOUS SURGERY: Chronic | ICD-10-CM

## 2021-12-07 DIAGNOSIS — F33.1 MAJOR DEPRESSIVE DISORDER, RECURRENT, MODERATE: ICD-10-CM

## 2021-12-07 DIAGNOSIS — F41.1 GENERALIZED ANXIETY DISORDER: ICD-10-CM

## 2021-12-17 ENCOUNTER — APPOINTMENT (OUTPATIENT)
Dept: PSYCHIATRY | Facility: CLINIC | Age: 51
End: 2021-12-17

## 2021-12-17 ENCOUNTER — OUTPATIENT (OUTPATIENT)
Dept: OUTPATIENT SERVICES | Facility: HOSPITAL | Age: 51
LOS: 1 days | Discharge: HOME | End: 2021-12-17

## 2021-12-17 DIAGNOSIS — Z98.891 HISTORY OF UTERINE SCAR FROM PREVIOUS SURGERY: Chronic | ICD-10-CM

## 2021-12-17 DIAGNOSIS — F41.0 PANIC DISORDER [EPISODIC PAROXYSMAL ANXIETY]: ICD-10-CM

## 2021-12-17 DIAGNOSIS — F41.1 GENERALIZED ANXIETY DISORDER: ICD-10-CM

## 2021-12-17 DIAGNOSIS — Z98.890 OTHER SPECIFIED POSTPROCEDURAL STATES: Chronic | ICD-10-CM

## 2021-12-17 DIAGNOSIS — Z90.49 ACQUIRED ABSENCE OF OTHER SPECIFIED PARTS OF DIGESTIVE TRACT: Chronic | ICD-10-CM

## 2021-12-17 DIAGNOSIS — F33.1 MAJOR DEPRESSIVE DISORDER, RECURRENT, MODERATE: ICD-10-CM

## 2021-12-21 ENCOUNTER — APPOINTMENT (OUTPATIENT)
Dept: PSYCHIATRY | Facility: CLINIC | Age: 51
End: 2021-12-21
Payer: COMMERCIAL

## 2021-12-21 ENCOUNTER — OUTPATIENT (OUTPATIENT)
Dept: OUTPATIENT SERVICES | Facility: HOSPITAL | Age: 51
LOS: 1 days | Discharge: HOME | End: 2021-12-21

## 2021-12-21 DIAGNOSIS — Z98.890 OTHER SPECIFIED POSTPROCEDURAL STATES: Chronic | ICD-10-CM

## 2021-12-21 DIAGNOSIS — Z98.891 HISTORY OF UTERINE SCAR FROM PREVIOUS SURGERY: Chronic | ICD-10-CM

## 2021-12-21 DIAGNOSIS — F33.1 MAJOR DEPRESSIVE DISORDER, RECURRENT, MODERATE: ICD-10-CM

## 2021-12-21 DIAGNOSIS — Z90.49 ACQUIRED ABSENCE OF OTHER SPECIFIED PARTS OF DIGESTIVE TRACT: Chronic | ICD-10-CM

## 2021-12-21 DIAGNOSIS — F41.0 PANIC DISORDER [EPISODIC PAROXYSMAL ANXIETY]: ICD-10-CM

## 2021-12-21 DIAGNOSIS — F41.1 GENERALIZED ANXIETY DISORDER: ICD-10-CM

## 2021-12-21 PROCEDURE — 99214 OFFICE O/P EST MOD 30 MIN: CPT | Mod: 95

## 2021-12-29 ENCOUNTER — NON-APPOINTMENT (OUTPATIENT)
Age: 51
End: 2021-12-29

## 2022-01-06 ENCOUNTER — APPOINTMENT (OUTPATIENT)
Dept: PSYCHIATRY | Facility: CLINIC | Age: 52
End: 2022-01-06

## 2022-01-21 ENCOUNTER — NON-APPOINTMENT (OUTPATIENT)
Age: 52
End: 2022-01-21

## 2022-02-01 ENCOUNTER — APPOINTMENT (OUTPATIENT)
Dept: PSYCHIATRY | Facility: CLINIC | Age: 52
End: 2022-02-01

## 2022-02-01 ENCOUNTER — OUTPATIENT (OUTPATIENT)
Dept: OUTPATIENT SERVICES | Facility: HOSPITAL | Age: 52
LOS: 1 days | Discharge: HOME | End: 2022-02-01

## 2022-02-01 DIAGNOSIS — Z98.890 OTHER SPECIFIED POSTPROCEDURAL STATES: Chronic | ICD-10-CM

## 2022-02-01 DIAGNOSIS — Z90.49 ACQUIRED ABSENCE OF OTHER SPECIFIED PARTS OF DIGESTIVE TRACT: Chronic | ICD-10-CM

## 2022-02-01 DIAGNOSIS — F41.1 GENERALIZED ANXIETY DISORDER: ICD-10-CM

## 2022-02-01 DIAGNOSIS — F33.1 MAJOR DEPRESSIVE DISORDER, RECURRENT, MODERATE: ICD-10-CM

## 2022-02-01 DIAGNOSIS — F41.0 PANIC DISORDER [EPISODIC PAROXYSMAL ANXIETY]: ICD-10-CM

## 2022-02-01 DIAGNOSIS — Z98.891 HISTORY OF UTERINE SCAR FROM PREVIOUS SURGERY: Chronic | ICD-10-CM

## 2022-02-15 ENCOUNTER — APPOINTMENT (OUTPATIENT)
Dept: PSYCHIATRY | Facility: CLINIC | Age: 52
End: 2022-02-15
Payer: COMMERCIAL

## 2022-02-15 ENCOUNTER — OUTPATIENT (OUTPATIENT)
Dept: OUTPATIENT SERVICES | Facility: HOSPITAL | Age: 52
LOS: 1 days | Discharge: HOME | End: 2022-02-15

## 2022-02-15 DIAGNOSIS — F41.0 PANIC DISORDER [EPISODIC PAROXYSMAL ANXIETY]: ICD-10-CM

## 2022-02-15 DIAGNOSIS — Z98.890 OTHER SPECIFIED POSTPROCEDURAL STATES: Chronic | ICD-10-CM

## 2022-02-15 DIAGNOSIS — F41.1 GENERALIZED ANXIETY DISORDER: ICD-10-CM

## 2022-02-15 DIAGNOSIS — F33.1 MAJOR DEPRESSIVE DISORDER, RECURRENT, MODERATE: ICD-10-CM

## 2022-02-15 DIAGNOSIS — Z90.49 ACQUIRED ABSENCE OF OTHER SPECIFIED PARTS OF DIGESTIVE TRACT: Chronic | ICD-10-CM

## 2022-02-15 DIAGNOSIS — Z98.891 HISTORY OF UTERINE SCAR FROM PREVIOUS SURGERY: Chronic | ICD-10-CM

## 2022-02-15 PROCEDURE — 99215 OFFICE O/P EST HI 40 MIN: CPT | Mod: 95

## 2022-03-17 ENCOUNTER — APPOINTMENT (OUTPATIENT)
Dept: PSYCHIATRY | Facility: CLINIC | Age: 52
End: 2022-03-17

## 2022-03-30 ENCOUNTER — NON-APPOINTMENT (OUTPATIENT)
Age: 52
End: 2022-03-30

## 2022-04-01 ENCOUNTER — NON-APPOINTMENT (OUTPATIENT)
Age: 52
End: 2022-04-01

## 2022-04-01 ENCOUNTER — OUTPATIENT (OUTPATIENT)
Dept: OUTPATIENT SERVICES | Facility: HOSPITAL | Age: 52
LOS: 1 days | Discharge: HOME | End: 2022-04-01

## 2022-04-01 DIAGNOSIS — Z90.49 ACQUIRED ABSENCE OF OTHER SPECIFIED PARTS OF DIGESTIVE TRACT: Chronic | ICD-10-CM

## 2022-04-01 DIAGNOSIS — Z98.890 OTHER SPECIFIED POSTPROCEDURAL STATES: Chronic | ICD-10-CM

## 2022-04-01 DIAGNOSIS — F41.1 GENERALIZED ANXIETY DISORDER: ICD-10-CM

## 2022-04-01 DIAGNOSIS — F41.0 PANIC DISORDER [EPISODIC PAROXYSMAL ANXIETY]: ICD-10-CM

## 2022-04-01 DIAGNOSIS — F33.1 MAJOR DEPRESSIVE DISORDER, RECURRENT, MODERATE: ICD-10-CM

## 2022-04-01 DIAGNOSIS — Z98.891 HISTORY OF UTERINE SCAR FROM PREVIOUS SURGERY: Chronic | ICD-10-CM

## 2022-04-15 ENCOUNTER — NON-APPOINTMENT (OUTPATIENT)
Age: 52
End: 2022-04-15

## 2022-04-15 ENCOUNTER — OUTPATIENT (OUTPATIENT)
Dept: OUTPATIENT SERVICES | Facility: HOSPITAL | Age: 52
LOS: 1 days | Discharge: HOME | End: 2022-04-15

## 2022-04-15 DIAGNOSIS — Z98.890 OTHER SPECIFIED POSTPROCEDURAL STATES: Chronic | ICD-10-CM

## 2022-04-15 DIAGNOSIS — Z98.891 HISTORY OF UTERINE SCAR FROM PREVIOUS SURGERY: Chronic | ICD-10-CM

## 2022-04-15 DIAGNOSIS — F41.1 GENERALIZED ANXIETY DISORDER: ICD-10-CM

## 2022-04-15 DIAGNOSIS — Z90.49 ACQUIRED ABSENCE OF OTHER SPECIFIED PARTS OF DIGESTIVE TRACT: Chronic | ICD-10-CM

## 2022-04-25 ENCOUNTER — TRANSCRIPTION ENCOUNTER (OUTPATIENT)
Age: 52
End: 2022-04-25

## 2022-04-26 ENCOUNTER — OUTPATIENT (OUTPATIENT)
Dept: INPATIENT UNIT | Facility: HOSPITAL | Age: 52
LOS: 1 days | Discharge: HOME | End: 2022-04-26

## 2022-04-26 ENCOUNTER — APPOINTMENT (OUTPATIENT)
Age: 52
End: 2022-04-26

## 2022-04-26 VITALS
TEMPERATURE: 99 F | RESPIRATION RATE: 17 BRPM | HEART RATE: 97 BPM | OXYGEN SATURATION: 97 % | SYSTOLIC BLOOD PRESSURE: 117 MMHG | DIASTOLIC BLOOD PRESSURE: 64 MMHG

## 2022-04-26 VITALS
OXYGEN SATURATION: 95 % | HEART RATE: 87 BPM | DIASTOLIC BLOOD PRESSURE: 77 MMHG | SYSTOLIC BLOOD PRESSURE: 126 MMHG | RESPIRATION RATE: 17 BRPM | TEMPERATURE: 99 F

## 2022-04-26 DIAGNOSIS — Z98.890 OTHER SPECIFIED POSTPROCEDURAL STATES: Chronic | ICD-10-CM

## 2022-04-26 DIAGNOSIS — U07.1 COVID-19: ICD-10-CM

## 2022-04-26 DIAGNOSIS — Z90.49 ACQUIRED ABSENCE OF OTHER SPECIFIED PARTS OF DIGESTIVE TRACT: Chronic | ICD-10-CM

## 2022-04-26 DIAGNOSIS — Z98.891 HISTORY OF UTERINE SCAR FROM PREVIOUS SURGERY: Chronic | ICD-10-CM

## 2022-04-26 RX ORDER — BEBTELOVIMAB 87.5 MG/ML
175 INJECTION, SOLUTION INTRAVENOUS ONCE
Refills: 0 | Status: COMPLETED | OUTPATIENT
Start: 2022-04-26 | End: 2022-04-26

## 2022-04-26 RX ORDER — SODIUM CHLORIDE 9 MG/ML
250 INJECTION INTRAMUSCULAR; INTRAVENOUS; SUBCUTANEOUS
Refills: 0 | Status: DISCONTINUED | OUTPATIENT
Start: 2022-04-26 | End: 2022-04-26

## 2022-04-26 RX ADMIN — SODIUM CHLORIDE 30 MILLILITER(S): 9 INJECTION INTRAMUSCULAR; INTRAVENOUS; SUBCUTANEOUS at 13:39

## 2022-04-26 RX ADMIN — BEBTELOVIMAB 175 MILLIGRAM(S): 87.5 INJECTION, SOLUTION INTRAVENOUS at 11:10

## 2022-04-26 NOTE — CHART NOTE - NSCHARTNOTEFT_GEN_A_CORE
Medicine Progress Note    Patient is a 51y old  Female who presents with a chief complaint of covid 19 infection and to receive monoclonal antibody infusion Bebtelovimab.  pt tested positive 2 days ago. pt is having couhg, headache, congestion and fatigue.  pt has been fully vaccinated against covid.       PAST MEDICAL & SURGICAL HISTORY:  MI (myocardial infarction)  s/p 4 stents (most recent )    Afib    DM (diabetes mellitus)    Hypertension    TIA (transient ischemic attack)  2- 2020 and 2020    Carotid stenosis, bilateral    CAD (coronary artery disease)  MI-2016    Obesity    JOCELYNE on CPAP    Depression  denies suicidal ideas    H/O blurred vision  left --TIA    H/O  section    History of cholecystectomy    H/O endarterectomy  LT-2020    H/O hernia repair    History of temporal artery biopsy  2020    Home Medications:  acetaminophen 325 mg oral tablet: 2 tab(s) orally every 6 hours, As needed, Moderate Pain (4 - 6) (2020 11:06)  aspirin 81 mg oral tablet:  (2020 08:09)  atorvastatin 80 mg oral tablet: 1 tab(s) orally once a day (2020 08:09)  HumaLOG KwikPen 100 units/mL injectable solution: 18 unit(s) injectable 3 times a day (2020 08:09)  metFORMIN 1000 mg oral tablet: 1 tab(s) orally 2 times a day (2020 08:09)  PARoxetine 30 mg oral tablet: 1 tab(s) orally once a day (2020 08:09)  pioglitazone 30 mg oral tablet: 1 tab(s) orally once a day (2020 08:09)    MEDICATIONS  (STANDING):  bebtelovimab (EUA) Injectable 175 milliGRAM(s) IV Push once  sodium chloride 0.9%. 250 milliLiter(s) (30 mL/Hr) IV Continuous <Continuous>      PHYSICAL EXAM:  VSS    CONSTITUTIONAL: NAD, well-developed, well-groomed  RESPIRATORY: Normal respiratory effort; lungs are clear to auscultation bilaterally  CARDIOVASCULAR: Regular rate and rhythm, normal S1 and S2, no murmur/rub/gallop; No lower extremity edema; Peripheral pulses are 2+ bilaterally  PSYCH: A+O to person, place, and time; affect appropriate  NEUROLOGY: CN 2-12 are intact and symmetric; no gross sensory deficits   SKIN: No rashes; no palpable lesions    Plan:  I have reviewed the Bebtelovimab Emergency Use Authorization (EUA) and I have provided the patient or patient's caregiver with the following information:  1. FDA has authorized emergency use of Bebtelovimab which is not an FDA approved biological agent.  2. The patient or patient’s caregiver has the option to accept or refuse administration of Bebtelovimab  3. The significant known and potential risks and benefits of Bebtelovimab and the extent to which such risks and benefits are unknown.  4. Information on available alternative treatments and risks and benefits of those alternatives.  Informed consent was obtained. Answered all of patient's questions and concerns to their satisfaction. Patient verbalized understanding.  Monitor VS per protocol.  Insert peripheral IV.  Infuse NSS 25/mL IV continuously.  Administer Bebtelovimab IVP over 30 seconds.  Observe patient for 1 hour post infusion.    Disposition:  Patient tolerated infusion well, denies complaints of chest pain, shortness of breath, dizziness or palpitations. Discharge instructions given and fact sheet included.  Instructed patient to contact the call center or their PMD if they develop side effects at home.  Instructed the patient to call 911 and go to the emergency room if they develop symptoms of a severe allergic reaction. Patient verbalized understanding.  RN educated patient on the proper use of the incentive spirometer and the patient displayed return demonstration.  VSS and RN removed IV successfully.  Patient was discharged home in [facility].

## 2022-05-05 ENCOUNTER — APPOINTMENT (OUTPATIENT)
Dept: PSYCHIATRY | Facility: CLINIC | Age: 52
End: 2022-05-05

## 2022-05-05 ENCOUNTER — OUTPATIENT (OUTPATIENT)
Dept: OUTPATIENT SERVICES | Facility: HOSPITAL | Age: 52
LOS: 1 days | Discharge: HOME | End: 2022-05-05

## 2022-05-05 DIAGNOSIS — Z98.891 HISTORY OF UTERINE SCAR FROM PREVIOUS SURGERY: Chronic | ICD-10-CM

## 2022-05-05 DIAGNOSIS — Z90.49 ACQUIRED ABSENCE OF OTHER SPECIFIED PARTS OF DIGESTIVE TRACT: Chronic | ICD-10-CM

## 2022-05-05 DIAGNOSIS — Z98.890 OTHER SPECIFIED POSTPROCEDURAL STATES: Chronic | ICD-10-CM

## 2022-05-05 DIAGNOSIS — F41.1 GENERALIZED ANXIETY DISORDER: ICD-10-CM

## 2022-05-19 ENCOUNTER — OUTPATIENT (OUTPATIENT)
Dept: OUTPATIENT SERVICES | Facility: HOSPITAL | Age: 52
LOS: 1 days | Discharge: HOME | End: 2022-05-19

## 2022-05-19 ENCOUNTER — APPOINTMENT (OUTPATIENT)
Dept: PSYCHIATRY | Facility: CLINIC | Age: 52
End: 2022-05-19
Payer: COMMERCIAL

## 2022-05-19 DIAGNOSIS — Z98.890 OTHER SPECIFIED POSTPROCEDURAL STATES: Chronic | ICD-10-CM

## 2022-05-19 DIAGNOSIS — Z98.891 HISTORY OF UTERINE SCAR FROM PREVIOUS SURGERY: Chronic | ICD-10-CM

## 2022-05-19 DIAGNOSIS — F41.1 GENERALIZED ANXIETY DISORDER: ICD-10-CM

## 2022-05-19 DIAGNOSIS — Z90.49 ACQUIRED ABSENCE OF OTHER SPECIFIED PARTS OF DIGESTIVE TRACT: Chronic | ICD-10-CM

## 2022-05-19 PROCEDURE — 99215 OFFICE O/P EST HI 40 MIN: CPT | Mod: 95

## 2022-05-23 ENCOUNTER — APPOINTMENT (OUTPATIENT)
Dept: PSYCHIATRY | Facility: CLINIC | Age: 52
End: 2022-05-23

## 2022-05-23 ENCOUNTER — OUTPATIENT (OUTPATIENT)
Dept: OUTPATIENT SERVICES | Facility: HOSPITAL | Age: 52
LOS: 1 days | Discharge: HOME | End: 2022-05-23

## 2022-05-23 DIAGNOSIS — F41.1 GENERALIZED ANXIETY DISORDER: ICD-10-CM

## 2022-05-23 DIAGNOSIS — Z90.49 ACQUIRED ABSENCE OF OTHER SPECIFIED PARTS OF DIGESTIVE TRACT: Chronic | ICD-10-CM

## 2022-05-23 DIAGNOSIS — Z98.890 OTHER SPECIFIED POSTPROCEDURAL STATES: Chronic | ICD-10-CM

## 2022-05-23 DIAGNOSIS — Z98.891 HISTORY OF UTERINE SCAR FROM PREVIOUS SURGERY: Chronic | ICD-10-CM

## 2022-06-16 ENCOUNTER — APPOINTMENT (OUTPATIENT)
Dept: PSYCHIATRY | Facility: CLINIC | Age: 52
End: 2022-06-16

## 2022-06-16 ENCOUNTER — OUTPATIENT (OUTPATIENT)
Dept: OUTPATIENT SERVICES | Facility: HOSPITAL | Age: 52
LOS: 1 days | Discharge: HOME | End: 2022-06-16

## 2022-06-16 DIAGNOSIS — Z98.890 OTHER SPECIFIED POSTPROCEDURAL STATES: Chronic | ICD-10-CM

## 2022-06-16 DIAGNOSIS — Z98.891 HISTORY OF UTERINE SCAR FROM PREVIOUS SURGERY: Chronic | ICD-10-CM

## 2022-06-16 DIAGNOSIS — F41.0 PANIC DISORDER [EPISODIC PAROXYSMAL ANXIETY]: ICD-10-CM

## 2022-06-16 DIAGNOSIS — F33.1 MAJOR DEPRESSIVE DISORDER, RECURRENT, MODERATE: ICD-10-CM

## 2022-06-16 DIAGNOSIS — Z90.49 ACQUIRED ABSENCE OF OTHER SPECIFIED PARTS OF DIGESTIVE TRACT: Chronic | ICD-10-CM

## 2022-06-16 DIAGNOSIS — F41.1 GENERALIZED ANXIETY DISORDER: ICD-10-CM

## 2022-06-16 PROCEDURE — 99215 OFFICE O/P EST HI 40 MIN: CPT | Mod: 95

## 2022-06-27 ENCOUNTER — OUTPATIENT (OUTPATIENT)
Dept: OUTPATIENT SERVICES | Facility: HOSPITAL | Age: 52
LOS: 1 days | Discharge: HOME | End: 2022-06-27

## 2022-06-27 ENCOUNTER — APPOINTMENT (OUTPATIENT)
Dept: PSYCHIATRY | Facility: CLINIC | Age: 52
End: 2022-06-27

## 2022-06-27 DIAGNOSIS — Z98.890 OTHER SPECIFIED POSTPROCEDURAL STATES: Chronic | ICD-10-CM

## 2022-06-27 DIAGNOSIS — F33.42 MAJOR DEPRESSIVE DISORDER, RECURRENT, IN FULL REMISSION: ICD-10-CM

## 2022-06-27 DIAGNOSIS — Z98.891 HISTORY OF UTERINE SCAR FROM PREVIOUS SURGERY: Chronic | ICD-10-CM

## 2022-06-27 DIAGNOSIS — F41.1 GENERALIZED ANXIETY DISORDER: ICD-10-CM

## 2022-06-27 DIAGNOSIS — Z90.49 ACQUIRED ABSENCE OF OTHER SPECIFIED PARTS OF DIGESTIVE TRACT: Chronic | ICD-10-CM

## 2022-06-27 DIAGNOSIS — F41.0 PANIC DISORDER [EPISODIC PAROXYSMAL ANXIETY]: ICD-10-CM

## 2022-06-27 PROCEDURE — 99214 OFFICE O/P EST MOD 30 MIN: CPT | Mod: 95

## 2022-06-29 ENCOUNTER — NON-APPOINTMENT (OUTPATIENT)
Age: 52
End: 2022-06-29

## 2022-06-29 ENCOUNTER — OUTPATIENT (OUTPATIENT)
Dept: OUTPATIENT SERVICES | Facility: HOSPITAL | Age: 52
LOS: 1 days | Discharge: HOME | End: 2022-06-29

## 2022-06-29 DIAGNOSIS — Z98.890 OTHER SPECIFIED POSTPROCEDURAL STATES: Chronic | ICD-10-CM

## 2022-06-29 DIAGNOSIS — Z98.891 HISTORY OF UTERINE SCAR FROM PREVIOUS SURGERY: Chronic | ICD-10-CM

## 2022-06-29 DIAGNOSIS — F41.0 PANIC DISORDER [EPISODIC PAROXYSMAL ANXIETY]: ICD-10-CM

## 2022-06-29 DIAGNOSIS — F41.1 GENERALIZED ANXIETY DISORDER: ICD-10-CM

## 2022-06-29 DIAGNOSIS — F33.42 MAJOR DEPRESSIVE DISORDER, RECURRENT, IN FULL REMISSION: ICD-10-CM

## 2022-06-29 DIAGNOSIS — Z90.49 ACQUIRED ABSENCE OF OTHER SPECIFIED PARTS OF DIGESTIVE TRACT: Chronic | ICD-10-CM

## 2022-07-01 ENCOUNTER — NON-APPOINTMENT (OUTPATIENT)
Age: 52
End: 2022-07-01

## 2022-07-06 ENCOUNTER — APPOINTMENT (OUTPATIENT)
Dept: VASCULAR SURGERY | Facility: CLINIC | Age: 52
End: 2022-07-06

## 2022-07-13 ENCOUNTER — APPOINTMENT (OUTPATIENT)
Dept: PSYCHIATRY | Facility: CLINIC | Age: 52
End: 2022-07-13

## 2022-07-13 ENCOUNTER — OUTPATIENT (OUTPATIENT)
Dept: OUTPATIENT SERVICES | Facility: HOSPITAL | Age: 52
LOS: 1 days | Discharge: HOME | End: 2022-07-13

## 2022-07-13 ENCOUNTER — NON-APPOINTMENT (OUTPATIENT)
Age: 52
End: 2022-07-13

## 2022-07-13 DIAGNOSIS — Z98.891 HISTORY OF UTERINE SCAR FROM PREVIOUS SURGERY: Chronic | ICD-10-CM

## 2022-07-13 DIAGNOSIS — Z98.890 OTHER SPECIFIED POSTPROCEDURAL STATES: Chronic | ICD-10-CM

## 2022-07-13 DIAGNOSIS — F41.0 PANIC DISORDER [EPISODIC PAROXYSMAL ANXIETY]: ICD-10-CM

## 2022-07-13 DIAGNOSIS — F41.1 GENERALIZED ANXIETY DISORDER: ICD-10-CM

## 2022-07-13 DIAGNOSIS — F33.42 MAJOR DEPRESSIVE DISORDER, RECURRENT, IN FULL REMISSION: ICD-10-CM

## 2022-07-13 DIAGNOSIS — Z90.49 ACQUIRED ABSENCE OF OTHER SPECIFIED PARTS OF DIGESTIVE TRACT: Chronic | ICD-10-CM

## 2022-07-27 ENCOUNTER — OUTPATIENT (OUTPATIENT)
Dept: OUTPATIENT SERVICES | Facility: HOSPITAL | Age: 52
LOS: 1 days | Discharge: HOME | End: 2022-07-27

## 2022-07-27 ENCOUNTER — NON-APPOINTMENT (OUTPATIENT)
Age: 52
End: 2022-07-27

## 2022-07-27 ENCOUNTER — APPOINTMENT (OUTPATIENT)
Dept: PSYCHIATRY | Facility: CLINIC | Age: 52
End: 2022-07-27

## 2022-07-27 DIAGNOSIS — Z98.890 OTHER SPECIFIED POSTPROCEDURAL STATES: Chronic | ICD-10-CM

## 2022-07-27 DIAGNOSIS — F41.1 GENERALIZED ANXIETY DISORDER: ICD-10-CM

## 2022-07-27 DIAGNOSIS — Z98.891 HISTORY OF UTERINE SCAR FROM PREVIOUS SURGERY: Chronic | ICD-10-CM

## 2022-07-27 DIAGNOSIS — Z90.49 ACQUIRED ABSENCE OF OTHER SPECIFIED PARTS OF DIGESTIVE TRACT: Chronic | ICD-10-CM

## 2022-07-27 DIAGNOSIS — F33.42 MAJOR DEPRESSIVE DISORDER, RECURRENT, IN FULL REMISSION: ICD-10-CM

## 2022-07-27 DIAGNOSIS — F41.0 PANIC DISORDER [EPISODIC PAROXYSMAL ANXIETY]: ICD-10-CM

## 2022-08-01 ENCOUNTER — APPOINTMENT (OUTPATIENT)
Dept: PSYCHIATRY | Facility: CLINIC | Age: 52
End: 2022-08-01

## 2022-08-09 ENCOUNTER — APPOINTMENT (OUTPATIENT)
Dept: PSYCHIATRY | Facility: CLINIC | Age: 52
End: 2022-08-09

## 2022-08-09 DIAGNOSIS — Z86.59 PERSONAL HISTORY OF OTHER MENTAL AND BEHAVIORAL DISORDERS: ICD-10-CM

## 2022-08-09 DIAGNOSIS — F33.1 MAJOR DEPRESSIVE DISORDER, RECURRENT, MODERATE: ICD-10-CM

## 2022-08-09 PROCEDURE — 99214 OFFICE O/P EST MOD 30 MIN: CPT | Mod: 95

## 2022-08-10 ENCOUNTER — APPOINTMENT (OUTPATIENT)
Dept: PSYCHIATRY | Facility: CLINIC | Age: 52
End: 2022-08-10

## 2022-08-10 ENCOUNTER — NON-APPOINTMENT (OUTPATIENT)
Age: 52
End: 2022-08-10

## 2022-08-12 ENCOUNTER — NON-APPOINTMENT (OUTPATIENT)
Age: 52
End: 2022-08-12

## 2022-08-12 ENCOUNTER — OUTPATIENT (OUTPATIENT)
Dept: OUTPATIENT SERVICES | Facility: HOSPITAL | Age: 52
LOS: 1 days | Discharge: HOME | End: 2022-08-12

## 2022-08-12 ENCOUNTER — APPOINTMENT (OUTPATIENT)
Dept: PSYCHIATRY | Facility: CLINIC | Age: 52
End: 2022-08-12

## 2022-08-12 DIAGNOSIS — Z98.890 OTHER SPECIFIED POSTPROCEDURAL STATES: Chronic | ICD-10-CM

## 2022-08-12 DIAGNOSIS — Z90.49 ACQUIRED ABSENCE OF OTHER SPECIFIED PARTS OF DIGESTIVE TRACT: Chronic | ICD-10-CM

## 2022-08-12 DIAGNOSIS — F41.0 PANIC DISORDER [EPISODIC PAROXYSMAL ANXIETY]: ICD-10-CM

## 2022-08-12 DIAGNOSIS — F33.42 MAJOR DEPRESSIVE DISORDER, RECURRENT, IN FULL REMISSION: ICD-10-CM

## 2022-08-12 DIAGNOSIS — Z98.891 HISTORY OF UTERINE SCAR FROM PREVIOUS SURGERY: Chronic | ICD-10-CM

## 2022-08-12 DIAGNOSIS — F41.1 GENERALIZED ANXIETY DISORDER: ICD-10-CM

## 2022-08-18 ENCOUNTER — APPOINTMENT (OUTPATIENT)
Dept: PSYCHIATRY | Facility: CLINIC | Age: 52
End: 2022-08-18

## 2022-08-24 ENCOUNTER — APPOINTMENT (OUTPATIENT)
Dept: PSYCHIATRY | Facility: CLINIC | Age: 52
End: 2022-08-24

## 2022-08-24 ENCOUNTER — NON-APPOINTMENT (OUTPATIENT)
Age: 52
End: 2022-08-24

## 2022-09-01 ENCOUNTER — NON-APPOINTMENT (OUTPATIENT)
Age: 52
End: 2022-09-01

## 2022-09-14 ENCOUNTER — OUTPATIENT (OUTPATIENT)
Dept: OUTPATIENT SERVICES | Facility: HOSPITAL | Age: 52
LOS: 1 days | Discharge: HOME | End: 2022-09-14

## 2022-09-14 ENCOUNTER — APPOINTMENT (OUTPATIENT)
Dept: PSYCHIATRY | Facility: CLINIC | Age: 52
End: 2022-09-14

## 2022-09-14 ENCOUNTER — NON-APPOINTMENT (OUTPATIENT)
Age: 52
End: 2022-09-14

## 2022-09-14 DIAGNOSIS — F41.0 PANIC DISORDER [EPISODIC PAROXYSMAL ANXIETY]: ICD-10-CM

## 2022-09-14 DIAGNOSIS — Z90.49 ACQUIRED ABSENCE OF OTHER SPECIFIED PARTS OF DIGESTIVE TRACT: Chronic | ICD-10-CM

## 2022-09-14 DIAGNOSIS — Z98.890 OTHER SPECIFIED POSTPROCEDURAL STATES: Chronic | ICD-10-CM

## 2022-09-14 DIAGNOSIS — F33.42 MAJOR DEPRESSIVE DISORDER, RECURRENT, IN FULL REMISSION: ICD-10-CM

## 2022-09-14 DIAGNOSIS — Z98.891 HISTORY OF UTERINE SCAR FROM PREVIOUS SURGERY: Chronic | ICD-10-CM

## 2022-09-14 DIAGNOSIS — F41.1 GENERALIZED ANXIETY DISORDER: ICD-10-CM

## 2022-09-22 ENCOUNTER — OUTPATIENT (OUTPATIENT)
Dept: OUTPATIENT SERVICES | Facility: HOSPITAL | Age: 52
LOS: 1 days | Discharge: HOME | End: 2022-09-22

## 2022-09-22 ENCOUNTER — APPOINTMENT (OUTPATIENT)
Dept: PSYCHIATRY | Facility: CLINIC | Age: 52
End: 2022-09-22

## 2022-09-22 DIAGNOSIS — Z98.891 HISTORY OF UTERINE SCAR FROM PREVIOUS SURGERY: Chronic | ICD-10-CM

## 2022-09-22 DIAGNOSIS — Z98.890 OTHER SPECIFIED POSTPROCEDURAL STATES: Chronic | ICD-10-CM

## 2022-09-22 DIAGNOSIS — F41.0 PANIC DISORDER [EPISODIC PAROXYSMAL ANXIETY]: ICD-10-CM

## 2022-09-22 DIAGNOSIS — F41.1 GENERALIZED ANXIETY DISORDER: ICD-10-CM

## 2022-09-22 DIAGNOSIS — F33.41 MAJOR DEPRESSIVE DISORDER, RECURRENT, IN PARTIAL REMISSION: ICD-10-CM

## 2022-09-22 DIAGNOSIS — Z90.49 ACQUIRED ABSENCE OF OTHER SPECIFIED PARTS OF DIGESTIVE TRACT: Chronic | ICD-10-CM

## 2022-09-22 PROCEDURE — 99214 OFFICE O/P EST MOD 30 MIN: CPT | Mod: 95

## 2022-09-28 ENCOUNTER — APPOINTMENT (OUTPATIENT)
Dept: PSYCHIATRY | Facility: CLINIC | Age: 52
End: 2022-09-28

## 2022-09-28 ENCOUNTER — OUTPATIENT (OUTPATIENT)
Dept: OUTPATIENT SERVICES | Facility: HOSPITAL | Age: 52
LOS: 1 days | Discharge: HOME | End: 2022-09-28

## 2022-09-28 ENCOUNTER — NON-APPOINTMENT (OUTPATIENT)
Age: 52
End: 2022-09-28

## 2022-09-28 DIAGNOSIS — Z98.890 OTHER SPECIFIED POSTPROCEDURAL STATES: Chronic | ICD-10-CM

## 2022-09-28 DIAGNOSIS — F41.0 PANIC DISORDER [EPISODIC PAROXYSMAL ANXIETY]: ICD-10-CM

## 2022-09-28 DIAGNOSIS — Z90.49 ACQUIRED ABSENCE OF OTHER SPECIFIED PARTS OF DIGESTIVE TRACT: Chronic | ICD-10-CM

## 2022-09-28 DIAGNOSIS — F41.1 GENERALIZED ANXIETY DISORDER: ICD-10-CM

## 2022-09-28 DIAGNOSIS — Z98.891 HISTORY OF UTERINE SCAR FROM PREVIOUS SURGERY: Chronic | ICD-10-CM

## 2022-09-28 DIAGNOSIS — F33.41 MAJOR DEPRESSIVE DISORDER, RECURRENT, IN PARTIAL REMISSION: ICD-10-CM

## 2022-10-04 ENCOUNTER — NON-APPOINTMENT (OUTPATIENT)
Age: 52
End: 2022-10-04

## 2022-10-04 ENCOUNTER — APPOINTMENT (OUTPATIENT)
Dept: PSYCHIATRY | Facility: CLINIC | Age: 52
End: 2022-10-04

## 2022-10-04 ENCOUNTER — OUTPATIENT (OUTPATIENT)
Dept: OUTPATIENT SERVICES | Facility: HOSPITAL | Age: 52
LOS: 1 days | Discharge: HOME | End: 2022-10-04

## 2022-10-04 DIAGNOSIS — F41.1 GENERALIZED ANXIETY DISORDER: ICD-10-CM

## 2022-10-04 DIAGNOSIS — Z90.49 ACQUIRED ABSENCE OF OTHER SPECIFIED PARTS OF DIGESTIVE TRACT: Chronic | ICD-10-CM

## 2022-10-04 DIAGNOSIS — F41.0 PANIC DISORDER [EPISODIC PAROXYSMAL ANXIETY]: ICD-10-CM

## 2022-10-04 DIAGNOSIS — Z98.890 OTHER SPECIFIED POSTPROCEDURAL STATES: Chronic | ICD-10-CM

## 2022-10-04 DIAGNOSIS — F33.41 MAJOR DEPRESSIVE DISORDER, RECURRENT, IN PARTIAL REMISSION: ICD-10-CM

## 2022-10-04 DIAGNOSIS — Z98.891 HISTORY OF UTERINE SCAR FROM PREVIOUS SURGERY: Chronic | ICD-10-CM

## 2022-10-04 PROCEDURE — 99214 OFFICE O/P EST MOD 30 MIN: CPT | Mod: 95

## 2022-10-04 NOTE — SOCIAL HISTORY
[Lives with Spouse] : lives with spouse [Disabled] : disabled [] :  [None] : none [Yes] : yes [No Known Use] : no known use [FreeTextEntry4] : Few college courses [FreeTextEntry1] : Quit cigarettes six years ago. [TextBox_52] : Prescribed

## 2022-10-04 NOTE — PHYSICAL EXAM
[Average] : average [Cooperative] : cooperative [Euthymic] : euthymic [Full] : full [Clear] : clear [Linear/Goal Directed] : linear/goal directed [WNL] : within normal limits [FreeTextEntry1] : Hair in a bun

## 2022-10-04 NOTE — DISCUSSION/SUMMARY
[FreeTextEntry1] : Patient is a 53yo  female, history of depression, anxiety.  Emelina expresses she will return to court next month and was provided different options. She is sleeping later this week and getting up later, mood is 'up and down', racing thoughts at night, she tries to meditate, read and watch television.   She is at low risk, no acute thoughts of harm to self, positive family support, adherent with medication and linked to treatment.\par \par Paroxetine 50mg po daily\par Lorazepam 1mg po bedtime\par \par Metformin 1000mg po twice daily\par Clopidogrel 75mg po daily\par Xarelto 20mg po daily\par Metoprolol 50mg am + 100mg po pm\par Atorvastatin 80mg po daily\par Pioglitazone 30mg po daily\par Lantus 80u am\par Ozempic IM weekly

## 2022-10-04 NOTE — HISTORY OF PRESENT ILLNESS
[FreeTextEntry1] : \par Emelina expresses she will return to court next month and was provided different options. She is sleeping later this week and getting up later, mood is 'up and down', racing thoughts at night, she tries to meditate, read and watch television. She takes Lorazepam daily around 8pm, with improved bouts of anxiety.  \par \par She feels anxious regarding medical comorbidities, bills, possible eviction and financial difficulty.  She expresses she is not able to do trips or 'fun' activities with her son due to lack of finances. She has fair relationship with .  She is adherent with medication regimen, denies side effects.  At this time, she denies thoughts of harm to self, safety plan is discussed.  Istop, no prescriptions.

## 2022-10-04 NOTE — RESULTS/DATA
[FreeTextEntry1] : PMBABAK, Dr. Wallace\par Labs 8/26/21:   normal limits\par EKG 11/17/20:  QTc 459ms, NSR\par \par

## 2022-10-12 ENCOUNTER — APPOINTMENT (OUTPATIENT)
Dept: PSYCHIATRY | Facility: CLINIC | Age: 52
End: 2022-10-12

## 2022-10-12 ENCOUNTER — NON-APPOINTMENT (OUTPATIENT)
Age: 52
End: 2022-10-12

## 2022-10-14 NOTE — REASON FOR VISIT
[Home] : at home, [unfilled] , at the time of the visit. [Other Location: e.g. Home (Enter Location, City,State)___] : at [unfilled] [Patient] : Patient [FreeTextEntry1] : Depression and Anxiety

## 2022-10-14 NOTE — PLAN
[Cognitive and/or Behavior Therapy] : Cognitive and/or Behavior Therapy  [Motivational Interviewing] : Motivational Interviewing  [Recommended Frequency of Visits: ____] : Recommended frequency of visits: [unfilled] [Return in ____ week(s)] : Return in [unfilled] week(s) [FreeTextEntry2] : Goal#1: Alleviate depressive  symptoms and increase coping skills\par Goal#1 Objective #1 Patient will explore and process feelings, and identify two triggers of depression\par Goal#1 Objective #2 Patient will identify activities that promotes mark and activate behavior\par Goal#1 Objective #3 Patient will maintain medication management appointments with psychiatrist\par Goal#2: Alleviate Anxiety Symptoms\par Goal#2 Objective #1 Patient will explore and process feelings, and identify two triggers of anxiety\par Goal#2 Objective #2 Patient will work with therapist to identify irrational beliefs and conclusions that contribute to anxiety  \par Goal#2 Objective #3 Patient will practice 2 anxiety management techniques \par Goal#2 Objective #4 Patient will maintain medication management appointments with psychiatrist\par  [de-identified] : Therapist conducted an individual therapy session with patient.  Patient was engaged in session. Therapist used CBT and motivational interviewing techniques to engage with patient. Therapist actively listened and provided a safe space for patient to express feelings. Patient discussed mental health symptoms and explored feelings and triggers experienced these past few weeks.  Patient reported  that she had an increase of depressive symptoms and anxiety.  She discussed having a negative experience speaking to [employer] rep from the [employer] [place].  Therapist validated concerns and provided emotional support. Therapist helped patient identify stressors that may have contributed to an increase of symptoms.  Patient provided an account of coping techniques used to help with symptoms. Patient reported that she continues to be compliant with medications, but has not had the motivation to use coping skills.  Therapist praised patient for participating in the process and provided psychoeducation about how allowing the idea of being defeated destroys her mood.  Patient encouraged to use positive self talk.  Therapist taught patient what positive self talk is and helped her to practice.  Patient did reports that she was successful at getting appointment to go to the food pantry at [facility] on 10-26.  Patient is future oriented, has protective factors that decreases the risk of suicide and denied current suicidal ideations or plans at this time. Therapist encouraged patient to practice techniques discussed in session.\par \par  [FreeTextEntry1] : Patient will practice positive self talk.

## 2022-10-26 ENCOUNTER — APPOINTMENT (OUTPATIENT)
Dept: PSYCHIATRY | Facility: CLINIC | Age: 52
End: 2022-10-26

## 2022-10-26 ENCOUNTER — NON-APPOINTMENT (OUTPATIENT)
Age: 52
End: 2022-10-26

## 2022-10-31 NOTE — DISCUSSION/SUMMARY
[FreeTextEntry1] : Patient called and left a message cancelling appointment. Therapist called her back to reschedule.  Patient agreed to reach out to reschedule.

## 2022-11-08 ENCOUNTER — OUTPATIENT (OUTPATIENT)
Dept: OUTPATIENT SERVICES | Facility: HOSPITAL | Age: 52
LOS: 1 days | Discharge: HOME | End: 2022-11-08

## 2022-11-08 ENCOUNTER — APPOINTMENT (OUTPATIENT)
Dept: PSYCHIATRY | Facility: CLINIC | Age: 52
End: 2022-11-08

## 2022-11-08 DIAGNOSIS — Z98.890 OTHER SPECIFIED POSTPROCEDURAL STATES: Chronic | ICD-10-CM

## 2022-11-08 DIAGNOSIS — F33.1 MAJOR DEPRESSIVE DISORDER, RECURRENT, MODERATE: ICD-10-CM

## 2022-11-08 DIAGNOSIS — Z98.891 HISTORY OF UTERINE SCAR FROM PREVIOUS SURGERY: Chronic | ICD-10-CM

## 2022-11-08 DIAGNOSIS — Z90.49 ACQUIRED ABSENCE OF OTHER SPECIFIED PARTS OF DIGESTIVE TRACT: Chronic | ICD-10-CM

## 2022-11-08 DIAGNOSIS — F41.1 GENERALIZED ANXIETY DISORDER: ICD-10-CM

## 2022-11-08 PROCEDURE — 99214 OFFICE O/P EST MOD 30 MIN: CPT | Mod: 95

## 2022-11-08 NOTE — DISCUSSION/SUMMARY
[FreeTextEntry1] : Patient is a 51yo  female, history of depression, anxiety.   Emelina expresses the case was dismissed and she is hoping eviction will not happen.  She expresses dysphoric mood,  low energy level, motivation, intermittent anxiety regarding medical comorbidities, bills and financial difficulty.  She would like to change the antidepressant once labs are obtained. She is at low risk, no acute thoughts of harm to self, positive family support, adherent with medication and linked to treatment.\par \par Paroxetine 50mg po daily\par Lorazepam 1mg po bedtime\par \par Metformin 1000mg po twice daily\par Clopidogrel 75mg po daily\par Xarelto 20mg po daily\par Metoprolol 50mg am + 100mg po pm\par Atorvastatin 80mg po daily\par Pioglitazone 30mg po daily\par Lantus 80u am\par Ozempic IM weekly

## 2022-11-08 NOTE — RESULTS/DATA
[FreeTextEntry1] : PMD, Dr. Wallace,  Endocrinologist, Dr. Hunter\par Labs 8/26/21:   normal limits\par EKG 11/17/20:  QTc 459ms, NSR\par \par

## 2022-11-08 NOTE — PHYSICAL EXAM
[Average] : average [Cooperative] : cooperative [Depressed] : depressed [Full] : full [Clear] : clear [Linear/Goal Directed] : linear/goal directed [WNL] : within normal limits [FreeTextEntry1] : Hair in a bun [de-identified] : Fair [de-identified] : Fair

## 2022-11-08 NOTE — HISTORY OF PRESENT ILLNESS
[FreeTextEntry1] : \par Emelina expresses the case was dismissed and she is hoping eviction will not happen. She has one interruption but is able to return to sleep. She expresses dysphoric mood,  low energy level, motivation to engage in housework or errands. She does the laundry outside. \par \par She tends to experience intermittent anxiety regarding medical comorbidities, bills and financial difficulty.  She denies recent panic attacks, takes Lorazepam every other day.  She is adherent with medication regimen, denies side effects.  She would like to change the antidepressant once recent labs are obtained.  At this time, she denies thoughts of harm to self, safety plan is discussed.  Istop, no prescriptions.

## 2022-11-23 ENCOUNTER — NON-APPOINTMENT (OUTPATIENT)
Age: 52
End: 2022-11-23

## 2022-11-23 ENCOUNTER — OUTPATIENT (OUTPATIENT)
Dept: OUTPATIENT SERVICES | Facility: HOSPITAL | Age: 52
LOS: 1 days | Discharge: HOME | End: 2022-11-23

## 2022-11-23 ENCOUNTER — APPOINTMENT (OUTPATIENT)
Dept: PSYCHIATRY | Facility: CLINIC | Age: 52
End: 2022-11-23

## 2022-11-23 DIAGNOSIS — Z98.890 OTHER SPECIFIED POSTPROCEDURAL STATES: Chronic | ICD-10-CM

## 2022-11-23 DIAGNOSIS — Z90.49 ACQUIRED ABSENCE OF OTHER SPECIFIED PARTS OF DIGESTIVE TRACT: Chronic | ICD-10-CM

## 2022-11-23 DIAGNOSIS — Z98.891 HISTORY OF UTERINE SCAR FROM PREVIOUS SURGERY: Chronic | ICD-10-CM

## 2022-11-23 DIAGNOSIS — F33.1 MAJOR DEPRESSIVE DISORDER, RECURRENT, MODERATE: ICD-10-CM

## 2022-11-23 DIAGNOSIS — F41.1 GENERALIZED ANXIETY DISORDER: ICD-10-CM

## 2022-11-23 NOTE — PLAN
[FreeTextEntry2] : Goal#1: Alleviate depressive  and anxiety symptoms and increase coping skills\par Goal#1 Objective #1 Patient will explore and process feelings, and identify triggers of depression and anxiety \par Goal#1 Objective #2 Patient will learn and practice coping skills and comply with medication requirements.\par  [Cognitive and/or Behavior Therapy] : Cognitive and/or Behavior Therapy  [Motivational Interviewing] : Motivational Interviewing  [de-identified] : Therapist conducted an individual therapy session with patient.  Patient was engaged in session. Therapist used CBT and motivational interviewing techniques to engage with patient. Therapist actively listened and provided a safe space for patient to express feelings. Patient explored and processed his feeling about mental health symptoms and discussed triggers experienced these past few weeks.  Patient reported  that she continues to feel depressed.  she explained that she still have issues with [employer] requesting documents she already submitted.  Patient reports feeling powerless and unmotivated because she is in the same place she was weeks ago. Therapist validated patient's experience and provided emotional support. Patient provided an account of coping techniques used to help with symptoms. Patient reports compliance with med, and continues to reach out for help.  Patient stated that she is working with [employer]man [employer]'s office and they helped her file and appeal.  her court case was dismiss and her eviction has been stopped. Therapist praised patient for participating in the process and for following the treatment plan. Therapist assisted patient in exploring what helped or could be helpful in the future. Therapist helped patient to explore the positive things that has happened, such as the support she is now getting, the eviction stopping in it's tract giving her mote time to work on things, and having her family together and healthy.  Patient agreed that she has made progress and agreed to remain thinking positive as she moves forward.  Patient was happy to receive Thanksgiving food supplies from an organization and plans on enjoying thanksgiving with her family. Patient continues to be future oriented, has appropriate protective factors that decreases the risk of suicide.  Patient denied current suicidal ideations or plans at this time. Therapist encouraged patient to practice self care and techniques discussed in session.\par  [Recommended Frequency of Visits: ____] : Recommended frequency of visits: [unfilled] [Return in ____ week(s)] : Return in [unfilled] week(s) [FreeTextEntry1] : Patient will practice positive self talk.

## 2022-11-23 NOTE — REASON FOR VISIT
[Patient preference] : as per patient preference [Continuing, patient not seen in-person within last 12 months (provide details below)] : Telehealth services are continuing, patient not seen in-person within last 12 months.  [Telehealth (audio & video) - Individual/Group] : This visit was provided via telehealth using real-time 2-way audio visual technology. [Other Location: e.g. Home (Enter Location, City,State)___] : The provider was located at [unfilled]. [Home] : The patient, [unfilled], was located at home, [unfilled], at the time of the visit. [Verbal consent obtained from patient/other participant(s)] : Verbal consent for telehealth/telephonic services obtained from patient/other participant(s) [FreeTextEntry4] : 10:30AM [FreeTextEntry5] : 11:00 AM [FreeTextEntry3] : Patient preference [Patient] : Patient [FreeTextEntry1] : Anxiety and Depression

## 2022-12-07 ENCOUNTER — APPOINTMENT (OUTPATIENT)
Dept: PSYCHIATRY | Facility: CLINIC | Age: 52
End: 2022-12-07

## 2022-12-07 ENCOUNTER — OUTPATIENT (OUTPATIENT)
Dept: OUTPATIENT SERVICES | Facility: HOSPITAL | Age: 52
LOS: 1 days | Discharge: HOME | End: 2022-12-07

## 2022-12-07 DIAGNOSIS — Z98.890 OTHER SPECIFIED POSTPROCEDURAL STATES: Chronic | ICD-10-CM

## 2022-12-07 DIAGNOSIS — Z90.49 ACQUIRED ABSENCE OF OTHER SPECIFIED PARTS OF DIGESTIVE TRACT: Chronic | ICD-10-CM

## 2022-12-07 DIAGNOSIS — Z98.891 HISTORY OF UTERINE SCAR FROM PREVIOUS SURGERY: Chronic | ICD-10-CM

## 2022-12-07 DIAGNOSIS — F33.1 MAJOR DEPRESSIVE DISORDER, RECURRENT, MODERATE: ICD-10-CM

## 2022-12-07 DIAGNOSIS — F41.1 GENERALIZED ANXIETY DISORDER: ICD-10-CM

## 2022-12-07 NOTE — PLAN
[FreeTextEntry2] : Goal#1: Alleviate depressive  and anxiety symptoms and increase coping skills\par Goal#1 Objective #1 Patient will explore and process feelings, and identify triggers of depression and anxiety \par Goal#1 Objective #2 Patient will learn and practice coping skills and comply with medication requirements.\par  [Cognitive and/or Behavior Therapy] : Cognitive and/or Behavior Therapy  [Motivational Interviewing] : Motivational Interviewing  [de-identified] : Therapist conducted an individual therapy session with patient.  Patient was engaged in session. Therapist used CBT and motivational interviewing techniques to engage with patient. Therapist actively listened and provided a safe space for patient to express feelings. Patient explored and processed feeling about mental health symptoms and discussed triggers experienced these past few weeks.  Patient reported  that she has been feeling a little blue lately.  She said she has not heard anything positive from [employer] regarding the applications she put in.  Patient provided her history, specifying that she has always taken care of her home and volunteered.  Patient feels that since the stroke her eyes has gone bad, and since her mother's death and she lost the house things have not been the same.  Therapist validated patient's experience and provided emotional support. Patient provided an account of coping techniques used to help with symptoms. Patient reports compliance with med, and she continues to speak to [place] from the elected official office to get help.  Therapist praised patient for participating in the process and for following the treatment plan. Therapist assisted patient in exploring what helped or could be helpful in the future. Patient reported that her doctor wants her to apply for SSI, so she started the SSI process. Patient is hopeful that her doctor will support her SSI application.  Patient continues to be future oriented, has appropriate protective factors that decreases the risk of suicide.  Patient denied current suicidal ideations or plans at this time. Therapist encouraged patient to practice self care and techniques discussed in session.\par  [Recommended Frequency of Visits: ____] : Recommended frequency of visits: [unfilled] [Return in ____ week(s)] : Return in [unfilled] week(s) [FreeTextEntry1] : Patient will practice positive self talk.

## 2022-12-08 ENCOUNTER — APPOINTMENT (OUTPATIENT)
Dept: PSYCHIATRY | Facility: CLINIC | Age: 52
End: 2022-12-08

## 2022-12-08 ENCOUNTER — OUTPATIENT (OUTPATIENT)
Dept: OUTPATIENT SERVICES | Facility: HOSPITAL | Age: 52
LOS: 1 days | Discharge: HOME | End: 2022-12-08

## 2022-12-08 DIAGNOSIS — F41.1 GENERALIZED ANXIETY DISORDER: ICD-10-CM

## 2022-12-08 DIAGNOSIS — F41.0 PANIC DISORDER [EPISODIC PAROXYSMAL ANXIETY]: ICD-10-CM

## 2022-12-08 DIAGNOSIS — Z98.890 OTHER SPECIFIED POSTPROCEDURAL STATES: Chronic | ICD-10-CM

## 2022-12-08 DIAGNOSIS — F33.41 MAJOR DEPRESSIVE DISORDER, RECURRENT, IN PARTIAL REMISSION: ICD-10-CM

## 2022-12-08 DIAGNOSIS — Z98.891 HISTORY OF UTERINE SCAR FROM PREVIOUS SURGERY: Chronic | ICD-10-CM

## 2022-12-08 DIAGNOSIS — Z90.49 ACQUIRED ABSENCE OF OTHER SPECIFIED PARTS OF DIGESTIVE TRACT: Chronic | ICD-10-CM

## 2022-12-08 PROCEDURE — 99214 OFFICE O/P EST MOD 30 MIN: CPT | Mod: 95

## 2022-12-08 NOTE — PHYSICAL EXAM
[Average] : average [Cooperative] : cooperative [Full] : full [Clear] : clear [Linear/Goal Directed] : linear/goal directed [WNL] : within normal limits [FreeTextEntry1] : Hair in a bun [FreeTextEntry8] : 'better' [de-identified] : Fair [de-identified] : Fair

## 2022-12-08 NOTE — HISTORY OF PRESENT ILLNESS
[FreeTextEntry1] : \bianca Tarango has improved sleep, her mood has been 'better' as she is not concerned regarding eviction.  She thinks of her  mother ( four yrs ago) when preparing for the holidays. She is trying to be more motivated to complete tasks at home.  She tends to experience intermittent anxiety regarding medical comorbidities, bills and financial difficulty.  She has been taking less Lorazepam with twice weekly.  She is adherent with medication regimen, denies side effects. She is not interested in changing antidepressant currently. Labs could not be checked during the session.  At this time, she denies thoughts of harm to self, safety plan is discussed.  Istop, no prescriptions.

## 2022-12-08 NOTE — REASON FOR VISIT
[Home] : at home, [unfilled] , at the time of the visit. [Verbal consent obtained from patient] : the patient, [unfilled] [Medical Office: ([facility])___] : at the medical office located in

## 2022-12-08 NOTE — DISCUSSION/SUMMARY
[FreeTextEntry1] : Emelina is a 51yo  female, history of depression, anxiety.   She has improved sleep, her mood has been 'better' as she is not concerned regarding eviction. She is trying to be more motivated to complete tasks.  She tends to experience intermittent anxiety regarding medical comorbidities, bills and financial difficulty.  She has been taking less Lorazepam with twice weekly.  She is at low risk, no acute thoughts of harm to self, positive family support, adherent with medication and linked to treatment.\par \par Paroxetine 50mg po daily\par Lorazepam 1mg po bedtime\par \par Metformin 1000mg po twice daily\par Clopidogrel 75mg po daily\par Xarelto 20mg po daily\par Metoprolol 50mg am + 100mg po pm\par Atorvastatin 80mg po daily\par Pioglitazone 30mg po daily\par Lantus 80u am\par Ozempic IM weekly

## 2022-12-12 ENCOUNTER — APPOINTMENT (OUTPATIENT)
Dept: PSYCHIATRY | Facility: CLINIC | Age: 52
End: 2022-12-12

## 2022-12-21 ENCOUNTER — APPOINTMENT (OUTPATIENT)
Dept: PSYCHIATRY | Facility: CLINIC | Age: 52
End: 2022-12-21

## 2022-12-21 ENCOUNTER — OUTPATIENT (OUTPATIENT)
Dept: OUTPATIENT SERVICES | Facility: HOSPITAL | Age: 52
LOS: 1 days | Discharge: HOME | End: 2022-12-21

## 2022-12-21 DIAGNOSIS — Z98.890 OTHER SPECIFIED POSTPROCEDURAL STATES: Chronic | ICD-10-CM

## 2022-12-21 DIAGNOSIS — F41.0 PANIC DISORDER [EPISODIC PAROXYSMAL ANXIETY]: ICD-10-CM

## 2022-12-21 DIAGNOSIS — Z98.891 HISTORY OF UTERINE SCAR FROM PREVIOUS SURGERY: Chronic | ICD-10-CM

## 2022-12-21 DIAGNOSIS — F41.1 GENERALIZED ANXIETY DISORDER: ICD-10-CM

## 2022-12-21 DIAGNOSIS — F33.41 MAJOR DEPRESSIVE DISORDER, RECURRENT, IN PARTIAL REMISSION: ICD-10-CM

## 2022-12-21 DIAGNOSIS — Z90.49 ACQUIRED ABSENCE OF OTHER SPECIFIED PARTS OF DIGESTIVE TRACT: Chronic | ICD-10-CM

## 2022-12-22 NOTE — PLAN
[FreeTextEntry2] : Goal#1: Alleviate depressive  and anxiety symptoms and increase coping skills\par Goal#1 Objective #1 Patient will explore and process feelings, and identify triggers of depression and anxiety \par Goal#1 Objective #2 Patient will learn and practice coping skills and comply with medication requirements.\par  [Cognitive and/or Behavior Therapy] : Cognitive and/or Behavior Therapy  [Motivational Interviewing] : Motivational Interviewing  [de-identified] : Therapist conducted an individual therapy session with patient.  Patient was engaged in session. Therapist used CBT and motivational interviewing techniques to engage with patient. Therapist actively listened and provided a safe space for patient to express feelings. Patient explored and processed feeling about mental health symptoms and discussed triggers experienced these past few weeks.  Patient reported  that she is feeling better this week.  She reported having a long conversation with  about her feelings.  Patient was able to discuss her strengths.  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   Patient reports compliance with med, and speaking about her feelings to her .   Therapist praised patient for participating in the process and for following the treatment plan. Patient has decorated her home and plans on spending Eddie with her family.  Patient continues to be future oriented, has appropriate protective factors that decreases the risk of suicide.  Patient denied current suicidal ideations or plans at this time. Therapist encouraged patient to practice self care and techniques discussed in session.\par  [Recommended Frequency of Visits: ____] : Recommended frequency of visits: [unfilled] [Return in ____ week(s)] : Return in [unfilled] week(s) [FreeTextEntry1] : Patient will practice positive self talk.  Patient is aware of therapist vacation and agreed to meet upon her return.

## 2023-01-13 ENCOUNTER — APPOINTMENT (OUTPATIENT)
Dept: PSYCHIATRY | Facility: CLINIC | Age: 53
End: 2023-01-13

## 2023-01-13 ENCOUNTER — OUTPATIENT (OUTPATIENT)
Dept: OUTPATIENT SERVICES | Facility: HOSPITAL | Age: 53
LOS: 1 days | Discharge: HOME | End: 2023-01-13

## 2023-01-13 DIAGNOSIS — F33.41 MAJOR DEPRESSIVE DISORDER, RECURRENT, IN PARTIAL REMISSION: ICD-10-CM

## 2023-01-13 DIAGNOSIS — Z98.890 OTHER SPECIFIED POSTPROCEDURAL STATES: Chronic | ICD-10-CM

## 2023-01-13 DIAGNOSIS — F41.0 PANIC DISORDER [EPISODIC PAROXYSMAL ANXIETY]: ICD-10-CM

## 2023-01-13 DIAGNOSIS — Z98.891 HISTORY OF UTERINE SCAR FROM PREVIOUS SURGERY: Chronic | ICD-10-CM

## 2023-01-13 DIAGNOSIS — Z90.49 ACQUIRED ABSENCE OF OTHER SPECIFIED PARTS OF DIGESTIVE TRACT: Chronic | ICD-10-CM

## 2023-01-13 DIAGNOSIS — F41.1 GENERALIZED ANXIETY DISORDER: ICD-10-CM

## 2023-01-19 NOTE — PLAN
[FreeTextEntry2] : Goal#1: Alleviate depressive  and anxiety symptoms and increase coping skills\par Goal#1 Objective #1 Patient will explore and process feelings, and identify triggers of depression and anxiety \par Goal#1 Objective #2 Patient will learn and practice coping skills and comply with medication therapy recommendation.\par  [Cognitive and/or Behavior Therapy] : Cognitive and/or Behavior Therapy  [Motivational Interviewing] : Motivational Interviewing  [de-identified] : Therapist conducted an individual therapy session with patient.  Patient was engaged in session. Therapist used CBT and motivational interviewing techniques to engage with patient. Therapist actively listened and provided a safe space for patient to express feelings. Patient explored and processed feeling about mental health symptoms and discussed triggers experienced these past few weeks.  Patient reported  feeling okay despite the fact that she has not been able to get assistance needed for rent.  Therapist validated patient's experience and provided emotional support. Patient provided an account of coping techniques used to help with symptoms. Patient denied using maladaptive behaviors to cope with situation. Patient denied smoking, drinking or any other substances.  Patient reports up-to-date in following up with PCP and other specialists, compliance with meds, and decided to reapply.  Therapist praised patient for participating in the process and for following the treatment plan. Therapist praised patient for her resilience.  Therapist assisted patient in exploring what helped or could be helpful in the future.  Patient was able to explore what it would mean to vacation before the year is over.  Patient continues to be future oriented, has appropriate protective factors that decreases the risk of suicide.  Patient denied current suicidal ideations or plans at this time. Therapist encouraged patient to practice self care and techniques discussed in session.\par  [Recommended Frequency of Visits: ____] : Recommended frequency of visits: [unfilled] [Return in ____ week(s)] : Return in [unfilled] week(s) [FreeTextEntry1] : Patient will practice positive self talk.

## 2023-01-19 NOTE — REASON FOR VISIT
[Patient preference] : as per patient preference [Continuing, patient not seen in-person within last 12 months (provide details below)] : Telehealth services are continuing, patient not seen in-person within last 12 months.  [Telehealth (audio & video) - Individual/Group] : This visit was provided via telehealth using real-time 2-way audio visual technology. [Other Location: e.g. Home (Enter Location, City,State)___] : The provider was located at [unfilled]. [Home] : The patient, [unfilled], was located at home, [unfilled], at the time of the visit. [Verbal consent obtained from patient/other participant(s)] : Verbal consent for telehealth/telephonic services obtained from patient/other participant(s) [FreeTextEntry4] : 10:AM [FreeTextEntry5] : 10:30 AM [FreeTextEntry3] : Patient preference [Patient] : Patient [FreeTextEntry1] : Anxiety and Depression

## 2023-02-03 ENCOUNTER — APPOINTMENT (OUTPATIENT)
Dept: PSYCHIATRY | Facility: CLINIC | Age: 53
End: 2023-02-03

## 2023-02-03 ENCOUNTER — OUTPATIENT (OUTPATIENT)
Dept: OUTPATIENT SERVICES | Facility: HOSPITAL | Age: 53
LOS: 1 days | Discharge: HOME | End: 2023-02-03

## 2023-02-03 ENCOUNTER — APPOINTMENT (OUTPATIENT)
Dept: PSYCHIATRY | Facility: CLINIC | Age: 53
End: 2023-02-03
Payer: COMMERCIAL

## 2023-02-03 DIAGNOSIS — Z98.890 OTHER SPECIFIED POSTPROCEDURAL STATES: Chronic | ICD-10-CM

## 2023-02-03 DIAGNOSIS — Z98.891 HISTORY OF UTERINE SCAR FROM PREVIOUS SURGERY: Chronic | ICD-10-CM

## 2023-02-03 DIAGNOSIS — Z90.49 ACQUIRED ABSENCE OF OTHER SPECIFIED PARTS OF DIGESTIVE TRACT: Chronic | ICD-10-CM

## 2023-02-03 DIAGNOSIS — F41.0 PANIC DISORDER [EPISODIC PAROXYSMAL ANXIETY]: ICD-10-CM

## 2023-02-03 DIAGNOSIS — F33.41 MAJOR DEPRESSIVE DISORDER, RECURRENT, IN PARTIAL REMISSION: ICD-10-CM

## 2023-02-03 DIAGNOSIS — F41.1 GENERALIZED ANXIETY DISORDER: ICD-10-CM

## 2023-02-03 PROCEDURE — 99214 OFFICE O/P EST MOD 30 MIN: CPT | Mod: 95

## 2023-02-03 NOTE — REASON FOR VISIT
[Patient preference] : as per patient preference [Continuing, patient not seen in-person within last 12 months (provide details below)] : Telehealth services are continuing, patient not seen in-person within last 12 months.  [Telehealth (audio & video) - Individual/Group] : This visit was provided via telehealth using real-time 2-way audio visual technology. [Other Location: e.g. Home (Enter Location, City,State)___] : The provider was located at [unfilled]. [Home] : The patient, [unfilled], was located at home, [unfilled], at the time of the visit. [Verbal consent obtained from patient/other participant(s)] : Verbal consent for telehealth/telephonic services obtained from patient/other participant(s) [FreeTextEntry4] : 10:AM [FreeTextEntry5] : 10:45AM [FreeTextEntry3] : Patient preference [Patient] : Patient [FreeTextEntry1] : Anxiety and Depression

## 2023-02-03 NOTE — HISTORY OF PRESENT ILLNESS
[FreeTextEntry1] : Emelina experienced diarrhea two weeks ago,  she will follow up with PMD.  She endorses sleeping well, mood has been 'better' and anxiety is manageable. She tends to experience intermittent anxiety regarding medical comorbidities, bills and financial difficulty.  She has initiated puzzles, has started walks with  and son in the evenings.  She is taking Lorazepam twice weekly, at times, she takes it during the day. She is adherent with medication regimen, denies side effects.  At this time, she denies thoughts of harm to self, safety plan is discussed.  Istop, no prescriptions.

## 2023-02-03 NOTE — PLAN
[FreeTextEntry2] : Goal#1: Alleviate depressive  and anxiety symptoms and increase coping skills\par Goal#1 Objective #1 Patient will explore and process feelings, and identify triggers of depression and anxiety \par Goal#1 Objective #2 Patient will learn and practice coping skills and comply with medication therapy recommendation.\par  [Cognitive and/or Behavior Therapy] : Cognitive and/or Behavior Therapy  [Motivational Interviewing] : Motivational Interviewing  [Supportive Therapy] : Supportive Therapy [de-identified] : Therapist provided an individual psychotherapy session with patient. Therapist used cognitive behavior therapy (CBT) motivational Interviewing (MI) and supportive psychotherapy (SP) techniques. Patient was engaged in session.  Patient reported experience since last session and process feeling. Patient stated that she is still feeling sad about her situation.  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  Therapist actively listened and provided emotional support. Patient reported compliance with treatment plan goals and objectives including medication therapies.  Patient described  how coping skills were used during stressful period. Patient explained that she continues to advocate for herself and speak to her  and is getting help from elected official.  Patient denied smoking, alcohol use and drug use. Therapist helped patient explore options including employment opportunities.  Patient stated that she applied to work for [employer].  Patient is future oriented, continues to have protective factors, denies suicide ideation or attempt. Therapist praised patient for progress made towards goal and encouraged continued growth.\par  [Recommended Frequency of Visits: ____] : Recommended frequency of visits: [unfilled] [Return in ____ week(s)] : Return in [unfilled] week(s) [FreeTextEntry1] : Patient will practice positive self talk.

## 2023-02-03 NOTE — PHYSICAL EXAM
[Average] : average [Cooperative] : cooperative [Full] : full [Clear] : clear [Linear/Goal Directed] : linear/goal directed [WNL] : within normal limits [FreeTextEntry1] : Casually dressed [FreeTextEntry8] : 'better' [de-identified] : Fair [de-identified] : Fair

## 2023-02-03 NOTE — DISCUSSION/SUMMARY
[FreeTextEntry1] : Emelina is a 51yo  female, history of depression, anxiety.   Emelina endorses sleeping well, mood has been 'better' and anxiety is manageable.  She has been taking Lorazepam with twice weekly.  She is at low risk, no acute thoughts of harm to self, positive family support, adherent with medication and linked to treatment.\par \par Paroxetine 50mg po daily\par Lorazepam 1mg po bedtime\par \par Metformin 1000mg po twice daily\par Clopidogrel 75mg po daily\par Xarelto 20mg po daily\par Metoprolol 50mg am + 100mg po pm\par Atorvastatin 80mg po daily\par Pioglitazone 30mg po daily\par Lantus 80u am\par Ozempic IM weekly\par

## 2023-02-17 ENCOUNTER — APPOINTMENT (OUTPATIENT)
Dept: PSYCHIATRY | Facility: CLINIC | Age: 53
End: 2023-02-17

## 2023-02-17 ENCOUNTER — OUTPATIENT (OUTPATIENT)
Dept: OUTPATIENT SERVICES | Facility: HOSPITAL | Age: 53
LOS: 1 days | End: 2023-02-17
Payer: COMMERCIAL

## 2023-02-17 DIAGNOSIS — Z98.890 OTHER SPECIFIED POSTPROCEDURAL STATES: Chronic | ICD-10-CM

## 2023-02-17 DIAGNOSIS — F33.41 MAJOR DEPRESSIVE DISORDER, RECURRENT, IN PARTIAL REMISSION: ICD-10-CM

## 2023-02-17 DIAGNOSIS — F41.1 GENERALIZED ANXIETY DISORDER: ICD-10-CM

## 2023-02-17 DIAGNOSIS — Z98.891 HISTORY OF UTERINE SCAR FROM PREVIOUS SURGERY: Chronic | ICD-10-CM

## 2023-02-17 DIAGNOSIS — Z90.49 ACQUIRED ABSENCE OF OTHER SPECIFIED PARTS OF DIGESTIVE TRACT: Chronic | ICD-10-CM

## 2023-02-17 DIAGNOSIS — F19.10 OTHER PSYCHOACTIVE SUBSTANCE ABUSE, UNCOMPLICATED: ICD-10-CM

## 2023-02-17 PROCEDURE — 90832 PSYTX W PT 30 MINUTES: CPT | Mod: 95

## 2023-02-17 NOTE — REASON FOR VISIT
[Patient preference] : as per patient preference [Continuing, patient not seen in-person within last 12 months (provide details below)] : Telehealth services are continuing, patient not seen in-person within last 12 months.  [Telehealth (audio & video) - Individual/Group] : This visit was provided via telehealth using real-time 2-way audio visual technology. [Other Location: e.g. Home (Enter Location, City,State)___] : The provider was located at [unfilled]. [Home] : The patient, [unfilled], was located at home, [unfilled], at the time of the visit. [Verbal consent obtained from patient/other participant(s)] : Verbal consent for telehealth/telephonic services obtained from patient/other participant(s) [FreeTextEntry4] : 10:30 AM [FreeTextEntry5] : 11 AM [FreeTextEntry3] : Patient preference [Patient] : Patient [FreeTextEntry1] : Anxiety and Depression

## 2023-02-17 NOTE — PLAN
[FreeTextEntry2] : Goal#1: Alleviate depressive  and anxiety symptoms and increase coping skills\par Goal#1 Objective #1 Patient will explore and process feelings, and identify triggers of depression and anxiety \par Goal#1 Objective #2 Patient will learn and practice coping skills and comply with medication therapy recommendation.\par  [Cognitive and/or Behavior Therapy] : Cognitive and/or Behavior Therapy  [Motivational Interviewing] : Motivational Interviewing  [Supportive Therapy] : Supportive Therapy [de-identified] : Therapist provided an individual psychotherapy session with patient. Therapist used cognitive behavior therapy (CBT) motivational Interviewing (MI) and supportive psychotherapy (SP) techniques. Patient was engaged in session.  Patient reported experience since last session and process feeling. Patient stated that she is still depressed because she has not heard from [facility].  She also addressed concerns she had about her 16 year old son's behavior. Therapist actively listened and provided emotional support. Patient reported compliance with treatment plan goals and objectives including medication therapies.  Patient described  how coping skills were used during stressful period. Patient stated that she is continuing to be patient  knowing that she has done everything in her power.  Patient denied smoking, alcohol use and drug use.  Therapist provided patient with psychoeducation on child development and empowered her with resources and information about effective communication strategies to connect with a teenager. Patient is future oriented, continues to have protective factors, denies suicide ideation or attempt. Therapist praised patient for progress made towards goal and encouraged continued growth.\par  [Recommended Frequency of Visits: ____] : Recommended frequency of visits: [unfilled] [Return in ____ week(s)] : Return in [unfilled] week(s) [FreeTextEntry1] : Patient will practice positive self talk.

## 2023-02-28 DIAGNOSIS — F41.1 GENERALIZED ANXIETY DISORDER: ICD-10-CM

## 2023-02-28 DIAGNOSIS — F33.41 MAJOR DEPRESSIVE DISORDER, RECURRENT, IN PARTIAL REMISSION: ICD-10-CM

## 2023-03-03 ENCOUNTER — OUTPATIENT (OUTPATIENT)
Dept: OUTPATIENT SERVICES | Facility: HOSPITAL | Age: 53
LOS: 1 days | End: 2023-03-03
Payer: COMMERCIAL

## 2023-03-03 ENCOUNTER — NON-APPOINTMENT (OUTPATIENT)
Age: 53
End: 2023-03-03

## 2023-03-03 ENCOUNTER — APPOINTMENT (OUTPATIENT)
Dept: PSYCHIATRY | Facility: CLINIC | Age: 53
End: 2023-03-03

## 2023-03-03 DIAGNOSIS — F41.1 GENERALIZED ANXIETY DISORDER: ICD-10-CM

## 2023-03-03 DIAGNOSIS — Z98.890 OTHER SPECIFIED POSTPROCEDURAL STATES: Chronic | ICD-10-CM

## 2023-03-03 DIAGNOSIS — F33.41 MAJOR DEPRESSIVE DISORDER, RECURRENT, IN PARTIAL REMISSION: ICD-10-CM

## 2023-03-03 DIAGNOSIS — Z90.49 ACQUIRED ABSENCE OF OTHER SPECIFIED PARTS OF DIGESTIVE TRACT: Chronic | ICD-10-CM

## 2023-03-03 DIAGNOSIS — Z98.891 HISTORY OF UTERINE SCAR FROM PREVIOUS SURGERY: Chronic | ICD-10-CM

## 2023-03-03 PROCEDURE — 90832 PSYTX W PT 30 MINUTES: CPT

## 2023-03-06 NOTE — PLAN
[FreeTextEntry2] : Goal#1: Alleviate depressive  and anxiety symptoms and increase coping skills\par Goal#1 Objective #1 Patient will explore and process feelings, and identify triggers of depression and anxiety \par Goal#1 Objective #2 Patient will learn and practice coping skills and comply with medication therapy recommendation.\par  [Cognitive and/or Behavior Therapy] : Cognitive and/or Behavior Therapy  [Motivational Interviewing] : Motivational Interviewing  [Supportive Therapy] : Supportive Therapy [de-identified] : Therapist provided an individual psychotherapy session with patient. Therapist used cognitive behavior therapy (CBT) motivational Interviewing (MI) and supportive psychotherapy (SP) techniques. Patient was engaged in session.  Therapist inquired about if patient is seeing a PCP and if compliant with health care provider's recommendations.  Therapist also inquired about whether patient is using maladaptive coping, such as smoking, drinking and using drugs.\par \par Patient reports having a PCP and following medical recommendations including compliance with psychiatry medication management therapies.  Patient denies coping with smoking, drinking and using drugs. Patient processed feelings and events experienced since last session. Patient explained that she has been feeling okay.  She feels that her communication with her son has improved and that makes her feel good.  Patient discussed coping techniques used. Patient explained that she has been communicating more effectively with her son. Therapist reinforced need to focus on treatment goals.\par \par Patient is future oriented, continues to have protective factors, denies suicide ideation or attempt. Therapist praised patient for progress made towards goal and encouraged continued growth.\par  [Recommended Frequency of Visits: ____] : Recommended frequency of visits: [unfilled] [Return in ____ week(s)] : Return in [unfilled] week(s) [FreeTextEntry1] : Patient will practice positive self-talk.

## 2023-03-17 ENCOUNTER — OUTPATIENT (OUTPATIENT)
Dept: OUTPATIENT SERVICES | Facility: HOSPITAL | Age: 53
LOS: 1 days | End: 2023-03-17
Payer: COMMERCIAL

## 2023-03-17 ENCOUNTER — APPOINTMENT (OUTPATIENT)
Dept: PSYCHIATRY | Facility: CLINIC | Age: 53
End: 2023-03-17

## 2023-03-17 ENCOUNTER — NON-APPOINTMENT (OUTPATIENT)
Age: 53
End: 2023-03-17

## 2023-03-17 DIAGNOSIS — Z98.890 OTHER SPECIFIED POSTPROCEDURAL STATES: Chronic | ICD-10-CM

## 2023-03-17 DIAGNOSIS — Z90.49 ACQUIRED ABSENCE OF OTHER SPECIFIED PARTS OF DIGESTIVE TRACT: Chronic | ICD-10-CM

## 2023-03-17 DIAGNOSIS — Z98.891 HISTORY OF UTERINE SCAR FROM PREVIOUS SURGERY: Chronic | ICD-10-CM

## 2023-03-17 DIAGNOSIS — F41.1 GENERALIZED ANXIETY DISORDER: ICD-10-CM

## 2023-03-17 DIAGNOSIS — F33.41 MAJOR DEPRESSIVE DISORDER, RECURRENT, IN PARTIAL REMISSION: ICD-10-CM

## 2023-03-17 PROCEDURE — 90834 PSYTX W PT 45 MINUTES: CPT

## 2023-03-17 NOTE — REASON FOR VISIT
[Patient preference] : as per patient preference [Continuing, patient not seen in-person within last 12 months (provide details below)] : Telehealth services are continuing, patient not seen in-person within last 12 months.  [Telehealth (audio & video) - Individual/Group] : This visit was provided via telehealth using real-time 2-way audio visual technology. [Other Location: e.g. Home (Enter Location, City,State)___] : The provider was located at [unfilled]. [Home] : The patient, [unfilled], was located at home, [unfilled], at the time of the visit. [Verbal consent obtained from patient/other participant(s)] : Verbal consent for telehealth/telephonic services obtained from patient/other participant(s) [Patient] : Patient [FreeTextEntry4] : 10:30 AM [FreeTextEntry5] : 11:15 AM [FreeTextEntry3] : Patient preference [FreeTextEntry1] : Anxiety and Depression

## 2023-03-17 NOTE — PLAN
[Cognitive and/or Behavior Therapy] : Cognitive and/or Behavior Therapy  [Motivational Interviewing] : Motivational Interviewing  [Supportive Therapy] : Supportive Therapy [Recommended Frequency of Visits: ____] : Recommended frequency of visits: [unfilled] [Return in ____ week(s)] : Return in [unfilled] week(s) [FreeTextEntry2] : Goal#1: Alleviate depressive  and anxiety symptoms and increase coping skills\par Goal#1 Objective #1 Patient will explore and process feelings, and identify triggers of depression and anxiety \par Goal#1 Objective #2 Patient will learn and practice coping skills and comply with medication therapy recommendation.\par  [de-identified] : Therapist provided an individual psychotherapy session with patient. Therapist used cognitive behavior therapy (CBT) motivational Interviewing (MI) and supportive psychotherapy (SP) techniques. Patient was engaged in session.  Therapist inquired about if patient is seeing a PCP and if compliant with health care provider's recommendations.  Therapist also inquired about whether patient is using maladaptive coping, such as smoking, drinking and using drugs.\par \par Patient reports having a PCP and following medical recommendations including compliance with psychiatry medication management therapies.  Patient denies coping with smoking, drinking and using drugs. Patient processed feelings and events experienced since last session. Patient explained that she has been a little depressed because she has recently lost 2 of her friends due to cancer and another is sick.  Patient feels sad because those friends are around her age and they all have high school kids like her. patient is also concerned about her own health because she has heart problems and is overweight. patient discussed coping techniques used. Patient explained she talks to her sister and .  She stated that she has decided not to allow those sad events to put her down but sees it as an opportunity to focus on what is important in her life.  patient also discuss plans to improve her health and lose weight.  Therapist reinforced need to focus on treatment goals.\par \par Patient is future oriented, continues to have protective factors, denies suicide ideation or attempt. Therapist praised patient for progress made towards goal and encouraged continued growth.\par  [FreeTextEntry1] : Patient will practice positive self-talk.

## 2023-03-23 NOTE — DISCUSSION/SUMMARY
[Plan Review] : Plan Review [Able to manage surrounding demands and opportunities] : able to manage surrounding demands and opportunities [Adherent to treatment recommendations] : adherent to treatment recommendations [Attempting to realize their potential] : Attempting to realize their potential [Awareness of substance use issues] : awareness of substance use issues [Cognitively intact] : cognitively intact [Insightful] : insightful [Creative] : creative [Motivated to participate in treatment] : motivated to participate in treatment [Health literacy] : health literacy [In good health] : in good health [Part of a supportive marriage] : part of a supportive marriage [Housing stability] : housing stability [English fluency] : English fluency [Connected to healthcare] : connected to healthcare [Access to safe outdoor spaces] : access to safe outdoor spaces [Social supports] : social supports [FreeTextEntry1] : 10/4/2022 [Mental Health] : Mental Health [Other: ___] : [unfilled] [Improvement in symptoms as evidenced by:] : Improvement in symptoms as evidenced by: [Psychiatric Provider/Prescriber] : Psychiatric Provider/Prescriber [Yes] : Yes [de-identified] : Her anxiety symptoms have improved on current regimen. [de-identified] : She incorporates distraction techniques and coping skills. [de-identified] : Remission of mood and anxiety symptoms.

## 2023-03-24 ENCOUNTER — OUTPATIENT (OUTPATIENT)
Dept: OUTPATIENT SERVICES | Facility: HOSPITAL | Age: 53
LOS: 1 days | End: 2023-03-24
Payer: COMMERCIAL

## 2023-03-24 ENCOUNTER — APPOINTMENT (OUTPATIENT)
Age: 53
End: 2023-03-24

## 2023-03-24 ENCOUNTER — APPOINTMENT (OUTPATIENT)
Dept: PSYCHIATRY | Facility: CLINIC | Age: 53
End: 2023-03-24

## 2023-03-24 ENCOUNTER — APPOINTMENT (OUTPATIENT)
Dept: PSYCHIATRY | Facility: CLINIC | Age: 53
End: 2023-03-24
Payer: COMMERCIAL

## 2023-03-24 DIAGNOSIS — Z98.890 OTHER SPECIFIED POSTPROCEDURAL STATES: Chronic | ICD-10-CM

## 2023-03-24 DIAGNOSIS — Z98.891 HISTORY OF UTERINE SCAR FROM PREVIOUS SURGERY: Chronic | ICD-10-CM

## 2023-03-24 DIAGNOSIS — Z00.8 ENCOUNTER FOR OTHER GENERAL EXAMINATION: ICD-10-CM

## 2023-03-24 DIAGNOSIS — F41.1 GENERALIZED ANXIETY DISORDER: ICD-10-CM

## 2023-03-24 DIAGNOSIS — F41.0 PANIC DISORDER [EPISODIC PAROXYSMAL ANXIETY]: ICD-10-CM

## 2023-03-24 DIAGNOSIS — Z90.49 ACQUIRED ABSENCE OF OTHER SPECIFIED PARTS OF DIGESTIVE TRACT: Chronic | ICD-10-CM

## 2023-03-24 DIAGNOSIS — F33.41 MAJOR DEPRESSIVE DISORDER, RECURRENT, IN PARTIAL REMISSION: ICD-10-CM

## 2023-03-24 PROCEDURE — 99214 OFFICE O/P EST MOD 30 MIN: CPT | Mod: 95

## 2023-03-24 NOTE — PHYSICAL EXAM
[Average] : average [Cooperative] : cooperative [Full] : full [Clear] : clear [Linear/Goal Directed] : linear/goal directed [WNL] : within normal limits [FreeTextEntry1] : Casually dressed [FreeTextEntry8] : dysphoric mood [de-identified] : Fair [de-identified] : Fair

## 2023-03-24 NOTE — PLAN
[FreeTextEntry4] : \par She will have improvement in mood symptoms secondary to multiple stressors.\par Her anxiety symptoms will improve.

## 2023-03-24 NOTE — HISTORY OF PRESENT ILLNESS
[FreeTextEntry1] : Three of her friends  from cancer over the past three weeks, she is coping with the losses.  She is sleeping well with seven hours, dysphoric mood due to losses and bouts of tearfulness. She worries regarding a possible eviction again, medical comorbidities, bills and financial difficulty.  She experiences palpitations, sweating, stomach upset and nausea.  As a result, she has been taking Lorazepam daily around 8pm.  She is encouraged to continue walks. She had a recent appointment with the cardiologist, which was uneventful. She is adherent with medication regimen, denies side effects.  At this time, she denies thoughts of harm to self, safety plan is discussed.  Istop, Reference [id number], no prescriptions.

## 2023-03-24 NOTE — DISCUSSION/SUMMARY
[FreeTextEntry1] : Emelina is a 51yo  female, history of depression, anxiety.  Three of her friends  from cancer over the past three weeks, she is coping with the losses.  She is sleeping well with seven hours, dysphoric mood due to losses and bouts of tearfulness. She worries regarding a possible eviction again, medical comorbidities, bills and financial difficulty.  As a result, she has been taking Lorazepam daily around 8pm. She is at low risk, no acute thoughts of harm to self, positive family support, adherent with medication and linked to treatment.\par \par Paroxetine 50mg po daily\par Lorazepam 1mg po bedtime\par \par Metformin 1000mg po twice daily\par Clopidogrel 75mg po daily\par Xarelto 20mg po daily\par Metoprolol 50mg am + 100mg po pm\par Atorvastatin 80mg po daily\par Pioglitazone 30mg po daily\par Lantus 80u am\par Ozempic IM weekly\par

## 2023-03-25 DIAGNOSIS — F41.1 GENERALIZED ANXIETY DISORDER: ICD-10-CM

## 2023-03-25 DIAGNOSIS — F41.0 PANIC DISORDER [EPISODIC PAROXYSMAL ANXIETY]: ICD-10-CM

## 2023-03-25 DIAGNOSIS — F33.41 MAJOR DEPRESSIVE DISORDER, RECURRENT, IN PARTIAL REMISSION: ICD-10-CM

## 2023-03-31 ENCOUNTER — APPOINTMENT (OUTPATIENT)
Dept: PSYCHIATRY | Facility: CLINIC | Age: 53
End: 2023-03-31

## 2023-03-31 ENCOUNTER — OUTPATIENT (OUTPATIENT)
Dept: OUTPATIENT SERVICES | Facility: HOSPITAL | Age: 53
LOS: 1 days | End: 2023-03-31
Payer: COMMERCIAL

## 2023-03-31 DIAGNOSIS — Z98.890 OTHER SPECIFIED POSTPROCEDURAL STATES: Chronic | ICD-10-CM

## 2023-03-31 DIAGNOSIS — F41.1 GENERALIZED ANXIETY DISORDER: ICD-10-CM

## 2023-03-31 DIAGNOSIS — F33.41 MAJOR DEPRESSIVE DISORDER, RECURRENT, IN PARTIAL REMISSION: ICD-10-CM

## 2023-03-31 DIAGNOSIS — Z90.49 ACQUIRED ABSENCE OF OTHER SPECIFIED PARTS OF DIGESTIVE TRACT: Chronic | ICD-10-CM

## 2023-03-31 DIAGNOSIS — Z98.891 HISTORY OF UTERINE SCAR FROM PREVIOUS SURGERY: Chronic | ICD-10-CM

## 2023-03-31 PROCEDURE — 90832 PSYTX W PT 30 MINUTES: CPT | Mod: 95

## 2023-03-31 NOTE — REASON FOR VISIT
[Patient preference] : as per patient preference [Continuing, patient not seen in-person within last 12 months (provide details below)] : Telehealth services are continuing, patient not seen in-person within last 12 months.  [Telehealth (audio & video) - Individual/Group] : This visit was provided via telehealth using real-time 2-way audio visual technology. [Other Location: e.g. Home (Enter Location, City,State)___] : The provider was located at [unfilled]. [Home] : The patient, [unfilled], was located at home, [unfilled], at the time of the visit. [Verbal consent obtained from patient/other participant(s)] : Verbal consent for telehealth/telephonic services obtained from patient/other participant(s) [FreeTextEntry4] : 10:30 AM [FreeTextEntry5] : 11:00 AM [FreeTextEntry3] : Patient preference [Patient] : Patient [FreeTextEntry1] : Anxiety and Depression

## 2023-03-31 NOTE — PLAN
[FreeTextEntry2] : Goal#1: Alleviate depressive  and anxiety symptoms and increase coping skills\par Goal#1 Objective #1 Patient will explore and process feelings, and identify triggers of depression and anxiety \par Goal#1 Objective #2 Patient will learn and practice coping skills and comply with medication therapy recommendation.\par  [Cognitive and/or Behavior Therapy] : Cognitive and/or Behavior Therapy  [Motivational Interviewing] : Motivational Interviewing  [Supportive Therapy] : Supportive Therapy [de-identified] : Therapist provided an individual psychotherapy session with patient. Therapist used cognitive behavior therapy (CBT) motivational Interviewing (MI) and supportive psychotherapy (SP) techniques. Patient was engaged in session.  Therapist inquired about if patient is seeing a PCP and if compliant with health care provider's recommendations.  Therapist also inquired about whether patient is using maladaptive coping, such as smoking, drinking and using drugs.\par \par Patient reports having a PCP and following medical recommendations including compliance with psychiatry medication management therapies.  Patient denies coping with smoking, drinking and using drugs. Patient processed feelings and events experienced since last session. Patient explained that she was feeling better this week although she had a  and was frustrated with [facility] and the elected official's office.  Patient  discussed coping techniques used. Patient explained that she connected with friends during the  of her 51 year old friend and set up plans for them to spend time together.  She was able to communicated her feelings to the elected official who is trying to help her, and is remaining hopeful.  Patient will be addressing her weight issue at her next doctor's appt. Therapist reinforced need to focus on treatment goals.\par \par Patient is future oriented, continues to have protective factors, denies suicide ideation or attempt. Therapist praised patient for progress made towards goal and encouraged continued growth.\par  [Recommended Frequency of Visits: ____] : Recommended frequency of visits: [unfilled] [Return in ____ week(s)] : Return in [unfilled] week(s) [FreeTextEntry1] : Patient will practice positive self-talk.

## 2023-04-14 ENCOUNTER — APPOINTMENT (OUTPATIENT)
Dept: PSYCHIATRY | Facility: CLINIC | Age: 53
End: 2023-04-14

## 2023-04-14 ENCOUNTER — OUTPATIENT (OUTPATIENT)
Dept: OUTPATIENT SERVICES | Facility: HOSPITAL | Age: 53
LOS: 1 days | End: 2023-04-14

## 2023-04-14 DIAGNOSIS — F41.1 GENERALIZED ANXIETY DISORDER: ICD-10-CM

## 2023-04-14 DIAGNOSIS — F33.41 MAJOR DEPRESSIVE DISORDER, RECURRENT, IN PARTIAL REMISSION: ICD-10-CM

## 2023-04-16 ENCOUNTER — NON-APPOINTMENT (OUTPATIENT)
Age: 53
End: 2023-04-16

## 2023-04-17 ENCOUNTER — INPATIENT (INPATIENT)
Facility: HOSPITAL | Age: 53
LOS: 2 days | Discharge: HOME | DRG: 140 | End: 2023-04-20
Attending: INTERNAL MEDICINE | Admitting: INTERNAL MEDICINE
Payer: COMMERCIAL

## 2023-04-17 VITALS
RESPIRATION RATE: 22 BRPM | HEART RATE: 94 BPM | DIASTOLIC BLOOD PRESSURE: 73 MMHG | OXYGEN SATURATION: 93 % | WEIGHT: 289.91 LBS | TEMPERATURE: 100 F | SYSTOLIC BLOOD PRESSURE: 132 MMHG | HEIGHT: 66 IN

## 2023-04-17 DIAGNOSIS — J44.0 CHRONIC OBSTRUCTIVE PULMONARY DISEASE WITH (ACUTE) LOWER RESPIRATORY INFECTION: ICD-10-CM

## 2023-04-17 DIAGNOSIS — J44.1 CHRONIC OBSTRUCTIVE PULMONARY DISEASE WITH (ACUTE) EXACERBATION: ICD-10-CM

## 2023-04-17 DIAGNOSIS — Z95.5 PRESENCE OF CORONARY ANGIOPLASTY IMPLANT AND GRAFT: ICD-10-CM

## 2023-04-17 DIAGNOSIS — Z90.49 ACQUIRED ABSENCE OF OTHER SPECIFIED PARTS OF DIGESTIVE TRACT: Chronic | ICD-10-CM

## 2023-04-17 DIAGNOSIS — Z79.82 LONG TERM (CURRENT) USE OF ASPIRIN: ICD-10-CM

## 2023-04-17 DIAGNOSIS — R06.02 SHORTNESS OF BREATH: ICD-10-CM

## 2023-04-17 DIAGNOSIS — Z98.891 HISTORY OF UTERINE SCAR FROM PREVIOUS SURGERY: Chronic | ICD-10-CM

## 2023-04-17 DIAGNOSIS — I25.2 OLD MYOCARDIAL INFARCTION: ICD-10-CM

## 2023-04-17 DIAGNOSIS — Z79.84 LONG TERM (CURRENT) USE OF ORAL HYPOGLYCEMIC DRUGS: ICD-10-CM

## 2023-04-17 DIAGNOSIS — I25.10 ATHEROSCLEROTIC HEART DISEASE OF NATIVE CORONARY ARTERY WITHOUT ANGINA PECTORIS: ICD-10-CM

## 2023-04-17 DIAGNOSIS — E83.42 HYPOMAGNESEMIA: ICD-10-CM

## 2023-04-17 DIAGNOSIS — E78.00 PURE HYPERCHOLESTEROLEMIA, UNSPECIFIED: ICD-10-CM

## 2023-04-17 DIAGNOSIS — Z79.01 LONG TERM (CURRENT) USE OF ANTICOAGULANTS: ICD-10-CM

## 2023-04-17 DIAGNOSIS — K21.9 GASTRO-ESOPHAGEAL REFLUX DISEASE WITHOUT ESOPHAGITIS: ICD-10-CM

## 2023-04-17 DIAGNOSIS — E66.9 OBESITY, UNSPECIFIED: ICD-10-CM

## 2023-04-17 DIAGNOSIS — E11.9 TYPE 2 DIABETES MELLITUS WITHOUT COMPLICATIONS: ICD-10-CM

## 2023-04-17 DIAGNOSIS — F32.A DEPRESSION, UNSPECIFIED: ICD-10-CM

## 2023-04-17 DIAGNOSIS — G47.33 OBSTRUCTIVE SLEEP APNEA (ADULT) (PEDIATRIC): ICD-10-CM

## 2023-04-17 DIAGNOSIS — J18.9 PNEUMONIA, UNSPECIFIED ORGANISM: ICD-10-CM

## 2023-04-17 DIAGNOSIS — I48.91 UNSPECIFIED ATRIAL FIBRILLATION: ICD-10-CM

## 2023-04-17 DIAGNOSIS — Z87.891 PERSONAL HISTORY OF NICOTINE DEPENDENCE: ICD-10-CM

## 2023-04-17 DIAGNOSIS — Z98.890 OTHER SPECIFIED POSTPROCEDURAL STATES: Chronic | ICD-10-CM

## 2023-04-17 DIAGNOSIS — I10 ESSENTIAL (PRIMARY) HYPERTENSION: ICD-10-CM

## 2023-04-17 DIAGNOSIS — J96.11 CHRONIC RESPIRATORY FAILURE WITH HYPOXIA: ICD-10-CM

## 2023-04-17 DIAGNOSIS — J20.4 ACUTE BRONCHITIS DUE TO PARAINFLUENZA VIRUS: ICD-10-CM

## 2023-04-17 DIAGNOSIS — Z86.73 PERSONAL HISTORY OF TRANSIENT ISCHEMIC ATTACK (TIA), AND CEREBRAL INFARCTION WITHOUT RESIDUAL DEFICITS: ICD-10-CM

## 2023-04-17 LAB
ALBUMIN SERPL ELPH-MCNC: 4.1 G/DL — SIGNIFICANT CHANGE UP (ref 3.5–5.2)
ALP SERPL-CCNC: 109 U/L — SIGNIFICANT CHANGE UP (ref 30–115)
ALT FLD-CCNC: 33 U/L — SIGNIFICANT CHANGE UP (ref 0–41)
ANION GAP SERPL CALC-SCNC: 13 MMOL/L — SIGNIFICANT CHANGE UP (ref 7–14)
AST SERPL-CCNC: 27 U/L — SIGNIFICANT CHANGE UP (ref 0–41)
BASE EXCESS BLDV CALC-SCNC: 2.7 MMOL/L — SIGNIFICANT CHANGE UP (ref -2–3)
BASOPHILS # BLD AUTO: 0.03 K/UL — SIGNIFICANT CHANGE UP (ref 0–0.2)
BASOPHILS NFR BLD AUTO: 0.7 % — SIGNIFICANT CHANGE UP (ref 0–1)
BILIRUB SERPL-MCNC: 0.7 MG/DL — SIGNIFICANT CHANGE UP (ref 0.2–1.2)
BUN SERPL-MCNC: 10 MG/DL — SIGNIFICANT CHANGE UP (ref 10–20)
CA-I SERPL-SCNC: 1.15 MMOL/L — SIGNIFICANT CHANGE UP (ref 1.15–1.33)
CALCIUM SERPL-MCNC: 9.3 MG/DL — SIGNIFICANT CHANGE UP (ref 8.4–10.5)
CHLORIDE SERPL-SCNC: 95 MMOL/L — LOW (ref 98–110)
CO2 SERPL-SCNC: 25 MMOL/L — SIGNIFICANT CHANGE UP (ref 17–32)
CREAT SERPL-MCNC: 0.8 MG/DL — SIGNIFICANT CHANGE UP (ref 0.7–1.5)
EGFR: 88 ML/MIN/1.73M2 — SIGNIFICANT CHANGE UP
EOSINOPHIL # BLD AUTO: 0.12 K/UL — SIGNIFICANT CHANGE UP (ref 0–0.7)
EOSINOPHIL NFR BLD AUTO: 2.6 % — SIGNIFICANT CHANGE UP (ref 0–8)
FLUAV AG NPH QL: SIGNIFICANT CHANGE UP
FLUBV AG NPH QL: SIGNIFICANT CHANGE UP
GAS PNL BLDV: 132 MMOL/L — LOW (ref 136–145)
GAS PNL BLDV: SIGNIFICANT CHANGE UP
GAS PNL BLDV: SIGNIFICANT CHANGE UP
GLUCOSE BLDC GLUCOMTR-MCNC: 142 MG/DL — HIGH (ref 70–99)
GLUCOSE SERPL-MCNC: 127 MG/DL — HIGH (ref 70–99)
HCO3 BLDV-SCNC: 28 MMOL/L — SIGNIFICANT CHANGE UP (ref 22–29)
HCT VFR BLD CALC: 38.5 % — SIGNIFICANT CHANGE UP (ref 37–47)
HCT VFR BLDA CALC: 44 % — SIGNIFICANT CHANGE UP (ref 39–51)
HGB BLD CALC-MCNC: 14.8 G/DL — SIGNIFICANT CHANGE UP (ref 12.6–17.4)
HGB BLD-MCNC: 12.7 G/DL — SIGNIFICANT CHANGE UP (ref 12–16)
IMM GRANULOCYTES NFR BLD AUTO: 0.4 % — HIGH (ref 0.1–0.3)
LACTATE BLDV-MCNC: 1.5 MMOL/L — SIGNIFICANT CHANGE UP (ref 0.5–2)
LYMPHOCYTES # BLD AUTO: 0.84 K/UL — LOW (ref 1.2–3.4)
LYMPHOCYTES # BLD AUTO: 18.5 % — LOW (ref 20.5–51.1)
MCHC RBC-ENTMCNC: 30.6 PG — SIGNIFICANT CHANGE UP (ref 27–31)
MCHC RBC-ENTMCNC: 33 G/DL — SIGNIFICANT CHANGE UP (ref 32–37)
MCV RBC AUTO: 92.8 FL — SIGNIFICANT CHANGE UP (ref 81–99)
MONOCYTES # BLD AUTO: 0.41 K/UL — SIGNIFICANT CHANGE UP (ref 0.1–0.6)
MONOCYTES NFR BLD AUTO: 9 % — SIGNIFICANT CHANGE UP (ref 1.7–9.3)
NEUTROPHILS # BLD AUTO: 3.12 K/UL — SIGNIFICANT CHANGE UP (ref 1.4–6.5)
NEUTROPHILS NFR BLD AUTO: 68.8 % — SIGNIFICANT CHANGE UP (ref 42.2–75.2)
NRBC # BLD: 0 /100 WBCS — SIGNIFICANT CHANGE UP (ref 0–0)
NT-PROBNP SERPL-SCNC: 70 PG/ML — SIGNIFICANT CHANGE UP (ref 0–300)
PCO2 BLDV: 44 MMHG — HIGH (ref 39–42)
PH BLDV: 7.41 — SIGNIFICANT CHANGE UP (ref 7.32–7.43)
PLATELET # BLD AUTO: 282 K/UL — SIGNIFICANT CHANGE UP (ref 130–400)
PMV BLD: 9.4 FL — SIGNIFICANT CHANGE UP (ref 7.4–10.4)
PO2 BLDV: 45 MMHG — SIGNIFICANT CHANGE UP
POTASSIUM BLDV-SCNC: 3.9 MMOL/L — SIGNIFICANT CHANGE UP (ref 3.5–5.1)
POTASSIUM SERPL-MCNC: 4.1 MMOL/L — SIGNIFICANT CHANGE UP (ref 3.5–5)
POTASSIUM SERPL-SCNC: 4.1 MMOL/L — SIGNIFICANT CHANGE UP (ref 3.5–5)
PROT SERPL-MCNC: 7.6 G/DL — SIGNIFICANT CHANGE UP (ref 6–8)
RBC # BLD: 4.15 M/UL — LOW (ref 4.2–5.4)
RBC # FLD: 14.2 % — SIGNIFICANT CHANGE UP (ref 11.5–14.5)
RSV RNA NPH QL NAA+NON-PROBE: SIGNIFICANT CHANGE UP
SAO2 % BLDV: 74.1 % — SIGNIFICANT CHANGE UP
SARS-COV-2 RNA SPEC QL NAA+PROBE: SIGNIFICANT CHANGE UP
SODIUM SERPL-SCNC: 133 MMOL/L — LOW (ref 135–146)
TROPONIN T SERPL-MCNC: <0.01 NG/ML — SIGNIFICANT CHANGE UP
WBC # BLD: 4.54 K/UL — LOW (ref 4.8–10.8)
WBC # FLD AUTO: 4.54 K/UL — LOW (ref 4.8–10.8)

## 2023-04-17 PROCEDURE — 84145 PROCALCITONIN (PCT): CPT

## 2023-04-17 PROCEDURE — 94640 AIRWAY INHALATION TREATMENT: CPT

## 2023-04-17 PROCEDURE — 85025 COMPLETE CBC W/AUTO DIFF WBC: CPT

## 2023-04-17 PROCEDURE — 87449 NOS EACH ORGANISM AG IA: CPT

## 2023-04-17 PROCEDURE — 0225U NFCT DS DNA&RNA 21 SARSCOV2: CPT

## 2023-04-17 PROCEDURE — 85379 FIBRIN DEGRADATION QUANT: CPT

## 2023-04-17 PROCEDURE — 83735 ASSAY OF MAGNESIUM: CPT

## 2023-04-17 PROCEDURE — 83036 HEMOGLOBIN GLYCOSYLATED A1C: CPT

## 2023-04-17 PROCEDURE — 99223 1ST HOSP IP/OBS HIGH 75: CPT | Mod: GC

## 2023-04-17 PROCEDURE — 71046 X-RAY EXAM CHEST 2 VIEWS: CPT | Mod: 26

## 2023-04-17 PROCEDURE — 87899 AGENT NOS ASSAY W/OPTIC: CPT

## 2023-04-17 PROCEDURE — 36415 COLL VENOUS BLD VENIPUNCTURE: CPT

## 2023-04-17 PROCEDURE — 80053 COMPREHEN METABOLIC PANEL: CPT

## 2023-04-17 PROCEDURE — 99285 EMERGENCY DEPT VISIT HI MDM: CPT

## 2023-04-17 PROCEDURE — 93010 ELECTROCARDIOGRAM REPORT: CPT

## 2023-04-17 PROCEDURE — 82962 GLUCOSE BLOOD TEST: CPT

## 2023-04-17 PROCEDURE — 87040 BLOOD CULTURE FOR BACTERIA: CPT

## 2023-04-17 RX ORDER — IPRATROPIUM/ALBUTEROL SULFATE 18-103MCG
3 AEROSOL WITH ADAPTER (GRAM) INHALATION EVERY 6 HOURS
Refills: 0 | Status: DISCONTINUED | OUTPATIENT
Start: 2023-04-17 | End: 2023-04-20

## 2023-04-17 RX ORDER — ASPIRIN/CALCIUM CARB/MAGNESIUM 324 MG
0 TABLET ORAL
Qty: 0 | Refills: 0 | DISCHARGE

## 2023-04-17 RX ORDER — PIOGLITAZONE HYDROCHLORIDE 15 MG/1
1 TABLET ORAL
Qty: 0 | Refills: 0 | DISCHARGE

## 2023-04-17 RX ORDER — DEXTROSE 50 % IN WATER 50 %
25 SYRINGE (ML) INTRAVENOUS ONCE
Refills: 0 | Status: DISCONTINUED | OUTPATIENT
Start: 2023-04-17 | End: 2023-04-20

## 2023-04-17 RX ORDER — GLUCAGON INJECTION, SOLUTION 0.5 MG/.1ML
1 INJECTION, SOLUTION SUBCUTANEOUS ONCE
Refills: 0 | Status: DISCONTINUED | OUTPATIENT
Start: 2023-04-17 | End: 2023-04-20

## 2023-04-17 RX ORDER — ONDANSETRON 8 MG/1
4 TABLET, FILM COATED ORAL EVERY 8 HOURS
Refills: 0 | Status: DISCONTINUED | OUTPATIENT
Start: 2023-04-17 | End: 2023-04-20

## 2023-04-17 RX ORDER — IPRATROPIUM/ALBUTEROL SULFATE 18-103MCG
3 AEROSOL WITH ADAPTER (GRAM) INHALATION
Refills: 0 | Status: DISCONTINUED | OUTPATIENT
Start: 2023-04-17 | End: 2023-04-17

## 2023-04-17 RX ORDER — ATORVASTATIN CALCIUM 80 MG/1
1 TABLET, FILM COATED ORAL
Qty: 0 | Refills: 0 | DISCHARGE

## 2023-04-17 RX ORDER — IPRATROPIUM/ALBUTEROL SULFATE 18-103MCG
3 AEROSOL WITH ADAPTER (GRAM) INHALATION ONCE
Refills: 0 | Status: DISCONTINUED | OUTPATIENT
Start: 2023-04-17 | End: 2023-04-17

## 2023-04-17 RX ORDER — DEXTROSE 50 % IN WATER 50 %
15 SYRINGE (ML) INTRAVENOUS ONCE
Refills: 0 | Status: DISCONTINUED | OUTPATIENT
Start: 2023-04-17 | End: 2023-04-20

## 2023-04-17 RX ORDER — ALBUTEROL 90 UG/1
3 AEROSOL, METERED ORAL ONCE
Refills: 0 | Status: COMPLETED | OUTPATIENT
Start: 2023-04-17 | End: 2023-04-17

## 2023-04-17 RX ORDER — DEXTROSE 50 % IN WATER 50 %
12.5 SYRINGE (ML) INTRAVENOUS ONCE
Refills: 0 | Status: DISCONTINUED | OUTPATIENT
Start: 2023-04-17 | End: 2023-04-20

## 2023-04-17 RX ORDER — INSULIN LISPRO 100/ML
VIAL (ML) SUBCUTANEOUS
Refills: 0 | Status: DISCONTINUED | OUTPATIENT
Start: 2023-04-17 | End: 2023-04-20

## 2023-04-17 RX ORDER — AZITHROMYCIN 500 MG/1
500 TABLET, FILM COATED ORAL ONCE
Refills: 0 | Status: COMPLETED | OUTPATIENT
Start: 2023-04-17 | End: 2023-04-17

## 2023-04-17 RX ORDER — IPRATROPIUM BROMIDE 0.2 MG/ML
3 SOLUTION, NON-ORAL INHALATION ONCE
Refills: 0 | Status: COMPLETED | OUTPATIENT
Start: 2023-04-17 | End: 2023-04-17

## 2023-04-17 RX ORDER — ACETAMINOPHEN 500 MG
650 TABLET ORAL EVERY 6 HOURS
Refills: 0 | Status: DISCONTINUED | OUTPATIENT
Start: 2023-04-17 | End: 2023-04-20

## 2023-04-17 RX ORDER — AZITHROMYCIN 500 MG/1
TABLET, FILM COATED ORAL
Refills: 0 | Status: DISCONTINUED | OUTPATIENT
Start: 2023-04-17 | End: 2023-04-18

## 2023-04-17 RX ORDER — AZITHROMYCIN 500 MG/1
500 TABLET, FILM COATED ORAL EVERY 24 HOURS
Refills: 0 | Status: DISCONTINUED | OUTPATIENT
Start: 2023-04-18 | End: 2023-04-18

## 2023-04-17 RX ORDER — INSULIN LISPRO 100/ML
18 VIAL (ML) SUBCUTANEOUS
Qty: 0 | Refills: 0 | DISCHARGE

## 2023-04-17 RX ORDER — INSULIN GLARGINE 100 [IU]/ML
80 INJECTION, SOLUTION SUBCUTANEOUS
Refills: 0 | DISCHARGE

## 2023-04-17 RX ORDER — METFORMIN HYDROCHLORIDE 850 MG/1
1 TABLET ORAL
Qty: 0 | Refills: 0 | DISCHARGE

## 2023-04-17 RX ORDER — LANOLIN ALCOHOL/MO/W.PET/CERES
3 CREAM (GRAM) TOPICAL AT BEDTIME
Refills: 0 | Status: DISCONTINUED | OUTPATIENT
Start: 2023-04-17 | End: 2023-04-20

## 2023-04-17 RX ORDER — SODIUM CHLORIDE 9 MG/ML
1000 INJECTION, SOLUTION INTRAVENOUS
Refills: 0 | Status: DISCONTINUED | OUTPATIENT
Start: 2023-04-17 | End: 2023-04-20

## 2023-04-17 RX ORDER — SEMAGLUTIDE 0.68 MG/ML
2 INJECTION, SOLUTION SUBCUTANEOUS
Refills: 0 | DISCHARGE

## 2023-04-17 RX ORDER — INSULIN GLARGINE 100 [IU]/ML
30 INJECTION, SOLUTION SUBCUTANEOUS AT BEDTIME
Refills: 0 | Status: DISCONTINUED | OUTPATIENT
Start: 2023-04-17 | End: 2023-04-20

## 2023-04-17 RX ADMIN — INSULIN GLARGINE 30 UNIT(S): 100 INJECTION, SOLUTION SUBCUTANEOUS at 23:39

## 2023-04-17 RX ADMIN — Medication 3 PUFF(S): at 12:24

## 2023-04-17 RX ADMIN — ALBUTEROL 3 PUFF(S): 90 AEROSOL, METERED ORAL at 12:24

## 2023-04-17 RX ADMIN — Medication 40 MILLIGRAM(S): at 22:41

## 2023-04-17 RX ADMIN — ONDANSETRON 4 MILLIGRAM(S): 8 TABLET, FILM COATED ORAL at 23:23

## 2023-04-17 RX ADMIN — AZITHROMYCIN 500 MILLIGRAM(S): 500 TABLET, FILM COATED ORAL at 22:42

## 2023-04-17 NOTE — ED ADULT TRIAGE NOTE - CHIEF COMPLAINT QUOTE
pt sent to ED from urgent care for a chest x-ray, pt was told she may have pneumonia. pt c/o difficulty breathing with fever

## 2023-04-17 NOTE — H&P ADULT - NSHPSOCIALHISTORY_GEN_ALL_CORE
lives with  and child  inddepenent in ADLs  no ambulatory dysfunction lives with  and child  independent in ADLs  no ambulatory dysfunction

## 2023-04-17 NOTE — ED ADULT NURSE NOTE - PAIN RATING/NUMBER SCALE (0-10): ACTIVITY
Physical Therapy     Referred by: Bertrand Montana MD; Medical Diagnosis (from order):    Diagnosis Information      Diagnosis    715.16 (ICD-9-CM) - M17.11 (ICD-10-CM) - Primary osteoarthritis of right knee                Daily Treatment Note    Visit:  4     SUBJECTIVE                                                                                                               Bad day today.  A lot of pain.  She tries to keep up with her exercises, but it's so hard when it hurts so bad.  But they won't adjust her pain medication with consideration that she was on pain meds prior to the surgery.    OBJECTIVE                                                                                                                        TREATMENT                                                                                                                  Therapeutic Exercise:  NuStep:  S:  10; LV 2; x 10 min    SAQ's 1 x 10 on her own, and 1 x 15 with my assisting her into terminal extension and she lowers it from there.    Manual Therapy:  PROM flexion - very painful in that one spot under the bandage - maybe a staple?    Extension stretch using contract relax technique        Gait Training:  Practiced walking with a SPC held in left hand.  She is going to  a cane at the store and start using that instead of the walker.       ASSESSMENT                                                                                                             She still has that searing pain right in one spot under the bandage.  She's getting her staples out tomorrow so I'm hoping if that is the culprit, it will feel a lot better ones the staples are removed.  Also she was agreeable to increase back to 3 x's/week if she can tolerate more and/or we feel she needs it.  Patient Education:   Results of above outlined education: Verbalizes understanding and Demonstrates understanding      PLAN                                                                                                                            Suggestions for next session as indicated: Progress per usual TKA rehab:  ROM, strengthening, gait training, pain and edema management           Therapy procedure time and total treatment time can be found documented on the Time Entry flowsheet   0 (no pain/absence of nonverbal indicators of pain)

## 2023-04-17 NOTE — ED PROVIDER NOTE - ATTENDING CONTRIBUTION TO CARE
52-year-old female history of CAD TIA previous smoker presents evaluation of shortness of breath and low-grade fever since last night.  Patient has had borderline low pulse ox at urgent care and sent for eval. Here on exam patient no distress normal speech S1-S2 faint wheezing right lower base soft nontender no calf swelling  Impression  Patient here with X-ray demonstrates viral pattern pneumonia labs with mild leukopenia no significant otherwise findings.  Patient flu COVID-negative however upon ambulation patient short of breath with pulse ox less than 90.  Patient admitted for further evaluation

## 2023-04-17 NOTE — H&P ADULT - HISTORY OF PRESENT ILLNESS
52 year old female with pmhx CAD s/p PCI (5 stents), afib on xarelto, JOCELYNE (noncompliant cpap), GERD, DM type 2, HTN, obesity, former smoker, CVA (cerebral infarction), Endarterectomy, High cholesterol, Depression.and hx of GCA s/p L temporal artery excision presents for 3 days of upper respiratory symptoms. Pt was sent in from  today for hypoxia and concern for possible PNA.  Pt developed cough suddenly 3 days ago, with fevers, sore throat, headache. Also has abdominal pain and chest tightness from bouts of coughing.  Describes cough as congestive with difficulty expectorating mucus. Denies leg swelling, recent travel, sick contacts, chest pain, numbness, tingling.   Pt states she has JOCELYNE and is unable to use her cpap due to facial discomfort and suffocated feeling when mask is on. Mentions she has some sort of low lung function noted on previous PFTs (follows Dr. Eastamn, last seen about one year ago).   Pt is a former heavy smoker, quit 8 years ago, smoked 1.5 ppd for 30 years. She is currently in search of a GI surgeon for gastric bypass surgery.   In ED pt vitals significant for tachypnea and hypoxia on ambulation. Labs show leuokopenia, flu and covid negative, CXR some interstitial markings, no consolidation. Given albuterol and ipratropium inhalers in ED.

## 2023-04-17 NOTE — H&P ADULT - NSHPREVIEWOFSYSTEMS_GEN_ALL_CORE
CONSTITUTIONAL: +fever No weakness, or chills  EYES/ENT: No visual changes;  No vertigo + throat pain soreness  NECK: No pain or stiffness  RESPIRATORY: +cough,+ wheezing, no hemoptysis;  CARDIOVASCULAR: No chest pain or palpitations +chest tightness  GASTROINTESTINAL: +abd pain associated with cough...+ intermittent nausea vomiting (ongoing for some time)  GENITOURINARY: No dysuria, frequency or hematuria  NEUROLOGICAL: No numbness or weakness  SKIN: No itching, rashes

## 2023-04-17 NOTE — H&P ADULT - NSHPPHYSICALEXAM_GEN_ALL_CORE
PHYSICAL EXAM:  GENERAL: NAD, on 3L NC, obese AAO x 4, 52y F  HEAD:  Atraumatic, Normocephalic  EYES: EOMI, conjunctiva and sclera white  NECK: Supple, No JVD  CHEST/LUNG: wheeze R>L  HEART: Regular rate and rhythm; No murmurs;   ABDOMEN: Soft, Nontender, Nondistended; Bowel sounds present; No guarding/rigidity  EXTREMITIES:  2+ Peripheral Pulses, No cyanosis or edema  NEUROLOGY: follows commands/ no dysarthria/ no facial asymmetry/ normal strength and sensation

## 2023-04-17 NOTE — ED PROVIDER NOTE - CLINICAL SUMMARY MEDICAL DECISION MAKING FREE TEXT BOX
Patient here with X-ray demonstrates viral pattern pneumonia labs with mild leukopenia no significant otherwise findings.  Patient flu COVID-negative however upon ambulation patient short of breath with pulse ox less than 90.  Patient admitted for further evaluation

## 2023-04-17 NOTE — ED PROVIDER NOTE - PHYSICAL EXAMINATION
CONSTITUTIONAL: NAD  SKIN: Warm dry  HEAD: NCAT  EYES: NL inspection  ENT: MMM  NECK: Supple; non tender.  CARD: RRR  RESP: Expiratory wheeze right base  ABD: S/NT no R/G  EXT: no pedal edema  NEURO: Grossly unremarkable  PSYCH: Cooperative, appropriate.

## 2023-04-17 NOTE — ED ADULT NURSE NOTE - NSIMPLEMENTINTERV_GEN_ALL_ED
Implemented All Fall with Harm Risk Interventions:  [place] to call system. Call bell, personal items and telephone within reach. Instruct patient to call for assistance. Room bathroom lighting operational. Non-slip footwear when patient is off stretcher. Physically safe environment: no spills, clutter or unnecessary equipment. Stretcher in lowest position, wheels locked, appropriate side rails in place. Provide visual cue, wrist band, yellow gown, etc. Monitor gait and stability. Monitor for mental status changes and reorient to person, place, and time. Review medications for side effects contributing to fall risk. Reinforce activity limits and safety measures with patient and family. Provide visual clues: red socks.

## 2023-04-17 NOTE — ED PROVIDER NOTE - OBJECTIVE STATEMENT
52 y f, pmh of cad, tia, former smoker, pw sob, started last night, +low grade fever, + productive cough, no cp. Pt went to urgent care and sent to ED to r/o pneumonia. Denies f/c, n/v/d, abd pain dysuria

## 2023-04-17 NOTE — H&P ADULT - ATTENDING COMMENTS
Patient seen and examined at bedside independently of the residents. I read the resident's note and agree with the plan with the additions and corrections as noted below. My note supersedes the resident's note.     REVIEW OF SYSTEMS:  Negative except in HPI.      PMH: CAD, TIA, former smoker     FHx: Reviewed. No fhx of asthma/copd, No fhx of liver and pulmonary disease. No fhx of hematological disorder.     Physical Exam:  GEN: No acute distress. Awake, Alert and oriented x 3. Morbidly obese.   Head: Atraumatic, Normocephalic.   Eye: PEERLA. No sclera icterus. EOMI.   ENT: Normal oropharynx, no thyromegaly, no mass, no lymphadenopathy.   LUNGS: Rhonchi b/l lung fields.   HEART: Normal. S1/S2 present. RRR. No murmur/gallops.   ABD: Soft, non-tender, non-distended. Bowel sounds present.   EXT: No pitting edema. No erythema. No tenderness.  Integumentary: No rash, No sore, No petechia.   NEURO: CN III-XII intact. Strength: 5/5 b/l ULE. Sensory intact b/l ULE.     Vital Signs Last 24 Hrs  T(C): 37 (2023 21:00), Max: 37.6 (2023 15:16)  T(F): 98.6 (2023 21:00), Max: 99.6 (2023 15:16)  HR: 92 (2023 21:00) (91 - 98)  BP: 127/66 (2023 21:00) (123/60 - 132/73)  BP(mean): --  RR: 20 (2023 21:00) (20 - 22)  SpO2: 96% (2023 21:00) (92% - 96%)    Parameters below as of 2023 21:00  Patient On (Oxygen Delivery Method): nasal cannula  O2 Flow (L/min): 3L    Please see the above notes for Labs and radiology.     Assessment and Plan:     51 yo F with hx of CAD, TIA, former smoker presents to ED for lw grade fever, productive cough and SOB.     AHRF likely 2/2 suspected acute on chronic bronchitis vs. atypical PNA  - CXR read as cardiomegaly with pulmonary vascular congestion.  - however patient does not clinically fluid overload and proBNP 70.   - will start on doxy and ceftriaxone   - start on IV solumedrol 40mg BID, albuterol q6h and q4h prn  - check BCx, procalcitonin and atypical PNA panel, RVP   - check TTE     CAD - c/w home med  TIA - c/w home med    DVT ppx: Lovenox SC  GI ppx: not indicated.   Diet: DASH  Activity: as tolerated.     Date seen by the attendin2023  Total time spent: 75 minutes.

## 2023-04-17 NOTE — H&P ADULT - ASSESSMENT
52 year old female with pmhx of cad afib marilin former smoker p/w viral URI symptoms, unlikely new onset chf or PE. possibly COPD (hx of heavy smoking). starting nebulizer, steroids and azithromycin    # Hypoxia (on ambulation)  # suspected viral/atypical pneumonia vs new onset copd exacerbation   # MARILIN (noncompliant cpap)  -flu negative, covid negative  -full RVP sent  -duonebs q6 and prn  -steroids BID, wean as appropriate  -started azithromycin  -wean O2  -needs pulmonologist f/u if no improvement or on discharge    # Hx of CAD s/p 5 stents - continue meds (on plavix and xarelto o/p)  # Hx of Afib on xarelto - continue meds  # DM type 2 - sliding scale/ start basal bolus if sugars over 180  # Hx of depression - continue paroxetine    dvt ppx xarelto  gi ppx PPI  diet cardiac/cc     52 year old female with pmhx of cad afib marilin former smoker p/w viral URI symptoms, unlikely new onset chf or PE. possibly COPD (hx of heavy smoking). starting nebulizer, steroids and azithromycin    # Hypoxia (on ambulation)  # suspected viral/atypical pneumonia vs new onset copd exacerbation   # MARILIN (noncompliant cpap)  -flu negative, covid negative  -full RVP sent  -duonebs q6 and prn  -steroids BID, wean as appropriate  -started azithromycin  -wean O2  -needs pulmonologist f/u if no improvement or on discharge    # nausea vomiting intermittent  # HO gerd  -states she stopped PPI recently  -sxs have worsened since  -has o/p GI f/u  -zofran PRN, monitor qtc if also on azithromycin    # Hx of CAD s/p 5 stents - continue meds (on plavix and xarelto o/p)  # Hx of Afib on xarelto - continue meds  # DM type 2 - sliding scale/ start basal bolus if sugars over 180  # Hx of depression - continue paroxetine    dvt ppx xarelto  gi ppx PPI  diet cardiac/cc

## 2023-04-18 LAB
A1C WITH ESTIMATED AVERAGE GLUCOSE RESULT: 5.6 % — SIGNIFICANT CHANGE UP (ref 4–5.6)
ALBUMIN SERPL ELPH-MCNC: 4.4 G/DL — SIGNIFICANT CHANGE UP (ref 3.5–5.2)
ALP SERPL-CCNC: 122 U/L — HIGH (ref 30–115)
ALT FLD-CCNC: 34 U/L — SIGNIFICANT CHANGE UP (ref 0–41)
ANION GAP SERPL CALC-SCNC: 14 MMOL/L — SIGNIFICANT CHANGE UP (ref 7–14)
AST SERPL-CCNC: 28 U/L — SIGNIFICANT CHANGE UP (ref 0–41)
BASOPHILS # BLD AUTO: 0.02 K/UL — SIGNIFICANT CHANGE UP (ref 0–0.2)
BASOPHILS NFR BLD AUTO: 0.4 % — SIGNIFICANT CHANGE UP (ref 0–1)
BILIRUB SERPL-MCNC: 0.6 MG/DL — SIGNIFICANT CHANGE UP (ref 0.2–1.2)
BUN SERPL-MCNC: 11 MG/DL — SIGNIFICANT CHANGE UP (ref 10–20)
CALCIUM SERPL-MCNC: 9.9 MG/DL — SIGNIFICANT CHANGE UP (ref 8.4–10.5)
CHLORIDE SERPL-SCNC: 98 MMOL/L — SIGNIFICANT CHANGE UP (ref 98–110)
CO2 SERPL-SCNC: 26 MMOL/L — SIGNIFICANT CHANGE UP (ref 17–32)
CREAT SERPL-MCNC: 0.8 MG/DL — SIGNIFICANT CHANGE UP (ref 0.7–1.5)
D DIMER BLD IA.RAPID-MCNC: 197 NG/ML DDU — SIGNIFICANT CHANGE UP
EGFR: 88 ML/MIN/1.73M2 — SIGNIFICANT CHANGE UP
EOSINOPHIL # BLD AUTO: 0.01 K/UL — SIGNIFICANT CHANGE UP (ref 0–0.7)
EOSINOPHIL NFR BLD AUTO: 0.2 % — SIGNIFICANT CHANGE UP (ref 0–8)
ESTIMATED AVERAGE GLUCOSE: 114 MG/DL — SIGNIFICANT CHANGE UP (ref 68–114)
GLUCOSE BLDC GLUCOMTR-MCNC: 177 MG/DL — HIGH (ref 70–99)
GLUCOSE BLDC GLUCOMTR-MCNC: 243 MG/DL — HIGH (ref 70–99)
GLUCOSE BLDC GLUCOMTR-MCNC: 297 MG/DL — HIGH (ref 70–99)
GLUCOSE BLDC GLUCOMTR-MCNC: 319 MG/DL — HIGH (ref 70–99)
GLUCOSE SERPL-MCNC: 207 MG/DL — HIGH (ref 70–99)
HCT VFR BLD CALC: 41.5 % — SIGNIFICANT CHANGE UP (ref 37–47)
HGB BLD-MCNC: 13.4 G/DL — SIGNIFICANT CHANGE UP (ref 12–16)
HPIV3 RNA SPEC QL NAA+PROBE: DETECTED
IMM GRANULOCYTES NFR BLD AUTO: 0.4 % — HIGH (ref 0.1–0.3)
LYMPHOCYTES # BLD AUTO: 0.56 K/UL — LOW (ref 1.2–3.4)
LYMPHOCYTES # BLD AUTO: 12.3 % — LOW (ref 20.5–51.1)
MAGNESIUM SERPL-MCNC: 1.7 MG/DL — LOW (ref 1.8–2.4)
MCHC RBC-ENTMCNC: 30.9 PG — SIGNIFICANT CHANGE UP (ref 27–31)
MCHC RBC-ENTMCNC: 32.3 G/DL — SIGNIFICANT CHANGE UP (ref 32–37)
MCV RBC AUTO: 95.6 FL — SIGNIFICANT CHANGE UP (ref 81–99)
MONOCYTES # BLD AUTO: 0.1 K/UL — SIGNIFICANT CHANGE UP (ref 0.1–0.6)
MONOCYTES NFR BLD AUTO: 2.2 % — SIGNIFICANT CHANGE UP (ref 1.7–9.3)
NEUTROPHILS # BLD AUTO: 3.85 K/UL — SIGNIFICANT CHANGE UP (ref 1.4–6.5)
NEUTROPHILS NFR BLD AUTO: 84.5 % — HIGH (ref 42.2–75.2)
NRBC # BLD: 0 /100 WBCS — SIGNIFICANT CHANGE UP (ref 0–0)
PLATELET # BLD AUTO: 292 K/UL — SIGNIFICANT CHANGE UP (ref 130–400)
PMV BLD: 9.1 FL — SIGNIFICANT CHANGE UP (ref 7.4–10.4)
POTASSIUM SERPL-MCNC: 4.9 MMOL/L — SIGNIFICANT CHANGE UP (ref 3.5–5)
POTASSIUM SERPL-SCNC: 4.9 MMOL/L — SIGNIFICANT CHANGE UP (ref 3.5–5)
PROCALCITONIN SERPL-MCNC: 0.04 NG/ML — SIGNIFICANT CHANGE UP (ref 0.02–0.1)
PROT SERPL-MCNC: 8 G/DL — SIGNIFICANT CHANGE UP (ref 6–8)
RAPID RVP RESULT: DETECTED
RBC # BLD: 4.34 M/UL — SIGNIFICANT CHANGE UP (ref 4.2–5.4)
RBC # FLD: 14.2 % — SIGNIFICANT CHANGE UP (ref 11.5–14.5)
SARS-COV-2 RNA SPEC QL NAA+PROBE: SIGNIFICANT CHANGE UP
SODIUM SERPL-SCNC: 138 MMOL/L — SIGNIFICANT CHANGE UP (ref 135–146)
WBC # BLD: 4.56 K/UL — LOW (ref 4.8–10.8)
WBC # FLD AUTO: 4.56 K/UL — LOW (ref 4.8–10.8)

## 2023-04-18 PROCEDURE — 99232 SBSQ HOSP IP/OBS MODERATE 35: CPT

## 2023-04-18 RX ORDER — METOPROLOL TARTRATE 50 MG
50 TABLET ORAL DAILY
Refills: 0 | Status: DISCONTINUED | OUTPATIENT
Start: 2023-04-18 | End: 2023-04-20

## 2023-04-18 RX ORDER — ATORVASTATIN CALCIUM 80 MG/1
80 TABLET, FILM COATED ORAL AT BEDTIME
Refills: 0 | Status: DISCONTINUED | OUTPATIENT
Start: 2023-04-18 | End: 2023-04-20

## 2023-04-18 RX ORDER — RIVAROXABAN 15 MG-20MG
20 KIT ORAL
Refills: 0 | Status: DISCONTINUED | OUTPATIENT
Start: 2023-04-18 | End: 2023-04-20

## 2023-04-18 RX ORDER — METOPROLOL TARTRATE 50 MG
100 TABLET ORAL DAILY
Refills: 0 | Status: DISCONTINUED | OUTPATIENT
Start: 2023-04-18 | End: 2023-04-18

## 2023-04-18 RX ORDER — CEFTRIAXONE 500 MG/1
1000 INJECTION, POWDER, FOR SOLUTION INTRAMUSCULAR; INTRAVENOUS EVERY 24 HOURS
Refills: 0 | Status: DISCONTINUED | OUTPATIENT
Start: 2023-04-18 | End: 2023-04-18

## 2023-04-18 RX ORDER — CLOPIDOGREL BISULFATE 75 MG/1
75 TABLET, FILM COATED ORAL DAILY
Refills: 0 | Status: DISCONTINUED | OUTPATIENT
Start: 2023-04-18 | End: 2023-04-20

## 2023-04-18 RX ORDER — MAGNESIUM SULFATE 500 MG/ML
2 VIAL (ML) INJECTION ONCE
Refills: 0 | Status: COMPLETED | OUTPATIENT
Start: 2023-04-18 | End: 2023-04-18

## 2023-04-18 RX ORDER — METOPROLOL TARTRATE 50 MG
100 TABLET ORAL AT BEDTIME
Refills: 0 | Status: DISCONTINUED | OUTPATIENT
Start: 2023-04-18 | End: 2023-04-20

## 2023-04-18 RX ORDER — GUAIFENESIN/DEXTROMETHORPHAN 600MG-30MG
10 TABLET, EXTENDED RELEASE 12 HR ORAL EVERY 6 HOURS
Refills: 0 | Status: DISCONTINUED | OUTPATIENT
Start: 2023-04-18 | End: 2023-04-20

## 2023-04-18 RX ADMIN — Medication 30 MILLIGRAM(S): at 11:02

## 2023-04-18 RX ADMIN — Medication 3 MILLILITER(S): at 09:49

## 2023-04-18 RX ADMIN — Medication 50 MILLIGRAM(S): at 05:45

## 2023-04-18 RX ADMIN — Medication 40 MILLIGRAM(S): at 11:46

## 2023-04-18 RX ADMIN — Medication 3 MILLILITER(S): at 14:07

## 2023-04-18 RX ADMIN — Medication 2: at 08:09

## 2023-04-18 RX ADMIN — ATORVASTATIN CALCIUM 80 MILLIGRAM(S): 80 TABLET, FILM COATED ORAL at 21:58

## 2023-04-18 RX ADMIN — CLOPIDOGREL BISULFATE 75 MILLIGRAM(S): 75 TABLET, FILM COATED ORAL at 11:04

## 2023-04-18 RX ADMIN — CEFTRIAXONE 100 MILLIGRAM(S): 500 INJECTION, POWDER, FOR SOLUTION INTRAMUSCULAR; INTRAVENOUS at 05:44

## 2023-04-18 RX ADMIN — RIVAROXABAN 20 MILLIGRAM(S): KIT at 17:07

## 2023-04-18 RX ADMIN — Medication 4: at 11:44

## 2023-04-18 RX ADMIN — Medication 6: at 17:06

## 2023-04-18 RX ADMIN — Medication 100 MILLIGRAM(S): at 09:26

## 2023-04-18 RX ADMIN — INSULIN GLARGINE 30 UNIT(S): 100 INJECTION, SOLUTION SUBCUTANEOUS at 21:57

## 2023-04-18 RX ADMIN — Medication 3 MILLILITER(S): at 19:53

## 2023-04-18 RX ADMIN — Medication 100 MILLIGRAM(S): at 21:58

## 2023-04-18 RX ADMIN — Medication 25 GRAM(S): at 11:01

## 2023-04-18 RX ADMIN — Medication 60 MILLIGRAM(S): at 17:26

## 2023-04-18 RX ADMIN — Medication 1 MILLIGRAM(S): at 21:58

## 2023-04-18 RX ADMIN — Medication 100 MILLIGRAM(S): at 17:08

## 2023-04-18 RX ADMIN — Medication 40 MILLIGRAM(S): at 05:44

## 2023-04-18 NOTE — PROGRESS NOTE ADULT - SUBJECTIVE AND OBJECTIVE BOX
No acute events overnight. She complains of coughing up yellow phlegm. Had 1 ep of diarrhea in the ED. Denies headache, dizziness, nausea, vomiting, chest pain, sob, abd pain, constipation, urinary symptoms    VITAL SIGNS:  T(C): 36.2 (04-18-23 @ 05:36), Max: 37.6 (04-17-23 @ 15:16)  HR: 93 (04-18-23 @ 07:55) (85 - 98)  BP: 144/67 (04-18-23 @ 05:36) (123/60 - 144/67)  RR: 20 (04-18-23 @ 05:36) (20 - 22)  SpO2: 97% (04-18-23 @ 08:15) (92% - 97%)      PHYSICAL EXAM:  GENERAL: NAD  HEAD: Atraumatic, Normocephalic  NECK: Supple  NERVOUS SYSTEM: Alert & Oriented X3, Good concentration; moves all ext  CHEST/LUNG: b/l wheeze  HEART: tachy. Normal S1/S1. No murmurs, rubs, or gallops  ABDOMEN: Soft, Nontender, Nondistended; Bowel sounds present  EXTREMITIES: no ble edema      MEDICATIONS:  MEDICATIONS  (STANDING):  albuterol/ipratropium for Nebulization 3 milliLiter(s) Nebulizer every 6 hours  atorvastatin 80 milliGRAM(s) Oral at bedtime  cefTRIAXone   IVPB 1000 milliGRAM(s) IV Intermittent every 24 hours  clopidogrel Tablet 75 milliGRAM(s) Oral daily  dextrose 5%. 1000 milliLiter(s) (50 mL/Hr) IV Continuous <Continuous>  dextrose 5%. 1000 milliLiter(s) (100 mL/Hr) IV Continuous <Continuous>  dextrose 50% Injectable 12.5 Gram(s) IV Push once  dextrose 50% Injectable 25 Gram(s) IV Push once  dextrose 50% Injectable 25 Gram(s) IV Push once  doxycycline IVPB 100 milliGRAM(s) IV Intermittent every 12 hours  glucagon  Injectable 1 milliGRAM(s) IntraMuscular once  insulin glargine Injectable (LANTUS) 30 Unit(s) SubCutaneous at bedtime  insulin lispro (ADMELOG) corrective regimen sliding scale   SubCutaneous three times a day before meals  LORazepam     Tablet 1 milliGRAM(s) Oral at bedtime  methylPREDNISolone sodium succinate Injectable 40 milliGRAM(s) IV Push two times a day  metoprolol succinate  milliGRAM(s) Oral at bedtime  metoprolol succinate ER 50 milliGRAM(s) Oral daily  PARoxetine 30 milliGRAM(s) Oral daily  rivaroxaban 20 milliGRAM(s) Oral with dinner    MEDICATIONS  (PRN):  acetaminophen     Tablet .. 650 milliGRAM(s) Oral every 6 hours PRN Temp greater or equal to 38C (100.4F), Mild Pain (1 - 3)  albuterol/ipratropium for Nebulization 3 milliLiter(s) Nebulizer every 6 hours PRN Shortness of Breath and/or Wheezing  aluminum hydroxide/magnesium hydroxide/simethicone Suspension 30 milliLiter(s) Oral every 4 hours PRN Dyspepsia  dextrose Oral Gel 15 Gram(s) Oral once PRN Blood Glucose LESS THAN 70 milliGRAM(s)/deciliter  melatonin 3 milliGRAM(s) Oral at bedtime PRN Insomnia  ondansetron Injectable 4 milliGRAM(s) IV Push every 8 hours PRN Nausea and/or Vomiting      LABS:                        13.4   4.56  )-----------( 292      ( 18 Apr 2023 07:29 )             41.5     04-17    133<L>  |  95<L>  |  10  ----------------------------<  127<H>  4.1   |  25  |  0.8    Ca    9.3      17 Apr 2023 12:15    TPro  7.6  /  Alb  4.1  /  TBili  0.7  /  DBili  x   /  AST  27  /  ALT  33  /  AlkPhos  109  04-17

## 2023-04-18 NOTE — PROGRESS NOTE ADULT - ASSESSMENT
52 year old female with pmhx of cad afib marilin former smoker p/w viral URI symptoms, unlikely new onset chf or PE. possibly COPD (hx of heavy smoking). starting nebulizer, steroids and azithromycin    # AHRF likely 2/2 suspected acute on chronic bronchitis vs atypical PNA  # MARILIN (noncompliant cpap)  - CXR cardiomegaly w/ pulm vascular congestion  - flu negative, covid negative  - start on IV solumedrol 40mg BID, albuterol q6h and q4h prn  - will start on doxy and ceftriaxone   - bcx  - sputum cx  - urine strep  - urine legionella  - procal  - RVP  - d-dimer  - echo    # nausea vomiting intermittent  # h/o GERD  - states she stopped PPI  - zofran PRN  - has o/p GI f/u    # H/o CAD s/p 5 stents   - c/w plavix and xarelto    # H/o Afib   - c/w xarelto    # DM type 2   - sliding scale/ start basal bolus if sugars over 180  - A1c    # Hx of depression   - c/w paroxetine    DVT ppx: xarelto  Diet: DASH/CC 52 year old female with pmhx of cad afib marilin former smoker p/w viral URI symptoms, unlikely new onset chf or PE. possibly COPD (hx of heavy smoking). starting nebulizer, steroids and azithromycin    # AHRF likely 2/2 suspected acute on chronic bronchitis vs atypical PNA  # MARILIN (noncompliant cpap)  - CXR cardiomegaly w/ pulm vascular congestion  - flu negative, covid negative  - start on IV solumedrol 40mg BID, albuterol q6h and q4h prn  - will start on doxy and ceftriaxone   - bcx  - sputum cx  - urine strep  - urine legionella  - procal  - RVP  - d-dimer  - echo    # nausea vomiting intermittent  # h/o GERD  - states she stopped PPI  - zofran PRN  - has o/p GI f/u    # H/o CAD s/p 5 stents   - c/w plavix and xarelto    # H/o Afib   - c/w xarelto    # DM type 2   - sliding scale/ start basal bolus if sugars over 180  - A1c    # Hx of depression   - c/w paroxetine    #Hypomagnesemia  - Mg 1.7, repleted    DVT ppx: xarelto  Diet: DASH/CC 52 year old female with pmhx of cad afib marilin former smoker p/w viral URI symptoms, unlikely new onset chf or PE. possibly COPD (hx of heavy smoking). starting nebulizer, steroids and azithromycin    # AHRF likely 2/2 suspected acute on chronic bronchitis vs atypical PNA  # MARILIN (noncompliant cpap)  - CXR cardiomegaly w/ pulm vascular congestion  - flu negative, covid negative  - IV solumedrol 60mg BID, albuterol q6h and q4h prn  - c/w doxy and ceftriaxone   - RVP positive for parainfluenza  - bcx  - procal  - d-dimer  - sputum cx  - urine strep  - urine legionella  - echo    # nausea vomiting intermittent  # h/o GERD  - states she stopped PPI  - zofran PRN  - has o/p GI f/u    # H/o CAD s/p 5 stents   - c/w plavix and xarelto    # H/o Afib   - c/w xarelto    # DM type 2   - sliding scale/ start basal bolus if sugars over 180  - A1c    # Hx of depression   - c/w paroxetine    #Hypomagnesemia  - Mg 1.7, repleted    DVT ppx: xarelto  Diet: DASH/CC    Pending: deescalate IV steroids and abx as tolerated 52 year old female with pmhx of cad afib marilin former smoker p/w viral URI symptoms, unlikely new onset chf or PE. possibly COPD (hx of heavy smoking). starting nebulizer, steroids and azithromycin    # AHRF likely 2/2 suspected acute on chronic bronchitis vs atypical PNA  # MARILIN (noncompliant cpap)  - CXR cardiomegaly w/ pulm vascular congestion  - flu negative, covid negative  - IV solumedrol 60mg BID, albuterol q6h and q4h prn  - c/w doxy, d/c ceftriaxone   - RVP positive for parainfluenza  - bcx  - procal  - sputum cx  - urine strep  - urine legionella  - echo    # nausea vomiting intermittent  # h/o GERD  - states she stopped PPI  - zofran PRN  - has o/p GI f/u    # H/o CAD s/p 5 stents   - c/w plavix and xarelto    # H/o Afib   - c/w xarelto    # DM type 2   - sliding scale/ start basal bolus if sugars over 180  - A1c 5.6    # Hx of depression   - c/w paroxetine    #Hypomagnesemia  - Mg 1.7, repleted    DVT ppx: xarelto  Diet: DASH/CC    Pending: deescalate IV steroids and abx as tolerated

## 2023-04-18 NOTE — PATIENT PROFILE ADULT - FALL HARM RISK - HARM RISK INTERVENTIONS

## 2023-04-19 LAB
ALBUMIN SERPL ELPH-MCNC: 3.9 G/DL — SIGNIFICANT CHANGE UP (ref 3.5–5.2)
ALP SERPL-CCNC: 99 U/L — SIGNIFICANT CHANGE UP (ref 30–115)
ALT FLD-CCNC: 28 U/L — SIGNIFICANT CHANGE UP (ref 0–41)
ANION GAP SERPL CALC-SCNC: 11 MMOL/L — SIGNIFICANT CHANGE UP (ref 7–14)
AST SERPL-CCNC: 21 U/L — SIGNIFICANT CHANGE UP (ref 0–41)
BASOPHILS # BLD AUTO: 0.02 K/UL — SIGNIFICANT CHANGE UP (ref 0–0.2)
BASOPHILS NFR BLD AUTO: 0.2 % — SIGNIFICANT CHANGE UP (ref 0–1)
BILIRUB SERPL-MCNC: 0.4 MG/DL — SIGNIFICANT CHANGE UP (ref 0.2–1.2)
BUN SERPL-MCNC: 14 MG/DL — SIGNIFICANT CHANGE UP (ref 10–20)
CALCIUM SERPL-MCNC: 9.7 MG/DL — SIGNIFICANT CHANGE UP (ref 8.4–10.5)
CHLORIDE SERPL-SCNC: 101 MMOL/L — SIGNIFICANT CHANGE UP (ref 98–110)
CO2 SERPL-SCNC: 28 MMOL/L — SIGNIFICANT CHANGE UP (ref 17–32)
CREAT SERPL-MCNC: 0.7 MG/DL — SIGNIFICANT CHANGE UP (ref 0.7–1.5)
EGFR: 103 ML/MIN/1.73M2 — SIGNIFICANT CHANGE UP
EOSINOPHIL # BLD AUTO: 0 K/UL — SIGNIFICANT CHANGE UP (ref 0–0.7)
EOSINOPHIL NFR BLD AUTO: 0 % — SIGNIFICANT CHANGE UP (ref 0–8)
GLUCOSE BLDC GLUCOMTR-MCNC: 195 MG/DL — HIGH (ref 70–99)
GLUCOSE BLDC GLUCOMTR-MCNC: 285 MG/DL — HIGH (ref 70–99)
GLUCOSE BLDC GLUCOMTR-MCNC: 300 MG/DL — HIGH (ref 70–99)
GLUCOSE BLDC GLUCOMTR-MCNC: 357 MG/DL — HIGH (ref 70–99)
GLUCOSE SERPL-MCNC: 219 MG/DL — HIGH (ref 70–99)
HCT VFR BLD CALC: 37.6 % — SIGNIFICANT CHANGE UP (ref 37–47)
HGB BLD-MCNC: 12.1 G/DL — SIGNIFICANT CHANGE UP (ref 12–16)
IMM GRANULOCYTES NFR BLD AUTO: 0.6 % — HIGH (ref 0.1–0.3)
LEGIONELLA AG UR QL: NEGATIVE — SIGNIFICANT CHANGE UP
LYMPHOCYTES # BLD AUTO: 0.98 K/UL — LOW (ref 1.2–3.4)
LYMPHOCYTES # BLD AUTO: 8.9 % — LOW (ref 20.5–51.1)
MAGNESIUM SERPL-MCNC: 1.8 MG/DL — SIGNIFICANT CHANGE UP (ref 1.8–2.4)
MCHC RBC-ENTMCNC: 30.5 PG — SIGNIFICANT CHANGE UP (ref 27–31)
MCHC RBC-ENTMCNC: 32.2 G/DL — SIGNIFICANT CHANGE UP (ref 32–37)
MCV RBC AUTO: 94.7 FL — SIGNIFICANT CHANGE UP (ref 81–99)
MONOCYTES # BLD AUTO: 0.58 K/UL — SIGNIFICANT CHANGE UP (ref 0.1–0.6)
MONOCYTES NFR BLD AUTO: 5.3 % — SIGNIFICANT CHANGE UP (ref 1.7–9.3)
NEUTROPHILS # BLD AUTO: 9.34 K/UL — HIGH (ref 1.4–6.5)
NEUTROPHILS NFR BLD AUTO: 85 % — HIGH (ref 42.2–75.2)
NRBC # BLD: 0 /100 WBCS — SIGNIFICANT CHANGE UP (ref 0–0)
PLATELET # BLD AUTO: 301 K/UL — SIGNIFICANT CHANGE UP (ref 130–400)
PMV BLD: 9.6 FL — SIGNIFICANT CHANGE UP (ref 7.4–10.4)
POTASSIUM SERPL-MCNC: 5.3 MMOL/L — HIGH (ref 3.5–5)
POTASSIUM SERPL-SCNC: 5.3 MMOL/L — HIGH (ref 3.5–5)
PROT SERPL-MCNC: 7.1 G/DL — SIGNIFICANT CHANGE UP (ref 6–8)
RBC # BLD: 3.97 M/UL — LOW (ref 4.2–5.4)
RBC # FLD: 14.1 % — SIGNIFICANT CHANGE UP (ref 11.5–14.5)
S PNEUM AG UR QL: NEGATIVE — SIGNIFICANT CHANGE UP
SODIUM SERPL-SCNC: 140 MMOL/L — SIGNIFICANT CHANGE UP (ref 135–146)
WBC # BLD: 10.99 K/UL — HIGH (ref 4.8–10.8)
WBC # FLD AUTO: 10.99 K/UL — HIGH (ref 4.8–10.8)

## 2023-04-19 PROCEDURE — 99232 SBSQ HOSP IP/OBS MODERATE 35: CPT

## 2023-04-19 RX ORDER — AZITHROMYCIN 500 MG/1
250 TABLET, FILM COATED ORAL DAILY
Refills: 0 | Status: DISCONTINUED | OUTPATIENT
Start: 2023-04-20 | End: 2023-04-20

## 2023-04-19 RX ORDER — SODIUM ZIRCONIUM CYCLOSILICATE 10 G/10G
10 POWDER, FOR SUSPENSION ORAL ONCE
Refills: 0 | Status: COMPLETED | OUTPATIENT
Start: 2023-04-19 | End: 2023-04-19

## 2023-04-19 RX ADMIN — Medication 2: at 08:19

## 2023-04-19 RX ADMIN — RIVAROXABAN 20 MILLIGRAM(S): KIT at 17:51

## 2023-04-19 RX ADMIN — ATORVASTATIN CALCIUM 80 MILLIGRAM(S): 80 TABLET, FILM COATED ORAL at 21:08

## 2023-04-19 RX ADMIN — Medication 30 MILLIGRAM(S): at 12:10

## 2023-04-19 RX ADMIN — Medication 60 MILLIGRAM(S): at 05:47

## 2023-04-19 RX ADMIN — Medication 100 MILLIGRAM(S): at 21:08

## 2023-04-19 RX ADMIN — Medication 50 MILLIGRAM(S): at 05:48

## 2023-04-19 RX ADMIN — Medication 6: at 17:46

## 2023-04-19 RX ADMIN — INSULIN GLARGINE 30 UNIT(S): 100 INJECTION, SOLUTION SUBCUTANEOUS at 21:05

## 2023-04-19 RX ADMIN — Medication 100 MILLIGRAM(S): at 05:46

## 2023-04-19 RX ADMIN — SODIUM ZIRCONIUM CYCLOSILICATE 10 GRAM(S): 10 POWDER, FOR SUSPENSION ORAL at 17:51

## 2023-04-19 RX ADMIN — Medication 6: at 12:09

## 2023-04-19 RX ADMIN — Medication 3 MILLILITER(S): at 14:11

## 2023-04-19 RX ADMIN — Medication 3 MILLILITER(S): at 12:21

## 2023-04-19 RX ADMIN — Medication 3 MILLILITER(S): at 21:11

## 2023-04-19 RX ADMIN — CLOPIDOGREL BISULFATE 75 MILLIGRAM(S): 75 TABLET, FILM COATED ORAL at 12:10

## 2023-04-19 RX ADMIN — Medication 1 MILLIGRAM(S): at 21:08

## 2023-04-19 RX ADMIN — SODIUM ZIRCONIUM CYCLOSILICATE 10 GRAM(S): 10 POWDER, FOR SUSPENSION ORAL at 12:09

## 2023-04-19 RX ADMIN — Medication 60 MILLIGRAM(S): at 17:48

## 2023-04-19 NOTE — PROGRESS NOTE ADULT - ASSESSMENT
Patient understands condition, prognosis and need for follow up care. 52 year old female with pmhx of cad afib marilin former smoker p/w viral URI symptoms, unlikely new onset chf or PE. possibly COPD (hx of heavy smoking). starting nebulizer, steroids and azithromycin    # AHRF likely 2/2 suspected acute on chronic bronchitis vs atypical PNA  # MARILIN (noncompliant cpap)  - CXR cardiomegaly w/ pulm vascular congestion  - flu negative, covid negative  - IV solumedrol 60mg BID, albuterol q6h and q4h prn  - d/c doxy and ceftriaxone   - azithro 250 for 2 days, end 4/21 to prevent superimposed infection  - RVP positive for parainfluenza  - procal 0.04, urine strep neg, urine legionella pending  - bcx  - sputum cx  - echo  - obtain ambulatory o2    # nausea vomiting intermittent  # h/o GERD  - states she stopped PPI  - zofran PRN  - has o/p GI f/u    # H/o CAD s/p 5 stents   - c/w plavix and xarelto    # H/o Afib   - c/w xarelto    # DM type 2   - sliding scale/start basal bolus if sugars over 180  - A1c 5.6    # Hx of depression   - c/w paroxetine    #Hypomagnesemia  - replete prn    DVT ppx: xarelto  Diet: DASH/CC    Pending: deescalate IV steroids, azithro for 2 days, ambulatory o2 52 year old female with pmhx of cad afib marilin former smoker p/w viral URI symptoms, unlikely new onset chf or PE. possibly COPD (hx of heavy smoking). starting nebulizer, steroids and azithromycin    # AHRF due to parainfluenza  # MARILIN (noncompliant cpap)  - CXR cardiomegaly w/ pulm vascular congestion  - flu negative, covid negative  - IV solumedrol 60mg BID, albuterol q6h and q4h prn  - d/c doxy and ceftriaxone   - azithro 250 for 2 days, end 4/21 to prevent superimposed infection  - RVP positive for parainfluenza  - procal 0.04, urine strep neg, urine legionella pending  - bcx  - sputum cx  - echo  - obtain ambulatory o2    # nausea vomiting intermittent  # h/o GERD  - states she stopped PPI  - zofran PRN  - has o/p GI f/u    # H/o CAD s/p 5 stents   - c/w plavix and xarelto    # H/o Afib   - c/w xarelto    # DM type 2   - sliding scale/start basal bolus if sugars over 180  - A1c 5.6    # Hx of depression   - c/w paroxetine    #Hypomagnesemia  - replete prn    DVT ppx: xarelto  Diet: DASH/CC    Pending: deescalate IV steroids, azithro for 2 days, ambulatory o2

## 2023-04-19 NOTE — PROGRESS NOTE ADULT - ATTENDING COMMENTS
53 y/o Female  with hx of   CAD s/p PCI (5 stents), afib on xarelto, JOCELYNE (noncompliant cpap), GERD, DM type 2, HTN, obesity, former smoker, CVA (cerebral infarction), Endarterectomy, High cholesterol, Depression and hx of GCA s/p L temporal artery excision presents for 3 days of upper respiratory symptoms due to COPD exacerbation.     Acute COPD exacerbation due to Parainfluenza bronchitis   - CXR read as cardiomegaly with pulmonary vascular congestion.  - proBNP 70.   -  doxy and ceftriaxone > Azithro until 4/20  - Solumedrol 40 QD > 60 mg IV BID (4/18), nebs, inhalation tx.   -RVP negative   - no hypoxia - on 1L  sat 97%    4/19: Check walking sats. No wheezing. Plan for d/c in 24-48 hours.     Cardiomegaly noted on CXR  - check TTE   - 12 lead EKG- NSR , no acute ST-T wave changes     CAD - c/w home med  TIA - c/w home med  DM 2- bsfs monitoring , insulin co, obtain hga1c level    DVT ppx: Lovenox SC  GI ppx: not indicated.   Diet: DASH  Activity: as tolerated.     #Progress Note Handoff:  Home__in 24 to 48 hours
Patient is a 51 y/o Female  with hx of   CAD s/p PCI (5 stents), afib on xarelto, JOCELYNE (noncompliant cpap), GERD, DM type 2, HTN, obesity, former smoker, CVA (cerebral infarction), Endarterectomy, High cholesterol, Depression and hx of GCA s/p L temporal artery excision presents for 3 days of upper respiratory symptoms due to COPD exacerbation.     Acute COPD exacerbation due to bronchitis vs. atypical PNA  - CXR read as cardiomegaly with pulmonary vascular congestion.  - proBNP 70.   -  doxy and ceftriaxone for next 24 hours, than deescalate to PO abx.   - Solumedrol 60 mg IV twice daily, nebs, inhalation tx.   -RVP negative   - no hypoxia - on 1L  sat 97%    Cardiomegaly noted on CXR  - check TTE   - 12 lead EKG- NSR , no acute ST-T wave changes     CAD - c/w home med  TIA - c/w home med  DM 2- bsfs monitoring , insulin co, obtain hga1c level    DVT ppx: Lovenox SC  GI ppx: not indicated.   Diet: DASH  Activity: as tolerated.     #Progress Note Handoff: deescalate IV abx in 24 hours, taper down IV steroids if wheezing resolves. , nebs tx  Family discussion: medical plan of tx. d/w pt. by bedside Disposition: Home__in 24 to 48 hours    Total time spent to complete patient's bedside assessment, review medical chart, discuss medical plan of care with covering medical team was more than 35 minutes with >50% of time spent face to face with patient, discussion with patient/family and/or coordination of care

## 2023-04-19 NOTE — PROGRESS NOTE ADULT - SUBJECTIVE AND OBJECTIVE BOX
No acute events overnight. Feels better today. is concerned that her breathing could get worsen if she is walking. she will walk with the nurse to check her pulse ox. otherwise no issues, denies any headache, dizziness, n/v, chest pain, sob abd pain.     VITAL SIGNS:  T(C): 37 (04-19-23 @ 04:59), Max: 37 (04-19-23 @ 04:59)  HR: 91 (04-19-23 @ 04:59) (91 - 107)  BP: 115/58 (04-19-23 @ 04:59) (115/58 - 132/63)  RR: 19 (04-19-23 @ 04:59) (18 - 19)  SpO2: 94% (04-19-23 @ 04:59) (89% - 94%)      PHYSICAL EXAM:  GENERAL: NAD  HEAD: Atraumatic, Normocephalic  NECK: Supple  NERVOUS SYSTEM: Alert & Oriented X3, Good concentration; moves all ext  CHEST/LUNG: b/l wheeze improved   HEART: RRR. Normal S1/S1. No murmurs, rubs, or gallops  ABDOMEN: Soft, Nontender, Nondistended; Bowel sounds present  EXTREMITIES: no ble edema      MEDICATIONS:  MEDICATIONS  (STANDING):  albuterol/ipratropium for Nebulization 3 milliLiter(s) Nebulizer every 6 hours  atorvastatin 80 milliGRAM(s) Oral at bedtime  clopidogrel Tablet 75 milliGRAM(s) Oral daily  dextrose 5%. 1000 milliLiter(s) (100 mL/Hr) IV Continuous <Continuous>  dextrose 5%. 1000 milliLiter(s) (50 mL/Hr) IV Continuous <Continuous>  dextrose 50% Injectable 25 Gram(s) IV Push once  dextrose 50% Injectable 12.5 Gram(s) IV Push once  dextrose 50% Injectable 25 Gram(s) IV Push once  glucagon  Injectable 1 milliGRAM(s) IntraMuscular once  guaifenesin/dextromethorphan Oral Liquid 10 milliLiter(s) Oral every 6 hours  insulin glargine Injectable (LANTUS) 30 Unit(s) SubCutaneous at bedtime  insulin lispro (ADMELOG) corrective regimen sliding scale   SubCutaneous three times a day before meals  LORazepam     Tablet 1 milliGRAM(s) Oral at bedtime  methylPREDNISolone sodium succinate Injectable 60 milliGRAM(s) IV Push every 12 hours  metoprolol succinate ER 50 milliGRAM(s) Oral daily  metoprolol succinate  milliGRAM(s) Oral at bedtime  PARoxetine 30 milliGRAM(s) Oral daily  rivaroxaban 20 milliGRAM(s) Oral with dinner  sodium zirconium cyclosilicate 10 Gram(s) Oral once    MEDICATIONS  (PRN):  acetaminophen     Tablet .. 650 milliGRAM(s) Oral every 6 hours PRN Temp greater or equal to 38C (100.4F), Mild Pain (1 - 3)  albuterol/ipratropium for Nebulization 3 milliLiter(s) Nebulizer every 6 hours PRN Shortness of Breath and/or Wheezing  aluminum hydroxide/magnesium hydroxide/simethicone Suspension 30 milliLiter(s) Oral every 4 hours PRN Dyspepsia  dextrose Oral Gel 15 Gram(s) Oral once PRN Blood Glucose LESS THAN 70 milliGRAM(s)/deciliter  melatonin 3 milliGRAM(s) Oral at bedtime PRN Insomnia  ondansetron Injectable 4 milliGRAM(s) IV Push every 8 hours PRN Nausea and/or Vomiting      LABS:                        12.1   10.99 )-----------( 301      ( 19 Apr 2023 07:37 )             37.6     04-19    140  |  101  |  14  ----------------------------<  219<H>  5.3<H>   |  28  |  0.7    Ca    9.7      19 Apr 2023 07:37  Mg     1.8     04-19    TPro  7.1  /  Alb  3.9  /  TBili  0.4  /  DBili  x   /  AST  21  /  ALT  28  /  AlkPhos  99  04-19

## 2023-04-20 ENCOUNTER — TRANSCRIPTION ENCOUNTER (OUTPATIENT)
Age: 53
End: 2023-04-20

## 2023-04-20 VITALS
OXYGEN SATURATION: 91 % | HEART RATE: 94 BPM | RESPIRATION RATE: 18 BRPM | SYSTOLIC BLOOD PRESSURE: 131 MMHG | DIASTOLIC BLOOD PRESSURE: 64 MMHG | TEMPERATURE: 98 F

## 2023-04-20 LAB
ALBUMIN SERPL ELPH-MCNC: 3.9 G/DL — SIGNIFICANT CHANGE UP (ref 3.5–5.2)
ALP SERPL-CCNC: 89 U/L — SIGNIFICANT CHANGE UP (ref 30–115)
ALT FLD-CCNC: 28 U/L — SIGNIFICANT CHANGE UP (ref 0–41)
ANION GAP SERPL CALC-SCNC: 8 MMOL/L — SIGNIFICANT CHANGE UP (ref 7–14)
AST SERPL-CCNC: 19 U/L — SIGNIFICANT CHANGE UP (ref 0–41)
BASOPHILS # BLD AUTO: 0.01 K/UL — SIGNIFICANT CHANGE UP (ref 0–0.2)
BASOPHILS NFR BLD AUTO: 0.1 % — SIGNIFICANT CHANGE UP (ref 0–1)
BILIRUB SERPL-MCNC: 0.4 MG/DL — SIGNIFICANT CHANGE UP (ref 0.2–1.2)
BUN SERPL-MCNC: 17 MG/DL — SIGNIFICANT CHANGE UP (ref 10–20)
CALCIUM SERPL-MCNC: 9.7 MG/DL — SIGNIFICANT CHANGE UP (ref 8.4–10.5)
CHLORIDE SERPL-SCNC: 97 MMOL/L — LOW (ref 98–110)
CO2 SERPL-SCNC: 29 MMOL/L — SIGNIFICANT CHANGE UP (ref 17–32)
CREAT SERPL-MCNC: 0.7 MG/DL — SIGNIFICANT CHANGE UP (ref 0.7–1.5)
EGFR: 103 ML/MIN/1.73M2 — SIGNIFICANT CHANGE UP
EOSINOPHIL # BLD AUTO: 0.01 K/UL — SIGNIFICANT CHANGE UP (ref 0–0.7)
EOSINOPHIL NFR BLD AUTO: 0.1 % — SIGNIFICANT CHANGE UP (ref 0–8)
GLUCOSE BLDC GLUCOMTR-MCNC: 261 MG/DL — HIGH (ref 70–99)
GLUCOSE BLDC GLUCOMTR-MCNC: 336 MG/DL — HIGH (ref 70–99)
GLUCOSE SERPL-MCNC: 279 MG/DL — HIGH (ref 70–99)
HCT VFR BLD CALC: 36.7 % — LOW (ref 37–47)
HGB BLD-MCNC: 12 G/DL — SIGNIFICANT CHANGE UP (ref 12–16)
IMM GRANULOCYTES NFR BLD AUTO: 0.5 % — HIGH (ref 0.1–0.3)
LYMPHOCYTES # BLD AUTO: 1.11 K/UL — LOW (ref 1.2–3.4)
LYMPHOCYTES # BLD AUTO: 12 % — LOW (ref 20.5–51.1)
MAGNESIUM SERPL-MCNC: 1.8 MG/DL — SIGNIFICANT CHANGE UP (ref 1.8–2.4)
MCHC RBC-ENTMCNC: 30.9 PG — SIGNIFICANT CHANGE UP (ref 27–31)
MCHC RBC-ENTMCNC: 32.7 G/DL — SIGNIFICANT CHANGE UP (ref 32–37)
MCV RBC AUTO: 94.6 FL — SIGNIFICANT CHANGE UP (ref 81–99)
MONOCYTES # BLD AUTO: 0.38 K/UL — SIGNIFICANT CHANGE UP (ref 0.1–0.6)
MONOCYTES NFR BLD AUTO: 4.1 % — SIGNIFICANT CHANGE UP (ref 1.7–9.3)
NEUTROPHILS # BLD AUTO: 7.69 K/UL — HIGH (ref 1.4–6.5)
NEUTROPHILS NFR BLD AUTO: 83.2 % — HIGH (ref 42.2–75.2)
NRBC # BLD: 0 /100 WBCS — SIGNIFICANT CHANGE UP (ref 0–0)
PLATELET # BLD AUTO: 286 K/UL — SIGNIFICANT CHANGE UP (ref 130–400)
PMV BLD: 9.1 FL — SIGNIFICANT CHANGE UP (ref 7.4–10.4)
POTASSIUM SERPL-MCNC: 4.9 MMOL/L — SIGNIFICANT CHANGE UP (ref 3.5–5)
POTASSIUM SERPL-SCNC: 4.9 MMOL/L — SIGNIFICANT CHANGE UP (ref 3.5–5)
PROT SERPL-MCNC: 6.9 G/DL — SIGNIFICANT CHANGE UP (ref 6–8)
RBC # BLD: 3.88 M/UL — LOW (ref 4.2–5.4)
RBC # FLD: 14.4 % — SIGNIFICANT CHANGE UP (ref 11.5–14.5)
SODIUM SERPL-SCNC: 134 MMOL/L — LOW (ref 135–146)
WBC # BLD: 9.25 K/UL — SIGNIFICANT CHANGE UP (ref 4.8–10.8)
WBC # FLD AUTO: 9.25 K/UL — SIGNIFICANT CHANGE UP (ref 4.8–10.8)

## 2023-04-20 PROCEDURE — 99239 HOSP IP/OBS DSCHRG MGMT >30: CPT

## 2023-04-20 RX ORDER — CLOPIDOGREL BISULFATE 75 MG/1
1 TABLET, FILM COATED ORAL
Qty: 0 | Refills: 0 | DISCHARGE
Start: 2023-04-20

## 2023-04-20 RX ADMIN — AZITHROMYCIN 250 MILLIGRAM(S): 500 TABLET, FILM COATED ORAL at 11:53

## 2023-04-20 RX ADMIN — Medication 6: at 11:47

## 2023-04-20 RX ADMIN — CLOPIDOGREL BISULFATE 75 MILLIGRAM(S): 75 TABLET, FILM COATED ORAL at 11:53

## 2023-04-20 RX ADMIN — Medication 60 MILLIGRAM(S): at 05:43

## 2023-04-20 RX ADMIN — Medication 8: at 12:20

## 2023-04-20 RX ADMIN — Medication 50 MILLIGRAM(S): at 05:43

## 2023-04-20 NOTE — DISCHARGE NOTE PROVIDER - NSDCCPCAREPLAN_GEN_ALL_CORE_FT
PRINCIPAL DISCHARGE DIAGNOSIS  Diagnosis: Parainfluenza virus bronchitis  Assessment and Plan of Treatment: you had suspected copd exacerbation due to parainfluenza bronchitis. you were treated with nebulizers and IV steroids. you will be tapered off steroids by oral prednisone 60 x3 days, 40 x2 days, 20 x2days. please follow up with your PCP and pulmonologist     PRINCIPAL DISCHARGE DIAGNOSIS  Diagnosis: Parainfluenza virus bronchitis  Assessment and Plan of Treatment: you had suspected copd exacerbation due to parainfluenza bronchitis. you were treated with nebulizers and IV steroids. you will be tapered off steroids by oral prednisone 60 x3 days, 40 x2 days, 20 x2days. please follow up with your PCP and pulmonologist. you should have a pulmonary function test. also follow up for your sleep apnea

## 2023-04-20 NOTE — DISCHARGE NOTE PROVIDER - NSDCFUSCHEDAPPT_GEN_ALL_CORE_FT
Diana Millard  [facility]  Scheduled Appointment: 04/21/2023    Diana Millard  [facility]  PSYCHIATRY  [address]  Scheduled Appointment: 04/21/2023    [facility]  PSYCHIATRY [facility] [address]  Scheduled Appointment: 04/28/2023     Diana Millard  [facility]  Scheduled Appointment: 04/21/2023    Diana Millard  [facility]  PSYCHIATRY  [address]  Scheduled Appointment: 04/21/2023    [facility]  PSYCHIATRY  [address]  Scheduled Appointment: 04/28/2023    Drake Garcias  [facility]  PULMMED [address]  Scheduled Appointment: 06/19/2023

## 2023-04-20 NOTE — DISCHARGE NOTE PROVIDER - CARE PROVIDER_API CALL
Tatiana Wallace (MD)  Family Medicine  [address] [place] [id number]  Phone: [phone]  Fax: [phone]  Follow Up Time: 2 weeks    Drake Garcias (DO)  Internal Medicine; Pulmonary Disease  [address] [place] [address]  Phone: [phone]  Fax: [phone]  Follow Up Time: 2 weeks

## 2023-04-20 NOTE — DISCHARGE NOTE NURSING/CASE MANAGEMENT/SOCIAL WORK - NSDCPEFALRISK_GEN_ALL_CORE
For information on Fall & Injury Prevention, visit: [email or web address] OR  [email or web address] OR  https://www.cdc.gov/steadi/patient.html

## 2023-04-20 NOTE — DISCHARGE NOTE NURSING/CASE MANAGEMENT/SOCIAL WORK - PATIENT PORTAL LINK FT
You can access the FollowMyHealth Patient Portal offered by [facility] by registering at the following website: [email or web address]. By joining [email or web address]’s FollowMyHealth portal, you will also be able to view your health information using other applications (apps) compatible with our system.

## 2023-04-20 NOTE — DISCHARGE NOTE PROVIDER - HOSPITAL COURSE
52 year old female with pmhx CAD s/p PCI (5 stents), afib on xarelto, JOCELYNE (noncompliant cpap), GERD, DM type 2, HTN, obesity, former smoker, CVA (cerebral infarction), Endarterectomy, High cholesterol, Depression.and hx of GCA s/p L temporal artery excision presents for 3 days of upper respiratory symptoms. Pt was sent in from  today for hypoxia and concern for possible PNA.  Pt developed cough suddenly 3 days ago, with fevers, sore throat, headache. Also has abdominal pain and chest tightness from bouts of coughing.  Describes cough as congestive with difficulty expectorating mucus. Denies leg swelling, recent travel, sick contacts, chest pain, numbness, tingling.   Pt states she has JOCELYNE and is unable to use her cpap due to facial discomfort and suffocated feeling when mask is on. Mentions she has some sort of low lung function noted on previous PFTs (follows Dr. Eastman, last seen about one year ago).   Pt is a former heavy smoker, quit 8 years ago, smoked 1.5 ppd for 30 years. She is currently in search of a GI surgeon for gastric bypass surgery.   In ED pt vitals significant for tachypnea and hypoxia on ambulation. Labs show leuokopenia, flu and covid negative, CXR some interstitial markings, no consolidation. Given albuterol and ipratropium inhalers in ED.     52 year old female with pmhx of cad afib jocelyne former smoker p/w viral URI symptoms, found to have parainfluenza    # copd exacerbation due to parainfluenza  # JOCELYNE (noncompliant cpap)  - CXR cardiomegaly w/ pulm vascular congestion  - flu negative, covid negative  - IV solumedrol 60mg BID, albuterol q6h and q4h prn - will be dc on prednisone taper 60 x3d, 40 x2d, 20 x2d.   - d/c doxy and ceftriaxone   - azithro 250 for 2 days, end 4/21 to prevent superimposed infection  - RVP positive for parainfluenza  - procal 0.04, urine strep neg, urine legionella pending  - ambulating: RA 89%, 1L NC 93%. rest: RA 94%, 1L NC 95%  - pt instructed to f/u w/ pcp and pulmonologist for PFT and JOCELYNE    # nausea vomiting intermittent  # h/o GERD  - states she stopped PPI  - zofran PRN  - has o/p GI f/u    # H/o CAD s/p 5 stents   - c/w plavix and xarelto    # H/o Afib   - c/w xarelto    # DM type 2   - lantus 30, iss  - A1c 5.6    # Hx of depression   - c/w paroxetine    #Hypomagnesemia  - replete prn    patient is medically stable for discharge 52 year old female with pmhx CAD s/p PCI (5 stents), afib on xarelto, JOCELYNE (noncompliant cpap), GERD, DM type 2, HTN, obesity, former smoker, CVA (cerebral infarction), Endarterectomy, High cholesterol, Depression.and hx of GCA s/p L temporal artery excision presents for 3 days of upper respiratory symptoms. Pt was sent in from  today for hypoxia and concern for possible PNA.  Pt developed cough suddenly 3 days ago, with fevers, sore throat, headache. Also has abdominal pain and chest tightness from bouts of coughing.  Describes cough as congestive with difficulty expectorating mucus. Denies leg swelling, recent travel, sick contacts, chest pain, numbness, tingling.   Pt states she has JOCELYNE and is unable to use her cpap due to facial discomfort and suffocated feeling when mask is on. Mentions she has some sort of low lung function noted on previous PFTs (follows Dr. Eastman, last seen about one year ago).   Pt is a former heavy smoker, quit 8 years ago, smoked 1.5 ppd for 30 years. She is currently in search of a GI surgeon for gastric bypass surgery.   In ED pt vitals significant for tachypnea and hypoxia on ambulation. Labs show leuokopenia, flu and covid negative, CXR some interstitial markings, no consolidation. Given albuterol and ipratropium inhalers in ED.     52 year old female with pmhx of cad afib jocelyne former smoker p/w viral URI symptoms, found to have parainfluenza    # copd exacerbation due to parainfluenza  # JOCELYNE (noncompliant cpap)  - CXR cardiomegaly w/ pulm vascular congestion  - flu negative, covid negative  - IV solumedrol 60mg BID, albuterol q6h and q4h prn - will be dc on prednisone taper 60 x3d, 40 x2d, 20 x2d.   - d/c doxy and ceftriaxone   - azithro 250 for 2 days, end 4/21 to prevent superimposed infection  - RVP positive for parainfluenza  - procal 0.04, urine strep neg, urine legionella pending  - ambulating: RA 89%, 1L NC 93%. rest: RA 94%, 1L NC 95%  - pt instructed to f/u w/ pcp and pulmonologist for PFT and JOCELYNE    # nausea vomiting intermittent  # h/o GERD  - states she stopped PPI  - zofran PRN  - has o/p GI f/u    # H/o CAD s/p 5 stents   - c/w plavix and xarelto    # H/o Afib   - c/w xarelto    # DM type 2   - lantus 30, iss  - A1c 5.6    # Hx of depression   - c/w paroxetine    #Hypomagnesemia  - replete prn    patient is medically stable for discharge    Attending Note:  Patient seen and examined independently. I personally had a face-to-face encounter with the patient, examined the patient myself, personally reviewed labs & Radiology images,  and reviewed the plan of care with the housestaff. Agree with resident's note but my note supersedes that of the resident in the matters hereby listed.   D/c to home. Meds per rec

## 2023-04-20 NOTE — DISCHARGE NOTE NURSING/CASE MANAGEMENT/SOCIAL WORK - FLU SEASON?
Son Pedro Michael called to cancel 2/14 appointment. Patient is inpatient in [place] since last week Wednesday. They found a staff infection and turned into a blood infection. Son said they will call back once this gets under control.
No

## 2023-04-20 NOTE — DISCHARGE NOTE PROVIDER - PROVIDER TOKENS
PROVIDER:[TOKEN:[[id number]],FOLLOWUP:[2 weeks]],PROVIDER:[TOKEN:[[id number]],FOLLOWUP:[2 weeks]]

## 2023-04-20 NOTE — DISCHARGE NOTE PROVIDER - NSDCMRMEDTOKEN_GEN_ALL_CORE_FT
atorvastatin 80 mg oral tablet: 1 tab(s) orally once a day  Lantus 100 units/mL subcutaneous solution: 80 application subcutaneous once a day  LORazepam 1 mg oral tablet: 1 tab(s) orally once a day (at bedtime)  metFORMIN 1000 mg oral tablet: 1 tab(s) orally 2 times a day  metoprolol succinate 100 mg oral tablet, extended release: 1 tab(s) orally once a day at night  metoprolol succinate 50 mg oral tablet, extended release: 1 tab(s) orally once a day in the morning  Ozempic 8 mg/3 mL (2 mg dose) subcutaneous solution: 2 milligram(s) subcutaneously once a week  PARoxetine 40 mg oral tablet: 1 tab(s) orally once a day (at bedtime)  pioglitazone 30 mg oral tablet: 1 tab(s) orally once a day  rivaroxaban 20 mg oral tablet: 1 tab(s) orally once a day    atorvastatin 80 mg oral tablet: 1 tab(s) orally once a day  azithromycin 250 mg oral tablet: 1 tab(s) orally once a day  clopidogrel 75 mg oral tablet: 1 tab(s) orally once a day  Lantus 100 units/mL subcutaneous solution: 80 application subcutaneous once a day  LORazepam 1 mg oral tablet: 1 tab(s) orally once a day (at bedtime)  metFORMIN 1000 mg oral tablet: 1 tab(s) orally 2 times a day  metoprolol succinate 100 mg oral tablet, extended release: 1 tab(s) orally once a day at night  metoprolol succinate 50 mg oral tablet, extended release: 1 tab(s) orally once a day in the morning  Ozempic 8 mg/3 mL (2 mg dose) subcutaneous solution: 2 milligram(s) subcutaneously once a week  PARoxetine 40 mg oral tablet: 1 tab(s) orally once a day (at bedtime)  pioglitazone 30 mg oral tablet: 1 tab(s) orally once a day  predniSONE 20 mg oral tablet: 3 tab(s) orally once a day taper steroids. take 60mg (3 tabs) for 3 days, then 40mg (2 tabs) for 2 days, then 20mg (1 tab) for 2 days.  rivaroxaban 20 mg oral tablet: 1 tab(s) orally once a day

## 2023-04-21 ENCOUNTER — APPOINTMENT (OUTPATIENT)
Dept: PSYCHIATRY | Facility: CLINIC | Age: 53
End: 2023-04-21
Payer: COMMERCIAL

## 2023-04-21 ENCOUNTER — OUTPATIENT (OUTPATIENT)
Dept: OUTPATIENT SERVICES | Facility: HOSPITAL | Age: 53
LOS: 1 days | End: 2023-04-21
Payer: COMMERCIAL

## 2023-04-21 DIAGNOSIS — Z98.891 HISTORY OF UTERINE SCAR FROM PREVIOUS SURGERY: Chronic | ICD-10-CM

## 2023-04-21 DIAGNOSIS — Z98.890 OTHER SPECIFIED POSTPROCEDURAL STATES: Chronic | ICD-10-CM

## 2023-04-21 DIAGNOSIS — F41.0 PANIC DISORDER [EPISODIC PAROXYSMAL ANXIETY]: ICD-10-CM

## 2023-04-21 DIAGNOSIS — Z90.49 ACQUIRED ABSENCE OF OTHER SPECIFIED PARTS OF DIGESTIVE TRACT: Chronic | ICD-10-CM

## 2023-04-21 DIAGNOSIS — F33.41 MAJOR DEPRESSIVE DISORDER, RECURRENT, IN PARTIAL REMISSION: ICD-10-CM

## 2023-04-21 PROCEDURE — 99213 OFFICE O/P EST LOW 20 MIN: CPT | Mod: 95

## 2023-04-21 RX ORDER — AZITHROMYCIN 500 MG/1
1 TABLET, FILM COATED ORAL
Qty: 1 | Refills: 0
Start: 2023-04-21 | End: 2023-04-21

## 2023-04-21 NOTE — RESULTS/DATA
[FreeTextEntry1] : PMD, Dr. Wallace,  Endocrinologist, Dr. Hunter\par Labs 4/20/23:  CBC, CMP - normal\par EKG 4/12/23:   QTc 397ms\par \par

## 2023-04-21 NOTE — SOCIAL HISTORY
[Lives with Spouse] : lives with spouse [Disabled] : disabled [] :  [None] : none [Yes] : yes [No Known Use] : no known use [FreeTextEntry4] : Few college courses [FreeTextEntry1] : She reports bouts of vision loss in left eye due to a 'broken plaque' from previous stroke. \par She follows up with retina specialist but is considering a second opinion. \par She quit cigarettes 2017. [TextBox_52] : Prescribed

## 2023-04-21 NOTE — HISTORY OF PRESENT ILLNESS
[FreeTextEntry1] : Earlier this week, she was medically hospitalized at [facility] for pneumonia, she was discharged yesterday. Upon discharge, she was initiated on prednisone, antibiotics and albuterol. She expresses feeling better as she was using oxygen during the hospitalization and has scheduled follow up with pulmonologist.  \par \par Today, she expresses sleeping well but she does not use cpap machine.  She worries regarding possible eviction again, she is in touch with the councilman's office and [employer].  She hopes this can be resolved. She has good appetite. She is adherent with medication regimen, denies dizziness, memory difficulty or falls.  At this time, she denies thoughts of harm to self, safety plan is discussed.  Istop, Reference [id number] (#30, last filled 3/24/23)  Total time in session: Twenty minutes.\par

## 2023-04-21 NOTE — DISCUSSION/SUMMARY
[FreeTextEntry1] : Emelina is a 53yo  female, history of depression, anxiety.  Earlier this week, she was medically hospitalized at [facility] for pneumonia, initiated on prednisone, antibiotics and albuterol.  She is sleeping well, managing her mood and worries regarding possible eviction again.  She is at low risk, no acute thoughts of harm to self, positive family support, adherent with medication and linked to treatment.\par \par Paroxetine 50mg po daily\par Lorazepam 1mg po bedtime\par \par Metformin 1000mg po twice daily\par Clopidogrel 75mg po daily\par Xarelto 20mg po daily\par Metoprolol 50mg am + 100mg po pm\par Atorvastatin 80mg po daily\par Pioglitazone 30mg po daily\par Lantus 80u am\par Ozempic IM weekly\par

## 2023-04-21 NOTE — PHYSICAL EXAM
[Average] : average [Cooperative] : cooperative [Full] : full [Clear] : clear [Linear/Goal Directed] : linear/goal directed [WNL] : within normal limits [FreeTextEntry1] : Casually dressed [FreeTextEntry8] : 'moving along' [de-identified] : Fair [de-identified] : Fair

## 2023-04-21 NOTE — PLAN
[Medication education provided] : Medication education provided. [Rationale for medication choices, possible risks/precautions, benefits, alternative treatment choices, and consequences of non-treatment discussed] : Rationale for medication choices, possible risks/precautions, benefits, alternative treatment choices, and consequences of non-treatment discussed with patient/family/caregiver  [FreeTextEntry4] : \par She will have improvement in mood symptoms. \par She will experience improvement in anxiety symptoms.

## 2023-04-22 DIAGNOSIS — F33.41 MAJOR DEPRESSIVE DISORDER, RECURRENT, IN PARTIAL REMISSION: ICD-10-CM

## 2023-04-22 DIAGNOSIS — F41.0 PANIC DISORDER [EPISODIC PAROXYSMAL ANXIETY]: ICD-10-CM

## 2023-04-23 LAB
CULTURE RESULTS: SIGNIFICANT CHANGE UP
SPECIMEN SOURCE: SIGNIFICANT CHANGE UP

## 2023-04-28 ENCOUNTER — OUTPATIENT (OUTPATIENT)
Dept: OUTPATIENT SERVICES | Facility: HOSPITAL | Age: 53
LOS: 1 days | End: 2023-04-28

## 2023-04-28 ENCOUNTER — APPOINTMENT (OUTPATIENT)
Dept: PSYCHIATRY | Facility: CLINIC | Age: 53
End: 2023-04-28

## 2023-04-28 ENCOUNTER — OUTPATIENT (OUTPATIENT)
Dept: OUTPATIENT SERVICES | Facility: HOSPITAL | Age: 53
LOS: 1 days | End: 2023-04-28
Payer: COMMERCIAL

## 2023-04-28 DIAGNOSIS — Z98.890 OTHER SPECIFIED POSTPROCEDURAL STATES: Chronic | ICD-10-CM

## 2023-04-28 DIAGNOSIS — Z98.891 HISTORY OF UTERINE SCAR FROM PREVIOUS SURGERY: Chronic | ICD-10-CM

## 2023-04-28 DIAGNOSIS — F33.1 MAJOR DEPRESSIVE DISORDER, RECURRENT, MODERATE: ICD-10-CM

## 2023-04-28 DIAGNOSIS — Z90.49 ACQUIRED ABSENCE OF OTHER SPECIFIED PARTS OF DIGESTIVE TRACT: Chronic | ICD-10-CM

## 2023-04-28 PROCEDURE — 90832 PSYTX W PT 30 MINUTES: CPT | Mod: 95

## 2023-04-28 NOTE — PHYSICAL EXAM
[Cooperative] : cooperative [Euthymic] : euthymic [Full] : full [Clear] : clear [Linear/Goal Directed] : linear/goal directed [None] : none [None Reported] : none reported [Average] : average [WNL] : within normal limits [Not applicable] : not applicable

## 2023-04-28 NOTE — PLAN
[FreeTextEntry2] : Goal#1: Alleviate depressive  and anxiety symptoms and increase coping skills\par Goal#1 Objective #1 Patient will explore and process feelings, and identify triggers of depression and anxiety \par Goal#1 Objective #2 Patient will learn and practice coping skills and comply with medication therapy recommendation.\par  [Cognitive and/or Behavior Therapy] : Cognitive and/or Behavior Therapy  [Motivational Interviewing] : Motivational Interviewing  [Supportive Therapy] : Supportive Therapy [Recommended Frequency of Visits: ____] : Recommended frequency of visits: [unfilled] [de-identified] : Therapist provided an individual psychotherapy session with patient. Therapist used cognitive behavior therapy (CBT) motivational Interviewing (MI) and supportive psychotherapy (SP) techniques. Patient was engaged in session. \par \par Patient processed feelings and events experienced since last session. Patient explained that she spent some time at the hospital due to having pneumonia.  Patent is happy that she has recovered.  Patient was feeling anxious  about her teenage son who requested to stay home due him not wanting to go on a school trip.  Therapist was able to help patient identify coping methods,  process her feelings and explore communication options and strategies to help her son with anxiety.    Patient reported compliance with medication. Therapist reinforced need to focus on treatment goals.\par \par Patient is future oriented, continues to have protective factors, did not endorse suicide ideation or attempt. Therapist praised patient for progress made towards goal and encouraged continued growth.\par  [Return in ____ week(s)] : Return in [unfilled] week(s) [FreeTextEntry1] : Patient will practice positive self-talk.

## 2023-04-28 NOTE — REASON FOR VISIT
[Patient preference] : as per patient preference [Continuing, patient not seen in-person within last 12 months (provide details below)] : Telehealth services are continuing, patient not seen in-person within last 12 months.  [Telehealth (audio & video) - Individual/Group] : This visit was provided via telehealth using real-time 2-way audio visual technology. [Other Location: e.g. Home (Enter Location, City,State)___] : The provider was located at [unfilled]. [Home] : The patient, [unfilled], was located at home, [unfilled], at the time of the visit. [Verbal consent obtained from patient/other participant(s)] : Verbal consent for telehealth/telephonic services obtained from patient/other participant(s) [FreeTextEntry5] : 11:00 AM [FreeTextEntry4] : 10:30 AM [FreeTextEntry3] : Patient preference [Patient] : Patient [Post-Hospitalization Visit] : This is a post-hospitalization visit [FreeTextEntry1] : Anxiety and Depression

## 2023-04-29 DIAGNOSIS — F33.1 MAJOR DEPRESSIVE DISORDER, RECURRENT, MODERATE: ICD-10-CM

## 2023-05-02 ENCOUNTER — NON-APPOINTMENT (OUTPATIENT)
Age: 53
End: 2023-05-02

## 2023-05-09 NOTE — CHART NOTE - NSCHARTNOTEFT_GEN_A_CORE
CDI clarification:  Patient had Chronic HYpoxic respiratory failure 2/2 her COPD  No Acute HYpoxic respiratory failure on her recent admission

## 2023-05-12 ENCOUNTER — APPOINTMENT (OUTPATIENT)
Dept: PSYCHIATRY | Facility: CLINIC | Age: 53
End: 2023-05-12

## 2023-05-15 ENCOUNTER — APPOINTMENT (OUTPATIENT)
Dept: PULMONOLOGY | Facility: CLINIC | Age: 53
End: 2023-05-15
Payer: COMMERCIAL

## 2023-05-15 VITALS
HEIGHT: 66 IN | RESPIRATION RATE: 14 BRPM | OXYGEN SATURATION: 97 % | WEIGHT: 292 LBS | SYSTOLIC BLOOD PRESSURE: 144 MMHG | BODY MASS INDEX: 46.93 KG/M2 | DIASTOLIC BLOOD PRESSURE: 80 MMHG | HEART RATE: 105 BPM

## 2023-05-15 DIAGNOSIS — Z82.49 FAMILY HISTORY OF ISCHEMIC HEART DISEASE AND OTHER DISEASES OF THE CIRCULATORY SYSTEM: ICD-10-CM

## 2023-05-15 DIAGNOSIS — J12.9 VIRAL PNEUMONIA, UNSPECIFIED: ICD-10-CM

## 2023-05-15 DIAGNOSIS — G47.30 SLEEP APNEA, UNSPECIFIED: ICD-10-CM

## 2023-05-15 PROCEDURE — 99204 OFFICE O/P NEW MOD 45 MIN: CPT

## 2023-05-15 PROCEDURE — G0296 VISIT TO DETERM LDCT ELIG: CPT

## 2023-05-15 NOTE — HISTORY OF PRESENT ILLNESS
[Former] : former [>= 20 pack years] : >= 20 pack years [TextBox_4] : Ms. MCCORMICK is a 52 year woman with a medical history significant for CAD s/p PCI (5 stents), afib on xarelto, JOCELYNE (noncompliant cpap), GERD, DM type 2, HTN, obesity, former smoker, CVA, Endarterectomy, Depression, and GCA s/p L temporal artery excision presenting today to the clinic for follow-up from a recent hospitalization for dyspnea.  She was found to have an abnormal CXR with hypoxia on ambulation, and further testing demonstrated parainfluenza pneumonia \par \par Since discharge from the hospital, she was having trouble after ocming off the steroids but it got better over a couple days.\par O2 levels have been good overall\par was having orthopnea at the time of admission\par still having BOONE\par \par \par previously followed w/ pulm Dr Sanjay Eastman\par \par JOCELYNE hx, not compliant w/ CPAP\par last sleep test was 2y/ago\par has trouble with the mask\par \par stopped smoking when she had a heart attack\par Dr Ocampo is her cardio, last seen 6wk ago, last TTE was years ago\par not on lasix\par \par Occupational Hx: , former, denies exposures\par  [TextBox_11] : 1.5 [TextBox_13] : 30 [YearQuit] : 2015

## 2023-05-15 NOTE — REASON FOR VISIT
[Follow-Up - From Hospitalization] : a follow-up visit after a recent hospitalization [Pneumonia] : pneumonia

## 2023-05-15 NOTE — PROCEDURE
[FreeTextEntry1] : In-office spirometry performed, demonstrating mild restrictive defect.  Flow-volume loops demonstrate suboptimal patient technique.  No obstructive pattern was seen.

## 2023-05-19 ENCOUNTER — APPOINTMENT (OUTPATIENT)
Dept: PSYCHIATRY | Facility: CLINIC | Age: 53
End: 2023-05-19
Payer: COMMERCIAL

## 2023-05-19 ENCOUNTER — OUTPATIENT (OUTPATIENT)
Dept: OUTPATIENT SERVICES | Facility: HOSPITAL | Age: 53
LOS: 1 days | End: 2023-05-19
Payer: COMMERCIAL

## 2023-05-19 DIAGNOSIS — F41.0 PANIC DISORDER [EPISODIC PAROXYSMAL ANXIETY]: ICD-10-CM

## 2023-05-19 DIAGNOSIS — Z98.890 OTHER SPECIFIED POSTPROCEDURAL STATES: Chronic | ICD-10-CM

## 2023-05-19 DIAGNOSIS — Z90.49 ACQUIRED ABSENCE OF OTHER SPECIFIED PARTS OF DIGESTIVE TRACT: Chronic | ICD-10-CM

## 2023-05-19 DIAGNOSIS — F41.1 GENERALIZED ANXIETY DISORDER: ICD-10-CM

## 2023-05-19 DIAGNOSIS — F33.41 MAJOR DEPRESSIVE DISORDER, RECURRENT, IN PARTIAL REMISSION: ICD-10-CM

## 2023-05-19 DIAGNOSIS — Z98.891 HISTORY OF UTERINE SCAR FROM PREVIOUS SURGERY: Chronic | ICD-10-CM

## 2023-05-19 PROCEDURE — 99214 OFFICE O/P EST MOD 30 MIN: CPT | Mod: 95

## 2023-05-19 NOTE — PLAN
[Medication education provided] : Medication education provided. [Rationale for medication choices, possible risks/precautions, benefits, alternative treatment choices, and consequences of non-treatment discussed] : Rationale for medication choices, possible risks/precautions, benefits, alternative treatment choices, and consequences of non-treatment discussed with patient/family/caregiver  [FreeTextEntry4] : \par She will experience improvement in mood symptoms.\par She will experience improvement in anxiety symptoms.

## 2023-05-19 NOTE — SOCIAL HISTORY
[Lives with Spouse] : lives with spouse [Disabled] : disabled [] :  [None] : none [Yes] : yes [No Known Use] : no known use [FreeTextEntry1] : She reports bouts of vision loss in left eye due to a 'broken plaque' from previous stroke. \par She follows up with retina specialist but is considering a second opinion. \par She quit cigarettes 2017. [FreeTextEntry4] : Few college courses [TextBox_52] : Prescribed

## 2023-05-19 NOTE — REASON FOR VISIT
[Home] : at home, [unfilled] , at the time of the visit. [Medical Office: ([facility])___] : at the medical office located in  [Verbal consent obtained from patient] : the patient, [unfilled] [Patient] : Patient [FreeTextEntry1] : Follow up for mood and anxiety

## 2023-05-19 NOTE — DISCUSSION/SUMMARY
[FreeTextEntry1] : Emelina is a 51yo  female, history of depression, anxiety.  She expresses fair sleep due to sleep apnea, worries regarding upcoming court date for eviction. She is at low risk, no acute thoughts of harm to self, positive family support, adherent with medication and linked to treatment.\par \par Paroxetine 50mg po daily\par Lorazepam 1mg po bedtime\par \par Metformin 1000mg po twice daily\par Clopidogrel 75mg po daily\par Xarelto 20mg po daily\par Metoprolol 50mg am + 100mg po pm\par Atorvastatin 80mg po daily\par Pioglitazone 30mg po daily\par Lantus 80u am\par Ozempic IM weekly\par

## 2023-05-19 NOTE — PHYSICAL EXAM
[Average] : average [Cooperative] : cooperative [Full] : full [Clear] : clear [Linear/Goal Directed] : linear/goal directed [WNL] : within normal limits [FreeTextEntry1] : Casually dressed [FreeTextEntry8] : 'alright' [de-identified] : Fair [de-identified] : Fair

## 2023-05-19 NOTE — HISTORY OF PRESENT ILLNESS
[FreeTextEntry1] : \par She expresses fair sleep due to sleep apnea, she has another assessment scheduled.  She worries regarding upcoming court date for eviction. She experienced anxiety in pulmonologist office, worried she would be diagnosed with another medical issue. She is scheduled for CT chest due to previous history of smoking. She has good appetite. She is adherent with medication regimen, denies dizziness, memory difficulty or falls.   At this time, she denies thoughts of harm to self.  Istop, Reference [id number] (#30, last filled 3/24/23)  Total time in session: Thirty minutes.\par

## 2023-05-20 DIAGNOSIS — F41.1 GENERALIZED ANXIETY DISORDER: ICD-10-CM

## 2023-05-20 DIAGNOSIS — F33.41 MAJOR DEPRESSIVE DISORDER, RECURRENT, IN PARTIAL REMISSION: ICD-10-CM

## 2023-05-20 DIAGNOSIS — F41.0 PANIC DISORDER [EPISODIC PAROXYSMAL ANXIETY]: ICD-10-CM

## 2023-05-22 ENCOUNTER — APPOINTMENT (OUTPATIENT)
Dept: CARDIOTHORACIC SURGERY | Facility: CLINIC | Age: 53
End: 2023-05-22
Payer: COMMERCIAL

## 2023-05-22 VITALS — BODY MASS INDEX: 46.61 KG/M2 | WEIGHT: 290 LBS | HEIGHT: 66 IN

## 2023-05-22 DIAGNOSIS — Z87.891 PERSONAL HISTORY OF NICOTINE DEPENDENCE: ICD-10-CM

## 2023-05-22 PROCEDURE — G0296 VISIT TO DETERM LDCT ELIG: CPT

## 2023-05-22 NOTE — HISTORY OF PRESENT ILLNESS
[Former] : Former [>=20 Pack-Years] : Twenty pack years or greater smoking history: Yes [TextBox_13] : Ms. MARCIAL MCCORMICK is a 52 year old woman with a history of HTN, HLD, CAD s/p PCI, CVA, anxiety \par She was seen in the office by Dr. Garcias for review of eligibility for, as well as, discussion of Low-Dose CT lung cancer screening program. Over the telephone today we reviewed and confirmed that the patient meets screening eligibility criteria:\par -Age: 52 year \par Smoking status:\par -Former smoker\par -Number of pack(s) per day: 1.5\par -Number of years smoked: 30\par -Number of pack years smokin\par -Number of years since quitting smokin\par -Quit year: \par Ms. MCCORMICK denies any signs or symptoms of lung cancer including new cough, change in cough, hemoptysis and unintentional weight loss. \par Ms. MCCORMICK denies any personal history of lung cancer. No lung cancer in a 1st degree relative. Denies any history of lung disease. Denies any history of occupational exposures\par \par  [YearQuit] : 2015 [PacksperDay] : 1.5 [N_Years] : 30 [PacksperYear] : 45

## 2023-05-22 NOTE — REASON FOR VISIT
[Initial Evaluation] : an initial evaluation visit [Review of Eligibility] : review of eligibility [Low-Dose CT Screening Discussion] : low-dose CT lung cancer screening discussion [Virtual Visit] : virtual visit [Home] : at home, [unfilled] , at the time of the visit. [Medical Office: ([facility])___] : at the medical office located in  [Verbal consent obtained from patient] : the patient, [unfilled]

## 2023-05-22 NOTE — PLAN
[Smoking Cessation Guidance Provided] : Smoking cessation guidance was provided to patient [Smoking Cessation] : smoking cessation [Lifestlye changes] : lifestyle changes [Regular follow-up with healthcare provider] : regular follow-up with healthcare provider [FreeTextEntry1] : Plan:\par -Low Dose CT chest for lung cancer screening\par -Follow up with patient and her referring provider after her LDCT results have been reviewed by the multi-disciplinary clinical team\par -Encouraged continued smoking abstinence\par -[facility] Smokers Quitline literature shared with patient and Staying Smoke Free brochure reviewed\par -Patient declined [facility] Smokers Quitline literature\par \par Should I Screen? tool utilized. 6 year risk of lung cancer is 0.4 %. Patient wishes to proceed with screening.\par Engaged in shared decision making with Ms. MARCIAL ANNY . Answered all questions. She verbalized understanding and agreement. She knows to call back with any questions or concerns\par \par

## 2023-05-26 NOTE — ED ADULT NURSE NOTE - NSSUHOSCREENINGYN_ED_ALL_ED
Pts only IV access was infiltrated. RN attempted to insert a new IV twice and was unsuccessful. Contacted provider. Yes - the patient is able to be screened

## 2023-05-30 NOTE — H&P PST ADULT - BMI (KG/M2)
What Is The Reason For Today's Visit?: Full Body Skin Examination with No Concerns What Is The Reason For Today's Visit? (Being Monitored For X): the development of new lesions 46.8

## 2023-06-01 ENCOUNTER — APPOINTMENT (OUTPATIENT)
Dept: PSYCHIATRY | Facility: CLINIC | Age: 53
End: 2023-06-01

## 2023-06-01 ENCOUNTER — OUTPATIENT (OUTPATIENT)
Dept: OUTPATIENT SERVICES | Facility: HOSPITAL | Age: 53
LOS: 1 days | End: 2023-06-01
Payer: COMMERCIAL

## 2023-06-01 DIAGNOSIS — Z98.890 OTHER SPECIFIED POSTPROCEDURAL STATES: Chronic | ICD-10-CM

## 2023-06-01 DIAGNOSIS — Z98.891 HISTORY OF UTERINE SCAR FROM PREVIOUS SURGERY: Chronic | ICD-10-CM

## 2023-06-01 DIAGNOSIS — F33.41 MAJOR DEPRESSIVE DISORDER, RECURRENT, IN PARTIAL REMISSION: ICD-10-CM

## 2023-06-01 DIAGNOSIS — Z90.49 ACQUIRED ABSENCE OF OTHER SPECIFIED PARTS OF DIGESTIVE TRACT: Chronic | ICD-10-CM

## 2023-06-01 DIAGNOSIS — F41.1 GENERALIZED ANXIETY DISORDER: ICD-10-CM

## 2023-06-01 PROCEDURE — 90832 PSYTX W PT 30 MINUTES: CPT | Mod: 95

## 2023-06-02 DIAGNOSIS — F33.41 MAJOR DEPRESSIVE DISORDER, RECURRENT, IN PARTIAL REMISSION: ICD-10-CM

## 2023-06-02 DIAGNOSIS — F41.1 GENERALIZED ANXIETY DISORDER: ICD-10-CM

## 2023-06-06 NOTE — PLAN
[Cognitive and/or Behavior Therapy] : Cognitive and/or Behavior Therapy  [Motivational Interviewing] : Motivational Interviewing  [Supportive Therapy] : Supportive Therapy [Recommended Frequency of Visits: ____] : Recommended frequency of visits: [unfilled] [Return in ____ week(s)] : Return in [unfilled] week(s) [FreeTextEntry2] : Goal#1: Alleviate depressive  and anxiety symptoms and increase coping skills\par Goal#1 Objective #1 Patient will explore and process feelings, and identify triggers of depression and anxiety \par Goal#1 Objective #2 Patient will learn and practice coping skills and comply with medication therapy recommendation.\par  [de-identified] : Therapist provided an individual psychotherapy session with patient.  Therapist used cognitive behavior therapy (CBT) motivational Interviewing (MI) and supportive psychotherapy (SP) techniques. Patient was engaged in session. \par \par Patient reported feeling good despite that fact that she is still having issues with [employer] who are not approving her rental assistance application.  Patient reported that she is coping well.  She has reapplied and is hopeful that things will improve.  Patient also talked about other coping skills used recently.  She has been spending time with family.  She talked about her experience  with her son recently when she went to spend time with family.  She talked about his limitations and how she coped with it.  Therapist praised patient for progress made.  Therapist helped patient explore work options.  \par \par Patient is future oriented, continues to have protective factors, did not endorse suicide ideation or attempt. Therapist praised patient for progress made towards goal and encouraged continued growth.\par  [FreeTextEntry1] : Patient will practice positive self-talk.

## 2023-06-06 NOTE — REASON FOR VISIT
[Patient preference] : as per patient preference [Continuing, patient not seen in-person within last 12 months (provide details below)] : Telehealth services are continuing, patient not seen in-person within last 12 months.  [Telehealth (audio & video) - Individual/Group] : This visit was provided via telehealth using real-time 2-way audio visual technology. [Other Location: e.g. Home (Enter Location, City,State)___] : The provider was located at [unfilled]. [Home] : The patient, [unfilled], was located at home, [unfilled], at the time of the visit. [Verbal consent obtained from patient/other participant(s)] : Verbal consent for telehealth/telephonic services obtained from patient/other participant(s) [Patient] : Patient [FreeTextEntry4] : 10:30 AM [FreeTextEntry5] : 11:00 AM [FreeTextEntry3] : Patient preference [FreeTextEntry1] : Anxiety and Depression

## 2023-06-08 ENCOUNTER — OUTPATIENT (OUTPATIENT)
Dept: OUTPATIENT SERVICES | Facility: HOSPITAL | Age: 53
LOS: 1 days | End: 2023-06-08
Payer: COMMERCIAL

## 2023-06-08 ENCOUNTER — RESULT REVIEW (OUTPATIENT)
Age: 53
End: 2023-06-08

## 2023-06-08 DIAGNOSIS — Z98.890 OTHER SPECIFIED POSTPROCEDURAL STATES: Chronic | ICD-10-CM

## 2023-06-08 DIAGNOSIS — Z00.8 ENCOUNTER FOR OTHER GENERAL EXAMINATION: ICD-10-CM

## 2023-06-08 DIAGNOSIS — Z98.891 HISTORY OF UTERINE SCAR FROM PREVIOUS SURGERY: Chronic | ICD-10-CM

## 2023-06-08 DIAGNOSIS — Z90.49 ACQUIRED ABSENCE OF OTHER SPECIFIED PARTS OF DIGESTIVE TRACT: Chronic | ICD-10-CM

## 2023-06-08 PROCEDURE — 71271 CT THORAX LUNG CANCER SCR C-: CPT | Mod: 26

## 2023-06-08 PROCEDURE — 71271 CT THORAX LUNG CANCER SCR C-: CPT

## 2023-06-09 DIAGNOSIS — Z00.8 ENCOUNTER FOR OTHER GENERAL EXAMINATION: ICD-10-CM

## 2023-06-13 ENCOUNTER — APPOINTMENT (OUTPATIENT)
Dept: PSYCHIATRY | Facility: CLINIC | Age: 53
End: 2023-06-13
Payer: COMMERCIAL

## 2023-06-13 ENCOUNTER — OUTPATIENT (OUTPATIENT)
Dept: OUTPATIENT SERVICES | Facility: HOSPITAL | Age: 53
LOS: 1 days | End: 2023-06-13
Payer: COMMERCIAL

## 2023-06-13 DIAGNOSIS — Z98.890 OTHER SPECIFIED POSTPROCEDURAL STATES: Chronic | ICD-10-CM

## 2023-06-13 DIAGNOSIS — Z98.891 HISTORY OF UTERINE SCAR FROM PREVIOUS SURGERY: Chronic | ICD-10-CM

## 2023-06-13 DIAGNOSIS — F33.1 MAJOR DEPRESSIVE DISORDER, RECURRENT, MODERATE: ICD-10-CM

## 2023-06-13 DIAGNOSIS — F41.0 PANIC DISORDER [EPISODIC PAROXYSMAL ANXIETY]: ICD-10-CM

## 2023-06-13 DIAGNOSIS — Z90.49 ACQUIRED ABSENCE OF OTHER SPECIFIED PARTS OF DIGESTIVE TRACT: Chronic | ICD-10-CM

## 2023-06-13 PROCEDURE — 99214 OFFICE O/P EST MOD 30 MIN: CPT

## 2023-06-13 NOTE — RESULTS/DATA
[FreeTextEntry1] : PMD, Dr. Wallace,  Endocrinologist, Dr. Hunter\par Labs 4/20/23:  CBC, CMP, normal\par EKG 4/12/23:   QTc 397ms\par \par

## 2023-06-13 NOTE — HISTORY OF PRESENT ILLNESS
[FreeTextEntry1] : \par Emelina presents to the appointment\par \par She expresses fair sleep due to sleep apnea, she prefers to abstain from using cpap mask.  She reports stable mood, she has been feeling overwhelmed due to multiple calls regarding eviction daily, she worries regarding her cardiac condition, she discusses the events prior to MI. She hopes to follow up with cardiologist regarding sob,  coping skills are discussed. CT chest was negative. She has good appetite. She is adherent with medication regimen, she discontinued Lorazepam, she is educated regarding taper process.   At this time, she denies thoughts of harm to self.  [facility], Reference [id number] (#30, last filled 3/24/23)  Total time in session: Thirty minutes.\par

## 2023-06-13 NOTE — PHYSICAL EXAM
[Average] : average [Cooperative] : cooperative [Full] : full [Clear] : clear [Linear/Goal Directed] : linear/goal directed [WNL] : within normal limits [Overproductive] : overproductive [FreeTextEntry1] : Casually dressed [FreeTextEntry8] : 'alright' [de-identified] : Fair [de-identified] : Fair

## 2023-06-13 NOTE — DISCUSSION/SUMMARY
[Date of Last Annual Labs: _____] : Date of Last Annual Labs: [unfilled] [FreeTextEntry1] : Emelina is a 53yo  female, history of depression, anxiety.  She expresses fair sleep due to sleep apnea, worries regarding medical conditions and eviction process. She is contemplating to switch Paroxetine. She discontinued Lorazepam.   She is at low risk, no acute thoughts of harm to self, positive family support, adherent with medication and linked to treatment.\par \par Paroxetine 50mg po daily\par She discontinued Lorazepam 1mg po bedtime\par \par Metformin 1000mg po twice daily\par Clopidogrel 75mg po daily\par Xarelto 20mg po daily\par Metoprolol 50mg am + 100mg po pm\par Atorvastatin 80mg po daily\par Pioglitazone 30mg po daily\par Lantus 80u am\par Ozempic IM weekly\par

## 2023-06-13 NOTE — REASON FOR VISIT
[Patient] : Patient [Home] : at home, [unfilled] , at the time of the visit. [Medical Office: ([facility])___] : at the medical office located in  [Verbal consent obtained from patient] : the patient, [unfilled] [FreeTextEntry1] : Follow up for mood and anxiety

## 2023-06-14 ENCOUNTER — APPOINTMENT (OUTPATIENT)
Dept: PSYCHIATRY | Facility: CLINIC | Age: 53
End: 2023-06-14

## 2023-06-14 ENCOUNTER — OUTPATIENT (OUTPATIENT)
Dept: OUTPATIENT SERVICES | Facility: HOSPITAL | Age: 53
LOS: 1 days | End: 2023-06-14
Payer: COMMERCIAL

## 2023-06-14 DIAGNOSIS — F33.1 MAJOR DEPRESSIVE DISORDER, RECURRENT, MODERATE: ICD-10-CM

## 2023-06-14 DIAGNOSIS — F41.1 GENERALIZED ANXIETY DISORDER: ICD-10-CM

## 2023-06-14 DIAGNOSIS — F33.41 MAJOR DEPRESSIVE DISORDER, RECURRENT, IN PARTIAL REMISSION: ICD-10-CM

## 2023-06-14 DIAGNOSIS — Z90.49 ACQUIRED ABSENCE OF OTHER SPECIFIED PARTS OF DIGESTIVE TRACT: Chronic | ICD-10-CM

## 2023-06-14 DIAGNOSIS — Z98.890 OTHER SPECIFIED POSTPROCEDURAL STATES: Chronic | ICD-10-CM

## 2023-06-14 DIAGNOSIS — Z98.891 HISTORY OF UTERINE SCAR FROM PREVIOUS SURGERY: Chronic | ICD-10-CM

## 2023-06-14 PROCEDURE — 90832 PSYTX W PT 30 MINUTES: CPT | Mod: 95

## 2023-06-14 NOTE — PLAN
[FreeTextEntry2] : Goal#1: Alleviate depressive  and anxiety symptoms and increase coping skills\par Goal#1 Objective #1 Patient will explore and process feelings, and identify triggers of depression and anxiety \par Goal#1 Objective #2 Patient will learn and practice coping skills and comply with medication therapy recommendation.\par  [Cognitive and/or Behavior Therapy] : Cognitive and/or Behavior Therapy  [Motivational Interviewing] : Motivational Interviewing  [Supportive Therapy] : Supportive Therapy [de-identified] : Therapist provided an individual psychotherapy session with patient. Therapist used cognitive behavior therapy (CBT) motivational Interviewing (MI) and supportive psychotherapy (SP) techniques. Patient was engaged in session. \par \par Patient processed feelings and events experienced since last session. Patient reported that she has been concerned lately about her health.  She stated that she is feeling more anxious which reminded her of when she was having a heart attack.  Patient discussed her most recent experience yesterday when she went to the doctor.  She spoke about her frustrations trying to find a parking, waiting to be seen and getting several calls while she was trying to leave her home. patient is still stressed about her [employer] rental assistance application.   Patient also discussed coping techniques used these last few days. discussed how she made calls and had several interviews. patient was frustrated when she realized that her [employer] online files were missing the documents she uploaded.  Patient reported compliance with medication. Therapist helped patient explore other ways to ensure that her documents are received by city agencies. Therapist helped patient explore self care as she is under a lot of stress. Patient explained that she is happy because this week she is babysitting her nieces children.  Therapist reinforced need to focus on treatment goals.\par \par Patient is future oriented, continues to have protective factors, did not endorse suicide ideation or attempt. Therapist praised patient for progress made towards goal and encouraged continued growth.\par  [Recommended Frequency of Visits: ____] : Recommended frequency of visits: [unfilled] [Return in ____ week(s)] : Return in [unfilled] week(s) [FreeTextEntry1] : Patient will practice positive self-talk.

## 2023-06-15 DIAGNOSIS — F33.41 MAJOR DEPRESSIVE DISORDER, RECURRENT, IN PARTIAL REMISSION: ICD-10-CM

## 2023-06-15 DIAGNOSIS — F41.1 GENERALIZED ANXIETY DISORDER: ICD-10-CM

## 2023-06-16 ENCOUNTER — APPOINTMENT (OUTPATIENT)
Dept: PSYCHIATRY | Facility: CLINIC | Age: 53
End: 2023-06-16

## 2023-06-27 ENCOUNTER — APPOINTMENT (OUTPATIENT)
Dept: PULMONOLOGY | Facility: HOSPITAL | Age: 53
End: 2023-06-27
Payer: COMMERCIAL

## 2023-06-27 ENCOUNTER — OUTPATIENT (OUTPATIENT)
Dept: OUTPATIENT SERVICES | Facility: HOSPITAL | Age: 53
LOS: 1 days | End: 2023-06-27
Payer: COMMERCIAL

## 2023-06-27 DIAGNOSIS — Z98.890 OTHER SPECIFIED POSTPROCEDURAL STATES: Chronic | ICD-10-CM

## 2023-06-27 DIAGNOSIS — Z98.891 HISTORY OF UTERINE SCAR FROM PREVIOUS SURGERY: Chronic | ICD-10-CM

## 2023-06-27 DIAGNOSIS — Z90.49 ACQUIRED ABSENCE OF OTHER SPECIFIED PARTS OF DIGESTIVE TRACT: Chronic | ICD-10-CM

## 2023-06-27 DIAGNOSIS — F17.201 NICOTINE DEPENDENCE, UNSPECIFIED, IN REMISSION: ICD-10-CM

## 2023-06-27 PROCEDURE — 94727 GAS DIL/WSHOT DETER LNG VOL: CPT

## 2023-06-27 PROCEDURE — 94060 EVALUATION OF WHEEZING: CPT | Mod: 26

## 2023-06-27 PROCEDURE — 94727 GAS DIL/WSHOT DETER LNG VOL: CPT | Mod: 26

## 2023-06-27 PROCEDURE — 94729 DIFFUSING CAPACITY: CPT

## 2023-06-27 PROCEDURE — 94729 DIFFUSING CAPACITY: CPT | Mod: 26

## 2023-06-27 PROCEDURE — 94070 EVALUATION OF WHEEZING: CPT

## 2023-06-28 ENCOUNTER — APPOINTMENT (OUTPATIENT)
Dept: PSYCHIATRY | Facility: CLINIC | Age: 53
End: 2023-06-28

## 2023-06-28 ENCOUNTER — OUTPATIENT (OUTPATIENT)
Dept: OUTPATIENT SERVICES | Facility: HOSPITAL | Age: 53
LOS: 1 days | End: 2023-06-28
Payer: COMMERCIAL

## 2023-06-28 DIAGNOSIS — Z98.890 OTHER SPECIFIED POSTPROCEDURAL STATES: Chronic | ICD-10-CM

## 2023-06-28 DIAGNOSIS — F17.201 NICOTINE DEPENDENCE, UNSPECIFIED, IN REMISSION: ICD-10-CM

## 2023-06-28 DIAGNOSIS — F41.1 GENERALIZED ANXIETY DISORDER: ICD-10-CM

## 2023-06-28 DIAGNOSIS — Z98.891 HISTORY OF UTERINE SCAR FROM PREVIOUS SURGERY: Chronic | ICD-10-CM

## 2023-06-28 DIAGNOSIS — F33.41 MAJOR DEPRESSIVE DISORDER, RECURRENT, IN PARTIAL REMISSION: ICD-10-CM

## 2023-06-28 DIAGNOSIS — Z90.49 ACQUIRED ABSENCE OF OTHER SPECIFIED PARTS OF DIGESTIVE TRACT: Chronic | ICD-10-CM

## 2023-06-28 PROCEDURE — 90834 PSYTX W PT 45 MINUTES: CPT | Mod: 95

## 2023-06-28 NOTE — PLAN
[Cognitive and/or Behavior Therapy] : Cognitive and/or Behavior Therapy  [Motivational Interviewing] : Motivational Interviewing  [Supportive Therapy] : Supportive Therapy [Return in ____ week(s)] : Return in [unfilled] week(s) [FreeTextEntry2] : Goal#1: Alleviate depressive  and anxiety symptoms and increase coping skills\par Goal#1 Objective #1 Patient will explore and process feelings, and identify triggers of depression and anxiety \par Goal#1 Objective #2 Patient will learn and practice coping skills and comply with medication therapy recommendation.\par  [de-identified] : Therapist provided an individual psychotherapy session with patient. Therapist used cognitive behavior therapy (CBT) motivational Interviewing (MI) and supportive psychotherapy (SP) techniques. Patient was engaged in session. \par \par Patient processed feelings and events experienced since last session. Patient discussed feeling she experienced due to borrowing money from her sister. Patient discussed coping techniques used. Patient stated that despite feeling that she was a disappointment to her sister by asking for money, she was still able to reach out to her and discuss her thoughts.  Therapist helped patient to explore what she has instead of feeling inferior about what she does not have.  Therapist helped patient to keep hope that her financial concerns will eventually be resolved. Patient acknowledged having a wonderful family and a child despite his challenges is still doing great things.  Patient reported compliance with medication.  Therapist reinforced need to focus on treatment goals.\par \par Patient is future oriented, continues to have protective factors, did not endorse suicide ideation or attempt. Therapist praised patient for progress made towards goal and encouraged continued growth.\par  [Recommended Frequency of Visits: ____] : Recommended frequency of visits: [unfilled] [FreeTextEntry1] : Patient will practice positive self-talk.

## 2023-06-29 DIAGNOSIS — F41.1 GENERALIZED ANXIETY DISORDER: ICD-10-CM

## 2023-06-29 DIAGNOSIS — F33.41 MAJOR DEPRESSIVE DISORDER, RECURRENT, IN PARTIAL REMISSION: ICD-10-CM

## 2023-07-14 ENCOUNTER — APPOINTMENT (OUTPATIENT)
Dept: PSYCHIATRY | Facility: CLINIC | Age: 53
End: 2023-07-14

## 2023-07-14 NOTE — PATIENT PROFILE ADULT - NSTRANSFERBELONGINGSDISPO_GEN_A_NUR
Impression: Vitreous degeneration, bilateral: H43.813. Plan: Patient to call and RTC if any new or worsening symptoms occur,  such as new flashes, floaters or sudden changes in [place]. with patient

## 2023-07-21 ENCOUNTER — APPOINTMENT (OUTPATIENT)
Dept: PSYCHIATRY | Facility: CLINIC | Age: 53
End: 2023-07-21

## 2023-07-21 ENCOUNTER — NON-APPOINTMENT (OUTPATIENT)
Age: 53
End: 2023-07-21

## 2023-07-21 ENCOUNTER — OUTPATIENT (OUTPATIENT)
Dept: OUTPATIENT SERVICES | Facility: HOSPITAL | Age: 53
LOS: 1 days | End: 2023-07-21
Payer: COMMERCIAL

## 2023-07-21 DIAGNOSIS — F33.41 MAJOR DEPRESSIVE DISORDER, RECURRENT, IN PARTIAL REMISSION: ICD-10-CM

## 2023-07-21 DIAGNOSIS — Z90.49 ACQUIRED ABSENCE OF OTHER SPECIFIED PARTS OF DIGESTIVE TRACT: Chronic | ICD-10-CM

## 2023-07-21 DIAGNOSIS — F41.1 GENERALIZED ANXIETY DISORDER: ICD-10-CM

## 2023-07-21 DIAGNOSIS — Z98.890 OTHER SPECIFIED POSTPROCEDURAL STATES: Chronic | ICD-10-CM

## 2023-07-21 DIAGNOSIS — Z98.891 HISTORY OF UTERINE SCAR FROM PREVIOUS SURGERY: Chronic | ICD-10-CM

## 2023-07-21 PROCEDURE — 90832 PSYTX W PT 30 MINUTES: CPT | Mod: 95

## 2023-07-21 NOTE — PLAN
[FreeTextEntry2] : Goal#1: Alleviate depressive  and anxiety symptoms and increase coping skills\par Goal#1 Objective #1 Patient will explore and process feelings, and identify triggers of depression and anxiety \par Goal#1 Objective #2 Patient will learn and practice coping skills and comply with medication therapy recommendation.\par  [Cognitive and/or Behavior Therapy] : Cognitive and/or Behavior Therapy  [Motivational Interviewing] : Motivational Interviewing  [Supportive Therapy] : Supportive Therapy [de-identified] : Therapist provided an individual psychotherapy session with patient. Therapist used cognitive behavior therapy (CBT) motivational Interviewing (MI) and supportive psychotherapy (SP) techniques. Patient was engaged in session. \par \par Patient processed feelings and events experienced since last session. Patient discussed her recent experience in housing court.  She reported that [employer] still has not finalized their decision is helping her pay her rent.  She did however report feeling good, because the  made her return on a day where [employer] was present in the building.  She was able to meet with them and they updated her on the status of her case.  Patient is feeling hopeful because they told her that they will seem make a decision and she was able to get a court appointment for the next few weeks.  Patient reports compliance with her medication  but is concerned that her medication is not working.  Patient reports that she is working with her psychiatrist to change it.  Patient acknowledge that there are environmental factors that may be affecting her mood and increasing her symptoms. Patient expressed concerns about being on too many medications for both physical and mental health and feels compel to take them due to all the medical issues she has. Patient processed her feelings about her health and will raise concerns about her meds with her doctors.  Patient reported compliance with medication.  Patient agreed to continue working on coping skills.  Therapist reinforced need to focus on treatment goals.\par \par Patient is future oriented, continues to have protective factors, did not endorse suicide ideation or attempt. Therapist praised patient for progress made towards goal and encouraged continued growth.\par  [Recommended Frequency of Visits: ____] : Recommended frequency of visits: [unfilled] [Return in ____ week(s)] : Return in [unfilled] week(s) [FreeTextEntry1] : Patient will practice positive self-talk.

## 2023-07-21 NOTE — REASON FOR VISIT
[Patient preference] : as per patient preference [Continuing, patient not seen in-person within last 12 months (provide details below)] : Telehealth services are continuing, patient not seen in-person within last 12 months.  [Telehealth (audio & video) - Individual/Group] : This visit was provided via telehealth using real-time 2-way audio visual technology. [Other Location: e.g. Home (Enter Location, City,State)___] : The provider was located at [unfilled]. [Home] : The patient, [unfilled], was located at home, [unfilled], at the time of the visit. [Verbal consent obtained from patient/other participant(s)] : Verbal consent for telehealth/telephonic services obtained from patient/other participant(s) [FreeTextEntry4] : 10:00 AM [FreeTextEntry5] : 10:40 AM [FreeTextEntry3] : Patient preference [Patient] : Patient [FreeTextEntry1] : Anxiety and Depression

## 2023-07-22 DIAGNOSIS — F33.41 MAJOR DEPRESSIVE DISORDER, RECURRENT, IN PARTIAL REMISSION: ICD-10-CM

## 2023-07-22 DIAGNOSIS — F41.1 GENERALIZED ANXIETY DISORDER: ICD-10-CM

## 2023-07-27 ENCOUNTER — APPOINTMENT (OUTPATIENT)
Dept: PULMONOLOGY | Facility: CLINIC | Age: 53
End: 2023-07-27
Payer: COMMERCIAL

## 2023-07-27 VITALS
BODY MASS INDEX: 46.61 KG/M2 | OXYGEN SATURATION: 94 % | WEIGHT: 290 LBS | HEIGHT: 66 IN | RESPIRATION RATE: 14 BRPM | DIASTOLIC BLOOD PRESSURE: 80 MMHG | SYSTOLIC BLOOD PRESSURE: 138 MMHG | HEART RATE: 84 BPM

## 2023-07-27 PROCEDURE — 99213 OFFICE O/P EST LOW 20 MIN: CPT

## 2023-07-27 NOTE — PHYSICAL EXAM
[No Acute Distress] : no acute distress [Well Nourished] : well nourished [Normal Oropharynx] : normal oropharynx [Normal Appearance] : normal appearance [No Neck Mass] : no neck mass [Normal Rate/Rhythm] : normal rate/rhythm [Normal S1, S2] : normal s1, s2 [No Murmurs] : no murmurs [No Resp Distress] : no resp distress [Clear to Auscultation Bilaterally] : clear to auscultation bilaterally [No Abnormalities] : no abnormalities [Benign] : benign [Normal Gait] : normal gait [No Clubbing] : no clubbing [No Cyanosis] : no cyanosis [No Edema] : no edema [FROM] : FROM [Normal Color/ Pigmentation] : normal color/ pigmentation [No Focal Deficits] : no focal deficits [Oriented x3] : oriented x3 [Normal Affect] : normal affect

## 2023-07-27 NOTE — HISTORY OF PRESENT ILLNESS
[Former] : former [>= 20 pack years] : >= 20 pack years [TextBox_4] : Ms. MCCORMICK is a 52 year woman with a medical history significant for CAD s/p PCI (5 stents), afib on xarelto, JOCELYNE (noncompliant cpap), GERD, DM type 2, HTN, obesity, former smoker, CVA, Endarterectomy, Depression, and GCA s/p L temporal artery excision presenting today to the clinic for follow-up from a recent hospitalization for dyspnea.  She was found to have an abnormal CXR with hypoxia on ambulation, and further testing demonstrated parainfluenza pneumonia \par \par Since discharge from the hospital, she was having trouble after ocming off the steroids but it got better over a couple days.\par O2 levels have been good overall\par was having orthopnea at the time of admission\par still having BOONE\par \par \par previously followed w/ pulm Dr Sanjay Eastman\par \par JOCELYNE hx, not compliant w/ CPAP\par last sleep test was 2y/ago\par has trouble with the mask\par \par stopped smoking when she had a heart attack\par Dr Ocampo is her cardio, last seen 6wk ago, last TTE was years ago\par not on lasix\par \par Occupational Hx: , former, denies exposures\par \par \par \par Interval history:\par Underwent LDCT last month, demonstrating lung RADS 1.\par PFT showed extraparenchymal restriction. [TextBox_11] : 1.5 [TextBox_13] : 30 [YearQuit] : 2015

## 2023-08-04 ENCOUNTER — APPOINTMENT (OUTPATIENT)
Dept: PSYCHIATRY | Facility: CLINIC | Age: 53
End: 2023-08-04

## 2023-08-04 ENCOUNTER — OUTPATIENT (OUTPATIENT)
Dept: OUTPATIENT SERVICES | Facility: HOSPITAL | Age: 53
LOS: 1 days | End: 2023-08-04
Payer: COMMERCIAL

## 2023-08-04 DIAGNOSIS — Z98.890 OTHER SPECIFIED POSTPROCEDURAL STATES: Chronic | ICD-10-CM

## 2023-08-04 DIAGNOSIS — F40.00 AGORAPHOBIA, UNSPECIFIED: ICD-10-CM

## 2023-08-04 DIAGNOSIS — F33.41 MAJOR DEPRESSIVE DISORDER, RECURRENT, IN PARTIAL REMISSION: ICD-10-CM

## 2023-08-04 DIAGNOSIS — Z90.49 ACQUIRED ABSENCE OF OTHER SPECIFIED PARTS OF DIGESTIVE TRACT: Chronic | ICD-10-CM

## 2023-08-04 DIAGNOSIS — F41.1 GENERALIZED ANXIETY DISORDER: ICD-10-CM

## 2023-08-04 PROCEDURE — 90832 PSYTX W PT 30 MINUTES: CPT | Mod: 95

## 2023-08-04 NOTE — REASON FOR VISIT
[Patient preference] : as per patient preference [Continuing, patient not seen in-person within last 12 months (provide details below)] : Telehealth services are continuing, patient not seen in-person within last 12 months.  [Telehealth (audio & video) - Individual/Group] : This visit was provided via telehealth using real-time 2-way audio visual technology. [Other Location: e.g. Home (Enter Location, City,State)___] : The provider was located at [unfilled]. [Home] : The patient, [unfilled], was located at home, [unfilled], at the time of the visit. [Verbal consent obtained from patient/other participant(s)] : Verbal consent for telehealth/telephonic services obtained from patient/other participant(s) [FreeTextEntry4] : 10:00 AM [FreeTextEntry5] : 10:30 AM [FreeTextEntry3] : Patient preference [Patient] : Patient [FreeTextEntry1] : Anxiety and Depression

## 2023-08-04 NOTE — PLAN
[FreeTextEntry2] : Goal#1: Alleviate depressive  and anxiety symptoms and increase coping skills\par  Goal#1 Objective #1 Patient will explore and process feelings, and identify triggers of depression and anxiety \par  Goal#1 Objective #2 Patient will learn and practice coping skills and comply with medication therapy recommendation.\par   [Cognitive and/or Behavior Therapy] : Cognitive and/or Behavior Therapy  [Motivational Interviewing] : Motivational Interviewing  [Supportive Therapy] : Supportive Therapy [de-identified] : Therapist provided an individual psychotherapy session with patient. Therapist used cognitive behavior therapy (CBT) motivational Interviewing (MI) and supportive psychotherapy (SP) techniques. Patient was engaged in session.   Patient processed feelings and events experienced since last session. Patient reported feeling that her depressive symptoms have significantly decrease. She received news that [employer] approved her one shot deal and paid her rent arrears. Patient explained that she had a bout of energy and started to clean and accomplish several of the goal she had.  Therapist helped patient explore ways to prevent a future financial situation that may put her at risk of eviction.  Patient discussed several financial support programs she will look into as well as exploring part time work with her doctors.   Patient reported compliance with medication. Therapist reinforced need to focus on treatment goals.  Patient is future oriented, continues to have protective factors, did not endorse suicide ideation or attempt. Therapist praised patient for progress made towards goal and encouraged continued growth.  [Recommended Frequency of Visits: ____] : Recommended frequency of visits: [unfilled] [Return in ____ week(s)] : Return in [unfilled] week(s) [FreeTextEntry1] : Patient will practice positive self-talk.

## 2023-08-05 DIAGNOSIS — F33.41 MAJOR DEPRESSIVE DISORDER, RECURRENT, IN PARTIAL REMISSION: ICD-10-CM

## 2023-08-05 DIAGNOSIS — F40.00 AGORAPHOBIA, UNSPECIFIED: ICD-10-CM

## 2023-08-05 DIAGNOSIS — F41.1 GENERALIZED ANXIETY DISORDER: ICD-10-CM

## 2023-08-18 ENCOUNTER — OUTPATIENT (OUTPATIENT)
Dept: OUTPATIENT SERVICES | Facility: HOSPITAL | Age: 53
LOS: 1 days | End: 2023-08-18
Payer: COMMERCIAL

## 2023-08-18 ENCOUNTER — APPOINTMENT (OUTPATIENT)
Dept: PSYCHIATRY | Facility: CLINIC | Age: 53
End: 2023-08-18

## 2023-08-18 DIAGNOSIS — Z90.49 ACQUIRED ABSENCE OF OTHER SPECIFIED PARTS OF DIGESTIVE TRACT: Chronic | ICD-10-CM

## 2023-08-18 DIAGNOSIS — Z98.890 OTHER SPECIFIED POSTPROCEDURAL STATES: Chronic | ICD-10-CM

## 2023-08-18 DIAGNOSIS — F33.41 MAJOR DEPRESSIVE DISORDER, RECURRENT, IN PARTIAL REMISSION: ICD-10-CM

## 2023-08-18 DIAGNOSIS — Z98.891 HISTORY OF UTERINE SCAR FROM PREVIOUS SURGERY: Chronic | ICD-10-CM

## 2023-08-18 DIAGNOSIS — F41.1 GENERALIZED ANXIETY DISORDER: ICD-10-CM

## 2023-08-18 PROCEDURE — 90832 PSYTX W PT 30 MINUTES: CPT | Mod: 95

## 2023-08-18 NOTE — PLAN
[FreeTextEntry2] : Goal#1: Alleviate depressive  and anxiety symptoms and increase coping skills\par  Goal#1 Objective #1 Patient will explore and process feelings, and identify triggers of depression and anxiety \par  Goal#1 Objective #2 Patient will learn and practice coping skills and comply with medication therapy recommendation.\par   [Cognitive and/or Behavior Therapy] : Cognitive and/or Behavior Therapy  [Motivational Interviewing] : Motivational Interviewing  [Supportive Therapy] : Supportive Therapy [de-identified] : Therapist provided an individual psychotherapy session with patient. Therapist used cognitive behavior therapy (CBT) motivational Interviewing (MI) and supportive psychotherapy (SP) techniques. Patient was engaged in session.   Patient processed feelings and events experienced since last session. Patient reported feeling okay this week.  Patient also reported that she accomplished a lot these past few weeks although she received a lot of disappointment.  She got bad news from her  that she would not be able to win a case to collect social security.  Patient is also happy that she found an organization that helped her connect with [employer], however she was disappointed that they said her family does not qualify for assistance.  patient acknowledge that she can now focus on getting a job since she knows what she is working with.    Patient also discussed coping techniques used. Patient reported compliance with medication.  Patient is also happy that she has been able to speak to her sister this past week and reports things are getting better between them.  Therapist reinforced need to focus on treatment goals.   Patient is future oriented, continues to have protective factors, did not endorse suicide ideation or attempt. Therapist praised patient for progress made towards goal and encouraged continued growth. [Recommended Frequency of Visits: ____] : Recommended frequency of visits: [unfilled] [Return in ____ week(s)] : Return in [unfilled] week(s) [FreeTextEntry1] : Patient will practice positive self-talk.

## 2023-08-18 NOTE — REASON FOR VISIT
[Patient preference] : as per patient preference [Continuing, patient not seen in-person within last 12 months (provide details below)] : Telehealth services are continuing, patient not seen in-person within last 12 months.  [Telehealth (audio & video) - Individual/Group] : This visit was provided via telehealth using real-time 2-way audio visual technology. [Other Location: e.g. Home (Enter Location, City,State)___] : The provider was located at [unfilled]. [Verbal consent obtained from patient/other participant(s)] : Verbal consent for telehealth/telephonic services obtained from patient/other participant(s) [Home] : The patient, [unfilled], was located at home, [unfilled], at the time of the visit. [FreeTextEntry4] : 10:00 AM [FreeTextEntry5] : 10:30 AM [FreeTextEntry3] : Patient preference [Patient] : Patient [FreeTextEntry1] : Anxiety and Depression

## 2023-08-19 DIAGNOSIS — F33.41 MAJOR DEPRESSIVE DISORDER, RECURRENT, IN PARTIAL REMISSION: ICD-10-CM

## 2023-08-19 DIAGNOSIS — F41.1 GENERALIZED ANXIETY DISORDER: ICD-10-CM

## 2023-08-26 NOTE — DISCUSSION/SUMMARY
[Plan Review] : Plan Review [Able to manage surrounding demands and opportunities] : able to manage surrounding demands and opportunities [Able to exercise self-direction] : able to exercise self-direction [Able to set and pursue goals] : able to set and pursue goals [Adherent to treatment recommendations] : adherent to treatment recommendations [Articulate] : articulate [Cognitively intact] : cognitively intact [Good impulse control] : good impulse control [Insightful] : insightful [Intelligent] : intelligent [Motivated to participate in treatment] : motivated to participate in treatment [Motivated and ready for change] : motivated and ready for change [Health literacy] : health literacy [Motivated to maintain or improve physical health] : motivated to maintain or improve physical health [In good health] : in good health [Part of a supportive marriage] : part of a supportive marriage [Part of a supportive family] : part of a supportive family [English fluency] : English fluency [Connected to healthcare] : connected to healthcare [Access to safe outdoor spaces] : access to safe outdoor spaces [Mental Health] : Mental Health [Continued - Progress made] : Continued - Progress made: [Residential] : Residential [Initial] : Initial [every ___ months] : every [unfilled] months [every ___ weeks] : every [unfilled] weeks [Improvement in symptoms as evidenced by:] : Improvement in symptoms as evidenced by: [Attainment of higher level of functioning as evidenced by:] : Attainment of higher level of functioning as evidenced by: [Treatment is no longer medically necessary as evidenced by:] : Treatment is no longer medically necessary as evidenced by: [Other rationale for transition/discharge:] : Other rationale for transition/discharge: [None - Reason others did not participate:] : None - Reason others did not participate:  [Yes] : Yes [Psychiatric Provider/Prescriber] : Psychiatric Provider/Prescriber [Therapist] : Therapist [Attempting to realize their potential] : Attempting to realize their potential [Creative] : creative [FreeTextEntry2] : 7/21/24 [FreeTextEntry3] : 10/28/21 [FreeTextEntry1] : Housing issues [FreeTextEntry4] : Obtain funds to help resolve financial needs due to housing issues [de-identified] : patient is motivated to finding a solution [de-identified] : Identify sources of financial support and discuss progress or challenges during biweekly sessions [de-identified] : 7/21/2024 [de-identified] : Patient actively seeks services [de-identified] : Follow up with referrals  [de-identified] : 7/21/2024 [de-identified] : Patient is actively following up and continues to remain hopeful [FreeTextEntry5] : Therapist will help patient with identifying resources and provide a safe space to process and cope with feelings of defeat. Psychiatrist will continue to provide medication management to help with depressive symptoms. [de-identified] : psychiatrist [de-identified] :  [de-identified] : Decrease of depressive and anxiety symptoms and resolve housing concerns [de-identified] : If patient is unable to perform activities of daily living and/or expresses suicidal ideation, a higher level of care will be considered.\par  \par  If patient is unable to perform activities of daily living and/or expresses suicidal ideation, a higher level of care will be considered.\par  \par   [de-identified] : Psychotherapy  and medical management is no longer needed and patient is able to perform at baseline.  [de-identified] : Family participation is not clinically necessary and patient declined.  [de-identified] : Patient request

## 2023-09-01 ENCOUNTER — APPOINTMENT (OUTPATIENT)
Dept: PSYCHIATRY | Facility: CLINIC | Age: 53
End: 2023-09-01

## 2023-09-01 ENCOUNTER — OUTPATIENT (OUTPATIENT)
Dept: OUTPATIENT SERVICES | Facility: HOSPITAL | Age: 53
LOS: 1 days | End: 2023-09-01
Payer: COMMERCIAL

## 2023-09-01 DIAGNOSIS — Z90.49 ACQUIRED ABSENCE OF OTHER SPECIFIED PARTS OF DIGESTIVE TRACT: Chronic | ICD-10-CM

## 2023-09-01 DIAGNOSIS — F33.41 MAJOR DEPRESSIVE DISORDER, RECURRENT, IN PARTIAL REMISSION: ICD-10-CM

## 2023-09-01 DIAGNOSIS — Z98.890 OTHER SPECIFIED POSTPROCEDURAL STATES: Chronic | ICD-10-CM

## 2023-09-01 DIAGNOSIS — Z98.891 HISTORY OF UTERINE SCAR FROM PREVIOUS SURGERY: Chronic | ICD-10-CM

## 2023-09-01 DIAGNOSIS — F41.1 GENERALIZED ANXIETY DISORDER: ICD-10-CM

## 2023-09-01 PROCEDURE — 90834 PSYTX W PT 45 MINUTES: CPT | Mod: 95

## 2023-09-02 DIAGNOSIS — F41.1 GENERALIZED ANXIETY DISORDER: ICD-10-CM

## 2023-09-05 NOTE — PLAN
[FreeTextEntry2] : Goal#1: Alleviate depressive  and anxiety symptoms and increase coping skills\par  Goal#1 Objective #1 Patient will explore and process feelings, and identify triggers of depression and anxiety \par  Goal#1 Objective #2 Patient will learn and practice coping skills and comply with medication therapy recommendation.\par   [Cognitive and/or Behavior Therapy] : Cognitive and/or Behavior Therapy  [Motivational Interviewing] : Motivational Interviewing  [Supportive Therapy] : Supportive Therapy [de-identified] : Therapist provided an individual psychotherapy session with patient. Therapist used cognitive behavior therapy (CBT) motivational Interviewing (MI) and supportive psychotherapy (SP) techniques. Patient was engaged in session.   Patient processed feelings and events experienced since last session. Patient reported feeling well this week.  She reported that she received a job interview to work with [employer].  Patient discussed the pros and cons of working with therapist.  Patient also discussed coping techniques used the past few weeks. She continues to speak to her  about her stressors, she is there for her son, and she is compliant with medication. Therapist reinforced need to focus on treatment goals.   Patient is future oriented, continues to have protective factors, did not endorse suicide ideation or attempt. Therapist praised patient for progress made towards goal and encouraged continued growth. [Recommended Frequency of Visits: ____] : Recommended frequency of visits: [unfilled] [Return in ____ week(s)] : Return in [unfilled] week(s) [FreeTextEntry1] : Patient will practice positive self-talk.

## 2023-09-08 ENCOUNTER — APPOINTMENT (OUTPATIENT)
Dept: PSYCHIATRY | Facility: CLINIC | Age: 53
End: 2023-09-08

## 2023-09-15 ENCOUNTER — APPOINTMENT (OUTPATIENT)
Dept: PSYCHIATRY | Facility: CLINIC | Age: 53
End: 2023-09-15

## 2023-09-25 ENCOUNTER — APPOINTMENT (OUTPATIENT)
Dept: PULMONOLOGY | Facility: CLINIC | Age: 53
End: 2023-09-25

## 2023-09-29 ENCOUNTER — APPOINTMENT (OUTPATIENT)
Dept: PSYCHIATRY | Facility: CLINIC | Age: 53
End: 2023-09-29

## 2023-10-03 ENCOUNTER — NON-APPOINTMENT (OUTPATIENT)
Age: 53
End: 2023-10-03

## 2023-10-07 ENCOUNTER — NON-APPOINTMENT (OUTPATIENT)
Age: 53
End: 2023-10-07

## 2023-10-12 ENCOUNTER — EMERGENCY (EMERGENCY)
Facility: HOSPITAL | Age: 53
LOS: 0 days | Discharge: HOME | End: 2023-10-12
Attending: EMERGENCY MEDICINE
Payer: COMMERCIAL

## 2023-10-12 VITALS
RESPIRATION RATE: 18 BRPM | HEART RATE: 83 BPM | WEIGHT: 293 LBS | OXYGEN SATURATION: 97 % | DIASTOLIC BLOOD PRESSURE: 66 MMHG | SYSTOLIC BLOOD PRESSURE: 137 MMHG | TEMPERATURE: 98 F | HEIGHT: 66 IN

## 2023-10-12 VITALS
HEART RATE: 69 BPM | RESPIRATION RATE: 18 BRPM | DIASTOLIC BLOOD PRESSURE: 62 MMHG | SYSTOLIC BLOOD PRESSURE: 140 MMHG | OXYGEN SATURATION: 99 %

## 2023-10-12 DIAGNOSIS — Z98.891 HISTORY OF UTERINE SCAR FROM PREVIOUS SURGERY: Chronic | ICD-10-CM

## 2023-10-12 DIAGNOSIS — G47.33 OBSTRUCTIVE SLEEP APNEA (ADULT) (PEDIATRIC): ICD-10-CM

## 2023-10-12 DIAGNOSIS — Z98.890 OTHER SPECIFIED POSTPROCEDURAL STATES: Chronic | ICD-10-CM

## 2023-10-12 DIAGNOSIS — Z82.3 FAMILY HISTORY OF STROKE: ICD-10-CM

## 2023-10-12 DIAGNOSIS — R06.02 SHORTNESS OF BREATH: ICD-10-CM

## 2023-10-12 DIAGNOSIS — I48.91 UNSPECIFIED ATRIAL FIBRILLATION: ICD-10-CM

## 2023-10-12 DIAGNOSIS — Z87.19 PERSONAL HISTORY OF OTHER DISEASES OF THE DIGESTIVE SYSTEM: ICD-10-CM

## 2023-10-12 DIAGNOSIS — Z79.84 LONG TERM (CURRENT) USE OF ORAL HYPOGLYCEMIC DRUGS: ICD-10-CM

## 2023-10-12 DIAGNOSIS — Z79.02 LONG TERM (CURRENT) USE OF ANTITHROMBOTICS/ANTIPLATELETS: ICD-10-CM

## 2023-10-12 DIAGNOSIS — I25.10 ATHEROSCLEROTIC HEART DISEASE OF NATIVE CORONARY ARTERY WITHOUT ANGINA PECTORIS: ICD-10-CM

## 2023-10-12 DIAGNOSIS — Z79.4 LONG TERM (CURRENT) USE OF INSULIN: ICD-10-CM

## 2023-10-12 DIAGNOSIS — R05.8 OTHER SPECIFIED COUGH: ICD-10-CM

## 2023-10-12 DIAGNOSIS — I10 ESSENTIAL (PRIMARY) HYPERTENSION: ICD-10-CM

## 2023-10-12 DIAGNOSIS — Z99.89 DEPENDENCE ON OTHER ENABLING MACHINES AND DEVICES: ICD-10-CM

## 2023-10-12 DIAGNOSIS — E11.9 TYPE 2 DIABETES MELLITUS WITHOUT COMPLICATIONS: ICD-10-CM

## 2023-10-12 DIAGNOSIS — Z90.49 ACQUIRED ABSENCE OF OTHER SPECIFIED PARTS OF DIGESTIVE TRACT: ICD-10-CM

## 2023-10-12 DIAGNOSIS — I25.2 OLD MYOCARDIAL INFARCTION: ICD-10-CM

## 2023-10-12 DIAGNOSIS — Z87.891 PERSONAL HISTORY OF NICOTINE DEPENDENCE: ICD-10-CM

## 2023-10-12 DIAGNOSIS — Z79.85 LONG-TERM (CURRENT) USE OF INJECTABLE NON-INSULIN ANTIDIABETIC DRUGS: ICD-10-CM

## 2023-10-12 DIAGNOSIS — Z90.49 ACQUIRED ABSENCE OF OTHER SPECIFIED PARTS OF DIGESTIVE TRACT: Chronic | ICD-10-CM

## 2023-10-12 DIAGNOSIS — Z82.49 FAMILY HISTORY OF ISCHEMIC HEART DISEASE AND OTHER DISEASES OF THE CIRCULATORY SYSTEM: ICD-10-CM

## 2023-10-12 LAB
BASOPHILS # BLD AUTO: 0.03 K/UL — SIGNIFICANT CHANGE UP (ref 0–0.2)
BASOPHILS NFR BLD AUTO: 0.5 % — SIGNIFICANT CHANGE UP (ref 0–1)
EOSINOPHIL # BLD AUTO: 0.15 K/UL — SIGNIFICANT CHANGE UP (ref 0–0.7)
EOSINOPHIL NFR BLD AUTO: 2.7 % — SIGNIFICANT CHANGE UP (ref 0–8)
HCT VFR BLD CALC: 35.2 % — LOW (ref 37–47)
HGB BLD-MCNC: 11.3 G/DL — LOW (ref 12–16)
IMM GRANULOCYTES NFR BLD AUTO: 0.7 % — HIGH (ref 0.1–0.3)
LYMPHOCYTES # BLD AUTO: 1.14 K/UL — LOW (ref 1.2–3.4)
LYMPHOCYTES # BLD AUTO: 20.7 % — SIGNIFICANT CHANGE UP (ref 20.5–51.1)
MCHC RBC-ENTMCNC: 30.7 PG — SIGNIFICANT CHANGE UP (ref 27–31)
MCHC RBC-ENTMCNC: 32.1 G/DL — SIGNIFICANT CHANGE UP (ref 32–37)
MCV RBC AUTO: 95.7 FL — SIGNIFICANT CHANGE UP (ref 81–99)
MONOCYTES # BLD AUTO: 0.33 K/UL — SIGNIFICANT CHANGE UP (ref 0.1–0.6)
MONOCYTES NFR BLD AUTO: 6 % — SIGNIFICANT CHANGE UP (ref 1.7–9.3)
NEUTROPHILS # BLD AUTO: 3.81 K/UL — SIGNIFICANT CHANGE UP (ref 1.4–6.5)
NEUTROPHILS NFR BLD AUTO: 69.4 % — SIGNIFICANT CHANGE UP (ref 42.2–75.2)
NRBC # BLD: 0 /100 WBCS — SIGNIFICANT CHANGE UP (ref 0–0)
PLATELET # BLD AUTO: 245 K/UL — SIGNIFICANT CHANGE UP (ref 130–400)
PMV BLD: 10.3 FL — SIGNIFICANT CHANGE UP (ref 7.4–10.4)
RBC # BLD: 3.68 M/UL — LOW (ref 4.2–5.4)
RBC # FLD: 14.3 % — SIGNIFICANT CHANGE UP (ref 11.5–14.5)
WBC # BLD: 5.5 K/UL — SIGNIFICANT CHANGE UP (ref 4.8–10.8)
WBC # FLD AUTO: 5.5 K/UL — SIGNIFICANT CHANGE UP (ref 4.8–10.8)

## 2023-10-12 PROCEDURE — 96374 THER/PROPH/DIAG INJ IV PUSH: CPT

## 2023-10-12 PROCEDURE — 84484 ASSAY OF TROPONIN QUANT: CPT

## 2023-10-12 PROCEDURE — 85379 FIBRIN DEGRADATION QUANT: CPT

## 2023-10-12 PROCEDURE — 36415 COLL VENOUS BLD VENIPUNCTURE: CPT

## 2023-10-12 PROCEDURE — 80053 COMPREHEN METABOLIC PANEL: CPT

## 2023-10-12 PROCEDURE — 99285 EMERGENCY DEPT VISIT HI MDM: CPT

## 2023-10-12 PROCEDURE — 99285 EMERGENCY DEPT VISIT HI MDM: CPT | Mod: 25

## 2023-10-12 PROCEDURE — 83880 ASSAY OF NATRIURETIC PEPTIDE: CPT

## 2023-10-12 PROCEDURE — 71045 X-RAY EXAM CHEST 1 VIEW: CPT

## 2023-10-12 PROCEDURE — 71045 X-RAY EXAM CHEST 1 VIEW: CPT | Mod: 26

## 2023-10-12 PROCEDURE — 93005 ELECTROCARDIOGRAM TRACING: CPT

## 2023-10-12 PROCEDURE — 93010 ELECTROCARDIOGRAM REPORT: CPT

## 2023-10-12 PROCEDURE — 94640 AIRWAY INHALATION TREATMENT: CPT

## 2023-10-12 PROCEDURE — 85025 COMPLETE CBC W/AUTO DIFF WBC: CPT

## 2023-10-12 RX ORDER — ALBUTEROL 90 UG/1
2.5 AEROSOL, METERED ORAL ONCE
Refills: 0 | Status: COMPLETED | OUTPATIENT
Start: 2023-10-12 | End: 2023-10-12

## 2023-10-12 RX ADMIN — ALBUTEROL 2.5 MILLIGRAM(S): 90 AEROSOL, METERED ORAL at 11:59

## 2023-10-12 RX ADMIN — Medication 125 MILLIGRAM(S): at 11:59

## 2023-10-12 NOTE — ED PROVIDER NOTE - PATIENT PORTAL LINK FT
You can access the FollowMyHealth Patient Portal offered by [facility] by registering at the following website: [email or web address]. By joining [employer]’s FollowMyHealth portal, you will also be able to view your health information using other applications (apps) compatible with our system.

## 2023-10-12 NOTE — ED PROVIDER NOTE - NSFOLLOWUPINSTRUCTIONS_ED_ALL_ED_FT
SEE YOUR DOCTOR IN THE NEXT 24-48 HOURS   RETURN FOR ANY FURTHER CONCERNS     Shortness of breath    Shortness of breath (dyspnea) means you have trouble breathing and could indicate a medical problem. Causes include lung disease, heart disease, low amount of red blood cells (anemia), poor physical fitness, being overweight, smoking, etc. Your health care provider today may not be able to find a cause for your shortness of breath after your exam. In this case, it is important to have a follow-up exam with your primary care physician as instructed. If medicines were prescribed, take them as directed for the full length of time directed. Refrain from tobacco products.    SEEK IMMEDIATE MEDICAL CARE IF YOU HAVE ANY OF THE FOLLOWING SYMPTOMS: worsening shortness of breath, chest pain, back pain, abdominal pain, fever, coughing up blood, lightheadedness/dizziness.

## 2023-10-12 NOTE — ED ADULT NURSE NOTE - NSFALLUNIVINTERV_ED_ALL_ED
Bed/Stretcher in lowest position, wheels locked, appropriate side rails in place/Call bell, personal items and telephone in reach/Instruct patient to call for assistance before getting out of bed/chair/stretcher/Non-slip footwear applied when patient is off stretcher/[place] to call system/Physically safe environment - no spills, clutter or unnecessary equipment/Purposeful proactive rounding/Room/bathroom lighting operational, light cord in reach

## 2023-10-12 NOTE — ED PROVIDER NOTE - PHYSICAL EXAMINATION
On physical exam patient is normocephalic atraumatic pupils equal round reactive light accommodation extraocular muscles intact oropharynx clear chest clear to auscultation bilaterally abdomen soft nontender nondistended bowel sounds positive no guarding or rebound chest clear to oscillation bilaterally abdomen soft nontender nondistended bowel sounds positive no guarding no rebound extremities full range of motion no edema noted pedal pulses 2+ radial pulses 2+

## 2023-10-12 NOTE — ED ADULT TRIAGE NOTE - CCCP TRG CHIEF CMPLNT
Lorazepam was refused in 12/3/20 refill request.   Patient asking for refill until seen 12/18/20.     Provider please review and advise. Thank you.     shortness of breath

## 2023-10-12 NOTE — ED ADULT NURSE NOTE - NS ED NOTE ABUSE SUSPICION NEGLECT YN
Called home number. Left message on answering machine to return the call. This call was concerning message below per Marj NARAYANAN. No

## 2023-10-12 NOTE — ED PROVIDER NOTE - CLINICAL SUMMARY MEDICAL DECISION MAKING FREE TEXT BOX
patient is presenting for evaluation of worsening shortness of breath and cough we obtain EKG per my depend evaluation not consistent with STEMI consistent with QT prolongation not consistent with arrhythmia we obtain x-ray per my depend evaluation not consistent with pneumonia or pneumothorax I obtained troponin which is negative patient is not complaining of chest pain overall has a heart score less than 5 this is not consistent with PE as patient is not hypoxic or tachycardic in addition pain not consistent with TAD I obtained a repeat troponin which is also negative patient improved here we discussed risk benefits alternatives to admission offered admission given age and symptoms patient actually declined advised to follow-up in the next 24 to 48 hours she is improved here in the emergency department I will discharge also advised to follow-up with cardiology for potential repeat echocardiogram overall instructed to have a low threshold for return

## 2023-10-12 NOTE — ED PROVIDER NOTE - OBJECTIVE STATEMENT
Patient is a 53-year-old female presents for evaluation of worsening shortness of breath and cough over the past 5 days notes dry cough denies any fevers chills denies any chest pain shortness of breath is no exertional component denies any diaphoresis denies any abdominal pain or back pain

## 2023-10-13 ENCOUNTER — APPOINTMENT (OUTPATIENT)
Dept: PSYCHIATRY | Facility: CLINIC | Age: 53
End: 2023-10-13

## 2023-10-17 ENCOUNTER — APPOINTMENT (OUTPATIENT)
Dept: PSYCHIATRY | Facility: CLINIC | Age: 53
End: 2023-10-17

## 2023-10-24 ENCOUNTER — APPOINTMENT (OUTPATIENT)
Dept: PSYCHIATRY | Facility: CLINIC | Age: 53
End: 2023-10-24

## 2023-10-24 ENCOUNTER — OUTPATIENT (OUTPATIENT)
Dept: OUTPATIENT SERVICES | Facility: HOSPITAL | Age: 53
LOS: 1 days | End: 2023-10-24
Payer: COMMERCIAL

## 2023-10-24 DIAGNOSIS — Z98.890 OTHER SPECIFIED POSTPROCEDURAL STATES: Chronic | ICD-10-CM

## 2023-10-24 DIAGNOSIS — Z98.891 HISTORY OF UTERINE SCAR FROM PREVIOUS SURGERY: Chronic | ICD-10-CM

## 2023-10-24 DIAGNOSIS — F41.9 ANXIETY DISORDER, UNSPECIFIED: ICD-10-CM

## 2023-10-24 PROCEDURE — 90832 PSYTX W PT 30 MINUTES: CPT | Mod: 95

## 2023-10-25 DIAGNOSIS — F41.9 ANXIETY DISORDER, UNSPECIFIED: ICD-10-CM

## 2023-10-25 NOTE — ED PROVIDER NOTE - OBJECTIVE STATEMENT
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 Pt states she was looking at her cell phone on Sunday when she began to experience room-spinning dizziness, nausea, and black spots to her left eye. States dizziness and nausea have resolved however vision loss is the same. She went to her eye doctor this morning who was concerned for papilledema during dilated eye exam, prompting ED evaluation. Denies injury/trauma, headaches, diplopia, n/v, chest pain, shortness of breath, fevers, recent illness. Medicine Medicine Medicine Dr. Duval Dr Ocampo

## 2023-11-14 ENCOUNTER — APPOINTMENT (OUTPATIENT)
Dept: PSYCHIATRY | Facility: CLINIC | Age: 53
End: 2023-11-14

## 2023-11-14 ENCOUNTER — OUTPATIENT (OUTPATIENT)
Dept: OUTPATIENT SERVICES | Facility: HOSPITAL | Age: 53
LOS: 1 days | End: 2023-11-14
Payer: COMMERCIAL

## 2023-11-14 DIAGNOSIS — Z98.890 OTHER SPECIFIED POSTPROCEDURAL STATES: Chronic | ICD-10-CM

## 2023-11-14 DIAGNOSIS — Z90.49 ACQUIRED ABSENCE OF OTHER SPECIFIED PARTS OF DIGESTIVE TRACT: Chronic | ICD-10-CM

## 2023-11-14 DIAGNOSIS — F41.9 ANXIETY DISORDER, UNSPECIFIED: ICD-10-CM

## 2023-11-14 PROCEDURE — 90832 PSYTX W PT 30 MINUTES: CPT | Mod: 95

## 2023-11-15 DIAGNOSIS — F41.9 ANXIETY DISORDER, UNSPECIFIED: ICD-10-CM

## 2023-11-17 ENCOUNTER — APPOINTMENT (OUTPATIENT)
Dept: PSYCHIATRY | Facility: CLINIC | Age: 53
End: 2023-11-17

## 2023-11-21 ENCOUNTER — APPOINTMENT (OUTPATIENT)
Dept: PSYCHIATRY | Facility: CLINIC | Age: 53
End: 2023-11-21

## 2023-12-05 ENCOUNTER — OUTPATIENT (OUTPATIENT)
Dept: OUTPATIENT SERVICES | Facility: HOSPITAL | Age: 53
LOS: 1 days | End: 2023-12-05
Payer: COMMERCIAL

## 2023-12-05 ENCOUNTER — APPOINTMENT (OUTPATIENT)
Dept: PSYCHIATRY | Facility: CLINIC | Age: 53
End: 2023-12-05

## 2023-12-05 DIAGNOSIS — Z98.891 HISTORY OF UTERINE SCAR FROM PREVIOUS SURGERY: Chronic | ICD-10-CM

## 2023-12-05 DIAGNOSIS — F41.9 ANXIETY DISORDER, UNSPECIFIED: ICD-10-CM

## 2023-12-05 DIAGNOSIS — Z90.49 ACQUIRED ABSENCE OF OTHER SPECIFIED PARTS OF DIGESTIVE TRACT: Chronic | ICD-10-CM

## 2023-12-05 DIAGNOSIS — Z98.890 OTHER SPECIFIED POSTPROCEDURAL STATES: Chronic | ICD-10-CM

## 2023-12-05 PROCEDURE — 90832 PSYTX W PT 30 MINUTES: CPT | Mod: 95

## 2023-12-06 DIAGNOSIS — F41.9 ANXIETY DISORDER, UNSPECIFIED: ICD-10-CM

## 2023-12-13 ENCOUNTER — OUTPATIENT (OUTPATIENT)
Dept: OUTPATIENT SERVICES | Facility: HOSPITAL | Age: 53
LOS: 1 days | End: 2023-12-13
Payer: COMMERCIAL

## 2023-12-13 DIAGNOSIS — Z98.890 OTHER SPECIFIED POSTPROCEDURAL STATES: Chronic | ICD-10-CM

## 2023-12-13 DIAGNOSIS — R07.9 CHEST PAIN, UNSPECIFIED: ICD-10-CM

## 2023-12-13 DIAGNOSIS — Z98.891 HISTORY OF UTERINE SCAR FROM PREVIOUS SURGERY: Chronic | ICD-10-CM

## 2023-12-13 DIAGNOSIS — Z90.49 ACQUIRED ABSENCE OF OTHER SPECIFIED PARTS OF DIGESTIVE TRACT: Chronic | ICD-10-CM

## 2023-12-13 DIAGNOSIS — Z00.8 ENCOUNTER FOR OTHER GENERAL EXAMINATION: ICD-10-CM

## 2023-12-13 PROCEDURE — 78452 HT MUSCLE IMAGE SPECT MULT: CPT

## 2023-12-13 PROCEDURE — 78452 HT MUSCLE IMAGE SPECT MULT: CPT | Mod: 26

## 2023-12-13 PROCEDURE — 93018 CV STRESS TEST I&R ONLY: CPT

## 2023-12-13 PROCEDURE — A9500: CPT

## 2023-12-14 DIAGNOSIS — R07.9 CHEST PAIN, UNSPECIFIED: ICD-10-CM

## 2023-12-21 ENCOUNTER — APPOINTMENT (OUTPATIENT)
Dept: PSYCHIATRY | Facility: CLINIC | Age: 53
End: 2023-12-21
Payer: COMMERCIAL

## 2023-12-21 ENCOUNTER — OUTPATIENT (OUTPATIENT)
Dept: OUTPATIENT SERVICES | Facility: HOSPITAL | Age: 53
LOS: 1 days | End: 2023-12-21
Payer: COMMERCIAL

## 2023-12-21 DIAGNOSIS — F33.41 MAJOR DEPRESSIVE DISORDER, RECURRENT, IN PARTIAL REMISSION: ICD-10-CM

## 2023-12-21 DIAGNOSIS — Z90.49 ACQUIRED ABSENCE OF OTHER SPECIFIED PARTS OF DIGESTIVE TRACT: Chronic | ICD-10-CM

## 2023-12-21 DIAGNOSIS — Z98.890 OTHER SPECIFIED POSTPROCEDURAL STATES: Chronic | ICD-10-CM

## 2023-12-21 DIAGNOSIS — Z98.891 HISTORY OF UTERINE SCAR FROM PREVIOUS SURGERY: Chronic | ICD-10-CM

## 2023-12-21 DIAGNOSIS — F41.0 PANIC DISORDER [EPISODIC PAROXYSMAL ANXIETY]: ICD-10-CM

## 2023-12-21 PROCEDURE — 99214 OFFICE O/P EST MOD 30 MIN: CPT | Mod: 95

## 2023-12-21 RX ORDER — LORAZEPAM 1 MG/1
1 TABLET ORAL
Qty: 30 | Refills: 0 | Status: DISCONTINUED | COMMUNITY
Start: 2021-11-12 | End: 2023-12-21

## 2023-12-21 NOTE — REVIEW OF SYSTEMS
[Negative] : Heme/Lymph [FreeTextEntry3] : decreased vision in left eye [FreeTextEntry5] : bouts of chest pain

## 2023-12-21 NOTE — REASON FOR VISIT
[Medical Office: ([facility])___] : The provider was located at the medical office in [unfilled]. [Home] : The patient, [unfilled], was located at home, [unfilled], at the time of the visit. [Verbal consent obtained from patient/other participant(s)] : Verbal consent for telehealth/telephonic services obtained from patient/other participant(s) [Patient] : Patient [FreeTextEntry1] : Follow up for mood and anxiety

## 2023-12-21 NOTE — PHYSICAL EXAM
[Average] : average [Cooperative] : cooperative [Full] : full [Clear] : clear [Overproductive] : overproductive [Linear/Goal Directed] : linear/goal directed [WNL] : within normal limits [FreeTextEntry1] : Casually dressed [FreeTextEntry8] : 'I am doing better' [de-identified] : Fair [de-identified] : Fair

## 2023-12-21 NOTE — DISCUSSION/SUMMARY
[Date of Last Physical Exam: _____] : Date of Last Physical Exam: [unfilled] [Date of Last Annual Labs: _____] : Date of Last Annual Labs: [unfilled] [Annual Review of Systems Completed?] : Annual Review of Systems Completed: Yes [Tobacco Screening Completed?] : Tobacco Screening Completed: Yes [FreeTextEntry1] : Emelina is a 54yo  female, history of depression, anxiety.  She is sleeping better, mood has improved, feeling nervous due to recent bouts of chest pain.   Her recent stress test showed blockage, cardiac catherization is scheduled for 1/24.  She is encouraged to follow up with ophthalmologist due to decreased vision in left eye.  Paroxetine 50mg po daily  Metformin 1000mg po twice daily Clopidogrel 75mg po daily Xarelto 20mg po daily Metoprolol 50mg am + 100mg po pm Atorvastatin 80mg po daily Pioglitazone 30mg po daily Lantus 80u am Ozempic IM weekly

## 2023-12-21 NOTE — HISTORY OF PRESENT ILLNESS
[FreeTextEntry1] : She is sleeping better, mood has improved, feeling nervous due to recent bouts of chest pain.   Her recent stress test showed blockage, cardiac catherization is scheduled for 1/24.  She has good appetite.  Previously she lost her job but will start a new position remotely 1/16. She discontinued Ozempic due to diarrhea but is considering restarting the medication.  She is encouraged to follow up with ophthalmologist due to decreased vision in left eye. She is adherent with Paroxetine, denies side effects. She denies thoughts of harm to self. Total time in session: Thirty minutes.

## 2023-12-21 NOTE — SOCIAL HISTORY
[Lives with Spouse] : lives with spouse [Disabled] : disabled [] :  [None] : none [Yes] : yes [No Known Use] : no known use [FreeTextEntry1] : She quit cigarettes 2017.  [FreeTextEntry4] : Few college courses [TextBox_52] : Prescribed

## 2023-12-22 DIAGNOSIS — F33.41 MAJOR DEPRESSIVE DISORDER, RECURRENT, IN PARTIAL REMISSION: ICD-10-CM

## 2023-12-22 DIAGNOSIS — F41.0 PANIC DISORDER [EPISODIC PAROXYSMAL ANXIETY]: ICD-10-CM

## 2023-12-28 ENCOUNTER — APPOINTMENT (OUTPATIENT)
Dept: PULMONOLOGY | Facility: CLINIC | Age: 53
End: 2023-12-28
Payer: COMMERCIAL

## 2023-12-28 VITALS
HEART RATE: 79 BPM | BODY MASS INDEX: 47.09 KG/M2 | DIASTOLIC BLOOD PRESSURE: 82 MMHG | OXYGEN SATURATION: 92 % | WEIGHT: 293 LBS | SYSTOLIC BLOOD PRESSURE: 122 MMHG | HEIGHT: 66 IN | RESPIRATION RATE: 14 BRPM

## 2023-12-28 DIAGNOSIS — G47.33 OBSTRUCTIVE SLEEP APNEA (ADULT) (PEDIATRIC): ICD-10-CM

## 2023-12-28 DIAGNOSIS — F17.201 NICOTINE DEPENDENCE, UNSPECIFIED, IN REMISSION: ICD-10-CM

## 2023-12-28 PROCEDURE — 99214 OFFICE O/P EST MOD 30 MIN: CPT

## 2023-12-28 NOTE — HISTORY OF PRESENT ILLNESS
[Former] : former [>= 20 pack years] : >= 20 pack years [TextBox_4] : Ms. MCCORMICK is a 52 year woman with a medical history significant for CAD s/p PCI (5 stents), afib on xarelto, JOCELYNE (noncompliant cpap), GERD, DM type 2, HTN, obesity, former smoker, CVA, Endarterectomy, Depression, and GCA s/p L temporal artery excision presenting today to the clinic for follow-up from a recent hospitalization for dyspnea.  She was found to have an abnormal CXR with hypoxia on ambulation, and further testing demonstrated parainfluenza pneumonia   Since discharge from the hospital, she was having trouble after ocming off the steroids but it got better over a couple days. O2 levels have been good overall was having orthopnea at the time of admission still having BOONE   previously followed w/ pulm Dr Sanjay Eastman  JOCELYNE hx, not compliant w/ CPAP last sleep test was 2y/ago has trouble with the mask  stopped smoking when she had a heart attack Dr Ocampo is her cardio not on lasix  Occupational Hx: , former, denies exposures    Interval history: Got her PSG records showing moderate JOCELYNE Needs 7cm/H2O, but patient mostly was previously unhappy with mask and pressure may need titration study [TextBox_11] : 1.5 [TextBox_13] : 30 [YearQuit] : 2015

## 2023-12-29 ENCOUNTER — APPOINTMENT (OUTPATIENT)
Dept: PSYCHIATRY | Facility: CLINIC | Age: 53
End: 2023-12-29

## 2023-12-31 ENCOUNTER — NON-APPOINTMENT (OUTPATIENT)
Age: 53
End: 2023-12-31

## 2024-01-12 ENCOUNTER — APPOINTMENT (OUTPATIENT)
Dept: PSYCHIATRY | Facility: CLINIC | Age: 54
End: 2024-01-12

## 2024-01-23 NOTE — PATIENT PROFILE ADULT - NSPROPOAURINARYCATHETER_GEN_A_NUR
Effort: Pulmonary effort is normal.      Breath sounds: Normal breath sounds.   Abdominal:      General: Bowel sounds are normal.      Palpations: Abdomen is soft. There is no hepatomegaly or splenomegaly.      Tenderness: There is no abdominal tenderness.   Musculoskeletal:      Right shoulder: No tenderness. Normal range of motion.      Left shoulder: No tenderness. Normal range of motion.      Right elbow: No tenderness.      Left elbow: No tenderness.      Right wrist: No swelling or tenderness.      Left wrist: No swelling or tenderness.      Right hand: No swelling or tenderness.      Left hand: No swelling or tenderness.      Cervical back: Neck supple. No tenderness.      Thoracic back: No tenderness.      Lumbar back: No tenderness. Normal range of motion.      Right hip: No tenderness.      Left hip: No tenderness.      Right knee: No tenderness.      Left knee: No tenderness.      Right lower leg: No edema.      Left lower leg: No edema.      Right ankle: No swelling. No tenderness.      Left ankle: No swelling. No tenderness.      Right foot: No swelling.      Left foot: No swelling.   Lymphadenopathy:      Head:      Right side of head: No submandibular adenopathy.      Left side of head: No submandibular adenopathy.   Skin:     General: Skin is warm and dry.   Neurological:      Mental Status: She is alert and oriented to person, place, and time.      Gait: Gait normal.      Deep Tendon Reflexes: Reflexes are normal and symmetric.   Psychiatric:         Attention and Perception: Attention normal.         Mood and Affect: Mood normal.         Speech: Speech normal.           Lab Review   Results for POC orders placed in visit on 01/23/24   POCT Urinalysis no Micro   Result Value Ref Range    Color, UA yellow     Clarity, UA clear     Glucose, UA POC neg     Bilirubin, UA neg     Ketones, UA trace     Spec Grav, UA 1.020     Blood, UA POC neg     pH, UA 7.0     Protein, UA POC 30     Urobilinogen, UA 1.0  no

## 2024-01-29 ENCOUNTER — NON-APPOINTMENT (OUTPATIENT)
Age: 54
End: 2024-01-29

## 2024-02-08 ENCOUNTER — NON-APPOINTMENT (OUTPATIENT)
Age: 54
End: 2024-02-08

## 2024-02-13 ENCOUNTER — NON-APPOINTMENT (OUTPATIENT)
Age: 54
End: 2024-02-13

## 2024-02-20 ENCOUNTER — OUTPATIENT (OUTPATIENT)
Dept: OUTPATIENT SERVICES | Facility: HOSPITAL | Age: 54
LOS: 1 days | End: 2024-02-20
Payer: COMMERCIAL

## 2024-02-20 ENCOUNTER — APPOINTMENT (OUTPATIENT)
Dept: PSYCHIATRY | Facility: CLINIC | Age: 54
End: 2024-02-20

## 2024-02-20 DIAGNOSIS — Z98.891 HISTORY OF UTERINE SCAR FROM PREVIOUS SURGERY: Chronic | ICD-10-CM

## 2024-02-20 DIAGNOSIS — F41.1 GENERALIZED ANXIETY DISORDER: ICD-10-CM

## 2024-02-20 DIAGNOSIS — Z90.49 ACQUIRED ABSENCE OF OTHER SPECIFIED PARTS OF DIGESTIVE TRACT: Chronic | ICD-10-CM

## 2024-02-20 DIAGNOSIS — Z98.890 OTHER SPECIFIED POSTPROCEDURAL STATES: Chronic | ICD-10-CM

## 2024-02-20 DIAGNOSIS — F33.41 MAJOR DEPRESSIVE DISORDER, RECURRENT, IN PARTIAL REMISSION: ICD-10-CM

## 2024-02-20 PROCEDURE — 90834 PSYTX W PT 45 MINUTES: CPT

## 2024-02-20 NOTE — PLAN
[FreeTextEntry2] : Treatment plan developed on 7/21/23 with previous therapist. Problem/Need I:   Domain for Problem/Need I: Mental Health and Residential.   Problem: Anxiety and depression.   Objective A: Pt will explore and process feelings, identifying two triggers of depression and anxiety and attend medication management appointments   Objective B: Pt will learn and implement two coping skills in order to reduce depression and anxiety    Problem/Need II: Domain for Problem/Need II: Residential. Problem: Housing issues. Objective A: Identify sources of financial support and discuss progress or challenges during biweekly sessions Objective B: Follow up with referrals [Dialectical Behavior Therapy] : Dialectical Behavior Therapy  [Cl[facility] Therapy] : [facility]  [Motivational Interviewing] : Motivational Interviewing  [Psychoeducation] : Psychoeducation  [Supportive Therapy] : Supportive Therapy [de-identified] : Session began at 6:04pm and ended at 6:49pm. Patient was seen via Solo audio/video session. Patient reported that she has been doing well with work and is enjoying working again. Therapist commended patient on this. She notes that she sometimes has small anxiety attacks, unrelated to work, and her coping strategy is to check her BP and heart rate, as she states it feels similar to when she suffered from her heart attack seven years ago. Therapist validated patient's feelings and noted there are other ways to manage anxiety, while decreasing the heart rate which will assist patient in identifying if it is an anxiety vs heart attack. Patient is open to exploring these coping mechanisms next session. [Recommended Frequency of Visits: ____] : Recommended frequency of visits: [unfilled] [Return in ____ week(s)] : Return in [unfilled] week(s) [FreeTextEntry1] : Patient will continue to attend therapy to discuss symptoms and management of these symptoms. Patient will contact therapist if they are experiencing an increase in symptoms or difficulty in managing. Patient is scheduled for virtual session on 3/5/24 at .

## 2024-02-20 NOTE — END OF VISIT
[Duration of Psychotherapy Visit (minutes spent in synchronous communication): ____] : Duration of Psychotherapy Visit (minutes spent in synchronous communication): [unfilled] [Individual Psychotherapy for 38-52 minutes] : Individual Psychotherapy for 38 - 52 minutes [Teletherapy Service Provided] : The services provided in this session were delivered via tele-therapy [FreeTextEntry3] : [address], [address], [place] [address] [FreeTextEntry5] : [address], [place] [address]

## 2024-02-20 NOTE — REASON FOR VISIT
[Patient preference] : as per patient preference [Continuity of care] : to ensure continuity of care [Continuing, patient not seen in-person within last 12 months (provide details below)] : Telehealth services are continuing, patient not seen in-person within last 12 months.  [Telehealth (audio & video) - Individual/Group] : This visit was provided via telehealth using real-time 2-way audio visual technology. [Medical Office: ([facility])___] : The provider was located at the medical office in [unfilled]. [Home] : The patient, [unfilled], was located at home, [unfilled], at the time of the visit. [Verbal consent obtained from patient/other participant(s)] : Verbal consent for telehealth/telephonic services obtained from patient/other participant(s) [FreeTextEntry4] : 6:04pm [FreeTextEntry5] : 6:49pm [FreeTextEntry2] : 09/28/2022 [Patient] : Patient [FreeTextEntry1] : Psychotherapy follow up.

## 2024-02-21 DIAGNOSIS — F41.1 GENERALIZED ANXIETY DISORDER: ICD-10-CM

## 2024-02-21 DIAGNOSIS — F33.41 MAJOR DEPRESSIVE DISORDER, RECURRENT, IN PARTIAL REMISSION: ICD-10-CM

## 2024-02-23 NOTE — ED ADULT NURSE REASSESSMENT NOTE - NIH STROKE SCALE: 6A. MOTOR LEG, LEFT, QM
Verbal order from physician to titrate oxygen down to keep oxygen sats in high 80s-low 90s. Decreased oxygen to 4L via oxymask. (0) No drift; leg holds 30 degree position for full 5 secs

## 2024-03-05 ENCOUNTER — OUTPATIENT (OUTPATIENT)
Dept: OUTPATIENT SERVICES | Facility: HOSPITAL | Age: 54
LOS: 1 days | End: 2024-03-05
Payer: SELF-PAY

## 2024-03-05 ENCOUNTER — APPOINTMENT (OUTPATIENT)
Dept: PSYCHIATRY | Facility: CLINIC | Age: 54
End: 2024-03-05

## 2024-03-05 DIAGNOSIS — Z98.891 HISTORY OF UTERINE SCAR FROM PREVIOUS SURGERY: Chronic | ICD-10-CM

## 2024-03-05 DIAGNOSIS — F33.41 MAJOR DEPRESSIVE DISORDER, RECURRENT, IN PARTIAL REMISSION: ICD-10-CM

## 2024-03-05 DIAGNOSIS — Z98.890 OTHER SPECIFIED POSTPROCEDURAL STATES: Chronic | ICD-10-CM

## 2024-03-05 DIAGNOSIS — F41.1 GENERALIZED ANXIETY DISORDER: ICD-10-CM

## 2024-03-05 DIAGNOSIS — Z90.49 ACQUIRED ABSENCE OF OTHER SPECIFIED PARTS OF DIGESTIVE TRACT: Chronic | ICD-10-CM

## 2024-03-05 PROCEDURE — 90834 PSYTX W PT 45 MINUTES: CPT

## 2024-03-05 NOTE — END OF VISIT
[Duration of Psychotherapy Visit (minutes spent in synchronous communication): ____] : Duration of Psychotherapy Visit (minutes spent in synchronous communication): [unfilled] [Teletherapy Service Provided] : The services provided in this session were delivered via tele-therapy [Individual Psychotherapy for 38-52 minutes] : Individual Psychotherapy for 38 - 52 minutes [FreeTextEntry5] : [address], [place] [address] [FreeTextEntry3] : [address], [address], [place] [address]

## 2024-03-05 NOTE — PLAN
[FreeTextEntry2] : Treatment plan developed on 7/21/23 with previous therapist. Problem/Need I:   Domain for Problem/Need I: Mental Health and Residential.   Problem: Anxiety and depression.   Objective A: Pt will explore and process feelings, identifying two triggers of depression and anxiety and attend medication management appointments   Objective B: Pt will learn and implement two coping skills in order to reduce depression and anxiety    Problem/Need II: Domain for Problem/Need II: Residential. Problem: Housing issues. Objective A: Identify sources of financial support and discuss progress or challenges during biweekly sessions Objective B: Follow up with referrals [Motivational Interviewing] : Motivational Interviewing  [Psychoeducation] : Psychoeducation  [Supportive Therapy] : Supportive Therapy [de-identified] : Session began at 6:06pm and ended at 6:51pm. Patient was seen via Solo audio/video session. Patient reported her frustration with a coworker and how this has been impacting her home life. Therapist and patient discussed her ability to set boundaries and ensure that she keeps a work/life balance. Patient will continue to set boundaries. Patient also notes she would like to discuss her relationship with her  and find ways to work on it in the future.  [Recommended Frequency of Visits: ____] : Recommended frequency of visits: [unfilled] [FreeTextEntry1] : Patient will continue to attend therapy to discuss symptoms and management of these symptoms. Patient will contact therapist if they are experiencing an increase in symptoms or difficulty in managing. Patient is scheduled for virtual session on 3/19 at . [Return in ____ week(s)] : Return in [unfilled] week(s)

## 2024-03-05 NOTE — REASON FOR VISIT
[Patient preference] : as per patient preference [Continuing, patient not seen in-person within last 12 months (provide details below)] : Telehealth services are continuing, patient not seen in-person within last 12 months.  [Telehealth (audio & video) - Individual/Group] : This visit was provided via telehealth using real-time 2-way audio visual technology. [Medical Office: ([facility])___] : The provider was located at the medical office in [unfilled]. [Home] : The patient, [unfilled], was located at home, [unfilled], at the time of the visit. [Verbal consent obtained from patient/other participant(s)] : Verbal consent for telehealth/telephonic services obtained from patient/other participant(s) [FreeTextEntry4] : 6:06pm [FreeTextEntry2] : 09/28/22 [FreeTextEntry5] : 6:51pm [FreeTextEntry1] : Psychotherapy follow up. [Patient] : Patient

## 2024-03-06 DIAGNOSIS — F33.41 MAJOR DEPRESSIVE DISORDER, RECURRENT, IN PARTIAL REMISSION: ICD-10-CM

## 2024-03-06 DIAGNOSIS — F41.1 GENERALIZED ANXIETY DISORDER: ICD-10-CM

## 2024-03-09 NOTE — OCCUPATIONAL THERAPY INITIAL EVALUATION ADULT - MUSCLE TONE ASSESSMENT, REHAB EVAL
Heparin Infusion Initiation  Patient on heparin for:  coronary artery disease  Heparin dosing: order for weight based protocol   Recent Labs     03/08/24  1804 03/09/24  0513   * 701*   HGB 11.8 13.8   INR 1.1  --    APTT 28.5  --        Factor Xa inhibitor/LMWH use within the past 72 hours? YES  If yes, date of last administration: Rivaroxaban 2.5 mg last taken 3/8 in am  Hypertriglyceridemia (> 414 mg/dL) or hyperbilirubinemia (> 37 mg/dL) present? NO  No results for input(s): \"TRIG\" in the last 72 hours.    Invalid input(s): \"TBILI\"    Heparin to be monitored using aPTT until 72h following last factor Xa inhibitor/LMWH administration .    Have admin instructions been updated to reflect appropriate protocol? YES    Have appropriate labs been ordered for corresponding protocol? YES   *Discontinue anti-Xa lab monitoring if using aPTT protocol    Initial Dosing Weight: 38.1 kg  Starting rate:  12 unit/kg/hr    bilateral upper extremities/normal

## 2024-03-14 ENCOUNTER — APPOINTMENT (OUTPATIENT)
Dept: PSYCHIATRY | Facility: CLINIC | Age: 54
End: 2024-03-14

## 2024-03-19 ENCOUNTER — APPOINTMENT (OUTPATIENT)
Dept: PSYCHIATRY | Facility: CLINIC | Age: 54
End: 2024-03-19

## 2024-03-26 ENCOUNTER — NON-APPOINTMENT (OUTPATIENT)
Age: 54
End: 2024-03-26

## 2024-03-29 ENCOUNTER — APPOINTMENT (OUTPATIENT)
Dept: PULMONOLOGY | Facility: CLINIC | Age: 54
End: 2024-03-29

## 2024-04-01 ENCOUNTER — NON-APPOINTMENT (OUTPATIENT)
Age: 54
End: 2024-04-01

## 2024-04-08 ENCOUNTER — NON-APPOINTMENT (OUTPATIENT)
Age: 54
End: 2024-04-08

## 2024-04-09 ENCOUNTER — OUTPATIENT (OUTPATIENT)
Dept: OUTPATIENT SERVICES | Facility: HOSPITAL | Age: 54
LOS: 1 days | End: 2024-04-09

## 2024-04-09 ENCOUNTER — APPOINTMENT (OUTPATIENT)
Dept: PSYCHIATRY | Facility: CLINIC | Age: 54
End: 2024-04-09

## 2024-04-09 DIAGNOSIS — Z98.891 HISTORY OF UTERINE SCAR FROM PREVIOUS SURGERY: Chronic | ICD-10-CM

## 2024-04-09 DIAGNOSIS — Z98.890 OTHER SPECIFIED POSTPROCEDURAL STATES: Chronic | ICD-10-CM

## 2024-04-09 DIAGNOSIS — F33.41 MAJOR DEPRESSIVE DISORDER, RECURRENT, IN PARTIAL REMISSION: ICD-10-CM

## 2024-04-09 DIAGNOSIS — F41.1 GENERALIZED ANXIETY DISORDER: ICD-10-CM

## 2024-04-09 DIAGNOSIS — Z90.49 ACQUIRED ABSENCE OF OTHER SPECIFIED PARTS OF DIGESTIVE TRACT: Chronic | ICD-10-CM

## 2024-04-09 NOTE — PLAN
[Motivational Interviewing] : Motivational Interviewing  [Psychodynamic Therapy] : Psychodynamic Therapy  [Psychoeducation] : Psychoeducation  [Supportive Therapy] : Supportive Therapy [Recommended Frequency of Visits: ____] : Recommended frequency of visits: [unfilled] [Return in ____ week(s)] : Return in [unfilled] week(s) [FreeTextEntry2] : Treatment plan developed on 7/21/23 with previous therapist. Problem/Need I:   Domain for Problem/Need I: Mental Health and Residential.   Problem: Anxiety and depression.   Objective A: Pt will explore and process feelings, identifying two triggers of depression and anxiety and attend medication management appointments   Objective B: Pt will learn and implement two coping skills in order to reduce depression and anxiety    Problem/Need II: Domain for Problem/Need II: Residential. Problem: Housing issues. Objective A: Identify sources of financial support and discuss progress or challenges during biweekly sessions Objective B: Follow up with referrals [de-identified] : Session began at 6:01pm and ended at 6:35pm Patient was seen via Solo audio/video session. Patient reported she has been attempting to manage the extra stress that she has been experiencing during work and her home life. Patient stated she has been trying to manage this stress by speaking with her managers and her . Patient identified that she has been deep breathing and relaxing music to help manage this anxiety. Therapist assisted patient in identifying other coping skills to utilize.  [FreeTextEntry1] : Patient will continue to attend therapy to discuss symptoms and management of these symptoms. Patient will contact therapist if they are experiencing an increase in symptoms or difficulty in managing. Patient is scheduled for virtual session on 4/23 at 6:00pm.

## 2024-04-09 NOTE — END OF VISIT
[Duration of Psychotherapy Visit (minutes spent in synchronous communication): ____] : Duration of Psychotherapy Visit (minutes spent in synchronous communication): [unfilled] [Individual Psychotherapy for 16-37 minutes] : Individual Psychotherapy for 16-37 minutes [Teletherapy Service Provided] : The services provided in this session were delivered via tele-therapy [FreeTextEntry3] : [address], [place] [address] [FreeTextEntry5] : [address], [place] [address]

## 2024-04-09 NOTE — REASON FOR VISIT
[Patient preference] : as per patient preference [Continuing, patient not seen in-person within last 12 months (provide details below)] : Telehealth services are continuing, patient not seen in-person within last 12 months.  [Telehealth (audio & video) - Individual/Group] : This visit was provided via telehealth using real-time 2-way audio visual technology. [Medical Office: ([facility])___] : The provider was located at the medical office in [unfilled]. [Home] : The patient, [unfilled], was located at home, [unfilled], at the time of the visit. [Verbal consent obtained from patient/other participant(s)] : Verbal consent for telehealth/telephonic services obtained from patient/other participant(s) [Patient] : Patient [FreeTextEntry4] : 6:01pm [FreeTextEntry5] : 6:35pm [FreeTextEntry2] : 9/28/22 [FreeTextEntry1] : Psychotherapy follow up

## 2024-04-10 DIAGNOSIS — F33.41 MAJOR DEPRESSIVE DISORDER, RECURRENT, IN PARTIAL REMISSION: ICD-10-CM

## 2024-04-10 DIAGNOSIS — F41.1 GENERALIZED ANXIETY DISORDER: ICD-10-CM

## 2024-04-23 ENCOUNTER — APPOINTMENT (OUTPATIENT)
Dept: PSYCHIATRY | Facility: CLINIC | Age: 54
End: 2024-04-23

## 2024-04-23 ENCOUNTER — OUTPATIENT (OUTPATIENT)
Dept: OUTPATIENT SERVICES | Facility: HOSPITAL | Age: 54
LOS: 1 days | End: 2024-04-23
Payer: COMMERCIAL

## 2024-04-23 DIAGNOSIS — F33.41 MAJOR DEPRESSIVE DISORDER, RECURRENT, IN PARTIAL REMISSION: ICD-10-CM

## 2024-04-23 DIAGNOSIS — Z90.49 ACQUIRED ABSENCE OF OTHER SPECIFIED PARTS OF DIGESTIVE TRACT: Chronic | ICD-10-CM

## 2024-04-23 DIAGNOSIS — Z98.890 OTHER SPECIFIED POSTPROCEDURAL STATES: Chronic | ICD-10-CM

## 2024-04-23 DIAGNOSIS — Z98.891 HISTORY OF UTERINE SCAR FROM PREVIOUS SURGERY: Chronic | ICD-10-CM

## 2024-04-23 DIAGNOSIS — F41.1 GENERALIZED ANXIETY DISORDER: ICD-10-CM

## 2024-04-23 PROCEDURE — 90832 PSYTX W PT 30 MINUTES: CPT

## 2024-04-23 NOTE — PLAN
[FreeTextEntry2] : Treatment plan developed on 7/21/23 with previous therapist. Problem/Need I:   Domain for Problem/Need I: Mental Health and Residential.   Problem: Anxiety and depression.   Objective A: Pt will explore and process feelings, identifying two triggers of depression and anxiety and attend medication management appointments   Objective B: Pt will learn and implement two coping skills in order to reduce depression and anxiety    Problem/Need II: Domain for Problem/Need II: Residential. Problem: Housing issues. Objective A: Identify sources of financial support and discuss progress or challenges during biweekly sessions Objective B: Follow up with referrals [de-identified] : Session began at 6:05pm and ended at 6:40pm. Patient was seen via Solo audio/video session. Patient noted that she has continued to utilize the coping skills of listening to music and scribbling when frustrated at work/home. She expressed feeling a release of frustration when using these skills and will continue to utilize them. She also noted she has spoken with her  regarding her stress at home and they have resolved this.  [FreeTextEntry1] : Patient will continue to attend therapy to discuss symptoms and management of these symptoms. Patient will contact therapist if they are experiencing an increase in symptoms or difficulty in managing. Patient is scheduled for virtual session on 5/14 at .

## 2024-04-23 NOTE — REASON FOR VISIT
[FreeTextEntry4] : 6:05pm [FreeTextEntry5] : 6:40pm [FreeTextEntry2] : 9/28/22 [FreeTextEntry1] : Psychotherapy follow up.

## 2024-04-23 NOTE — END OF VISIT
[FreeTextEntry3] : [address], [place] [address] [FreeTextEntry5] : [address], [place] [address]

## 2024-04-24 DIAGNOSIS — F33.41 MAJOR DEPRESSIVE DISORDER, RECURRENT, IN PARTIAL REMISSION: ICD-10-CM

## 2024-04-24 DIAGNOSIS — F41.1 GENERALIZED ANXIETY DISORDER: ICD-10-CM

## 2024-05-06 ENCOUNTER — APPOINTMENT (OUTPATIENT)
Dept: PSYCHIATRY | Facility: CLINIC | Age: 54
End: 2024-05-06
Payer: COMMERCIAL

## 2024-05-06 ENCOUNTER — OUTPATIENT (OUTPATIENT)
Dept: OUTPATIENT SERVICES | Facility: HOSPITAL | Age: 54
LOS: 1 days | End: 2024-05-06
Payer: COMMERCIAL

## 2024-05-06 DIAGNOSIS — Z98.890 OTHER SPECIFIED POSTPROCEDURAL STATES: Chronic | ICD-10-CM

## 2024-05-06 DIAGNOSIS — F33.41 MAJOR DEPRESSIVE DISORDER, RECURRENT, IN PARTIAL REMISSION: ICD-10-CM

## 2024-05-06 DIAGNOSIS — Z98.891 HISTORY OF UTERINE SCAR FROM PREVIOUS SURGERY: Chronic | ICD-10-CM

## 2024-05-06 DIAGNOSIS — F41.1 GENERALIZED ANXIETY DISORDER: ICD-10-CM

## 2024-05-06 DIAGNOSIS — Z90.49 ACQUIRED ABSENCE OF OTHER SPECIFIED PARTS OF DIGESTIVE TRACT: Chronic | ICD-10-CM

## 2024-05-06 PROCEDURE — 99214 OFFICE O/P EST MOD 30 MIN: CPT | Mod: 95

## 2024-05-06 RX ORDER — PAROXETINE HYDROCHLORIDE 20 MG/1
20 TABLET, FILM COATED ORAL AS DIRECTED
Qty: 60 | Refills: 0 | Status: DISCONTINUED | COMMUNITY
Start: 2021-10-28 | End: 2024-05-06

## 2024-05-06 RX ORDER — PAROXETINE HYDROCHLORIDE 10 MG/1
10 TABLET, FILM COATED ORAL DAILY
Qty: 90 | Refills: 0 | Status: DISCONTINUED | COMMUNITY
Start: 2022-06-16 | End: 2024-05-06

## 2024-05-06 RX ORDER — PAROXETINE HYDROCHLORIDE 40 MG/1
40 TABLET, FILM COATED ORAL DAILY
Qty: 30 | Refills: 2 | Status: ACTIVE | COMMUNITY
Start: 2024-05-06 | End: 1900-01-01

## 2024-05-06 NOTE — SOCIAL HISTORY
[Lives with Spouse] : lives with spouse [Disabled] : disabled [] :  [None] : none [No Known Use] : no known use

## 2024-05-06 NOTE — DISCUSSION/SUMMARY
[Date of Last Physical Exam: _____] : Date of Last Physical Exam: [unfilled] [Date of Last Annual Labs: _____] : Date of Last Annual Labs: [unfilled] [Annual Review of Systems Completed?] : Annual Review of Systems Completed: Yes [Tobacco Screening Completed?] : Tobacco Screening Completed: Yes

## 2024-05-06 NOTE — REASON FOR VISIT
[Medical Office: ([facility])___] : The provider was located at the medical office in [unfilled]. [Home] : The patient, [unfilled], was located at home, [unfilled], at the time of the visit. [Verbal consent obtained from patient/other participant(s)] : Verbal consent for telehealth/telephonic services obtained from patient/other participant(s) [Patient] : Patient

## 2024-05-06 NOTE — PHYSICAL EXAM
[Average] : average [Cooperative] : cooperative [Full] : full [Clear] : clear [Overproductive] : overproductive [Linear/Goal Directed] : linear/goal directed [WNL] : within normal limits

## 2024-05-07 DIAGNOSIS — F33.41 MAJOR DEPRESSIVE DISORDER, RECURRENT, IN PARTIAL REMISSION: ICD-10-CM

## 2024-05-07 DIAGNOSIS — F41.1 GENERALIZED ANXIETY DISORDER: ICD-10-CM

## 2024-05-14 ENCOUNTER — OUTPATIENT (OUTPATIENT)
Dept: OUTPATIENT SERVICES | Facility: HOSPITAL | Age: 54
LOS: 1 days | End: 2024-05-14
Payer: COMMERCIAL

## 2024-05-14 ENCOUNTER — APPOINTMENT (OUTPATIENT)
Dept: PSYCHIATRY | Facility: CLINIC | Age: 54
End: 2024-05-14

## 2024-05-14 DIAGNOSIS — Z90.49 ACQUIRED ABSENCE OF OTHER SPECIFIED PARTS OF DIGESTIVE TRACT: Chronic | ICD-10-CM

## 2024-05-14 DIAGNOSIS — Z98.890 OTHER SPECIFIED POSTPROCEDURAL STATES: Chronic | ICD-10-CM

## 2024-05-14 DIAGNOSIS — F41.1 GENERALIZED ANXIETY DISORDER: ICD-10-CM

## 2024-05-14 DIAGNOSIS — Z98.891 HISTORY OF UTERINE SCAR FROM PREVIOUS SURGERY: Chronic | ICD-10-CM

## 2024-05-14 DIAGNOSIS — F33.41 MAJOR DEPRESSIVE DISORDER, RECURRENT, IN PARTIAL REMISSION: ICD-10-CM

## 2024-05-14 PROCEDURE — 90834 PSYTX W PT 45 MINUTES: CPT

## 2024-05-14 NOTE — REASON FOR VISIT
[Patient preference] : as per patient preference [Continuing, patient not seen in-person within last 12 months (provide details below)] : Telehealth services are continuing, patient not seen in-person within last 12 months.  [Telehealth (audio & video) - Individual/Group] : This visit was provided via telehealth using real-time 2-way audio visual technology. [Medical Office: ([facility])___] : The provider was located at the medical office in [unfilled]. [Home] : The patient, [unfilled], was located at home, [unfilled], at the time of the visit. [Verbal consent obtained from patient/other participant(s)] : Verbal consent for telehealth/telephonic services obtained from patient/other participant(s) [Patient] : Patient [FreeTextEntry4] : 6:03pm [FreeTextEntry5] : 6:50pm [FreeTextEntry2] : 9/28/22 [FreeTextEntry1] : Psychotherapy follow up.

## 2024-05-14 NOTE — PLAN
[Motivational Interviewing] : Motivational Interviewing  [Psychoeducation] : Psychoeducation  [Supportive Therapy] : Supportive Therapy [Recommended Frequency of Visits: ____] : Recommended frequency of visits: [unfilled] [Return in ____ week(s)] : Return in [unfilled] week(s) [FreeTextEntry2] : Treatment plan developed on 7/21/23 with previous therapist. Problem/Need I:   Domain for Problem/Need I: Mental Health and Residential.   Problem: Anxiety and depression.   Objective A: Pt will explore and process feelings, identifying two triggers of depression and anxiety and attend medication management appointments   Objective B: Pt will learn and implement two coping skills in order to reduce depression and anxiety    Problem/Need II: Domain for Problem/Need II: Residential. Problem: Housing issues. Objective A: Identify sources of financial support and discuss progress or challenges during biweekly sessions Objective B: Follow up with referrals [de-identified] : Session began at 6:03pm and ended at 6:50pm. Patient was seen via Solo audio/video session. Patient expressed that she is feeling much happier since beginning her new job, speaking with her  and son, and feeling less stressed financially. Therapist and patient explored ways in which she is managing her anxiety and patient expressed ways in which she has grown as a person. Patient will try mindfulness techniques to assist with decreasing anxiety and due to her progress is agreeable to monthly sessions.  [FreeTextEntry1] : Patient will continue to attend therapy to discuss symptoms and management of these symptoms. Patient will contact therapist if they are experiencing an increase in symptoms or difficulty in managing. Patient is scheduled for virtual session on 6/11/24 at .

## 2024-05-14 NOTE — END OF VISIT
[Duration of Psychotherapy Visit (minutes spent in synchronous communication): ____] : Duration of Psychotherapy Visit (minutes spent in synchronous communication): [unfilled] [Individual Psychotherapy for 38-52 minutes] : Individual Psychotherapy for 38 - 52 minutes [Teletherapy Service Provided] : The services provided in this session were delivered via tele-therapy [FreeTextEntry3] : [address], [place] [address] [FreeTextEntry5] : [address], [place] [address]

## 2024-05-15 DIAGNOSIS — F33.41 MAJOR DEPRESSIVE DISORDER, RECURRENT, IN PARTIAL REMISSION: ICD-10-CM

## 2024-05-15 DIAGNOSIS — F41.1 GENERALIZED ANXIETY DISORDER: ICD-10-CM

## 2024-06-11 ENCOUNTER — OUTPATIENT (OUTPATIENT)
Dept: OUTPATIENT SERVICES | Facility: HOSPITAL | Age: 54
LOS: 1 days | End: 2024-06-11
Payer: COMMERCIAL

## 2024-06-11 ENCOUNTER — APPOINTMENT (OUTPATIENT)
Dept: PSYCHIATRY | Facility: CLINIC | Age: 54
End: 2024-06-11

## 2024-06-11 DIAGNOSIS — Z98.890 OTHER SPECIFIED POSTPROCEDURAL STATES: Chronic | ICD-10-CM

## 2024-06-11 DIAGNOSIS — F33.1 MAJOR DEPRESSIVE DISORDER, RECURRENT, MODERATE: ICD-10-CM

## 2024-06-11 DIAGNOSIS — F41.0 PANIC DISORDER [EPISODIC PAROXYSMAL ANXIETY]: ICD-10-CM

## 2024-06-11 DIAGNOSIS — F33.41 MAJOR DEPRESSIVE DISORDER, RECURRENT, IN PARTIAL REMISSION: ICD-10-CM

## 2024-06-11 DIAGNOSIS — Z90.49 ACQUIRED ABSENCE OF OTHER SPECIFIED PARTS OF DIGESTIVE TRACT: Chronic | ICD-10-CM

## 2024-06-11 DIAGNOSIS — Z98.891 HISTORY OF UTERINE SCAR FROM PREVIOUS SURGERY: Chronic | ICD-10-CM

## 2024-06-11 DIAGNOSIS — F41.1 GENERALIZED ANXIETY DISORDER: ICD-10-CM

## 2024-06-11 PROCEDURE — 90834 PSYTX W PT 45 MINUTES: CPT

## 2024-06-12 DIAGNOSIS — F33.41 MAJOR DEPRESSIVE DISORDER, RECURRENT, IN PARTIAL REMISSION: ICD-10-CM

## 2024-06-12 DIAGNOSIS — F41.1 GENERALIZED ANXIETY DISORDER: ICD-10-CM

## 2024-06-12 NOTE — END OF VISIT
[Individual Psychotherapy for 16-37 minutes] : Individual Psychotherapy for 16-37 minutes [Teletherapy Service Provided] : The services provided in this session were delivered via tele-therapy [Duration of Psychotherapy Visit (minutes spent in synchronous communication): ____] : Duration of Psychotherapy Visit (minutes spent in synchronous communication): [unfilled] [FreeTextEntry3] : [address], [place] [address] [FreeTextEntry5] : [address], [place] [address]

## 2024-06-12 NOTE — REASON FOR VISIT
[Patient preference] : as per patient preference [Continuing, patient not seen in-person within last 12 months (provide details below)] : Telehealth services are continuing, patient not seen in-person within last 12 months.  [Medical Office: ([facility])___] : The provider was located at the medical office in [unfilled]. [Telehealth (audio & video) - Individual/Group] : This visit was provided via telehealth using real-time 2-way audio visual technology. [Home] : The patient, [unfilled], was located at home, [unfilled], at the time of the visit. [Verbal consent obtained from patient/other participant(s)] : Verbal consent for telehealth/telephonic services obtained from patient/other participant(s) [Patient] : Patient [FreeTextEntry5] : 6:44pm [FreeTextEntry4] : 6:02pm [FreeTextEntry2] : 9/28/22 [FreeTextEntry1] : Psychotherapy follow up.

## 2024-06-12 NOTE — PLAN
[Interpersonal Therapy] : Interpersonal Therapy  [Motivational Interviewing] : Motivational Interviewing  [Supportive Therapy] : Supportive Therapy [Recommended Frequency of Visits: ____] : Recommended frequency of visits: [unfilled] [Return in ____ week(s)] : Return in [unfilled] week(s) [FreeTextEntry2] : Treatment plan developed on 7/21/23 with previous therapist. Problem/Need I:   Domain for Problem/Need I: Mental Health and Residential.   Problem: Anxiety and depression.   Objective A: Pt will explore and process feelings, identifying two triggers of depression and anxiety and attend medication management appointments   Objective B: Pt will learn and implement two coping skills in order to reduce depression and anxiety    Problem/Need II: Domain for Problem/Need II: Residential. Problem: Housing issues. Objective A: Identify sources of financial support and discuss progress or challenges during biweekly sessions Objective B: Follow up with referrals [de-identified] : Session began at 6:02pm and ended at 6:44pm. Patient was seen via Solo audio/video session. Patient expressed feeling happy about her son's graduation but noted he has been expressing embarrassment or need for independence from his parents. Patient and therapist explored ways in which she has been helping him gain independence as he enters college. She cited work has improved, having some anxiety at times but has discovered reading and color by numbers is beneficial to her.  [FreeTextEntry1] : Patient will continue to attend therapy to discuss symptoms and management of these symptoms. Patient will contact therapist if they are experiencing an increase in symptoms or difficulty in managing. Patient is scheduled for virtual session on 7/9/24 at .

## 2024-06-30 NOTE — REASON FOR VISIT
Moved pt belongings from intake to Ed room 1    [Patient preference] : as per patient preference [Continuing, patient not seen in-person within last 12 months (provide details below)] : Telehealth services are continuing, patient not seen in-person within last 12 months.  [Telehealth (audio & video) - Individual/Group] : This visit was provided via telehealth using real-time 2-way audio visual technology. [Other Location: e.g. Home (Enter Location, City,State)___] : The provider was located at [unfilled]. [Home] : The patient, [unfilled], was located at home, [unfilled], at the time of the visit. [Verbal consent obtained from patient/other participant(s)] : Verbal consent for telehealth/telephonic services obtained from patient/other participant(s) [Patient] : Patient [FreeTextEntry4] : 10:30 AM [FreeTextEntry5] : 11:15 AM [FreeTextEntry3] : Patient preference [FreeTextEntry1] : Anxiety and Depression

## 2024-07-09 ENCOUNTER — APPOINTMENT (OUTPATIENT)
Dept: PSYCHIATRY | Facility: CLINIC | Age: 54
End: 2024-07-09

## 2024-07-10 ENCOUNTER — APPOINTMENT (OUTPATIENT)
Dept: VASCULAR SURGERY | Facility: CLINIC | Age: 54
End: 2024-07-10
Payer: COMMERCIAL

## 2024-07-10 VITALS — SYSTOLIC BLOOD PRESSURE: 176 MMHG | DIASTOLIC BLOOD PRESSURE: 79 MMHG | HEART RATE: 98 BPM

## 2024-07-10 VITALS — BODY MASS INDEX: 47.09 KG/M2 | HEIGHT: 66 IN | WEIGHT: 293 LBS

## 2024-07-10 DIAGNOSIS — I65.23 OCCLUSION AND STENOSIS OF BILATERAL CAROTID ARTERIES: ICD-10-CM

## 2024-07-10 PROCEDURE — 99202 OFFICE O/P NEW SF 15 MIN: CPT

## 2024-07-10 PROCEDURE — G2211 COMPLEX E/M VISIT ADD ON: CPT | Mod: NC,1L

## 2024-07-10 PROCEDURE — 93880 EXTRACRANIAL BILAT STUDY: CPT

## 2024-07-22 ENCOUNTER — APPOINTMENT (OUTPATIENT)
Dept: PSYCHIATRY | Facility: CLINIC | Age: 54
End: 2024-07-22
Payer: COMMERCIAL

## 2024-07-22 DIAGNOSIS — F41.1 GENERALIZED ANXIETY DISORDER: ICD-10-CM

## 2024-07-22 DIAGNOSIS — F33.41 MAJOR DEPRESSIVE DISORDER, RECURRENT, IN PARTIAL REMISSION: ICD-10-CM

## 2024-07-22 PROCEDURE — 99214 OFFICE O/P EST MOD 30 MIN: CPT | Mod: 95

## 2024-07-22 NOTE — REASON FOR VISIT
[Medical Office: ([facility])___] : The provider was located at the medical office in [unfilled]. [Home] : The patient, [unfilled], was located at home, [unfilled], at the time of the visit. [Verbal consent obtained from patient/other participant(s)] : Verbal consent for telehealth/telephonic services obtained from patient/other participant(s) [Patient] : Patient

## 2024-07-22 NOTE — PHYSICAL EXAM
[Average] : average [Cooperative] : cooperative [Full] : full [Clear] : clear [Linear/Goal Directed] : linear/goal directed [WNL] : within normal limits

## 2024-07-23 ENCOUNTER — APPOINTMENT (OUTPATIENT)
Dept: PSYCHIATRY | Facility: CLINIC | Age: 54
End: 2024-07-23

## 2024-07-24 NOTE — END OF VISIT
[FreeTextEntry3] : [address], [place] [address]   [FreeTextEntry5] : [address], [place] [address]

## 2024-07-24 NOTE — END OF VISIT
[FreeTextEntry3] : [address], [place] [address]   [FreeTextEntry5] : [address], [place] [address]

## 2024-07-24 NOTE — PLAN
[FreeTextEntry2] : Treatment plan revised with patient during session: Mental Health and Interpersonal Relationships Problem: Emelina identified that she would like to address how she communicates with others when she is anxious and manage her expectations of people. Objective A: Emelina will be able to learn three ways to communicate effectively with friends and family and utilize at least 75% of the time. Objective B: Emelina will report feeling more positive about self and communication during individual sessions, at least 75% of the time.  Physical Health Problem: Patient will maintain overall physical health and well being, and will address medical needs as necessary. Objective A: Emelina will attend all medical appointments, take medications as prescribed and follow up with providers as necessary. [de-identified] : Session began at 6:35pm and ended at 7:05pm, and they were seen via Solo audio/video session. She explored her thoughts and feelings about coming off medication, which was discussed with Dr. Millard during their previous session. She expressed she has anxiety and has been overthinking about this thought and is unsure about it at the moment; she would like to continue medication at this time. She noted she has an overwhelming sense to complete tasks as they arise, attributes this to her anxiety but understands that it prevents her anxiety by being on top of the situations. [FreeTextEntry1] : Patient will continue to attend therapy to discuss symptoms and management of these symptoms. Patient will contact therapist if they are experiencing an increase in symptoms or difficulty in managing. Next virtual session is scheduled for 8/20 at .

## 2024-07-24 NOTE — END OF VISIT
[FreeTextEntry3] : [address], [place] [address]   [FreeTextEntry5] : [address], [place] [address]

## 2024-07-24 NOTE — END OF VISIT
[FreeTextEntry3] : [address], [place] [address]   [FreeTextEntry5] : [address], [place] [address]

## 2024-07-24 NOTE — PLAN
[FreeTextEntry2] : Treatment plan revised with patient during session: Mental Health and Interpersonal Relationships Problem: Emelina identified that she would like to address how she communicates with others when she is anxious and manage her expectations of people. Objective A: Emelina will be able to learn three ways to communicate effectively with friends and family and utilize at least 75% of the time. Objective B: Emelina will report feeling more positive about self and communication during individual sessions, at least 75% of the time.  Physical Health Problem: Patient will maintain overall physical health and well being, and will address medical needs as necessary. Objective A: Emelina will attend all medical appointments, take medications as prescribed and follow up with providers as necessary. [de-identified] : Session began at 6:35pm and ended at 7:05pm, and they were seen via Solo audio/video session. She explored her thoughts and feelings about coming off medication, which was discussed with Dr. Millard during their previous session. She expressed she has anxiety and has been overthinking about this thought and is unsure about it at the moment; she would like to continue medication at this time. She noted she has an overwhelming sense to complete tasks as they arise, attributes this to her anxiety but understands that it prevents her anxiety by being on top of the situations. [FreeTextEntry1] : Patient will continue to attend therapy to discuss symptoms and management of these symptoms. Patient will contact therapist if they are experiencing an increase in symptoms or difficulty in managing. Next virtual session is scheduled for 8/20 at .

## 2024-07-24 NOTE — PLAN
[FreeTextEntry2] : Treatment plan revised with patient during session: Mental Health and Interpersonal Relationships Problem: Emelina identified that she would like to address how she communicates with others when she is anxious and manage her expectations of people. Objective A: Emelina will be able to learn three ways to communicate effectively with friends and family and utilize at least 75% of the time. Objective B: Emelina will report feeling more positive about self and communication during individual sessions, at least 75% of the time.  Physical Health Problem: Patient will maintain overall physical health and well being, and will address medical needs as necessary. Objective A: Emelina will attend all medical appointments, take medications as prescribed and follow up with providers as necessary. [de-identified] : Session began at 6:35pm and ended at 7:05pm, and they were seen via Solo audio/video session. She explored her thoughts and feelings about coming off medication, which was discussed with Dr. Millard during their previous session. She expressed she has anxiety and has been overthinking about this thought and is unsure about it at the moment; she would like to continue medication at this time. She noted she has an overwhelming sense to complete tasks as they arise, attributes this to her anxiety but understands that it prevents her anxiety by being on top of the situations. [FreeTextEntry1] : Patient will continue to attend therapy to discuss symptoms and management of these symptoms. Patient will contact therapist if they are experiencing an increase in symptoms or difficulty in managing. Next virtual session is scheduled for 8/20 at .

## 2024-07-24 NOTE — END OF VISIT
[FreeTextEntry3] : [address], [place] [address]   [FreeTextEntry5] : [address], [place] [address]

## 2024-07-24 NOTE — PLAN
[FreeTextEntry2] : Treatment plan revised with patient during session: Mental Health and Interpersonal Relationships Problem: Emelina identified that she would like to address how she communicates with others when she is anxious and manage her expectations of people. Objective A: Emelina will be able to learn three ways to communicate effectively with friends and family and utilize at least 75% of the time. Objective B: Emelina will report feeling more positive about self and communication during individual sessions, at least 75% of the time.  Physical Health Problem: Patient will maintain overall physical health and well being, and will address medical needs as necessary. Objective A: Emelina will attend all medical appointments, take medications as prescribed and follow up with providers as necessary. [de-identified] : Session began at 6:35pm and ended at 7:05pm, and they were seen via Solo audio/video session. She explored her thoughts and feelings about coming off medication, which was discussed with Dr. Millard during their previous session. She expressed she has anxiety and has been overthinking about this thought and is unsure about it at the moment; she would like to continue medication at this time. She noted she has an overwhelming sense to complete tasks as they arise, attributes this to her anxiety but understands that it prevents her anxiety by being on top of the situations. [FreeTextEntry1] : Patient will continue to attend therapy to discuss symptoms and management of these symptoms. Patient will contact therapist if they are experiencing an increase in symptoms or difficulty in managing. Next virtual session is scheduled for 8/20 at .

## 2024-07-24 NOTE — PLAN
[FreeTextEntry2] : Treatment plan revised with patient during session: Mental Health and Interpersonal Relationships Problem: Emelina identified that she would like to address how she communicates with others when she is anxious and manage her expectations of people. Objective A: Emelina will be able to learn three ways to communicate effectively with friends and family and utilize at least 75% of the time. Objective B: Emelina will report feeling more positive about self and communication during individual sessions, at least 75% of the time.  Physical Health Problem: Patient will maintain overall physical health and well being, and will address medical needs as necessary. Objective A: Emelina will attend all medical appointments, take medications as prescribed and follow up with providers as necessary. [de-identified] : Session began at 6:35pm and ended at 7:05pm, and they were seen via Solo audio/video session. She explored her thoughts and feelings about coming off medication, which was discussed with Dr. Millard during their previous session. She expressed she has anxiety and has been overthinking about this thought and is unsure about it at the moment; she would like to continue medication at this time. She noted she has an overwhelming sense to complete tasks as they arise, attributes this to her anxiety but understands that it prevents her anxiety by being on top of the situations. [FreeTextEntry1] : Patient will continue to attend therapy to discuss symptoms and management of these symptoms. Patient will contact therapist if they are experiencing an increase in symptoms or difficulty in managing. Next virtual session is scheduled for 8/20 at .

## 2024-08-01 NOTE — DISCUSSION/SUMMARY
[A change in level of care is needed as evidenced by:] : A change in level of care is needed as evidenced by: [Supervisor (if needed)] : Supervisor [Mental Health] : Mental Health [Interpersonal Relationships] : Interpersonal Relationships [Physical Health] : Physical Health [Initial] : Initial [every ___ months] : every [unfilled] months [every ___ weeks] : every [unfilled] weeks [Plan Revision] : Plan Revision [Attained - As evidenced by] : Attained - As evidenced by: [FreeTextEntry2] : 7/23/25 [FreeTextEntry3] : 10/28/21 [FreeTextEntry7] : **Treatment plan is late as patient cancelled previous session.** [FreeTextEntry8] : None reported [de-identified] : Emelina reports her financial situation is stable and she is no longer concerned about losing her residence.  [de-identified] : She has begun a new job and therefore is financially stable. [de-identified] : Emelina is no longer concerned with her financial and residential stability.  [de-identified] : Emelina will report feeling more positive about self and communication during individual sessions, at least 75% of the time. [de-identified] : 7/24/24 [FreeTextEntry1] : Patient will maintain overall physical health and well being, and will address medical needs as necessary. [FreeTextEntry4] : REVISED: "Manage my expectations and anxiety without putting it on others." Previous: "Depression and anxiety due to concerns about my housing issues" [de-identified] : Emelina will attend all medical appointments, take medications as prescribed and follow up with providers as necessary. [de-identified] : 7/24/24 [FreeTextEntry5] : Therapist will utilize MI and psychoeducation to assist patient in reaching their goals. [de-identified] : Dr. Millard [de-identified] : Concepción Vila LMSW [de-identified] : If patient is unable to perform activities of daily living and/or expresses suicidal ideation, a higher level of care will be considered. [de-identified] : When patient no longer requires individual psychotherapy and medication in order to perform at baseline, the patient will be discharged. [de-identified] : Not clinically indicated.

## 2024-08-01 NOTE — DISCUSSION/SUMMARY
[A change in level of care is needed as evidenced by:] : A change in level of care is needed as evidenced by: [Supervisor (if needed)] : Supervisor [Mental Health] : Mental Health [Interpersonal Relationships] : Interpersonal Relationships [Physical Health] : Physical Health [Initial] : Initial [every ___ months] : every [unfilled] months [every ___ weeks] : every [unfilled] weeks [Plan Revision] : Plan Revision [Attained - As evidenced by] : Attained - As evidenced by: [FreeTextEntry2] : 7/23/25 [FreeTextEntry3] : 10/28/21 [FreeTextEntry7] : **Treatment plan is late as patient cancelled previous session.** [FreeTextEntry8] : None reported [de-identified] : Emelina reports her financial situation is stable and she is no longer concerned about losing her residence.  [de-identified] : She has begun a new job and therefore is financially stable. [de-identified] : Emelina is no longer concerned with her financial and residential stability.  [de-identified] : Emelina will report feeling more positive about self and communication during individual sessions, at least 75% of the time. [de-identified] : 7/24/24 [FreeTextEntry1] : Patient will maintain overall physical health and well being, and will address medical needs as necessary. [FreeTextEntry4] : REVISED: "Manage my expectations and anxiety without putting it on others." Previous: "Depression and anxiety due to concerns about my housing issues" [de-identified] : Emelina will attend all medical appointments, take medications as prescribed and follow up with providers as necessary. [de-identified] : 7/24/24 [FreeTextEntry5] : Therapist will utilize MI and psychoeducation to assist patient in reaching their goals. [de-identified] : Dr. Millard [de-identified] : Concepción Vila LMSW [de-identified] : If patient is unable to perform activities of daily living and/or expresses suicidal ideation, a higher level of care will be considered. [de-identified] : When patient no longer requires individual psychotherapy and medication in order to perform at baseline, the patient will be discharged. [de-identified] : Not clinically indicated.

## 2024-08-01 NOTE — REASON FOR VISIT
[FreeTextEntry4] : 6:35pm [FreeTextEntry2] : 9/28/22 [FreeTextEntry1] : Psychotherapy follow up [FreeTextEntry3] : home  [FreeTextEntry5] : [address]

## 2024-08-01 NOTE — REASON FOR VISIT
[FreeTextEntry4] : 6:35pm [FreeTextEntry2] : 9/28/22 [FreeTextEntry1] : Psychotherapy follow up [FreeTextEntry3] : home  [FreeTextEntry5] : [address]

## 2024-08-01 NOTE — DISCUSSION/SUMMARY
[A change in level of care is needed as evidenced by:] : A change in level of care is needed as evidenced by: [Supervisor (if needed)] : Supervisor [Mental Health] : Mental Health [Interpersonal Relationships] : Interpersonal Relationships [Physical Health] : Physical Health [Initial] : Initial [every ___ months] : every [unfilled] months [every ___ weeks] : every [unfilled] weeks [Plan Revision] : Plan Revision [Attained - As evidenced by] : Attained - As evidenced by: [FreeTextEntry2] : 7/23/25 [FreeTextEntry3] : 10/28/21 [FreeTextEntry7] : **Treatment plan is late as patient cancelled previous session.** [FreeTextEntry8] : None reported [de-identified] : Emelina reports her financial situation is stable and she is no longer concerned about losing her residence.  [de-identified] : She has begun a new job and therefore is financially stable. [de-identified] : Emelina is no longer concerned with her financial and residential stability.  [de-identified] : Emelina will report feeling more positive about self and communication during individual sessions, at least 75% of the time. [de-identified] : 7/24/24 [FreeTextEntry1] : Patient will maintain overall physical health and well being, and will address medical needs as necessary. [FreeTextEntry4] : REVISED: "Manage my expectations and anxiety without putting it on others." Previous: "Depression and anxiety due to concerns about my housing issues" [de-identified] : Emelina will attend all medical appointments, take medications as prescribed and follow up with providers as necessary. [de-identified] : 7/24/24 [FreeTextEntry5] : Therapist will utilize MI and psychoeducation to assist patient in reaching their goals. [de-identified] : Dr. Millard [de-identified] : Concepción Vila LMSW [de-identified] : If patient is unable to perform activities of daily living and/or expresses suicidal ideation, a higher level of care will be considered. [de-identified] : When patient no longer requires individual psychotherapy and medication in order to perform at baseline, the patient will be discharged. [de-identified] : Not clinically indicated.

## 2024-08-01 NOTE — DISCUSSION/SUMMARY
[A change in level of care is needed as evidenced by:] : A change in level of care is needed as evidenced by: [Supervisor (if needed)] : Supervisor [Mental Health] : Mental Health [Interpersonal Relationships] : Interpersonal Relationships [Physical Health] : Physical Health [Initial] : Initial [every ___ months] : every [unfilled] months [every ___ weeks] : every [unfilled] weeks [Plan Revision] : Plan Revision [Attained - As evidenced by] : Attained - As evidenced by: [FreeTextEntry2] : 7/23/25 [FreeTextEntry3] : 10/28/21 [FreeTextEntry7] : **Treatment plan is late as patient cancelled previous session.** [FreeTextEntry8] : None reported [de-identified] : Emelina reports her financial situation is stable and she is no longer concerned about losing her residence.  [de-identified] : She has begun a new job and therefore is financially stable. [de-identified] : Emelina is no longer concerned with her financial and residential stability.  [de-identified] : Emelina will report feeling more positive about self and communication during individual sessions, at least 75% of the time. [de-identified] : 7/24/24 [FreeTextEntry1] : Patient will maintain overall physical health and well being, and will address medical needs as necessary. [FreeTextEntry4] : REVISED: "Manage my expectations and anxiety without putting it on others." Previous: "Depression and anxiety due to concerns about my housing issues" [de-identified] : Emelina will attend all medical appointments, take medications as prescribed and follow up with providers as necessary. [de-identified] : 7/24/24 [FreeTextEntry5] : Therapist will utilize MI and psychoeducation to assist patient in reaching their goals. [de-identified] : Dr. Millard [de-identified] : Concepción Vila LMSW [de-identified] : If patient is unable to perform activities of daily living and/or expresses suicidal ideation, a higher level of care will be considered. [de-identified] : When patient no longer requires individual psychotherapy and medication in order to perform at baseline, the patient will be discharged. [de-identified] : Not clinically indicated.

## 2024-08-01 NOTE — REASON FOR VISIT
[FreeTextEntry4] : 6:35pm [FreeTextEntry2] : 9/28/22 [FreeTextEntry1] : Psychotherapy follow up [FreeTextEntry3] : home  [FreeTextEntry5] : [address]

## 2024-08-01 NOTE — DISCUSSION/SUMMARY
[A change in level of care is needed as evidenced by:] : A change in level of care is needed as evidenced by: [Supervisor (if needed)] : Supervisor [Mental Health] : Mental Health [Interpersonal Relationships] : Interpersonal Relationships [Physical Health] : Physical Health [Initial] : Initial [every ___ months] : every [unfilled] months [every ___ weeks] : every [unfilled] weeks [Plan Revision] : Plan Revision [Attained - As evidenced by] : Attained - As evidenced by: [FreeTextEntry2] : 7/23/25 [FreeTextEntry3] : 10/28/21 [FreeTextEntry7] : **Treatment plan is late as patient cancelled previous session.** [FreeTextEntry8] : None reported [de-identified] : Emelina reports her financial situation is stable and she is no longer concerned about losing her residence.  [de-identified] : She has begun a new job and therefore is financially stable. [de-identified] : Emelina is no longer concerned with her financial and residential stability.  [de-identified] : Emelina will report feeling more positive about self and communication during individual sessions, at least 75% of the time. [de-identified] : 7/24/24 [FreeTextEntry1] : Patient will maintain overall physical health and well being, and will address medical needs as necessary. [FreeTextEntry4] : REVISED: "Manage my expectations and anxiety without putting it on others." Previous: "Depression and anxiety due to concerns about my housing issues" [de-identified] : Emelina will attend all medical appointments, take medications as prescribed and follow up with providers as necessary. [de-identified] : 7/24/24 [FreeTextEntry5] : Therapist will utilize MI and psychoeducation to assist patient in reaching their goals. [de-identified] : Dr. Millard [de-identified] : Concepción Vila LMSW [de-identified] : If patient is unable to perform activities of daily living and/or expresses suicidal ideation, a higher level of care will be considered. [de-identified] : When patient no longer requires individual psychotherapy and medication in order to perform at baseline, the patient will be discharged. [de-identified] : Not clinically indicated.

## 2024-08-01 NOTE — REASON FOR VISIT
[FreeTextEntry4] : 6:35pm [FreeTextEntry2] : 9/28/22 [FreeTextEntry1] : Psychotherapy follow up [FreeTextEntry3] : home  [FreeTextEntry5] : [address]

## 2024-08-06 ENCOUNTER — NON-APPOINTMENT (OUTPATIENT)
Age: 54
End: 2024-08-06

## 2024-08-08 ENCOUNTER — APPOINTMENT (OUTPATIENT)
Dept: ORTHOPEDIC SURGERY | Facility: CLINIC | Age: 54
End: 2024-08-08

## 2024-08-08 PROBLEM — S93.601A RIGHT FOOT SPRAIN, INITIAL ENCOUNTER: Status: ACTIVE | Noted: 2024-08-08

## 2024-08-08 PROBLEM — I82.409 DVT (DEEP VENOUS THROMBOSIS): Status: ACTIVE | Noted: 2024-08-08

## 2024-08-08 PROBLEM — M19.071 ARTHRITIS OF ANKLE, RIGHT, DEGENERATIVE: Status: ACTIVE | Noted: 2024-08-08

## 2024-08-08 PROCEDURE — 73610 X-RAY EXAM OF ANKLE: CPT | Mod: RT

## 2024-08-08 PROCEDURE — 73630 X-RAY EXAM OF FOOT: CPT | Mod: RT

## 2024-08-08 PROCEDURE — 99203 OFFICE O/P NEW LOW 30 MIN: CPT | Mod: 25

## 2024-08-08 NOTE — HISTORY OF PRESENT ILLNESS
[de-identified] : Ms. Gutierrez is a 54 year old female who presents to the walk in for evaluation of R foot and ankle pain that has been occurring for the past 2 weeks and worsening over the past 3 days.  She denies any injury and is unsure what caused the pain.  She states the pain is better in the morning and as the day progresses it gets worse.  She states her foot and ankle are swollen and she has increased pain with weightbearing.  She went to urgent care yesterday where she was given an air cast which does somewhat help but she is still unable to bear weight. She is not taking anything for pain and states she has decreased ROM with moving her ankle.

## 2024-08-08 NOTE — IMAGING
[de-identified] : R ankle:  + tenderness over anterior aspect, + tenderness over ATFL/PTFL with edema over lateral aspect of ankle, + mild calf tenderness and mild edema, no erythema or warmth, decreased ROM with dorsiflexion, NV intact.  R foot:  + tenderness over cuboid bone, pain with dorsiflexion and plantar flexion, tenderness over dorsum of foot, NV intact.  X-Ray R ankle:  degenerative changes noted.  X-ray R foot: heel spur noted.

## 2024-08-08 NOTE — ASSESSMENT
[FreeTextEntry1] : 54 year old female with R ankle OA, possible R ankle foot sprain, R/O DVT.  Although patient is taking Xarelto due to the edema and calf tenderness I am ordering a R lower extremity venous doppler for further assessment and she will contact me for the results.  In regards to her R ankle/foot I have placed her in a tall cam walker boot to use while weight bearing and she will f/u in 3 weeks to see Brittani Alvarado for reassessment.  She is unable to use anti inflammatory medications due to blood thinners and will try warm Epsom salts for pain and inflammation.

## 2024-08-20 ENCOUNTER — APPOINTMENT (OUTPATIENT)
Dept: PSYCHIATRY | Facility: CLINIC | Age: 54
End: 2024-08-20

## 2024-08-20 ENCOUNTER — OUTPATIENT (OUTPATIENT)
Dept: OUTPATIENT SERVICES | Facility: HOSPITAL | Age: 54
LOS: 1 days | End: 2024-08-20
Payer: COMMERCIAL

## 2024-08-20 DIAGNOSIS — Z98.891 HISTORY OF UTERINE SCAR FROM PREVIOUS SURGERY: Chronic | ICD-10-CM

## 2024-08-20 DIAGNOSIS — F41.1 GENERALIZED ANXIETY DISORDER: ICD-10-CM

## 2024-08-20 DIAGNOSIS — Z98.890 OTHER SPECIFIED POSTPROCEDURAL STATES: Chronic | ICD-10-CM

## 2024-08-20 DIAGNOSIS — F33.41 MAJOR DEPRESSIVE DISORDER, RECURRENT, IN PARTIAL REMISSION: ICD-10-CM

## 2024-08-20 DIAGNOSIS — Z90.49 ACQUIRED ABSENCE OF OTHER SPECIFIED PARTS OF DIGESTIVE TRACT: Chronic | ICD-10-CM

## 2024-08-20 PROCEDURE — 90832 PSYTX W PT 30 MINUTES: CPT

## 2024-08-20 NOTE — END OF VISIT
[Duration of Psychotherapy Visit (minutes spent in synchronous communication): ____] : Duration of Psychotherapy Visit (minutes spent in synchronous communication): [unfilled] [Individual Psychotherapy for 16-37 minutes] : Individual Psychotherapy for 16-37 minutes [Teletherapy Service Provided] : The services provided in this session were delivered via tele-therapy [FreeTextEntry3] : [address], [place] [address] [FreeTextEntry5] : [address], [place] [address]

## 2024-08-20 NOTE — REASON FOR VISIT
[Patient preference] : as per patient preference [Continuing, patient not seen in-person within last 12 months (provide details below)] : Telehealth services are continuing, patient not seen in-person within last 12 months.  [Telehealth (audio & video) - Individual/Group] : This visit was provided via telehealth using real-time 2-way audio visual technology. [Medical Office: ([facility])___] : The provider was located at the medical office in [unfilled]. [Home] : The patient, [unfilled], was located at home, [unfilled], at the time of the visit. [Verbal consent obtained from patient/other participant(s)] : Verbal consent for telehealth/telephonic services obtained from patient/other participant(s) [FreeTextEntry4] : 6:01pm [FreeTextEntry5] : 6:36pm [FreeTextEntry2] : 9/28/22 [Patient] : Patient [FreeTextEntry1] : Psychotherapy follow up

## 2024-08-20 NOTE — REASON FOR VISIT
[Patient preference] : as per patient preference [Continuing, patient not seen in-person within last 12 months (provide details below)] : Telehealth services are continuing, patient not seen in-person within last 12 months.  [Telehealth (audio & video) - Individual/Group] : This visit was provided via telehealth using real-time 2-way audio visual technology. [Medical Office: ([facility])___] : The provider was located at the medical office in [unfilled]. [Home] : The patient, [unfilled], was located at home, [unfilled], at the time of the visit. [Verbal consent obtained from patient/other participant(s)] : Verbal consent for telehealth/telephonic services obtained from patient/other participant(s) [FreeTextEntry4] : 6:01pm [FreeTextEntry5] : 6:36pm [FreeTextEntry2] : 9/28/22 [Patient] : Patient [FreeTextEntry1] : Psychotherapy follow up

## 2024-08-20 NOTE — END OF VISIT
[Duration of Psychotherapy Visit (minutes spent in synchronous communication): ____] : Duration of Psychotherapy Visit (minutes spent in synchronous communication): [unfilled] [Individual Psychotherapy for 16-37 minutes] : Individual Psychotherapy for 16-37 minutes [Teletherapy Service Provided] : The services provided in this session were delivered via tele-therapy [FreeTextEntry3] : [address], [place] [address] [FreeTextEntry5] : [address], [place] [address]

## 2024-08-20 NOTE — PLAN
[FreeTextEntry2] : Mental Health and Interpersonal Relationships Problem: Emelina identified that she would like to address how she communicates with others when she is anxious and manage her expectations of people. Objective A: Emelina will be able to learn three ways to communicate effectively with friends and family and utilize at least 75% of the time. Objective B: Emelina will report feeling more positive about self and communication during individual sessions, at least 75% of the time.  Physical Health Problem: Patient will maintain overall physical health and well being, and will address medical needs as necessary. Objective A: Emelina will attend all medical appointments, take medications as prescribed and follow up with providers as necessary. [Dialectical Behavior Therapy] : Dialectical Behavior Therapy  [Motivational Interviewing] : Motivational Interviewing  [Supportive Therapy] : Supportive Therapy [de-identified] : Session began at 6:01pm and ended at 6:36pm, and they were seen via Solo audio/video session.  She reported she has been anxious as her son is beginning college shortly and she worries about how he will manage. She has been working on her communication with her family and has begun to sit with them to ensure events are on their calendars as a family. She stated she is happy with this procedure as it takes some responsibility off her and therefore some of the stress. Therapist commended her on the progress she has made and the utilization of her coping skills for her anxiety. [Recommended Frequency of Visits: ____] : Recommended frequency of visits: [unfilled] [Return in ____ week(s)] : Return in [unfilled] week(s) [FreeTextEntry1] : Patient will continue to attend therapy to discuss symptoms and management of these symptoms. Patient will contact therapist if they are experiencing an increase in symptoms or difficulty in managing. Next virtual session is scheduled for 9/24/24 at .

## 2024-08-20 NOTE — PLAN
[FreeTextEntry2] : Mental Health and Interpersonal Relationships Problem: Emelina identified that she would like to address how she communicates with others when she is anxious and manage her expectations of people. Objective A: Emelina will be able to learn three ways to communicate effectively with friends and family and utilize at least 75% of the time. Objective B: Emelina will report feeling more positive about self and communication during individual sessions, at least 75% of the time.  Physical Health Problem: Patient will maintain overall physical health and well being, and will address medical needs as necessary. Objective A: Emelina will attend all medical appointments, take medications as prescribed and follow up with providers as necessary. [Dialectical Behavior Therapy] : Dialectical Behavior Therapy  [Motivational Interviewing] : Motivational Interviewing  [Supportive Therapy] : Supportive Therapy [de-identified] : Session began at 6:01pm and ended at 6:36pm, and they were seen via Solo audio/video session.  She reported she has been anxious as her son is beginning college shortly and she worries about how he will manage. She has been working on her communication with her family and has begun to sit with them to ensure events are on their calendars as a family. She stated she is happy with this procedure as it takes some responsibility off her and therefore some of the stress. Therapist commended her on the progress she has made and the utilization of her coping skills for her anxiety. [Recommended Frequency of Visits: ____] : Recommended frequency of visits: [unfilled] [Return in ____ week(s)] : Return in [unfilled] week(s) [FreeTextEntry1] : Patient will continue to attend therapy to discuss symptoms and management of these symptoms. Patient will contact therapist if they are experiencing an increase in symptoms or difficulty in managing. Next virtual session is scheduled for 9/24/24 at .

## 2024-08-21 DIAGNOSIS — F41.1 GENERALIZED ANXIETY DISORDER: ICD-10-CM

## 2024-08-21 DIAGNOSIS — F33.41 MAJOR DEPRESSIVE DISORDER, RECURRENT, IN PARTIAL REMISSION: ICD-10-CM

## 2024-08-29 ENCOUNTER — APPOINTMENT (OUTPATIENT)
Dept: ORTHOPEDIC SURGERY | Facility: CLINIC | Age: 54
End: 2024-08-29

## 2024-08-30 ENCOUNTER — NON-APPOINTMENT (OUTPATIENT)
Age: 54
End: 2024-08-30

## 2024-09-24 ENCOUNTER — OUTPATIENT (OUTPATIENT)
Dept: OUTPATIENT SERVICES | Facility: HOSPITAL | Age: 54
LOS: 1 days | End: 2024-09-24
Payer: COMMERCIAL

## 2024-09-24 ENCOUNTER — APPOINTMENT (OUTPATIENT)
Dept: PSYCHIATRY | Facility: CLINIC | Age: 54
End: 2024-09-24

## 2024-09-24 DIAGNOSIS — Z98.891 HISTORY OF UTERINE SCAR FROM PREVIOUS SURGERY: Chronic | ICD-10-CM

## 2024-09-24 DIAGNOSIS — F33.41 MAJOR DEPRESSIVE DISORDER, RECURRENT, IN PARTIAL REMISSION: ICD-10-CM

## 2024-09-24 DIAGNOSIS — Z90.49 ACQUIRED ABSENCE OF OTHER SPECIFIED PARTS OF DIGESTIVE TRACT: Chronic | ICD-10-CM

## 2024-09-24 DIAGNOSIS — Z98.890 OTHER SPECIFIED POSTPROCEDURAL STATES: Chronic | ICD-10-CM

## 2024-09-24 DIAGNOSIS — F41.1 GENERALIZED ANXIETY DISORDER: ICD-10-CM

## 2024-09-24 PROCEDURE — 90834 PSYTX W PT 45 MINUTES: CPT

## 2024-09-25 DIAGNOSIS — F41.1 GENERALIZED ANXIETY DISORDER: ICD-10-CM

## 2024-09-25 DIAGNOSIS — F33.41 MAJOR DEPRESSIVE DISORDER, RECURRENT, IN PARTIAL REMISSION: ICD-10-CM

## 2024-09-25 NOTE — END OF VISIT
[FreeTextEntry3] : [address], [place] [address] [FreeTextEntry5] : [address], [place] [address]

## 2024-09-25 NOTE — END OF VISIT
[FreeTextEntry3] : [address], [place] [address] [FreeTextEntry5] : [address], [place] [address]

## 2024-09-25 NOTE — PLAN
[FreeTextEntry2] : Mental Health and Interpersonal Relationships Problem: Emelina identified that she would like to address how she communicates with others when she is anxious and manage her expectations of people. Objective A: Emelina will be able to learn three ways to communicate effectively with friends and family and utilize at least 75% of the time. Objective B: Emelina will report feeling more positive about self and communication during individual sessions, at least 75% of the time.  Physical Health Problem: Patient will maintain overall physical health and well being, and will address medical needs as necessary. Objective A: Emelina will attend all medical appointments, take medications as prescribed and follow up with providers as necessary. [de-identified] : Session began at 6:07pm and ended at 6:52pm, and they were seen via Solo audio/video session. She noted she has been trying to manage a lot of relationship stressors within her family, reporting she has been effectively communicating using "I feel..." statements, however it is not being received well by her family. She stated she is wanting her  to engage in therapy to have an outlet for himself and hopefully learn to effectively communicate with others. She expressed since she feels unheard at times, it contributes to her anxiety further. She will continue to focus on becoming an effective communicator. [FreeTextEntry1] : Patient will continue to attend therapy to discuss symptoms and management of these symptoms. Patient will contact therapist if they are experiencing an increase in symptoms or difficulty in managing. Next virtual session is scheduled for 10/15 at .

## 2024-09-25 NOTE — PLAN
[FreeTextEntry2] : Mental Health and Interpersonal Relationships Problem: Emelina identified that she would like to address how she communicates with others when she is anxious and manage her expectations of people. Objective A: Emelina will be able to learn three ways to communicate effectively with friends and family and utilize at least 75% of the time. Objective B: Emelina will report feeling more positive about self and communication during individual sessions, at least 75% of the time.  Physical Health Problem: Patient will maintain overall physical health and well being, and will address medical needs as necessary. Objective A: Emelina will attend all medical appointments, take medications as prescribed and follow up with providers as necessary. [de-identified] : Session began at 6:07pm and ended at 6:52pm, and they were seen via Solo audio/video session. She noted she has been trying to manage a lot of relationship stressors within her family, reporting she has been effectively communicating using "I feel..." statements, however it is not being received well by her family. She stated she is wanting her  to engage in therapy to have an outlet for himself and hopefully learn to effectively communicate with others. She expressed since she feels unheard at times, it contributes to her anxiety further. She will continue to focus on becoming an effective communicator. [FreeTextEntry1] : Patient will continue to attend therapy to discuss symptoms and management of these symptoms. Patient will contact therapist if they are experiencing an increase in symptoms or difficulty in managing. Next virtual session is scheduled for 10/15 at .

## 2024-09-25 NOTE — REASON FOR VISIT
[FreeTextEntry4] : 6:07pm [FreeTextEntry5] : 6:52pm [FreeTextEntry2] : 9/28/22 [FreeTextEntry1] : Psychotherapy follow up

## 2024-10-02 RX ORDER — LORAZEPAM 0.5 MG/1
0.5 TABLET ORAL DAILY
Qty: 5 | Refills: 0 | Status: ACTIVE | COMMUNITY
Start: 2024-10-02 | End: 1900-01-01

## 2024-10-07 ENCOUNTER — EMERGENCY (EMERGENCY)
Facility: HOSPITAL | Age: 54
LOS: 0 days | Discharge: LEFT BEFORE DISCHARGE | End: 2024-10-07
Attending: EMERGENCY MEDICINE
Payer: COMMERCIAL

## 2024-10-07 VITALS
HEIGHT: 66 IN | TEMPERATURE: 98 F | HEART RATE: 84 BPM | OXYGEN SATURATION: 95 % | DIASTOLIC BLOOD PRESSURE: 79 MMHG | SYSTOLIC BLOOD PRESSURE: 127 MMHG | WEIGHT: 293 LBS | RESPIRATION RATE: 18 BRPM

## 2024-10-07 DIAGNOSIS — Z90.49 ACQUIRED ABSENCE OF OTHER SPECIFIED PARTS OF DIGESTIVE TRACT: Chronic | ICD-10-CM

## 2024-10-07 DIAGNOSIS — Z98.890 OTHER SPECIFIED POSTPROCEDURAL STATES: Chronic | ICD-10-CM

## 2024-10-07 DIAGNOSIS — Z98.891 HISTORY OF UTERINE SCAR FROM PREVIOUS SURGERY: Chronic | ICD-10-CM

## 2024-10-07 LAB
ALBUMIN SERPL ELPH-MCNC: 4.3 G/DL — SIGNIFICANT CHANGE UP (ref 3.5–5.2)
ALP SERPL-CCNC: 129 U/L — HIGH (ref 30–115)
ALT FLD-CCNC: 24 U/L — SIGNIFICANT CHANGE UP (ref 0–41)
ANION GAP SERPL CALC-SCNC: 11 MMOL/L — SIGNIFICANT CHANGE UP (ref 7–14)
AST SERPL-CCNC: 17 U/L — SIGNIFICANT CHANGE UP (ref 0–41)
BASOPHILS # BLD AUTO: 0.04 K/UL — SIGNIFICANT CHANGE UP (ref 0–0.2)
BASOPHILS NFR BLD AUTO: 0.5 % — SIGNIFICANT CHANGE UP (ref 0–1)
BILIRUB SERPL-MCNC: 0.7 MG/DL — SIGNIFICANT CHANGE UP (ref 0.2–1.2)
BUN SERPL-MCNC: 11 MG/DL — SIGNIFICANT CHANGE UP (ref 10–20)
CALCIUM SERPL-MCNC: 9.3 MG/DL — SIGNIFICANT CHANGE UP (ref 8.4–10.5)
CHLORIDE SERPL-SCNC: 100 MMOL/L — SIGNIFICANT CHANGE UP (ref 98–110)
CO2 SERPL-SCNC: 27 MMOL/L — SIGNIFICANT CHANGE UP (ref 17–32)
CREAT SERPL-MCNC: 0.8 MG/DL — SIGNIFICANT CHANGE UP (ref 0.7–1.5)
EGFR: 86 ML/MIN/1.73M2 — SIGNIFICANT CHANGE UP
EOSINOPHIL # BLD AUTO: 0.2 K/UL — SIGNIFICANT CHANGE UP (ref 0–0.7)
EOSINOPHIL NFR BLD AUTO: 2.6 % — SIGNIFICANT CHANGE UP (ref 0–8)
GLUCOSE SERPL-MCNC: 227 MG/DL — HIGH (ref 70–99)
HCT VFR BLD CALC: 35.6 % — LOW (ref 37–47)
HGB BLD-MCNC: 11.4 G/DL — LOW (ref 12–16)
IMM GRANULOCYTES NFR BLD AUTO: 0.5 % — HIGH (ref 0.1–0.3)
INR BLD: 1.15 RATIO — SIGNIFICANT CHANGE UP (ref 0.65–1.3)
LYMPHOCYTES # BLD AUTO: 1.06 K/UL — LOW (ref 1.2–3.4)
LYMPHOCYTES # BLD AUTO: 14 % — LOW (ref 20.5–51.1)
MCHC RBC-ENTMCNC: 30.2 PG — SIGNIFICANT CHANGE UP (ref 27–31)
MCHC RBC-ENTMCNC: 32 G/DL — SIGNIFICANT CHANGE UP (ref 32–37)
MCV RBC AUTO: 94.4 FL — SIGNIFICANT CHANGE UP (ref 81–99)
MONOCYTES # BLD AUTO: 0.52 K/UL — SIGNIFICANT CHANGE UP (ref 0.1–0.6)
MONOCYTES NFR BLD AUTO: 6.9 % — SIGNIFICANT CHANGE UP (ref 1.7–9.3)
NEUTROPHILS # BLD AUTO: 5.71 K/UL — SIGNIFICANT CHANGE UP (ref 1.4–6.5)
NEUTROPHILS NFR BLD AUTO: 75.5 % — HIGH (ref 42.2–75.2)
NRBC # BLD: 0 /100 WBCS — SIGNIFICANT CHANGE UP (ref 0–0)
PLATELET # BLD AUTO: 259 K/UL — SIGNIFICANT CHANGE UP (ref 130–400)
PMV BLD: 9.6 FL — SIGNIFICANT CHANGE UP (ref 7.4–10.4)
POTASSIUM SERPL-MCNC: 4.5 MMOL/L — SIGNIFICANT CHANGE UP (ref 3.5–5)
POTASSIUM SERPL-SCNC: 4.5 MMOL/L — SIGNIFICANT CHANGE UP (ref 3.5–5)
PROT SERPL-MCNC: 6.9 G/DL — SIGNIFICANT CHANGE UP (ref 6–8)
PROTHROM AB SERPL-ACNC: 13.1 SEC — HIGH (ref 9.95–12.87)
RBC # BLD: 3.77 M/UL — LOW (ref 4.2–5.4)
RBC # FLD: 15.2 % — HIGH (ref 11.5–14.5)
SODIUM SERPL-SCNC: 138 MMOL/L — SIGNIFICANT CHANGE UP (ref 135–146)
WBC # BLD: 7.57 K/UL — SIGNIFICANT CHANGE UP (ref 4.8–10.8)
WBC # FLD AUTO: 7.57 K/UL — SIGNIFICANT CHANGE UP (ref 4.8–10.8)

## 2024-10-07 PROCEDURE — 36000 PLACE NEEDLE IN VEIN: CPT

## 2024-10-07 PROCEDURE — 70450 CT HEAD/BRAIN W/O DYE: CPT | Mod: 26,MC

## 2024-10-07 PROCEDURE — 36415 COLL VENOUS BLD VENIPUNCTURE: CPT

## 2024-10-07 PROCEDURE — 71045 X-RAY EXAM CHEST 1 VIEW: CPT | Mod: 26

## 2024-10-07 PROCEDURE — 71045 X-RAY EXAM CHEST 1 VIEW: CPT

## 2024-10-07 PROCEDURE — 93005 ELECTROCARDIOGRAM TRACING: CPT

## 2024-10-07 PROCEDURE — 99285 EMERGENCY DEPT VISIT HI MDM: CPT | Mod: 25

## 2024-10-07 PROCEDURE — 99285 EMERGENCY DEPT VISIT HI MDM: CPT

## 2024-10-07 PROCEDURE — 80053 COMPREHEN METABOLIC PANEL: CPT

## 2024-10-07 PROCEDURE — 85025 COMPLETE CBC W/AUTO DIFF WBC: CPT

## 2024-10-07 PROCEDURE — 99284 EMERGENCY DEPT VISIT MOD MDM: CPT

## 2024-10-07 PROCEDURE — 82962 GLUCOSE BLOOD TEST: CPT

## 2024-10-07 PROCEDURE — 93010 ELECTROCARDIOGRAM REPORT: CPT

## 2024-10-07 PROCEDURE — 85610 PROTHROMBIN TIME: CPT

## 2024-10-07 PROCEDURE — 70450 CT HEAD/BRAIN W/O DYE: CPT | Mod: MC

## 2024-10-07 NOTE — ED PROVIDER NOTE - ATTENDING CONTRIBUTION TO CARE
53 yo woman w/ hx of AF on coumadin here s/p syncope and head trauma  pt in and out of AF today and felt lightheaded this afternoon  sat down and then slid out of chair and woke up on ground  Doesn't know if hit head but has posterior head pain  no cp, sob, cornelius    ambulatory after syncope  currently, asymptmoatic  no neck pain    wd/wn  nad  nc/at  no midline ttp  rrr s1s2 wnl  cta b/l  nt/nd + BS  AAO x 3  from X 4  strength 5/5 X 4  gait stable    53 yo woman w/ minor head trauma on coumadin  NCHCT, labs, ECG may require admission  I have personally seen and examined this patient. I have fully participated in the care of this patient. I have made amendments to the documentation where appropriate and otherwise agree with the history, physical exam, and plan as documented by the Resident.

## 2024-10-07 NOTE — ED PROVIDER NOTE - NSFOLLOWUPINSTRUCTIONS_ED_ALL_ED_FT
Falls    Your risk of falling increases as you grow older. That's because getting older can make it harder to walk steadily and keep your balance. Also, the effects of falls are more serious in older people. If you have fallen in the past, you are at higher risk of falling again.    Several things can increase your risk of a fall, including:  - Illness(es)  - A change in the medicines you take  - An unsafe or unfamiliar setting (for example, a room with rugs or furniture that might trip you, or an area you don't know well)    Is there anything I can do on my own?    Yes. To help keep from falling, you can:    - Make your home safer:   To avoid falling at home, get rid of things that might make you trip or slip. This might include furniture, electrical cords, clutter, and loose rugs. Keep your home well-lit so that you can easily see where you are going. Avoid storing things in high places so you don't have to reach or climb.    - Wear sturdy shoes that fit well:  Walking around in bare feet, or only socks, can also increase your risk of falling.    - Use a cane, walker, and other safety devices: If your doctor recommends that you use a cane or walker, be sure that it's the right size and you know how to use it. There are other devices that might help you avoid falling, too. These include grab bars or a sturdy seat for the shower, non-slip bath mats, and hand rails or treads for the stairs (to prevent slipping).    If you worry that you could fall, there are also alarm buttons that let you call for help if you fall and can't get up.    What should I do if I fall?  If you fall, see your doctor right away, even if you aren't hurt. Your doctor can try to figure out what caused you to fall, and how likely you are to fall again. He or she will do an exam and talk to you about your health problems, medicines, and activities. Then he or she can suggest things you can do to avoid falling again.    Many older people have a hard time recovering after a fall. Doing things to prevent falling can help you to protect your health and independence.

## 2024-10-07 NOTE — ED PROVIDER NOTE - OBJECTIVE STATEMENT
54F with PMHx of DM, CVA, MI s/p 5 stents, A-fib on Plavix presenting to ED s/p syncope with (+)HS. Patient states she was experiencing runs of A-fib in the morning at home (indicated by her Apple watch), then syncopized.  Episode witnessed by  who stated patient was sitting in a chair, then syncope caused her to slip out the chair and onto the floor to hit her head. Patient currently complaining of pain to the posterior aspect of head, otherwise back to baseline.

## 2024-10-07 NOTE — CONSULT NOTE ADULT - SUBJECTIVE AND OBJECTIVE BOX
TRAUMA ACTIVATION LEVEL:  CODE / ALERT  / CONSULT  ACTIVATED BY: EMS**  /  ED**  INTUBATED: YES** / NO**      MECHANISM OF INJURY:   [] Blunt     [] MVC	  [x] Fall	  [] Pedestrian Struck	  [] Motorcycle     [] Assault     [] Bicycle collision    [] Sports injury    [] Penetrating    [] Gun Shot Wound      [] Stab Wound    GCS: 15 	E: 4	V: 5	M: 6    HPI:    54yF w/ PMHx of Diabetes, CVA approx 5 years ago, MI s/p stenting x5 on plavix, bilateral carotid stenosis s/p b/l carotid endarterectomy, AFib on Eliquis seen as a Trauma Alert s/p fall out of her desk chair and hitting her head (+HT, +LOC, +AC).  Trauma assessment in ED: ABCs intact , GCS 15 , AAOx3.    Patient states that she was sitting in her chair, had chest pain, was told by her apple watch that she was in AFib, and then had syncopal episode. She then slid out of the chair onto the floor and hit the back of her head on the floor.     Fatigue: How much time during the previous 4 weeks did you feel tired?   All or most of the time [   ] Yes (1pt)    [ x ] No  (0pts)  Resistance: Do you have any difficulty walking up 10 steps alone without resting and without aids? [   ] Yes (1pt)    [ x ] No  (0pts)  Ambulation: Do you have any difficulty walking several hundred yards alone without aids? [   ] Yes (1pt)    [ x ] No  (0pts)  Illness: how many illnesses do you have out of list of 11 total? [   ] 5 or more (1pt) [ x ] < 5 (0pts)  Loss of weight: Have you had weight loss of 5% or more? [   ] Yes (1pt)    [ x ] No  (0pts)    Total Score: 0    Score 1-2: Consult medical comangement  Score 3-4: Consult Geriatric service   Score 5: Consult Palliative service [phone]    Marino Enamorado G, Roque MENDES, Gopi BALES, Yasmeen B. Frality: toward a clinical definition. J AM Med Dir Assoc. 2008; 9 (2): 71-72  Corona JE, Simin TK, Almanzar DK. A simple frailty questionnaire (FRAIL) predicts outcomes in middle aged  Americans. J Nutr Health Aging. 2012; 16 (7): 601-608    PAST MEDICAL & SURGICAL HISTORY:  MI (myocardial infarction)  s/p 4 stents (most recent )      Afib      DM (diabetes mellitus)      Hypertension      TIA (transient ischemic attack)  2- 2020 and 2020      Carotid stenosis, bilateral      CAD (coronary artery disease)  MI-2016      Obesity      JOCELYNE on CPAP      Depression  denies suicidal ideas      H/O blurred vision  left --TIA      H/O  section      History of cholecystectomy      H/O endarterectomy  LT-2020      H/O hernia repair      History of temporal artery biopsy  2020          Allergies    No Known Allergies    Intolerances        Home Medications:  atorvastatin 80 mg oral tablet: 1 tab(s) orally once a day (2023 23:54)  clopidogrel 75 mg oral tablet: 1 tab(s) orally once a day (2023 09:41)  Lantus 100 units/mL subcutaneous solution: 80 application subcutaneous once a day (2023 23:51)  LORazepam 1 mg oral tablet: 1 tab(s) orally once a day (at bedtime) (2023 23:51)  metFORMIN 1000 mg oral tablet: 1 tab(s) orally 2 times a day (2023 23:54)  Ozempic 8 mg/3 mL (2 mg dose) subcutaneous solution: 2 milligram(s) subcutaneously once a week (2023 23:54)  PARoxetine 40 mg oral tablet: 1 tab(s) orally once a day (at bedtime) (2023 23:52)  pioglitazone 30 mg oral tablet: 1 tab(s) orally once a day (2023 23:54)      ROS: 10-system review is otherwise negative except HPI above.      Primary Survey:    A - airway intact  B - bilateral breath sounds and good chest rise  C - palpable pulses in all extremities  D - GCS 15 on arrival, COMER  Exposure obtained    Vital Signs Last 24 Hrs  T(C): 36.6 (07 Oct 2024 15:23), Max: 36.6 (07 Oct 2024 15:23)  T(F): 97.8 (07 Oct 2024 15:23), Max: 97.8 (07 Oct 2024 15:23)  HR: 84 (07 Oct 2024 15:23) (84 - 84)  BP: 127/79 (07 Oct 2024 15:23) (127/79 - 127/79)  BP(mean): --  RR: 18 (07 Oct 2024 15:23) (18 - 18)  SpO2: 95% (07 Oct 2024 15:23) (95% - 95%)    Parameters below as of 07 Oct 2024 15:23  Patient On (Oxygen Delivery Method): room air        Secondary Survey:   General: NAD  HEENT: Normocephalic, atraumatic, EOMI, PEERLA. no scalp lacerations, no cephalohematoma.  Neck: Soft, midline trachea. no c-spine tenderness  Chest: No chest wall tenderness, no subcutaneous emphysema   Cardiac: S1, S2, RRR  Respiratory: Bilateral breath sounds, clear and equal bilaterally  Abdomen: Soft, non-distended, non-tender, no rebound, no guarding.  Groin: Normal appearing, pelvis stable   Ext:  Moving b/l upper and lower extremities. Palpable Radial b/l UE, b/l DP palpable in LE.   Back: No T/L/S spine tenderness, No palpable runoff/stepoff/deformity  Rectal: normal gluteal clench    FAST: Negative      Labs:  CAPILLARY BLOOD GLUCOSE      POCT Blood Glucose.: 241 mg/dL (07 Oct 2024 15:38)                          11.4   7.57  )-----------( 259      ( 07 Oct 2024 15:42 )             35.6       Auto Neutrophil %: 75.5 % (10-07-24 @ 15:42)  Auto Immature Granulocyte %: 0.5 % (10-07-24 @ 15:42)    10-07    138  |  100  |  11  ----------------------------<  227[H]  4.5   |  27  |  0.8      Calcium: 9.3 mg/dL (10-07-24 @ 15:42)      LFTs:             6.9  | 0.7  | 17       ------------------[129     ( 07 Oct 2024 15:42 )  4.3  | x    | 24          Lipase:x      Amylase:x             Coags:     13.10  ----< 1.15    ( 07 Oct 2024 15:42 )     x                   Urinalysis Basic - ( 07 Oct 2024 15:42 )    Color: x / Appearance: x / SG: x / pH: x  Gluc: 227 mg/dL / Ketone: x  / Bili: x / Urobili: x   Blood: x / Protein: x / Nitrite: x   Leuk Esterase: x / RBC: x / WBC x   Sq Epi: x / Non Sq Epi: x / Bacteria: x                RADIOLOGY & ADDITIONAL STUDIES:  ---------------------------------------------------------------------------------------    ASSESSMENT:  54y Female  w/ PMHx of CAD, MI, DM, CVA, Afib on plavix and eliquis seen as a trauma alert s/p syncopal fall out of her chair, with complaint of posterior scalp pain. No external signs of trauma. Trauma assessment in ED: ABCs intact , GCS 15 , AAOx3,  COMER.     Injuries identified:   -  no traumatic injuries identified  - pelvic xrays pending  -     PLAN:   - CT Head and C-spine negative for intracranial and c-spine injury, ok to remove C-collar  - CXR negative  - Recommend Pelvic xray, patient wishes to leave AMA - would be important for r/o pelvic injury per trauma protocol  - If pelvic xray negative, cleared from trauma perspective  - if admitted, will perform TTs 24-48 hours post admission    Patient seen and examined and plan of care discussed with my attending, Dr. Letty Stallings MD  PGY2 Trauma Surgery  --------------------------------------------------------------------------------------  10-07-24 @ 18:37    TRAUMA SENIOR SPECTRA: [id number]  TRAUMA TEAM SPECTRA: [id number]

## 2024-10-07 NOTE — ED PROVIDER NOTE - PHYSICAL EXAMINATION
Physical Exam: VS reviewed.   Constitutional: Patient is well appearing, in no distress.   NECK: No midline tenderness  CARD: S1S2 RRR, no murmurs appreciated.  RESP: Normal work of breathing, no tachypnea, no retractions or distress. Lungs CTAB, no w/r/c.   ABD: Soft, NT/ND, no guarding.   MSK: Moving all extremities well.  Neuro: Awake, alert, oriented. Answering questions appropriately.   Psych: Cooperative, appropriate

## 2024-10-07 NOTE — ED ADULT TRIAGE NOTE - CHIEF COMPLAINT QUOTE
Pt presents via EMS for witness syncope. Pt c/o head pain.  + LOC + HI  + AC. HX: afib. EKG completed in triage. trauma alert activated in triage

## 2024-10-07 NOTE — ED PROVIDER NOTE - PATIENT PORTAL LINK FT
You can access the FollowMyHealth Patient Portal offered by [facility] by registering at the following website: [email or web address]. By joining [facility]’s FollowMyHealth portal, you will also be able to view your health information using other applications (apps) compatible with our system.

## 2024-10-07 NOTE — ED PROVIDER NOTE - CLINICAL SUMMARY MEDICAL DECISION MAKING FREE TEXT BOX
Patient signed out to me resting comfortably.  No syncope in the ED.  Patient states she feels fine would prefer to go home.  Has an appointment with Dr. traore the cardiologist tomorrow at 945.    I had extensive discussion of Risks/Alternatives/Benefits of pursuing further medical evaluation and/or care with patient and any available family/friends; patient still electing to leave against medical advice. Patient is awake, alert, oriented and demonstrates full capacity and insight into illness. Patient aware and encouraged to return immediately to ED or nearest ED if patient decides to change mind regarding care or if patient experiences any new, worsening, or concerning symptoms. Patient signed out to me resting comfortably.  No syncope in the ED.  Patient states she feels fine would prefer to go home.  Has an appointment with Dr. traore the cardiologist tomorrow at 945.    I had extensive discussion of Risks/Alternatives/Benefits of pursuing further medical evaluation and/or care with patient and any available family/friends; patient still electing to leave against medical advice. Patient is awake, alert, oriented and demonstrates full capacity and insight into illness. Patient aware and encouraged to return immediately to ED or nearest ED if patient decides to change mind regarding care or if patient experiences any new, worsening, or concerning symptoms..

## 2024-10-15 ENCOUNTER — OUTPATIENT (OUTPATIENT)
Dept: OUTPATIENT SERVICES | Facility: HOSPITAL | Age: 54
LOS: 1 days | End: 2024-10-15
Payer: COMMERCIAL

## 2024-10-15 ENCOUNTER — APPOINTMENT (OUTPATIENT)
Dept: PSYCHIATRY | Facility: CLINIC | Age: 54
End: 2024-10-15

## 2024-10-15 DIAGNOSIS — F33.41 MAJOR DEPRESSIVE DISORDER, RECURRENT, IN PARTIAL REMISSION: ICD-10-CM

## 2024-10-15 DIAGNOSIS — Z98.890 OTHER SPECIFIED POSTPROCEDURAL STATES: Chronic | ICD-10-CM

## 2024-10-15 DIAGNOSIS — Z90.49 ACQUIRED ABSENCE OF OTHER SPECIFIED PARTS OF DIGESTIVE TRACT: Chronic | ICD-10-CM

## 2024-10-15 DIAGNOSIS — F41.1 GENERALIZED ANXIETY DISORDER: ICD-10-CM

## 2024-10-15 PROCEDURE — 90832 PSYTX W PT 30 MINUTES: CPT

## 2024-10-16 DIAGNOSIS — F41.1 GENERALIZED ANXIETY DISORDER: ICD-10-CM

## 2024-10-16 DIAGNOSIS — F33.41 MAJOR DEPRESSIVE DISORDER, RECURRENT, IN PARTIAL REMISSION: ICD-10-CM

## 2024-10-28 ENCOUNTER — OUTPATIENT (OUTPATIENT)
Dept: OUTPATIENT SERVICES | Facility: HOSPITAL | Age: 54
LOS: 1 days | End: 2024-10-28
Payer: COMMERCIAL

## 2024-10-28 ENCOUNTER — APPOINTMENT (OUTPATIENT)
Dept: PSYCHIATRY | Facility: CLINIC | Age: 54
End: 2024-10-28
Payer: COMMERCIAL

## 2024-10-28 DIAGNOSIS — F41.1 GENERALIZED ANXIETY DISORDER: ICD-10-CM

## 2024-10-28 DIAGNOSIS — F33.41 MAJOR DEPRESSIVE DISORDER, RECURRENT, IN PARTIAL REMISSION: ICD-10-CM

## 2024-10-28 DIAGNOSIS — F33.1 MAJOR DEPRESSIVE DISORDER, RECURRENT, MODERATE: ICD-10-CM

## 2024-10-28 DIAGNOSIS — Z98.890 OTHER SPECIFIED POSTPROCEDURAL STATES: Chronic | ICD-10-CM

## 2024-10-28 DIAGNOSIS — Z98.891 HISTORY OF UTERINE SCAR FROM PREVIOUS SURGERY: Chronic | ICD-10-CM

## 2024-10-28 DIAGNOSIS — Z90.49 ACQUIRED ABSENCE OF OTHER SPECIFIED PARTS OF DIGESTIVE TRACT: Chronic | ICD-10-CM

## 2024-10-28 PROCEDURE — 99214 OFFICE O/P EST MOD 30 MIN: CPT | Mod: 95

## 2024-10-29 DIAGNOSIS — F41.1 GENERALIZED ANXIETY DISORDER: ICD-10-CM

## 2024-10-29 DIAGNOSIS — F33.41 MAJOR DEPRESSIVE DISORDER, RECURRENT, IN PARTIAL REMISSION: ICD-10-CM

## 2024-11-12 ENCOUNTER — OUTPATIENT (OUTPATIENT)
Dept: OUTPATIENT SERVICES | Facility: HOSPITAL | Age: 54
LOS: 1 days | End: 2024-11-12
Payer: COMMERCIAL

## 2024-11-12 ENCOUNTER — APPOINTMENT (OUTPATIENT)
Dept: PSYCHIATRY | Facility: CLINIC | Age: 54
End: 2024-11-12

## 2024-11-12 DIAGNOSIS — Z98.890 OTHER SPECIFIED POSTPROCEDURAL STATES: Chronic | ICD-10-CM

## 2024-11-12 DIAGNOSIS — F41.1 GENERALIZED ANXIETY DISORDER: ICD-10-CM

## 2024-11-12 DIAGNOSIS — F33.41 MAJOR DEPRESSIVE DISORDER, RECURRENT, IN PARTIAL REMISSION: ICD-10-CM

## 2024-11-12 DIAGNOSIS — Z90.49 ACQUIRED ABSENCE OF OTHER SPECIFIED PARTS OF DIGESTIVE TRACT: Chronic | ICD-10-CM

## 2024-11-12 DIAGNOSIS — Z98.891 HISTORY OF UTERINE SCAR FROM PREVIOUS SURGERY: Chronic | ICD-10-CM

## 2024-11-12 PROCEDURE — 90832 PSYTX W PT 30 MINUTES: CPT

## 2024-11-13 DIAGNOSIS — F33.41 MAJOR DEPRESSIVE DISORDER, RECURRENT, IN PARTIAL REMISSION: ICD-10-CM

## 2024-11-13 DIAGNOSIS — F41.1 GENERALIZED ANXIETY DISORDER: ICD-10-CM

## 2024-12-09 ENCOUNTER — APPOINTMENT (OUTPATIENT)
Dept: PSYCHIATRY | Facility: CLINIC | Age: 54
End: 2024-12-09
Payer: COMMERCIAL

## 2024-12-09 ENCOUNTER — OUTPATIENT (OUTPATIENT)
Dept: OUTPATIENT SERVICES | Facility: HOSPITAL | Age: 54
LOS: 1 days | End: 2024-12-09
Payer: COMMERCIAL

## 2024-12-09 DIAGNOSIS — Z98.891 HISTORY OF UTERINE SCAR FROM PREVIOUS SURGERY: Chronic | ICD-10-CM

## 2024-12-09 DIAGNOSIS — Z98.890 OTHER SPECIFIED POSTPROCEDURAL STATES: Chronic | ICD-10-CM

## 2024-12-09 DIAGNOSIS — F33.41 MAJOR DEPRESSIVE DISORDER, RECURRENT, IN PARTIAL REMISSION: ICD-10-CM

## 2024-12-09 DIAGNOSIS — F41.1 GENERALIZED ANXIETY DISORDER: ICD-10-CM

## 2024-12-09 DIAGNOSIS — Z90.49 ACQUIRED ABSENCE OF OTHER SPECIFIED PARTS OF DIGESTIVE TRACT: Chronic | ICD-10-CM

## 2024-12-09 PROCEDURE — 99214 OFFICE O/P EST MOD 30 MIN: CPT | Mod: 95

## 2024-12-10 ENCOUNTER — OUTPATIENT (OUTPATIENT)
Dept: OUTPATIENT SERVICES | Facility: HOSPITAL | Age: 54
LOS: 1 days | End: 2024-12-10
Payer: COMMERCIAL

## 2024-12-10 ENCOUNTER — APPOINTMENT (OUTPATIENT)
Dept: PSYCHIATRY | Facility: CLINIC | Age: 54
End: 2024-12-10

## 2024-12-10 DIAGNOSIS — F41.1 GENERALIZED ANXIETY DISORDER: ICD-10-CM

## 2024-12-10 DIAGNOSIS — Z90.49 ACQUIRED ABSENCE OF OTHER SPECIFIED PARTS OF DIGESTIVE TRACT: Chronic | ICD-10-CM

## 2024-12-10 DIAGNOSIS — F33.41 MAJOR DEPRESSIVE DISORDER, RECURRENT, IN PARTIAL REMISSION: ICD-10-CM

## 2024-12-10 DIAGNOSIS — Z98.890 OTHER SPECIFIED POSTPROCEDURAL STATES: Chronic | ICD-10-CM

## 2024-12-10 DIAGNOSIS — Z98.891 HISTORY OF UTERINE SCAR FROM PREVIOUS SURGERY: Chronic | ICD-10-CM

## 2024-12-10 PROCEDURE — 90834 PSYTX W PT 45 MINUTES: CPT

## 2024-12-11 DIAGNOSIS — F33.41 MAJOR DEPRESSIVE DISORDER, RECURRENT, IN PARTIAL REMISSION: ICD-10-CM

## 2024-12-11 DIAGNOSIS — F41.1 GENERALIZED ANXIETY DISORDER: ICD-10-CM

## 2025-01-07 ENCOUNTER — NON-APPOINTMENT (OUTPATIENT)
Age: 55
End: 2025-01-07

## 2025-01-11 ENCOUNTER — NON-APPOINTMENT (OUTPATIENT)
Age: 55
End: 2025-01-11

## 2025-01-14 ENCOUNTER — APPOINTMENT (OUTPATIENT)
Dept: PSYCHIATRY | Facility: CLINIC | Age: 55
End: 2025-01-14

## 2025-01-14 ENCOUNTER — OUTPATIENT (OUTPATIENT)
Dept: OUTPATIENT SERVICES | Facility: HOSPITAL | Age: 55
LOS: 1 days | End: 2025-01-14
Payer: COMMERCIAL

## 2025-01-14 DIAGNOSIS — Z98.891 HISTORY OF UTERINE SCAR FROM PREVIOUS SURGERY: Chronic | ICD-10-CM

## 2025-01-14 DIAGNOSIS — F41.1 GENERALIZED ANXIETY DISORDER: ICD-10-CM

## 2025-01-14 DIAGNOSIS — F33.41 MAJOR DEPRESSIVE DISORDER, RECURRENT, IN PARTIAL REMISSION: ICD-10-CM

## 2025-01-14 DIAGNOSIS — Z90.49 ACQUIRED ABSENCE OF OTHER SPECIFIED PARTS OF DIGESTIVE TRACT: Chronic | ICD-10-CM

## 2025-01-14 DIAGNOSIS — Z98.890 OTHER SPECIFIED POSTPROCEDURAL STATES: Chronic | ICD-10-CM

## 2025-01-14 PROCEDURE — 90832 PSYTX W PT 30 MINUTES: CPT

## 2025-01-15 DIAGNOSIS — F41.1 GENERALIZED ANXIETY DISORDER: ICD-10-CM

## 2025-01-15 DIAGNOSIS — F33.41 MAJOR DEPRESSIVE DISORDER, RECURRENT, IN PARTIAL REMISSION: ICD-10-CM

## 2025-01-28 ENCOUNTER — EMERGENCY (EMERGENCY)
Facility: HOSPITAL | Age: 55
LOS: 0 days | Discharge: HOME | End: 2025-01-29
Attending: EMERGENCY MEDICINE
Payer: SELF-PAY

## 2025-01-28 VITALS
DIASTOLIC BLOOD PRESSURE: 77 MMHG | RESPIRATION RATE: 20 BRPM | OXYGEN SATURATION: 98 % | WEIGHT: 279.99 LBS | TEMPERATURE: 98 F | SYSTOLIC BLOOD PRESSURE: 153 MMHG | HEART RATE: 90 BPM

## 2025-01-28 DIAGNOSIS — Z95.5 PRESENCE OF CORONARY ANGIOPLASTY IMPLANT AND GRAFT: ICD-10-CM

## 2025-01-28 DIAGNOSIS — I48.91 UNSPECIFIED ATRIAL FIBRILLATION: ICD-10-CM

## 2025-01-28 DIAGNOSIS — I65.23 OCCLUSION AND STENOSIS OF BILATERAL CAROTID ARTERIES: ICD-10-CM

## 2025-01-28 DIAGNOSIS — E11.9 TYPE 2 DIABETES MELLITUS WITHOUT COMPLICATIONS: ICD-10-CM

## 2025-01-28 DIAGNOSIS — Z98.890 OTHER SPECIFIED POSTPROCEDURAL STATES: Chronic | ICD-10-CM

## 2025-01-28 DIAGNOSIS — K03.81 CRACKED TOOTH: ICD-10-CM

## 2025-01-28 DIAGNOSIS — K08.89 OTHER SPECIFIED DISORDERS OF TEETH AND SUPPORTING STRUCTURES: ICD-10-CM

## 2025-01-28 DIAGNOSIS — Z98.891 HISTORY OF UTERINE SCAR FROM PREVIOUS SURGERY: Chronic | ICD-10-CM

## 2025-01-28 DIAGNOSIS — I25.2 OLD MYOCARDIAL INFARCTION: ICD-10-CM

## 2025-01-28 DIAGNOSIS — K02.9 DENTAL CARIES, UNSPECIFIED: ICD-10-CM

## 2025-01-28 DIAGNOSIS — Z79.01 LONG TERM (CURRENT) USE OF ANTICOAGULANTS: ICD-10-CM

## 2025-01-28 PROCEDURE — 99283 EMERGENCY DEPT VISIT LOW MDM: CPT

## 2025-01-29 ENCOUNTER — EMERGENCY (EMERGENCY)
Facility: HOSPITAL | Age: 55
LOS: 0 days | Discharge: LEFT BEFORE DISCHARGE | End: 2025-01-29
Attending: EMERGENCY MEDICINE
Payer: COMMERCIAL

## 2025-01-29 ENCOUNTER — EMERGENCY (EMERGENCY)
Facility: HOSPITAL | Age: 55
LOS: 0 days | Discharge: HOME | End: 2025-01-29
Attending: EMERGENCY MEDICINE
Payer: COMMERCIAL

## 2025-01-29 VITALS
WEIGHT: 289.91 LBS | DIASTOLIC BLOOD PRESSURE: 70 MMHG | TEMPERATURE: 99 F | OXYGEN SATURATION: 95 % | RESPIRATION RATE: 18 BRPM | SYSTOLIC BLOOD PRESSURE: 130 MMHG | HEART RATE: 96 BPM

## 2025-01-29 VITALS
DIASTOLIC BLOOD PRESSURE: 83 MMHG | HEART RATE: 109 BPM | OXYGEN SATURATION: 96 % | SYSTOLIC BLOOD PRESSURE: 142 MMHG | WEIGHT: 289.91 LBS | TEMPERATURE: 99 F | RESPIRATION RATE: 20 BRPM | HEIGHT: 66 IN

## 2025-01-29 DIAGNOSIS — K08.89 OTHER SPECIFIED DISORDERS OF TEETH AND SUPPORTING STRUCTURES: ICD-10-CM

## 2025-01-29 DIAGNOSIS — K03.81 CRACKED TOOTH: ICD-10-CM

## 2025-01-29 DIAGNOSIS — Z90.49 ACQUIRED ABSENCE OF OTHER SPECIFIED PARTS OF DIGESTIVE TRACT: Chronic | ICD-10-CM

## 2025-01-29 DIAGNOSIS — I48.91 UNSPECIFIED ATRIAL FIBRILLATION: ICD-10-CM

## 2025-01-29 DIAGNOSIS — Z98.890 OTHER SPECIFIED POSTPROCEDURAL STATES: Chronic | ICD-10-CM

## 2025-01-29 DIAGNOSIS — K02.9 DENTAL CARIES, UNSPECIFIED: ICD-10-CM

## 2025-01-29 DIAGNOSIS — I25.10 ATHEROSCLEROTIC HEART DISEASE OF NATIVE CORONARY ARTERY WITHOUT ANGINA PECTORIS: ICD-10-CM

## 2025-01-29 DIAGNOSIS — Z86.73 PERSONAL HISTORY OF TRANSIENT ISCHEMIC ATTACK (TIA), AND CEREBRAL INFARCTION WITHOUT RESIDUAL DEFICITS: ICD-10-CM

## 2025-01-29 DIAGNOSIS — Z95.5 PRESENCE OF CORONARY ANGIOPLASTY IMPLANT AND GRAFT: ICD-10-CM

## 2025-01-29 DIAGNOSIS — Z98.891 HISTORY OF UTERINE SCAR FROM PREVIOUS SURGERY: Chronic | ICD-10-CM

## 2025-01-29 DIAGNOSIS — I25.2 OLD MYOCARDIAL INFARCTION: ICD-10-CM

## 2025-01-29 DIAGNOSIS — E11.9 TYPE 2 DIABETES MELLITUS WITHOUT COMPLICATIONS: ICD-10-CM

## 2025-01-29 DIAGNOSIS — Z53.29 PROCEDURE AND TREATMENT NOT CARRIED OUT BECAUSE OF PATIENT'S DECISION FOR OTHER REASONS: ICD-10-CM

## 2025-01-29 DIAGNOSIS — Z79.01 LONG TERM (CURRENT) USE OF ANTICOAGULANTS: ICD-10-CM

## 2025-01-29 PROCEDURE — 99283 EMERGENCY DEPT VISIT LOW MDM: CPT

## 2025-01-29 PROCEDURE — 99281 EMR DPT VST MAYX REQ PHY/QHP: CPT

## 2025-01-29 PROCEDURE — L9991: CPT

## 2025-01-29 PROCEDURE — 99284 EMERGENCY DEPT VISIT MOD MDM: CPT

## 2025-01-29 RX ORDER — CHLORHEXIDINE GLUCONATE 1.2 MG/ML
15 RINSE ORAL
Qty: 1 | Refills: 0
Start: 2025-01-29 | End: 2025-02-04

## 2025-01-29 RX ORDER — AMOXICILLIN/POTASSIUM CLAV 875-125 MG
1 TABLET ORAL
Qty: 14 | Refills: 0
Start: 2025-01-29 | End: 2025-02-04

## 2025-01-29 RX ORDER — OXYCODONE AND ACETAMINOPHEN 5; 325 MG/1; MG/1
1 TABLET ORAL ONCE
Refills: 0 | Status: DISCONTINUED | OUTPATIENT
Start: 2025-01-29 | End: 2025-01-29

## 2025-01-29 RX ORDER — NAPROXEN 500 MG
1 TABLET, DELAYED RELEASE (ENTERIC COATED) ORAL
Qty: 21 | Refills: 0
Start: 2025-01-29 | End: 2025-02-04

## 2025-01-29 RX ADMIN — OXYCODONE AND ACETAMINOPHEN 1 TABLET(S): 5; 325 TABLET ORAL at 13:50

## 2025-01-29 NOTE — ED PROVIDER NOTE - OBJECTIVE STATEMENT
54-year-old female with past medical history of DM, CVA, MI s/p PCI, bilateral carotid stenosis, A-fib on Xarelto presents for eval of dental pain. Patient w/ several dys of R upper dental pain - was evaluated by her dentist - but was unable to get her tooth pulled as she is on AC. Today got clearance from cardiology to go off AC for the tooth to be pulled. Has an appt to see dental surgeon next week for evaluation. Started on abx, clindamycin, by her dentist. No fever, chills, SOB.

## 2025-01-29 NOTE — ED PROVIDER NOTE - PHYSICAL EXAMINATION
Vital Signs: I have reviewed the initial vital signs.  Constitutional: appears stated age, no acute distress  Eyes: Sclera clear, EOMI.  ENT: Caries and dental fracture to tooth  # 3, no gross dental abscess  Cardiovascular: S1 and S2, regular rate, regular rhythm, well-perfused extremities, radial pulses equal and 2+, pedal pulses 2+ and equal  Respiratory: unlabored respiratory effort, clear to auscultation bilaterally no wheezing, rales, or rhonchi  Gastrointestinal:  abdomen soft, non-tender  Musculoskeletal: supple neck, no lower extremity edema  Integumentary: warm, dry, no rash  Neurologic: awake, alert, oriented x3, extremities’ motor and sensory functions grossly intact

## 2025-01-29 NOTE — ED PROVIDER NOTE - PATIENT PORTAL LINK FT
You can access the FollowMyHealth Patient Portal offered by [facility] by registering at the following website: [email or web address]. By joining [employer]’s FollowMyHealth portal, you will also be able to view your health information using other applications (apps) compatible with our system.

## 2025-01-29 NOTE — ED ADULT NURSE NOTE - NS ED NURSE RECORD ANOTHER VITAL SIGN
[facility] Care Transitions Follow Up Call    Patient Current Location:  Home: [address] [place] [address]    Care Transition Nurse contacted the patient by telephone to follow up after admission on 23. Verified name and  with patient as identifiers. Patient: Trupti Cabrera  Patient : 1950   MRN: [id number]  Reason for Admission:   Discharge Date: 23 RARS: Readmission Risk Score: 6.4      Needs to be reviewed by the provider   Additional needs identified to be addressed with provider: No  none             Method of communication with provider: none. Writer spoke to patient, she is doing well, no new needs or concerns at this time, denies any c/o headache, fever, chills, n/v/d, sob or chest pain, is eating and drinking, will continue to follow//JU    Addressed changes since last contact:  none  Discussed follow-up appointments. If no appointment was previously scheduled, appointment scheduling offered: Yes. Is follow up appointment scheduled within 7 days of discharge? Yes. Follow Up  Future Appointments   Date Time Provider Adin Gonzalez   2023 12:15 PM Marlow Babinski, MD [facility] [place] Transitions Subsequent and Final Call    Schedule Follow Up Appointment with PCP: Completed  Subsequent and Final Calls  Do you have any ongoing symptoms?: No  Have your medications changed?: No  Do you currently have any active services?: No  Do you have any needs or concerns that I can assist you with?: No  Care Transitions Interventions  Disease Association: Completed  Specialty Service Referral: Declined    Other Interventions:             Care Transition Nurse provided contact information for future needs. Plan for follow-up call in 5-7 days based on severity of symptoms and risk factors.   Plan for next call: symptom management-     Rowena Soriano RN
Yes

## 2025-01-29 NOTE — ED PROVIDER NOTE - CLINICAL SUMMARY MEDICAL DECISION MAKING FREE TEXT BOX
Patient presents with dental pain.  Pain medications given.  Antibiotic changed to antibiotic to Augmentin.  Protecting airway secretions. Patient discharged with dental follow-up and return precautions.

## 2025-01-29 NOTE — ED PROVIDER NOTE - ATTENDING APP SHARED VISIT CONTRIBUTION OF CARE
54-year-old female past med history of CAD with 5 stents, CVA, A-fib on Xarelto and Plavix, presents with dental pain.  Patient reports for last 2 days she has been having pain to her right upper molar.  States that she cracked her tooth.  Went to the dentist yesterday and was started on clindamycin.  Was advised to get clearance from her cardiologist and taken off the blood thinners before any procedures could be done.  Was also advised to follow-up with an oral surgeon.  Patient's pain persisted.  Went to the [facility] ED and was given a Percocet at that time with mild improvement in symptoms.  Tried just go to the dental clinic today but was unable to get seen so came to the ED again for evaluation.  Denies any fevers or chills.  Positive worsening pain to that area and some mild facial swelling.  No difficulty speaking or swallowing.  Patient is since gotten clearance by her cardiologist to stop taking her blood thinners.    VITALS:  I have reviewed the initial vital signs.  GENERAL: Well-developed, well-nourished, in no acute distress. Nontoxic.  HEENT: MMM, tolerating oral secretions. No trismus. + carry to right upper molar. No abscess. No floor of mouth or submandibular swelling. No tongue elevation.    NECK: supple w FROM.   PULM: Normal effort. No tachypnea or retractions. No stridor.   SKIN: Warm, dry.  NEURO: A&Ox3. Speech clear. CN II-XII intact. No focal deficits.

## 2025-01-29 NOTE — ED PROVIDER NOTE - PHYSICAL EXAMINATION
CONST: Well appearing in NAD  EYES: PERRL, EOMI, Sclera and conjunctiva clear.   ENT: Multiple missing teeth, fracture #3. No palpable abscess. Uvula midline.   CARD: Normal S1 S2; Normal rate and rhythm  RESP: Equal BS B/L, No wheezes, rhonchi or rales. No distress  GI: Soft, non-tender, non-distended.  MS: Normal ROM in all extremities. No midline spinal tenderness.  SKIN: Warm, dry, no acute rashes.   NEURO: A&Ox3, No focal deficits. Strength 5/5 with no sensory deficits. Steady gait

## 2025-01-29 NOTE — ED PROVIDER NOTE - NSFOLLOWUPINSTRUCTIONS_ED_ALL_ED_FT
Our Emergency Department Referral Coordinators will be reaching out to you in the next 24-48 hours from 9:00am to 5:00pm to schedule a follow up appointment. Please expect a phone call from the hospital in that time frame. If you do not receive a call or if you have any questions or concerns, you can reach them at   [phone]    Dental Pain    Dental pain (toothache) may be caused by many things including tooth decay (cavities or caries), abscess or infection, injury, or the reason may be unknown. Your pain may only occur when you are chewing, are exposed to hot or cold temperature, are eating or drinking sugary foods or beverages, or your pain may be constant. If you were prescribed an antibiotic medicine, finish all of it even if you start to feel better. Rinsing your mouth with salt water or applying ice to the painful area of your face may help with the pain.    SEEK IMMEDIATE MEDICAL CARE IF YOU HAVE THE FOLLOWING SYMPTOMS: unable to open mouth, trouble breathing or swallowing, fever, or swelling of the face, neck or jaw.

## 2025-01-29 NOTE — ED ADULT NURSE NOTE - CHIEF COMPLAINT QUOTE
PT BIBEMS for RU toothache. PT was at [facility] ED earlier and DC with Naproxen which provided no relief

## 2025-01-29 NOTE — ED PROVIDER NOTE - NSFOLLOWUPINSTRUCTIONS_ED_ALL_ED_FT
Dental Pain  ImageDental pain may be caused by many things, including:  Tooth decay (cavities or caries).Infection.The inner part of the tooth being filled with pus (abscess).Injury.Sometimes the cause of pain is unknown.  Your pain can vary. It may be mild or severe. You may have it all the time, or it may occur only when you are:  Chewing.Exposed to hot or cold temperature.Eating or drinking sugary foods or beverages, such as soda or candy.Follow these instructions at home:  Medicines     Take over-the-counter and prescription medicines only as told by your doctor.If you were prescribed an antibiotic medicine, take it as told by your doctor. Do not stop taking the medicine even if you start to feel better.Eating and drinking     Do not eat foods or drinks that cause you pain. These include:  Very hot or very cold foods or drinks.Sweet or sugary foods or drinks.Managing pain and swelling     Image   Gargle with a salt-water mixture 3–4 times a day. To make this, dissolve ½–1 tsp of salt in 1 cup of warm water.If told, put ice on the painful area of your face:  Put ice in a plastic bag. Place a towel between your skin and the bag. Leave the ice on for 20 minutes, 2–3 times a day.Brushing your teeth     Brush your teeth twice a day using a fluoride toothpaste. Floss your teeth once a day.Use a toothpaste made for sensitive teeth as told by your doctor.Use a soft toothbrush.General instructions     Do not apply heat to the outside of your face.Watch your dental pain. Let your doctor know if there are any changes.Keep all follow-up visits as told by your doctor. This is important.Contact a doctor if:  Your pain is not relieved by medicines.You have new symptoms.Your symptoms get worse.Get help right away if:  You cannot open your mouth.You are having trouble breathing or swallowing.You have a fever.Your face, neck, or jaw is swollen.Summary  Dental pain may be caused by many things, including tooth decay, injury, or infection. In some cases, the cause is not known.Your pain may be mild or severe. You may have pain all the time, or you may have it only when you eat or drink.Take over-the-counter and prescription medicines only as told by your doctor.Watch your dental pain for any changes. Let your doctor know if symptoms get worse.This information is not intended to replace advice given to you by your health care provider. Make sure you discuss any questions you have with your health care provider

## 2025-01-29 NOTE — ED PROVIDER NOTE - PATIENT PORTAL LINK FT
You can access the FollowMyHealth Patient Portal offered by [facility] by registering at the following website: [email or web address]. By joining [facility]’s FollowMyHealth portal, you will also be able to view your health information using other applications (apps) compatible with our system.

## 2025-01-29 NOTE — ED PROVIDER NOTE - CLINICAL SUMMARY MEDICAL DECISION MAKING FREE TEXT BOX
54yF pw  right upper tooth pain  tooth #3 fractured,  seen by dentist -  ,  cleared by cardiology to hold her  xarelto for  extraction of tooth and use of nsaids.  Pt took motrin  at [facility] with no relief,    Pt on clindamycin  prescribed by dentist -  had local  injection   which worked  but wore off ,. here for pain ,   percocet given     continue outpt follow up  with oral surgeon /dentist

## 2025-01-29 NOTE — ED ADULT NURSE NOTE - EXPLANATION OF PATIENT'S REASON FOR LEAVING
Informed pt to wait to be seen by provider, pt did not want to wait longer and decided to leave. Pt AO4, independent

## 2025-01-29 NOTE — ED ADULT TRIAGE NOTE - CHIEF COMPLAINT QUOTE
PT BIBEMS for RU toothache. PT was at [facility] ED earlier and DC with Naproxen which provided no relief

## 2025-01-29 NOTE — ED PROVIDER NOTE - OBJECTIVE STATEMENT
54-year-old female with past medical history of DM, CVA, MI s/p PCI, bilateral carotid stenosis, A-fib on Xarelto presents with complaint of dental pain.  Patient reports she has caries and dental fracture to right upper tooth #3.  States she saw her dentist today who prescribed antibiotics, performed a dental block, recommended tooth extraction, however stated she needs clearance from cardiology to stop her anticoagulant medications.  Reports she took 4 Advil and stopped her Xarelto as per cardiologist recommendation, however dental pain is persistent, prompting emergency department evaluation.  Denies fever, chills, trismus, drooling, difficulty tolerating p.o.

## 2025-01-30 ENCOUNTER — OUTPATIENT (OUTPATIENT)
Dept: OUTPATIENT SERVICES | Facility: HOSPITAL | Age: 55
LOS: 1 days | End: 2025-01-30
Payer: COMMERCIAL

## 2025-01-30 DIAGNOSIS — K02.9 DENTAL CARIES, UNSPECIFIED: ICD-10-CM

## 2025-01-30 DIAGNOSIS — Z98.890 OTHER SPECIFIED POSTPROCEDURAL STATES: Chronic | ICD-10-CM

## 2025-01-30 DIAGNOSIS — Z90.49 ACQUIRED ABSENCE OF OTHER SPECIFIED PARTS OF DIGESTIVE TRACT: Chronic | ICD-10-CM

## 2025-01-30 DIAGNOSIS — Z98.891 HISTORY OF UTERINE SCAR FROM PREVIOUS SURGERY: Chronic | ICD-10-CM

## 2025-01-30 PROCEDURE — D0220: CPT

## 2025-01-30 PROCEDURE — D0140: CPT

## 2025-01-30 NOTE — ED ADULT NURSE REASSESSMENT NOTE - NS ED NURSE REASSESS COMMENT FT1
Pt (AO4, independent) stated she will leave due to not being seen yet by a provider. Informed pt that the provider will eventually see her, but may take a bit longer due to the number of pts present in the hospital. Pt verbalized that she did not want to wait any longer and will leave

## 2025-01-31 ENCOUNTER — OUTPATIENT (OUTPATIENT)
Dept: OUTPATIENT SERVICES | Facility: HOSPITAL | Age: 55
LOS: 1 days | End: 2025-01-31
Payer: COMMERCIAL

## 2025-01-31 DIAGNOSIS — Z98.891 HISTORY OF UTERINE SCAR FROM PREVIOUS SURGERY: Chronic | ICD-10-CM

## 2025-01-31 DIAGNOSIS — Z90.49 ACQUIRED ABSENCE OF OTHER SPECIFIED PARTS OF DIGESTIVE TRACT: Chronic | ICD-10-CM

## 2025-01-31 DIAGNOSIS — Z98.890 OTHER SPECIFIED POSTPROCEDURAL STATES: Chronic | ICD-10-CM

## 2025-01-31 DIAGNOSIS — K01.1 IMPACTED TEETH: ICD-10-CM

## 2025-01-31 PROCEDURE — D7250: CPT

## 2025-02-06 DIAGNOSIS — K02.9 DENTAL CARIES, UNSPECIFIED: ICD-10-CM

## 2025-02-06 DIAGNOSIS — K08.3 RETAINED DENTAL ROOT: ICD-10-CM

## 2025-02-11 ENCOUNTER — APPOINTMENT (OUTPATIENT)
Dept: PSYCHIATRY | Facility: CLINIC | Age: 55
End: 2025-02-11

## 2025-02-11 ENCOUNTER — OUTPATIENT (OUTPATIENT)
Dept: OUTPATIENT SERVICES | Facility: HOSPITAL | Age: 55
LOS: 1 days | End: 2025-02-11
Payer: COMMERCIAL

## 2025-02-11 DIAGNOSIS — Z98.890 OTHER SPECIFIED POSTPROCEDURAL STATES: Chronic | ICD-10-CM

## 2025-02-11 DIAGNOSIS — Z98.891 HISTORY OF UTERINE SCAR FROM PREVIOUS SURGERY: Chronic | ICD-10-CM

## 2025-02-11 DIAGNOSIS — F33.41 MAJOR DEPRESSIVE DISORDER, RECURRENT, IN PARTIAL REMISSION: ICD-10-CM

## 2025-02-11 DIAGNOSIS — Z90.49 ACQUIRED ABSENCE OF OTHER SPECIFIED PARTS OF DIGESTIVE TRACT: Chronic | ICD-10-CM

## 2025-02-11 DIAGNOSIS — F41.1 GENERALIZED ANXIETY DISORDER: ICD-10-CM

## 2025-02-11 PROCEDURE — 90834 PSYTX W PT 45 MINUTES: CPT

## 2025-02-12 DIAGNOSIS — F33.41 MAJOR DEPRESSIVE DISORDER, RECURRENT, IN PARTIAL REMISSION: ICD-10-CM

## 2025-02-12 DIAGNOSIS — F41.1 GENERALIZED ANXIETY DISORDER: ICD-10-CM

## 2025-02-24 ENCOUNTER — APPOINTMENT (OUTPATIENT)
Dept: PSYCHIATRY | Facility: CLINIC | Age: 55
End: 2025-02-24

## 2025-03-03 ENCOUNTER — APPOINTMENT (OUTPATIENT)
Facility: CLINIC | Age: 55
End: 2025-03-03
Payer: COMMERCIAL

## 2025-03-03 ENCOUNTER — NON-APPOINTMENT (OUTPATIENT)
Age: 55
End: 2025-03-03

## 2025-03-03 PROCEDURE — 92020 GONIOSCOPY: CPT

## 2025-03-03 PROCEDURE — 92250 FUNDUS PHOTOGRAPHY W/I&R: CPT

## 2025-03-03 PROCEDURE — 92235 FLUORESCEIN ANGRPH MLTIFRAME: CPT

## 2025-03-03 PROCEDURE — 76512 OPH US DX B-SCAN: CPT | Mod: RT

## 2025-03-03 PROCEDURE — 99214 OFFICE O/P EST MOD 30 MIN: CPT

## 2025-03-04 ENCOUNTER — APPOINTMENT (OUTPATIENT)
Dept: PSYCHIATRY | Facility: CLINIC | Age: 55
End: 2025-03-04

## 2025-03-04 ENCOUNTER — OUTPATIENT (OUTPATIENT)
Dept: OUTPATIENT SERVICES | Facility: HOSPITAL | Age: 55
LOS: 1 days | End: 2025-03-04
Payer: COMMERCIAL

## 2025-03-04 DIAGNOSIS — F41.1 GENERALIZED ANXIETY DISORDER: ICD-10-CM

## 2025-03-04 DIAGNOSIS — Z90.49 ACQUIRED ABSENCE OF OTHER SPECIFIED PARTS OF DIGESTIVE TRACT: Chronic | ICD-10-CM

## 2025-03-04 DIAGNOSIS — Z98.890 OTHER SPECIFIED POSTPROCEDURAL STATES: Chronic | ICD-10-CM

## 2025-03-04 DIAGNOSIS — F33.41 MAJOR DEPRESSIVE DISORDER, RECURRENT, IN PARTIAL REMISSION: ICD-10-CM

## 2025-03-04 PROCEDURE — 90834 PSYTX W PT 45 MINUTES: CPT

## 2025-03-05 DIAGNOSIS — F41.1 GENERALIZED ANXIETY DISORDER: ICD-10-CM

## 2025-03-05 DIAGNOSIS — F33.41 MAJOR DEPRESSIVE DISORDER, RECURRENT, IN PARTIAL REMISSION: ICD-10-CM

## 2025-03-25 ENCOUNTER — OUTPATIENT (OUTPATIENT)
Dept: OUTPATIENT SERVICES | Facility: HOSPITAL | Age: 55
LOS: 1 days | End: 2025-03-25
Payer: COMMERCIAL

## 2025-03-25 ENCOUNTER — APPOINTMENT (OUTPATIENT)
Dept: PSYCHIATRY | Facility: CLINIC | Age: 55
End: 2025-03-25

## 2025-03-25 DIAGNOSIS — Z98.890 OTHER SPECIFIED POSTPROCEDURAL STATES: Chronic | ICD-10-CM

## 2025-03-25 DIAGNOSIS — F33.41 MAJOR DEPRESSIVE DISORDER, RECURRENT, IN PARTIAL REMISSION: ICD-10-CM

## 2025-03-25 DIAGNOSIS — Z90.49 ACQUIRED ABSENCE OF OTHER SPECIFIED PARTS OF DIGESTIVE TRACT: Chronic | ICD-10-CM

## 2025-03-25 DIAGNOSIS — F41.1 GENERALIZED ANXIETY DISORDER: ICD-10-CM

## 2025-03-25 DIAGNOSIS — Z98.891 HISTORY OF UTERINE SCAR FROM PREVIOUS SURGERY: Chronic | ICD-10-CM

## 2025-03-25 PROCEDURE — 90832 PSYTX W PT 30 MINUTES: CPT

## 2025-03-26 DIAGNOSIS — F41.1 GENERALIZED ANXIETY DISORDER: ICD-10-CM

## 2025-03-26 DIAGNOSIS — F33.41 MAJOR DEPRESSIVE DISORDER, RECURRENT, IN PARTIAL REMISSION: ICD-10-CM

## 2025-04-15 ENCOUNTER — APPOINTMENT (OUTPATIENT)
Dept: PSYCHIATRY | Facility: CLINIC | Age: 55
End: 2025-04-15

## 2025-04-15 ENCOUNTER — OUTPATIENT (OUTPATIENT)
Dept: OUTPATIENT SERVICES | Facility: HOSPITAL | Age: 55
LOS: 1 days | End: 2025-04-15
Payer: COMMERCIAL

## 2025-04-15 DIAGNOSIS — F41.1 GENERALIZED ANXIETY DISORDER: ICD-10-CM

## 2025-04-15 DIAGNOSIS — Z98.890 OTHER SPECIFIED POSTPROCEDURAL STATES: Chronic | ICD-10-CM

## 2025-04-15 DIAGNOSIS — Z98.891 HISTORY OF UTERINE SCAR FROM PREVIOUS SURGERY: Chronic | ICD-10-CM

## 2025-04-15 DIAGNOSIS — F33.41 MAJOR DEPRESSIVE DISORDER, RECURRENT, IN PARTIAL REMISSION: ICD-10-CM

## 2025-04-15 DIAGNOSIS — Z90.49 ACQUIRED ABSENCE OF OTHER SPECIFIED PARTS OF DIGESTIVE TRACT: Chronic | ICD-10-CM

## 2025-04-15 PROCEDURE — 90834 PSYTX W PT 45 MINUTES: CPT

## 2025-04-16 DIAGNOSIS — F33.41 MAJOR DEPRESSIVE DISORDER, RECURRENT, IN PARTIAL REMISSION: ICD-10-CM

## 2025-04-16 DIAGNOSIS — F41.1 GENERALIZED ANXIETY DISORDER: ICD-10-CM

## 2025-04-29 NOTE — REASON FOR VISIT
[Home] : at home, [unfilled] , at the time of the visit. [Other Location: e.g. Home (Enter Location, City,State)___] : at [unfilled] [Verbal consent obtained from patient] : the patient, [unfilled] complains of pain/discomfort

## 2025-05-13 ENCOUNTER — APPOINTMENT (OUTPATIENT)
Dept: PSYCHIATRY | Facility: CLINIC | Age: 55
End: 2025-05-13

## 2025-05-27 ENCOUNTER — APPOINTMENT (OUTPATIENT)
Dept: PSYCHIATRY | Facility: CLINIC | Age: 55
End: 2025-05-27

## 2025-05-27 ENCOUNTER — OUTPATIENT (OUTPATIENT)
Dept: OUTPATIENT SERVICES | Facility: HOSPITAL | Age: 55
LOS: 1 days | End: 2025-05-27
Payer: COMMERCIAL

## 2025-05-27 DIAGNOSIS — F33.41 MAJOR DEPRESSIVE DISORDER, RECURRENT, IN PARTIAL REMISSION: ICD-10-CM

## 2025-05-27 DIAGNOSIS — Z98.890 OTHER SPECIFIED POSTPROCEDURAL STATES: Chronic | ICD-10-CM

## 2025-05-27 DIAGNOSIS — F41.1 GENERALIZED ANXIETY DISORDER: ICD-10-CM

## 2025-05-27 DIAGNOSIS — Z98.891 HISTORY OF UTERINE SCAR FROM PREVIOUS SURGERY: Chronic | ICD-10-CM

## 2025-05-27 DIAGNOSIS — Z90.49 ACQUIRED ABSENCE OF OTHER SPECIFIED PARTS OF DIGESTIVE TRACT: Chronic | ICD-10-CM

## 2025-05-27 PROCEDURE — 90834 PSYTX W PT 45 MINUTES: CPT

## 2025-05-28 DIAGNOSIS — F41.1 GENERALIZED ANXIETY DISORDER: ICD-10-CM

## 2025-05-28 DIAGNOSIS — F33.41 MAJOR DEPRESSIVE DISORDER, RECURRENT, IN PARTIAL REMISSION: ICD-10-CM

## 2025-06-23 ENCOUNTER — APPOINTMENT (OUTPATIENT)
Dept: PSYCHIATRY | Facility: CLINIC | Age: 55
End: 2025-06-23
Payer: COMMERCIAL

## 2025-06-23 ENCOUNTER — OUTPATIENT (OUTPATIENT)
Dept: OUTPATIENT SERVICES | Facility: HOSPITAL | Age: 55
LOS: 1 days | End: 2025-06-23
Payer: COMMERCIAL

## 2025-06-23 DIAGNOSIS — Z98.891 HISTORY OF UTERINE SCAR FROM PREVIOUS SURGERY: Chronic | ICD-10-CM

## 2025-06-23 DIAGNOSIS — Z90.49 ACQUIRED ABSENCE OF OTHER SPECIFIED PARTS OF DIGESTIVE TRACT: Chronic | ICD-10-CM

## 2025-06-23 DIAGNOSIS — F33.41 MAJOR DEPRESSIVE DISORDER, RECURRENT, IN PARTIAL REMISSION: ICD-10-CM

## 2025-06-23 DIAGNOSIS — F41.1 GENERALIZED ANXIETY DISORDER: ICD-10-CM

## 2025-06-23 PROCEDURE — 99214 OFFICE O/P EST MOD 30 MIN: CPT | Mod: 95

## 2025-06-23 PROCEDURE — 98007 SYNCH AUDIO-VIDEO EST HI 40: CPT

## 2025-06-24 ENCOUNTER — APPOINTMENT (OUTPATIENT)
Dept: PSYCHIATRY | Facility: CLINIC | Age: 55
End: 2025-06-24

## 2025-06-24 ENCOUNTER — OUTPATIENT (OUTPATIENT)
Dept: OUTPATIENT SERVICES | Facility: HOSPITAL | Age: 55
LOS: 1 days | End: 2025-06-24
Payer: COMMERCIAL

## 2025-06-24 DIAGNOSIS — F33.41 MAJOR DEPRESSIVE DISORDER, RECURRENT, IN PARTIAL REMISSION: ICD-10-CM

## 2025-06-24 DIAGNOSIS — F41.1 GENERALIZED ANXIETY DISORDER: ICD-10-CM

## 2025-06-24 DIAGNOSIS — Z90.49 ACQUIRED ABSENCE OF OTHER SPECIFIED PARTS OF DIGESTIVE TRACT: Chronic | ICD-10-CM

## 2025-06-24 DIAGNOSIS — Z98.890 OTHER SPECIFIED POSTPROCEDURAL STATES: Chronic | ICD-10-CM

## 2025-06-24 DIAGNOSIS — Z98.891 HISTORY OF UTERINE SCAR FROM PREVIOUS SURGERY: Chronic | ICD-10-CM

## 2025-06-24 PROCEDURE — 90832 PSYTX W PT 30 MINUTES: CPT

## 2025-06-25 DIAGNOSIS — F41.1 GENERALIZED ANXIETY DISORDER: ICD-10-CM

## 2025-07-21 ENCOUNTER — NON-APPOINTMENT (OUTPATIENT)
Age: 55
End: 2025-07-21

## 2025-07-23 ENCOUNTER — APPOINTMENT (OUTPATIENT)
Dept: VASCULAR SURGERY | Facility: CLINIC | Age: 55
End: 2025-07-23

## 2025-07-24 ENCOUNTER — APPOINTMENT (OUTPATIENT)
Facility: CLINIC | Age: 55
End: 2025-07-24

## 2025-07-29 ENCOUNTER — APPOINTMENT (OUTPATIENT)
Dept: PSYCHIATRY | Facility: CLINIC | Age: 55
End: 2025-07-29

## 2025-07-29 ENCOUNTER — OUTPATIENT (OUTPATIENT)
Dept: OUTPATIENT SERVICES | Facility: HOSPITAL | Age: 55
LOS: 1 days | End: 2025-07-29
Payer: COMMERCIAL

## 2025-07-29 DIAGNOSIS — Z98.890 OTHER SPECIFIED POSTPROCEDURAL STATES: Chronic | ICD-10-CM

## 2025-07-29 DIAGNOSIS — F41.1 GENERALIZED ANXIETY DISORDER: ICD-10-CM

## 2025-07-29 DIAGNOSIS — Z98.891 HISTORY OF UTERINE SCAR FROM PREVIOUS SURGERY: Chronic | ICD-10-CM

## 2025-07-29 DIAGNOSIS — Z90.49 ACQUIRED ABSENCE OF OTHER SPECIFIED PARTS OF DIGESTIVE TRACT: Chronic | ICD-10-CM

## 2025-07-29 PROCEDURE — 90834 PSYTX W PT 45 MINUTES: CPT

## 2025-07-30 DIAGNOSIS — F33.41 MAJOR DEPRESSIVE DISORDER, RECURRENT, IN PARTIAL REMISSION: ICD-10-CM

## 2025-07-30 DIAGNOSIS — F41.1 GENERALIZED ANXIETY DISORDER: ICD-10-CM

## 2025-08-26 ENCOUNTER — APPOINTMENT (OUTPATIENT)
Dept: PSYCHIATRY | Facility: CLINIC | Age: 55
End: 2025-08-26

## 2025-08-26 ENCOUNTER — OUTPATIENT (OUTPATIENT)
Dept: OUTPATIENT SERVICES | Facility: HOSPITAL | Age: 55
LOS: 1 days | End: 2025-08-26
Payer: COMMERCIAL

## 2025-08-26 DIAGNOSIS — F41.1 GENERALIZED ANXIETY DISORDER: ICD-10-CM

## 2025-08-26 DIAGNOSIS — F33.41 MAJOR DEPRESSIVE DISORDER, RECURRENT, IN PARTIAL REMISSION: ICD-10-CM

## 2025-08-26 DIAGNOSIS — Z98.890 OTHER SPECIFIED POSTPROCEDURAL STATES: Chronic | ICD-10-CM

## 2025-08-26 DIAGNOSIS — Z98.891 HISTORY OF UTERINE SCAR FROM PREVIOUS SURGERY: Chronic | ICD-10-CM

## 2025-08-26 PROCEDURE — 90832 PSYTX W PT 30 MINUTES: CPT

## 2025-08-27 DIAGNOSIS — F33.41 MAJOR DEPRESSIVE DISORDER, RECURRENT, IN PARTIAL REMISSION: ICD-10-CM

## 2025-08-27 DIAGNOSIS — F41.1 GENERALIZED ANXIETY DISORDER: ICD-10-CM

## 2025-09-15 ENCOUNTER — APPOINTMENT (OUTPATIENT)
Dept: PSYCHIATRY | Facility: CLINIC | Age: 55
End: 2025-09-15
Payer: COMMERCIAL

## 2025-09-15 DIAGNOSIS — F33.41 MAJOR DEPRESSIVE DISORDER, RECURRENT, IN PARTIAL REMISSION: ICD-10-CM

## 2025-09-15 DIAGNOSIS — F41.1 GENERALIZED ANXIETY DISORDER: ICD-10-CM

## 2025-09-15 DIAGNOSIS — F41.0 PANIC DISORDER [EPISODIC PAROXYSMAL ANXIETY]: ICD-10-CM

## 2025-09-15 PROCEDURE — 99215 OFFICE O/P EST HI 40 MIN: CPT | Mod: 95
